# Patient Record
Sex: MALE | Race: WHITE | Employment: OTHER | URBAN - NONMETROPOLITAN AREA
[De-identification: names, ages, dates, MRNs, and addresses within clinical notes are randomized per-mention and may not be internally consistent; named-entity substitution may affect disease eponyms.]

---

## 2017-01-20 RX ORDER — PRAVASTATIN SODIUM 20 MG
TABLET ORAL
Qty: 90 TABLET | Refills: 3 | Status: SHIPPED | OUTPATIENT
Start: 2017-01-20 | End: 2017-12-02 | Stop reason: SDUPTHER

## 2017-01-20 RX ORDER — CLOPIDOGREL BISULFATE 75 MG/1
TABLET ORAL
Qty: 90 TABLET | Refills: 3 | Status: SHIPPED | OUTPATIENT
Start: 2017-01-20 | End: 2017-12-02 | Stop reason: SDUPTHER

## 2017-03-14 ENCOUNTER — OFFICE VISIT (OUTPATIENT)
Dept: PULMONOLOGY | Age: 72
End: 2017-03-14

## 2017-03-14 VITALS
HEART RATE: 72 BPM | HEIGHT: 64 IN | DIASTOLIC BLOOD PRESSURE: 73 MMHG | BODY MASS INDEX: 23.18 KG/M2 | OXYGEN SATURATION: 97 % | WEIGHT: 135.8 LBS | SYSTOLIC BLOOD PRESSURE: 106 MMHG | TEMPERATURE: 96.8 F

## 2017-03-14 DIAGNOSIS — J44.9 COPD, SEVERE (HCC): ICD-10-CM

## 2017-03-14 DIAGNOSIS — J44.1 COPD EXACERBATION (HCC): Primary | ICD-10-CM

## 2017-03-14 PROCEDURE — G8420 CALC BMI NORM PARAMETERS: HCPCS | Performed by: INTERNAL MEDICINE

## 2017-03-14 PROCEDURE — G8427 DOCREV CUR MEDS BY ELIG CLIN: HCPCS | Performed by: INTERNAL MEDICINE

## 2017-03-14 PROCEDURE — 3017F COLORECTAL CA SCREEN DOC REV: CPT | Performed by: INTERNAL MEDICINE

## 2017-03-14 PROCEDURE — 3023F SPIROM DOC REV: CPT | Performed by: INTERNAL MEDICINE

## 2017-03-14 PROCEDURE — G8598 ASA/ANTIPLAT THER USED: HCPCS | Performed by: INTERNAL MEDICINE

## 2017-03-14 PROCEDURE — G8926 SPIRO NO PERF OR DOC: HCPCS | Performed by: INTERNAL MEDICINE

## 2017-03-14 PROCEDURE — 4040F PNEUMOC VAC/ADMIN/RCVD: CPT | Performed by: INTERNAL MEDICINE

## 2017-03-14 PROCEDURE — 99214 OFFICE O/P EST MOD 30 MIN: CPT | Performed by: INTERNAL MEDICINE

## 2017-03-14 PROCEDURE — 1036F TOBACCO NON-USER: CPT | Performed by: INTERNAL MEDICINE

## 2017-03-14 PROCEDURE — G8484 FLU IMMUNIZE NO ADMIN: HCPCS | Performed by: INTERNAL MEDICINE

## 2017-03-14 PROCEDURE — 1123F ACP DISCUSS/DSCN MKR DOCD: CPT | Performed by: INTERNAL MEDICINE

## 2017-03-14 RX ORDER — DOXYCYCLINE HYCLATE 100 MG
100 TABLET ORAL 2 TIMES DAILY
COMMUNITY
End: 2017-04-04

## 2017-03-14 RX ORDER — PREDNISONE 10 MG/1
TABLET ORAL
Qty: 30 TABLET | Refills: 0 | Status: SHIPPED | OUTPATIENT
Start: 2017-03-14 | End: 2017-03-24

## 2017-04-04 PROBLEM — J44.1 COPD WITH ACUTE EXACERBATION (HCC): Status: ACTIVE | Noted: 2017-04-04

## 2017-04-19 ENCOUNTER — TELEPHONE (OUTPATIENT)
Dept: PULMONOLOGY | Age: 72
End: 2017-04-19

## 2017-04-26 ENCOUNTER — OFFICE VISIT (OUTPATIENT)
Dept: ENDOCRINOLOGY | Age: 72
End: 2017-04-26

## 2017-04-26 VITALS
TEMPERATURE: 98.1 F | BODY MASS INDEX: 23.56 KG/M2 | SYSTOLIC BLOOD PRESSURE: 132 MMHG | WEIGHT: 138 LBS | HEIGHT: 64 IN | RESPIRATION RATE: 30 BRPM | DIASTOLIC BLOOD PRESSURE: 74 MMHG | HEART RATE: 76 BPM | OXYGEN SATURATION: 96 %

## 2017-04-26 DIAGNOSIS — M81.0 OSTEOPOROSIS: Primary | ICD-10-CM

## 2017-04-26 PROCEDURE — 3017F COLORECTAL CA SCREEN DOC REV: CPT | Performed by: CLINICAL NURSE SPECIALIST

## 2017-04-26 PROCEDURE — G8420 CALC BMI NORM PARAMETERS: HCPCS | Performed by: CLINICAL NURSE SPECIALIST

## 2017-04-26 PROCEDURE — 1036F TOBACCO NON-USER: CPT | Performed by: CLINICAL NURSE SPECIALIST

## 2017-04-26 PROCEDURE — 4005F PHARM THX FOR OP RXD: CPT | Performed by: CLINICAL NURSE SPECIALIST

## 2017-04-26 PROCEDURE — G8427 DOCREV CUR MEDS BY ELIG CLIN: HCPCS | Performed by: CLINICAL NURSE SPECIALIST

## 2017-04-26 PROCEDURE — 1123F ACP DISCUSS/DSCN MKR DOCD: CPT | Performed by: CLINICAL NURSE SPECIALIST

## 2017-04-26 PROCEDURE — G8598 ASA/ANTIPLAT THER USED: HCPCS | Performed by: CLINICAL NURSE SPECIALIST

## 2017-04-26 PROCEDURE — 1111F DSCHRG MED/CURRENT MED MERGE: CPT | Performed by: CLINICAL NURSE SPECIALIST

## 2017-04-26 PROCEDURE — 99213 OFFICE O/P EST LOW 20 MIN: CPT | Performed by: CLINICAL NURSE SPECIALIST

## 2017-04-26 PROCEDURE — 4040F PNEUMOC VAC/ADMIN/RCVD: CPT | Performed by: CLINICAL NURSE SPECIALIST

## 2017-04-26 ASSESSMENT — ENCOUNTER SYMPTOMS
NAUSEA: 0
ABDOMINAL PAIN: 0
EYE REDNESS: 0
VOICE CHANGE: 0
CONSTIPATION: 0
WHEEZING: 0
CHEST TIGHTNESS: 0
COUGH: 0
SHORTNESS OF BREATH: 1
DIARRHEA: 0

## 2017-05-22 DIAGNOSIS — E83.52 HYPERCALCEMIA: ICD-10-CM

## 2017-05-22 DIAGNOSIS — M81.0 OSTEOPOROSIS: ICD-10-CM

## 2017-05-22 DIAGNOSIS — M81.0 SENILE OSTEOPOROSIS: ICD-10-CM

## 2017-05-22 DIAGNOSIS — M81.8 STEROID-INDUCED OSTEOPOROSIS: ICD-10-CM

## 2017-05-22 DIAGNOSIS — T38.0X5A STEROID-INDUCED OSTEOPOROSIS: ICD-10-CM

## 2017-05-22 DIAGNOSIS — J44.9 COPD, SEVERE (HCC): ICD-10-CM

## 2017-05-22 RX ORDER — ALENDRONATE SODIUM 70 MG/1
TABLET ORAL
Qty: 12 TABLET | Refills: 1 | Status: SHIPPED | OUTPATIENT
Start: 2017-05-22 | End: 2017-12-04 | Stop reason: SDUPTHER

## 2017-07-26 ENCOUNTER — HOSPITAL ENCOUNTER (OUTPATIENT)
Age: 72
Discharge: HOME | End: 2017-07-26
Payer: MEDICARE

## 2017-07-26 DIAGNOSIS — M81.0 OSTEOPOROSIS: ICD-10-CM

## 2017-07-26 LAB
ANION GAP SERPL CALCULATED.3IONS-SCNC: 11 MEQ/L (ref 8–16)
BUN BLDV-MCNC: 17 MG/DL (ref 7–22)
CALCIUM SERPL-MCNC: 10 MG/DL (ref 8.5–10.5)
CHLORIDE BLD-SCNC: 94 MEQ/L (ref 98–111)
CO2: 29 MEQ/L (ref 23–33)
CREAT SERPL-MCNC: 0.7 MG/DL (ref 0.4–1.2)
GFR SERPL CREATININE-BSD FRML MDRD: > 90 ML/MIN/1.73M2
GLUCOSE BLD-MCNC: 78 MG/DL (ref 70–108)
POTASSIUM SERPL-SCNC: 4.4 MEQ/L (ref 3.5–5.2)
SODIUM BLD-SCNC: 134 MEQ/L (ref 135–145)
VITAMIN D 25-HYDROXY: 49 NG/ML (ref 30–100)

## 2017-07-26 PROCEDURE — 82652 VIT D 1 25-DIHYDROXY: CPT

## 2017-07-26 PROCEDURE — 36415 COLL VENOUS BLD VENIPUNCTURE: CPT

## 2017-07-26 PROCEDURE — 82306 VITAMIN D 25 HYDROXY: CPT

## 2017-07-26 PROCEDURE — 80048 BASIC METABOLIC PNL TOTAL CA: CPT

## 2017-07-28 LAB — VITAMIN D 1,25-DIHYDROXY: 31.1 PG/ML (ref 19.9–79.3)

## 2017-08-31 ENCOUNTER — OFFICE VISIT (OUTPATIENT)
Dept: INTERNAL MEDICINE CLINIC | Age: 72
End: 2017-08-31
Payer: MEDICARE

## 2017-08-31 ENCOUNTER — HOSPITAL ENCOUNTER (OUTPATIENT)
Dept: PHYSICAL THERAPY | Age: 72
Setting detail: THERAPIES SERIES
Discharge: HOME | End: 2017-08-31
Payer: MEDICARE

## 2017-08-31 VITALS
HEART RATE: 71 BPM | HEIGHT: 64 IN | RESPIRATION RATE: 18 BRPM | BODY MASS INDEX: 23.99 KG/M2 | DIASTOLIC BLOOD PRESSURE: 66 MMHG | WEIGHT: 140.5 LBS | SYSTOLIC BLOOD PRESSURE: 130 MMHG

## 2017-08-31 DIAGNOSIS — M81.0 OSTEOPOROSIS WITHOUT CURRENT PATHOLOGICAL FRACTURE, UNSPECIFIED OSTEOPOROSIS TYPE: Primary | ICD-10-CM

## 2017-08-31 DIAGNOSIS — Z79.52 CURRENT CHRONIC USE OF SYSTEMIC STEROIDS: ICD-10-CM

## 2017-08-31 PROCEDURE — 4005F PHARM THX FOR OP RXD: CPT | Performed by: INTERNAL MEDICINE

## 2017-08-31 PROCEDURE — G8979 MOBILITY GOAL STATUS: HCPCS

## 2017-08-31 PROCEDURE — 1036F TOBACCO NON-USER: CPT | Performed by: INTERNAL MEDICINE

## 2017-08-31 PROCEDURE — 1123F ACP DISCUSS/DSCN MKR DOCD: CPT | Performed by: INTERNAL MEDICINE

## 2017-08-31 PROCEDURE — 3017F COLORECTAL CA SCREEN DOC REV: CPT | Performed by: INTERNAL MEDICINE

## 2017-08-31 PROCEDURE — G8427 DOCREV CUR MEDS BY ELIG CLIN: HCPCS | Performed by: INTERNAL MEDICINE

## 2017-08-31 PROCEDURE — 4040F PNEUMOC VAC/ADMIN/RCVD: CPT | Performed by: INTERNAL MEDICINE

## 2017-08-31 PROCEDURE — G8420 CALC BMI NORM PARAMETERS: HCPCS | Performed by: INTERNAL MEDICINE

## 2017-08-31 PROCEDURE — G8978 MOBILITY CURRENT STATUS: HCPCS

## 2017-08-31 PROCEDURE — 97161 PT EVAL LOW COMPLEX 20 MIN: CPT

## 2017-08-31 PROCEDURE — 99213 OFFICE O/P EST LOW 20 MIN: CPT | Performed by: INTERNAL MEDICINE

## 2017-08-31 PROCEDURE — G8598 ASA/ANTIPLAT THER USED: HCPCS | Performed by: INTERNAL MEDICINE

## 2017-08-31 PROCEDURE — 97110 THERAPEUTIC EXERCISES: CPT

## 2017-08-31 ASSESSMENT — PAIN DESCRIPTION - ORIENTATION: ORIENTATION: LEFT

## 2017-08-31 ASSESSMENT — PAIN DESCRIPTION - LOCATION: LOCATION: BACK;NECK;HIP

## 2017-08-31 ASSESSMENT — PAIN DESCRIPTION - PAIN TYPE: TYPE: CHRONIC PAIN

## 2017-08-31 ASSESSMENT — PAIN SCALES - GENERAL: PAINLEVEL_OUTOF10: 4

## 2017-08-31 NOTE — PROGRESS NOTES
[facility]  PHYSICIANS [facility] [place] [address]  [address]  [address]  Dept: [phone]  Dept Fax: [phone]  Loc: [phone]    Visit Date: 10/1/2017    Ryan Herrera is a 67 y.o. male who presents today for:  Chief Complaint   Patient presents with    Osteoporosis    Discuss Labs     7/26/17            Subjective:   59-year-old male being followed for osteoporosis that was diagnosed last year. He has a history of long-term steroid usage for COPD. Most recent DEXA was last here with the T score of -2.7 at the femoral neck. There is no history of low trauma fracture. Patient is maintained on 70 mg of Fosamax weekly which he is taking with no GI side effects. He reports no musculoskeletal complaints. Patient reports no dental issues. We worked him up for secondary causes of osteoporosis and found to have an upper normal urinary excretion of calcium. This was in relation to increased salt intake and he has been accordingly advised to limit salt intake. There is no chest pain or shortness of breath.   Past Medical History:   Diagnosis Date    CAD (coronary artery disease)     COPD (chronic obstructive pulmonary disease) ([address]     Hyperlipidemia     Hypertension     Pneumonia       Past Surgical History:   Procedure Laterality Date    CARDIAC CATHETERIZATION  2012    STENT    COLONOSCOPY      ENDOSCOPY, COLON, DIAGNOSTIC      ROTATOR CUFF REPAIR         Family History   Problem Relation Age of Onset    High Cholesterol Father     Cancer Sister     Cancer Brother     Heart Disease Brother      Social History   Substance Use Topics    Smoking status: Former Smoker     Packs/day: 1.00     Years: 30.00     Types: Cigarettes     Start date: 5/3/1953     Quit date: 6/16/1983    Smokeless tobacco: Never Used    Alcohol use No      Current Outpatient Prescriptions   Medication Sig Dispense Refill    alendronate (FOSAMAX) 70 MG tablet Take 1 tablet by mouth  every 7 days 12 tablet 1    predniSONE (DELTASONE) 5 MG tablet Take 5 mg by mouth daily      guaiFENesin (MUCINEX) 600 MG extended release tablet Take 600 mg by mouth Daily      albuterol (PROVENTIL) (2.5 MG/3ML) 0.083% nebulizer solution Take 3 mLs by nebulization every 6 hours as needed for Wheezing or Shortness of Breath 360 vial 3    clopidogrel (PLAVIX) 75 MG tablet Take 1 tablet by mouth  daily 90 tablet 3    metoprolol tartrate (LOPRESSOR) 25 MG tablet Take 1 tablet by mouth  twice a day 180 tablet 3    pravastatin (PRAVACHOL) 20 MG tablet Take 1 tablet by mouth  daily 90 tablet 3    montelukast (SINGULAIR) 10 MG tablet Take 1 tablet by mouth  nightly 90 tablet 3    PROAIR  (90 BASE) MCG/ACT inhaler INHALE 2 PUFFS INTO THE  LUNGS EVERY 6 HOURS AS  NEEDED FOR WHEEZING OR  SHORTNESS OF BREATH 3 Inhaler 3    ADVAIR DISKUS 250-50 MCG/DOSE AEPB Use 1 puff every 12 hours 3 Inhaler 3    Calcium Carb-Cholecalciferol (CALCIUM 600 + D PO) Take 600 mg by mouth daily      potassium iodide (SSKI) 1 GM/ML solution Take by mouth daily 5 drops into oral liquid as Dr suggested      nitroGLYCERIN (NITROSTAT) 0.4 MG SL tablet Place 1 tablet under the tongue every 5 minutes as needed. 25 tablet 3    Oxygen Concentrator 2 L by Nasal route as needed.  aspirin 81 MG tablet Take 81 mg by mouth daily. No current facility-administered medications for this visit.       Allergies   Allergen Reactions    Flonase [Fluticasone Propionate]      Pt shakes and feels anxious    Mometasone Furoate      Shakes,feels anxious    Spiriva Handihaler [Tiotropium Bromide Monohydrate]      Health Maintenance   Topic Date Due    AAA screen  1945    DTaP/Tdap/Td vaccine (1 - Tdap) 08/01/1963    Zostavax vaccine  08/01/2004    Pneumococcal low/med risk (1 of 2 - PCV13) 08/01/2009    Flu vaccine (1) 09/01/2017    Lipid screen  04/14/2019    Colon cancer screen colonoscopy  12/11/2022       Labs  No results found for: LABA1C  No results found for: EAG  Lab Results   Component Value Date    TSH 2.190 06/28/2016     Lab Results   Component Value Date    CHOL 124 04/14/2014    CHOL 101 07/09/2013    CHOL 137 03/20/2012     Lab Results   Component Value Date    TRIG 33 04/14/2014    TRIG 24 07/09/2013    TRIG 23 03/20/2012     Lab Results   Component Value Date    HDL 71 (A) 04/14/2014    HDL 60 07/09/2013    HDL 52 03/20/2012     Lab Results   Component Value Date    LDLCALC 46 04/14/2014    LDLCALC 36 07/09/2013    [address] 03/20/2012     Lab Results   Component Value Date    VLDL 7 04/14/2014     No results found for: CHOLHDLRATIO      Review of Systems  Constitutional: negative for chills and fevers  Eyes: negative for irritation, redness and visual disturbance  Respiratory: negative for cough, shortness of breath and wheezing  Cardiovascular: negative for chest pain, irregular heart beat and palpitations    The remainder of systems were reviewed and negative.      Objective:   /66  Pulse 71  Resp 18  Ht 5' 4\" (1.626 m)  Wt 140 lb 8 oz (63.7 kg)  BMI 24.12 kg/m2    General:  alert, appears stated age, cooperative and no distress   Oropharynx: normal findings: lips normal without lesions, buccal mucosa normal, gums healthy and tongue midline and normal    Eyes:  negative findings: lids and lashes normal, conjunctivae and sclerae normal, corneas clear and pupils equal, round, reactive to light and accomodation       Neck: no adenopathy, no carotid bruit, no JVD, supple, symmetrical, trachea midline and thyroid not enlarged, symmetric, no tenderness/mass/nodules   Thyroid:  no palpable nodule   Lung: clear to auscultation bilaterally   Heart:  regular rate and rhythm, S1, S2 normal, no murmur, click, rub or gallop and normal apical impulse   Abdomen: soft, non-tender; bowel sounds normal; no masses,  no organomegaly   Extremities: extremities normal, atraumatic, no cyanosis or edema and Homans sign is negative, no sign of DVT   Pulses: 2+ and symmetric   Skin: warm and dry, no hyperpigmentation, vitiligo, or suspicious lesions   Neuro: normal without focal findings, mental status, speech normal, alert and oriented x3, PANCHO, cranial nerves 2-12 intact, muscle tone and strength normal and symmetric. Assessment/Plan:     1. Osteoporosis without current pathological fracture, unspecified osteoporosis type: Recent is tolerating Fosamax well. We'll monitor vitamin D status. I reinforced the need to limit salt intake. He will benefit from PT referral for muscle strengthening and appropriate training in posture and gait. 2. Current chronic use of systemic steroids: Required for COPD. Management by pulmonary. Orders Placed This Encounter   Procedures    Vitamin D 25 Hydroxy     Standing Status:   Future     Standing Expiration Date:   8/31/2018    Basic Metabolic Panel     Standing Status:   Future     Standing Expiration Date:   8/31/2018   [facility] - [facility]     Referral Priority:   Routine     Referral Type:   Eval and Treat     Referral Reason:   Specialty Services Required     Requested Specialty:   Physical Therapy     Number of Visits Requested:   1       · The risks and benefits of my recommendations, as well as other treatment options were discussed with the patient today. Questions were answered. · Follow up: 6 months and as needed. Electronically signed by Jadiel Velazquez MD on 8/31/17 at 8:36 AM    **This report has been created using voice recognition software. It may   contain minor errors which are inherent in voice recognition technology. **

## 2017-08-31 NOTE — LETTER
Start date: 5/3/1953     Quit date: 6/16/1983    Smokeless tobacco: Never Used    Alcohol use No      Current Outpatient Prescriptions   Medication Sig Dispense Refill    alendronate (FOSAMAX) 70 MG tablet Take 1 tablet by mouth  every 7 days 12 tablet 1    predniSONE (DELTASONE) 5 MG tablet Take 5 mg by mouth daily      guaiFENesin (MUCINEX) 600 MG extended release tablet Take 600 mg by mouth Daily      albuterol (PROVENTIL) (2.5 MG/3ML) 0.083% nebulizer solution Take 3 mLs by nebulization every 6 hours as needed for Wheezing or Shortness of Breath 360 vial 3    clopidogrel (PLAVIX) 75 MG tablet Take 1 tablet by mouth  daily 90 tablet 3    metoprolol tartrate (LOPRESSOR) 25 MG tablet Take 1 tablet by mouth  twice a day 180 tablet 3    pravastatin (PRAVACHOL) 20 MG tablet Take 1 tablet by mouth  daily 90 tablet 3    montelukast (SINGULAIR) 10 MG tablet Take 1 tablet by mouth  nightly 90 tablet 3    PROAIR  (90 BASE) MCG/ACT inhaler INHALE 2 PUFFS INTO THE  LUNGS EVERY 6 HOURS AS  NEEDED FOR WHEEZING OR  SHORTNESS OF BREATH 3 Inhaler 3    ADVAIR DISKUS 250-50 MCG/DOSE AEPB Use 1 puff every 12 hours 3 Inhaler 3    Calcium Carb-Cholecalciferol (CALCIUM 600 + D PO) Take 600 mg by mouth daily      potassium iodide (SSKI) 1 GM/ML solution Take by mouth daily 5 drops into oral liquid as Dr suggested      nitroGLYCERIN (NITROSTAT) 0.4 MG SL tablet Place 1 tablet under the tongue every 5 minutes as needed. 25 tablet 3    Oxygen Concentrator 2 L by Nasal route as needed.  aspirin 81 MG tablet Take 81 mg by mouth daily. No current facility-administered medications for this visit.       Allergies   Allergen Reactions    Flonase [Fluticasone Propionate]      Pt shakes and feels anxious    Mometasone Furoate      Shakes,feels anxious    Spiriva Handihaler [Tiotropium Bromide Monohydrate]      Health Maintenance   Topic Date Due    AAA screen  1945

## 2017-08-31 NOTE — MR AVS SNAPSHOT
Osteoporosis without current pathological fracture, unspecified osteoporosis type   [[id number]]         Vital Signs     Blood Pressure Pulse Respirations Height Weight Body Mass Index    130/66 71 18 5' 4\" (1.626 m) 140 lb 8 oz (63.7 kg) 24.12 kg/m2    Smoking Status                   Former Smoker              Today's Medication Changes          These changes are accurate as of: 8/31/17  8:49 AM.  If you have any questions, ask your nurse or doctor. STOP taking these medications           AMOXICILLIN PO               Your Current Medications Are              alendronate (FOSAMAX) 70 MG tablet Take 1 tablet by mouth  every 7 days    predniSONE (DELTASONE) 5 MG tablet Take 5 mg by mouth daily    guaiFENesin (MUCINEX) 600 MG extended release tablet Take 600 mg by mouth Daily    albuterol (PROVENTIL) (2.5 MG/3ML) 0.083% nebulizer solution Take 3 mLs by nebulization every 6 hours as needed for Wheezing or Shortness of Breath    clopidogrel (PLAVIX) 75 MG tablet Take 1 tablet by mouth  daily    metoprolol tartrate (LOPRESSOR) 25 MG tablet Take 1 tablet by mouth  twice a day    pravastatin (PRAVACHOL) 20 MG tablet Take 1 tablet by mouth  daily    montelukast (SINGULAIR) 10 MG tablet Take 1 tablet by mouth  nightly    PROAIR  (90 BASE) MCG/ACT inhaler INHALE 2 PUFFS INTO THE  LUNGS EVERY 6 HOURS AS  NEEDED FOR WHEEZING OR  SHORTNESS OF BREATH    ADVAIR DISKUS 250-50 MCG/DOSE AEPB Use 1 puff every 12 hours    Calcium Carb-Cholecalciferol (CALCIUM 600 + D PO) Take 600 mg by mouth daily    potassium iodide (SSKI) 1 GM/ML solution Take by mouth daily 5 drops into oral liquid as Dr suggested    nitroGLYCERIN (NITROSTAT) 0.4 MG SL tablet Place 1 tablet under the tongue every 5 minutes as needed. Oxygen Concentrator 2 L by Nasal route as needed. aspirin 81 MG tablet Take 81 mg by mouth daily.       Allergies              Flonase [Fluticasone Propionate]     Pt shakes and feels anxious Mometasone Furoate     Shakes,feels anxious    Spiriva Handihaler [Tiotropium Bromide Monohydrate]       We Ordered/Performed the following           [facility] - [facility]     Scheduling Instructions:    [facility]  [address]  [phone]    Comments: The patient can be scheduled with any member of the group, including the provider with the first available appointments. Additional Information        Basic Information     Date Of Birth Sex Race Ethnicity Preferred Language Preferred Written Language    1945 Male White Non-/Non  English English      Problem List as of 8/31/2017  Date Reviewed: 8/31/2017                COPD with acute exacerbation (HCC)    Chronic obstructive pulmonary disease with acute exacerbation (HCC)    Hypercalcemia    Wheezing    Preop testing    Nasal congestion    Deviated nasal septum    Tinnitus    COPD (chronic obstructive pulmonary disease) (HCC)    CAD (coronary artery disease)      Immunizations as of 8/31/2017     Name Date    Influenza Vaccine, unspecified formulation 10/30/2016      Preventive Care        Date Due    One-time abdominal aortic aneurism (AAA) screening is recommended for all men between the age of 73-68 who have ever smoked 1945    Tetanus Combination Vaccine (1 - Tdap) 8/1/1963    Zoster Vaccine 8/1/2004    Pneumococcal Vaccines (two) for all adults aged 72 and over (1 of 2 - PCV13) 8/1/2009    Yearly Flu Vaccine (1) 8/1/2017    Cholesterol Screening 4/14/2019    Colonoscopy 12/11/2022            [facility] Signup           Our records indicate that you have an active [facility] account. You can view your After Visit Summary by going to [email or web address] and logging in with your [facility] username and password.       If you don't have a [facility] username and password but a parent or

## 2017-09-06 ENCOUNTER — HOSPITAL ENCOUNTER (OUTPATIENT)
Dept: PHYSICAL THERAPY | Age: 72
Setting detail: THERAPIES SERIES
Discharge: HOME | End: 2017-09-06
Payer: MEDICARE

## 2017-09-06 PROCEDURE — 97110 THERAPEUTIC EXERCISES: CPT

## 2017-09-07 ENCOUNTER — HOSPITAL ENCOUNTER (OUTPATIENT)
Dept: PHYSICAL THERAPY | Age: 72
Setting detail: THERAPIES SERIES
Discharge: HOME | End: 2017-09-07
Payer: MEDICARE

## 2017-09-07 PROCEDURE — 97110 THERAPEUTIC EXERCISES: CPT

## 2017-09-11 ENCOUNTER — HOSPITAL ENCOUNTER (OUTPATIENT)
Dept: PHYSICAL THERAPY | Age: 72
Setting detail: THERAPIES SERIES
Discharge: HOME | End: 2017-09-11
Payer: MEDICARE

## 2017-09-11 PROCEDURE — G8980 MOBILITY D/C STATUS: HCPCS

## 2017-09-11 PROCEDURE — 97110 THERAPEUTIC EXERCISES: CPT

## 2017-09-11 PROCEDURE — G8979 MOBILITY GOAL STATUS: HCPCS

## 2017-09-19 ENCOUNTER — OFFICE VISIT (OUTPATIENT)
Dept: PULMONOLOGY | Age: 72
End: 2017-09-19
Payer: MEDICARE

## 2017-09-19 VITALS
HEIGHT: 64 IN | BODY MASS INDEX: 24.38 KG/M2 | WEIGHT: 142.8 LBS | OXYGEN SATURATION: 97 % | HEART RATE: 73 BPM | DIASTOLIC BLOOD PRESSURE: 76 MMHG | SYSTOLIC BLOOD PRESSURE: 115 MMHG | TEMPERATURE: 97.2 F

## 2017-09-19 DIAGNOSIS — J44.9 STEROID-DEPENDENT COPD (HCC): Primary | ICD-10-CM

## 2017-09-19 PROCEDURE — 3017F COLORECTAL CA SCREEN DOC REV: CPT | Performed by: INTERNAL MEDICINE

## 2017-09-19 PROCEDURE — G8427 DOCREV CUR MEDS BY ELIG CLIN: HCPCS | Performed by: INTERNAL MEDICINE

## 2017-09-19 PROCEDURE — G8420 CALC BMI NORM PARAMETERS: HCPCS | Performed by: INTERNAL MEDICINE

## 2017-09-19 PROCEDURE — 4040F PNEUMOC VAC/ADMIN/RCVD: CPT | Performed by: INTERNAL MEDICINE

## 2017-09-19 PROCEDURE — G8926 SPIRO NO PERF OR DOC: HCPCS | Performed by: INTERNAL MEDICINE

## 2017-09-19 PROCEDURE — 1036F TOBACCO NON-USER: CPT | Performed by: INTERNAL MEDICINE

## 2017-09-19 PROCEDURE — 3023F SPIROM DOC REV: CPT | Performed by: INTERNAL MEDICINE

## 2017-09-19 PROCEDURE — G8598 ASA/ANTIPLAT THER USED: HCPCS | Performed by: INTERNAL MEDICINE

## 2017-09-19 PROCEDURE — 1123F ACP DISCUSS/DSCN MKR DOCD: CPT | Performed by: INTERNAL MEDICINE

## 2017-09-19 PROCEDURE — 99214 OFFICE O/P EST MOD 30 MIN: CPT | Performed by: INTERNAL MEDICINE

## 2017-12-04 DIAGNOSIS — J44.9 COPD, SEVERE (HCC): ICD-10-CM

## 2017-12-04 DIAGNOSIS — E83.52 HYPERCALCEMIA: ICD-10-CM

## 2017-12-04 DIAGNOSIS — M81.0 SENILE OSTEOPOROSIS: ICD-10-CM

## 2017-12-04 DIAGNOSIS — T38.0X5A STEROID-INDUCED OSTEOPOROSIS: ICD-10-CM

## 2017-12-04 DIAGNOSIS — M81.0 OSTEOPOROSIS: ICD-10-CM

## 2017-12-04 DIAGNOSIS — M81.8 STEROID-INDUCED OSTEOPOROSIS: ICD-10-CM

## 2017-12-04 RX ORDER — PRAVASTATIN SODIUM 20 MG
TABLET ORAL
Qty: 90 TABLET | Refills: 3 | Status: SHIPPED | OUTPATIENT
Start: 2017-12-04 | End: 2018-11-25 | Stop reason: SDUPTHER

## 2017-12-04 RX ORDER — CLOPIDOGREL BISULFATE 75 MG/1
TABLET ORAL
Qty: 90 TABLET | Refills: 3 | Status: SHIPPED | OUTPATIENT
Start: 2017-12-04 | End: 2018-11-25 | Stop reason: SDUPTHER

## 2017-12-04 RX ORDER — ALENDRONATE SODIUM 70 MG/1
TABLET ORAL
Qty: 12 TABLET | Refills: 1 | Status: SHIPPED | OUTPATIENT
Start: 2017-12-04 | End: 2018-07-14 | Stop reason: SDUPTHER

## 2017-12-12 ENCOUNTER — OFFICE VISIT (OUTPATIENT)
Dept: CARDIOLOGY CLINIC | Age: 72
End: 2017-12-12
Payer: MEDICARE

## 2017-12-12 VITALS
DIASTOLIC BLOOD PRESSURE: 64 MMHG | WEIGHT: 142.2 LBS | BODY MASS INDEX: 24.28 KG/M2 | SYSTOLIC BLOOD PRESSURE: 118 MMHG | HEIGHT: 64 IN | HEART RATE: 80 BPM

## 2017-12-12 DIAGNOSIS — J44.9 CHRONIC OBSTRUCTIVE PULMONARY DISEASE, UNSPECIFIED COPD TYPE (HCC): ICD-10-CM

## 2017-12-12 DIAGNOSIS — I10 ESSENTIAL HYPERTENSION: ICD-10-CM

## 2017-12-12 DIAGNOSIS — I25.10 CORONARY ARTERY DISEASE INVOLVING NATIVE CORONARY ARTERY OF NATIVE HEART WITHOUT ANGINA PECTORIS: Primary | ICD-10-CM

## 2017-12-12 PROCEDURE — G8484 FLU IMMUNIZE NO ADMIN: HCPCS | Performed by: NUCLEAR MEDICINE

## 2017-12-12 PROCEDURE — 4040F PNEUMOC VAC/ADMIN/RCVD: CPT | Performed by: NUCLEAR MEDICINE

## 2017-12-12 PROCEDURE — 1036F TOBACCO NON-USER: CPT | Performed by: NUCLEAR MEDICINE

## 2017-12-12 PROCEDURE — G8420 CALC BMI NORM PARAMETERS: HCPCS | Performed by: NUCLEAR MEDICINE

## 2017-12-12 PROCEDURE — G8926 SPIRO NO PERF OR DOC: HCPCS | Performed by: NUCLEAR MEDICINE

## 2017-12-12 PROCEDURE — 3023F SPIROM DOC REV: CPT | Performed by: NUCLEAR MEDICINE

## 2017-12-12 PROCEDURE — G8427 DOCREV CUR MEDS BY ELIG CLIN: HCPCS | Performed by: NUCLEAR MEDICINE

## 2017-12-12 PROCEDURE — 99213 OFFICE O/P EST LOW 20 MIN: CPT | Performed by: NUCLEAR MEDICINE

## 2017-12-12 PROCEDURE — 1123F ACP DISCUSS/DSCN MKR DOCD: CPT | Performed by: NUCLEAR MEDICINE

## 2017-12-12 PROCEDURE — 3017F COLORECTAL CA SCREEN DOC REV: CPT | Performed by: NUCLEAR MEDICINE

## 2017-12-12 PROCEDURE — G8598 ASA/ANTIPLAT THER USED: HCPCS | Performed by: NUCLEAR MEDICINE

## 2017-12-12 NOTE — PROGRESS NOTES
Pt here for 1 year fu     Denies chest pain, dizziness   Has some SOB but has COPD   NO MED LIST TODAY

## 2017-12-12 NOTE — PROGRESS NOTES
[facility]  [facility]  [address] [facility] [address]  Dept: [phone]  Dept Fax: [phone]  Loc: [phone]    Visit Date: 12/12/2017    Robe Castellanos is a 67 y.o. male who presents today for:  Chief Complaint   Patient presents with    Check-Up     CAD    Coronary Artery Disease    COPD    Hypertension    Shortness of Breath     Has severe COPD  Cardiac fair  No chest pain  No changes in breathing  LM stent  No new cardiac symptoms   On home O2    HPI:  HPI  Past Medical History:   Diagnosis Date    CAD (coronary artery disease)     COPD (chronic obstructive pulmonary disease) ([address]     Hyperlipidemia     Hypertension     Pneumonia       Past Surgical History:   Procedure Laterality Date    CARDIAC CATHETERIZATION  2012    STENT    COLONOSCOPY      ENDOSCOPY, COLON, DIAGNOSTIC      ROTATOR CUFF REPAIR       Family History   Problem Relation Age of Onset    High Cholesterol Father     Cancer Sister     Cancer Brother     Heart Disease Brother      Social History   Substance Use Topics    Smoking status: Former Smoker     Packs/day: 1.00     Years: 30.00     Types: Cigarettes     Start date: 5/3/1953     Quit date: 6/16/1983    Smokeless tobacco: Never Used    Alcohol use No      Current Outpatient Prescriptions   Medication Sig Dispense Refill    pravastatin (PRAVACHOL) 20 MG tablet TAKE 1 TABLET BY MOUTH  DAILY 90 tablet 3    metoprolol tartrate (LOPRESSOR) 25 MG tablet TAKE 1 TABLET BY MOUTH  TWICE A  tablet 3    clopidogrel (PLAVIX) 75 MG tablet TAKE 1 TABLET BY MOUTH  DAILY 90 tablet 3    alendronate (FOSAMAX) 70 MG tablet TAKE 1 TABLET BY MOUTH  EVERY 7 DAYS 12 tablet 1    predniSONE (DELTASONE) 5 MG tablet Take 5 mg by mouth daily      guaiFENesin (MUCINEX) 600 MG extended release tablet Take 600 mg by mouth Daily      albuterol (PROVENTIL) (2.5 MG/3ML) 0.083% nebulizer solution Take 3 mLs by nebulization every 6

## 2017-12-14 ENCOUNTER — HOSPITAL ENCOUNTER (OUTPATIENT)
Age: 72
Discharge: HOME | End: 2017-12-14
Payer: MEDICARE

## 2017-12-14 DIAGNOSIS — M81.0 OSTEOPOROSIS WITHOUT CURRENT PATHOLOGICAL FRACTURE, UNSPECIFIED OSTEOPOROSIS TYPE: ICD-10-CM

## 2017-12-14 LAB
ANION GAP SERPL CALCULATED.3IONS-SCNC: 12 MEQ/L (ref 8–16)
BUN BLDV-MCNC: 14 MG/DL (ref 7–22)
CALCIUM SERPL-MCNC: 9.7 MG/DL (ref 8.5–10.5)
CHLORIDE BLD-SCNC: 94 MEQ/L (ref 98–111)
CO2: 29 MEQ/L (ref 23–33)
CREAT SERPL-MCNC: 0.8 MG/DL (ref 0.4–1.2)
GFR SERPL CREATININE-BSD FRML MDRD: > 90 ML/MIN/1.73M2
GLUCOSE BLD-MCNC: 92 MG/DL (ref 70–108)
POTASSIUM SERPL-SCNC: 4.5 MEQ/L (ref 3.5–5.2)
SODIUM BLD-SCNC: 135 MEQ/L (ref 135–145)
VITAMIN D 25-HYDROXY: 49 NG/ML (ref 30–100)

## 2017-12-14 PROCEDURE — 36415 COLL VENOUS BLD VENIPUNCTURE: CPT

## 2017-12-14 PROCEDURE — 82306 VITAMIN D 25 HYDROXY: CPT

## 2017-12-14 PROCEDURE — 80048 BASIC METABOLIC PNL TOTAL CA: CPT

## 2018-03-08 ENCOUNTER — OFFICE VISIT (OUTPATIENT)
Dept: ENDOCRINOLOGY | Age: 73
End: 2018-03-08
Payer: MEDICARE

## 2018-03-08 VITALS
HEART RATE: 83 BPM | BODY MASS INDEX: 23.74 KG/M2 | WEIGHT: 142.5 LBS | RESPIRATION RATE: 14 BRPM | SYSTOLIC BLOOD PRESSURE: 118 MMHG | DIASTOLIC BLOOD PRESSURE: 67 MMHG | HEIGHT: 65 IN

## 2018-03-08 DIAGNOSIS — T38.0X5A STEROID-INDUCED OSTEOPOROSIS: Primary | ICD-10-CM

## 2018-03-08 DIAGNOSIS — M81.8 STEROID-INDUCED OSTEOPOROSIS: Primary | ICD-10-CM

## 2018-03-08 PROCEDURE — 1036F TOBACCO NON-USER: CPT | Performed by: NURSE PRACTITIONER

## 2018-03-08 PROCEDURE — G8484 FLU IMMUNIZE NO ADMIN: HCPCS | Performed by: NURSE PRACTITIONER

## 2018-03-08 PROCEDURE — 99213 OFFICE O/P EST LOW 20 MIN: CPT | Performed by: NURSE PRACTITIONER

## 2018-03-08 PROCEDURE — 4040F PNEUMOC VAC/ADMIN/RCVD: CPT | Performed by: NURSE PRACTITIONER

## 2018-03-08 PROCEDURE — G8427 DOCREV CUR MEDS BY ELIG CLIN: HCPCS | Performed by: NURSE PRACTITIONER

## 2018-03-08 PROCEDURE — G8598 ASA/ANTIPLAT THER USED: HCPCS | Performed by: NURSE PRACTITIONER

## 2018-03-08 PROCEDURE — G8420 CALC BMI NORM PARAMETERS: HCPCS | Performed by: NURSE PRACTITIONER

## 2018-03-08 PROCEDURE — 1123F ACP DISCUSS/DSCN MKR DOCD: CPT | Performed by: NURSE PRACTITIONER

## 2018-03-08 PROCEDURE — 3017F COLORECTAL CA SCREEN DOC REV: CPT | Performed by: NURSE PRACTITIONER

## 2018-03-08 ASSESSMENT — ENCOUNTER SYMPTOMS
NAUSEA: 0
TROUBLE SWALLOWING: 0
VOICE CHANGE: 0
ABDOMINAL PAIN: 0
VOMITING: 0
SHORTNESS OF BREATH: 1

## 2018-03-08 NOTE — PROGRESS NOTES
normal.   Rhonchi   Abdominal: Soft. Bowel sounds are normal.   Musculoskeletal: Normal range of motion. Neurological: He is alert and oriented to person, place, and time. Skin: Skin is warm and dry. Psychiatric: He has a normal mood and affect. Assessment / Plan:      1. Steroid induced osteoporosis. Maintained on and tolerating Fosamax. Repeat DXA due, will schedule. Repeat vit d and bmp in June. Fall precautions. Weight bearing activities. Continue supplements. RTC in 6 months.

## 2018-03-21 ENCOUNTER — HOSPITAL ENCOUNTER (OUTPATIENT)
Dept: WOMENS IMAGING | Age: 73
Discharge: HOME | End: 2018-03-21
Payer: MEDICARE

## 2018-03-21 DIAGNOSIS — T38.0X5A STEROID-INDUCED OSTEOPOROSIS: ICD-10-CM

## 2018-03-21 DIAGNOSIS — M81.8 STEROID-INDUCED OSTEOPOROSIS: ICD-10-CM

## 2018-03-21 PROCEDURE — 77080 DXA BONE DENSITY AXIAL: CPT

## 2018-06-07 ENCOUNTER — HOSPITAL ENCOUNTER (OUTPATIENT)
Age: 73
Discharge: HOME | End: 2018-06-07
Payer: MEDICARE

## 2018-06-07 DIAGNOSIS — M81.8 STEROID-INDUCED OSTEOPOROSIS: ICD-10-CM

## 2018-06-07 DIAGNOSIS — T38.0X5A STEROID-INDUCED OSTEOPOROSIS: ICD-10-CM

## 2018-06-07 LAB
ANION GAP SERPL CALCULATED.3IONS-SCNC: 13 MEQ/L (ref 8–16)
BUN BLDV-MCNC: 13 MG/DL (ref 7–22)
CALCIUM SERPL-MCNC: 9.3 MG/DL (ref 8.5–10.5)
CHLORIDE BLD-SCNC: 99 MEQ/L (ref 98–111)
CO2: 26 MEQ/L (ref 23–33)
CREAT SERPL-MCNC: 0.7 MG/DL (ref 0.4–1.2)
GFR SERPL CREATININE-BSD FRML MDRD: > 90 ML/MIN/1.73M2
GLUCOSE BLD-MCNC: 96 MG/DL (ref 70–108)
POTASSIUM SERPL-SCNC: 5.3 MEQ/L (ref 3.5–5.2)
SODIUM BLD-SCNC: 138 MEQ/L (ref 135–145)
VITAMIN D 25-HYDROXY: 49 NG/ML (ref 30–100)

## 2018-06-07 PROCEDURE — 80048 BASIC METABOLIC PNL TOTAL CA: CPT

## 2018-06-07 PROCEDURE — 82306 VITAMIN D 25 HYDROXY: CPT

## 2018-06-07 PROCEDURE — 36415 COLL VENOUS BLD VENIPUNCTURE: CPT

## 2018-06-13 ASSESSMENT — ENCOUNTER SYMPTOMS
SINUS PAIN: 0
DIARRHEA: 0
SPUTUM PRODUCTION: 0
BLURRED VISION: 0
SORE THROAT: 0
ORTHOPNEA: 0
HEMOPTYSIS: 0
VOMITING: 0
DOUBLE VISION: 0
COUGH: 0
NAUSEA: 0

## 2018-06-14 ENCOUNTER — OFFICE VISIT (OUTPATIENT)
Dept: PULMONOLOGY | Age: 73
End: 2018-06-14
Payer: MEDICARE

## 2018-06-14 VITALS
BODY MASS INDEX: 24.66 KG/M2 | HEIGHT: 65 IN | TEMPERATURE: 96.3 F | DIASTOLIC BLOOD PRESSURE: 70 MMHG | SYSTOLIC BLOOD PRESSURE: 110 MMHG | WEIGHT: 148 LBS | OXYGEN SATURATION: 96 % | HEART RATE: 74 BPM

## 2018-06-14 DIAGNOSIS — J44.9 CHRONIC OBSTRUCTIVE PULMONARY DISEASE, UNSPECIFIED COPD TYPE (HCC): Primary | ICD-10-CM

## 2018-06-14 PROCEDURE — 99214 OFFICE O/P EST MOD 30 MIN: CPT | Performed by: NURSE PRACTITIONER

## 2018-06-14 ASSESSMENT — ENCOUNTER SYMPTOMS
WHEEZING: 1
SHORTNESS OF BREATH: 1

## 2018-07-14 DIAGNOSIS — E83.52 HYPERCALCEMIA: ICD-10-CM

## 2018-07-14 DIAGNOSIS — T38.0X5A STEROID-INDUCED OSTEOPOROSIS: ICD-10-CM

## 2018-07-14 DIAGNOSIS — M81.8 STEROID-INDUCED OSTEOPOROSIS: ICD-10-CM

## 2018-07-14 DIAGNOSIS — M81.0 OSTEOPOROSIS: ICD-10-CM

## 2018-07-14 DIAGNOSIS — M81.0 SENILE OSTEOPOROSIS: ICD-10-CM

## 2018-07-14 DIAGNOSIS — J44.9 COPD, SEVERE (HCC): ICD-10-CM

## 2018-07-16 RX ORDER — ALENDRONATE SODIUM 70 MG/1
TABLET ORAL
Qty: 12 TABLET | Refills: 1 | Status: SHIPPED | OUTPATIENT
Start: 2018-07-16 | End: 2018-12-31 | Stop reason: SDUPTHER

## 2018-07-19 ENCOUNTER — TELEPHONE (OUTPATIENT)
Dept: INTERNAL MEDICINE CLINIC | Age: 73
End: 2018-07-19

## 2018-07-19 NOTE — TELEPHONE ENCOUNTER
Patient received [employer] message that Juan De La Cruz is leaving. He is requesting lab results from 6/7/18. He wishes to discuss referral with PCP. His appt with Ashish for 9/13/18 has been cancelled.

## 2018-07-24 RX ORDER — ALBUTEROL SULFATE 90 UG/1
2 AEROSOL, METERED RESPIRATORY (INHALATION) EVERY 6 HOURS PRN
Qty: 1 INHALER | Refills: 3 | Status: SHIPPED | OUTPATIENT
Start: 2018-07-24 | End: 2018-10-01 | Stop reason: SDUPTHER

## 2018-10-01 ENCOUNTER — TELEPHONE (OUTPATIENT)
Dept: PULMONOLOGY | Age: 73
End: 2018-10-01

## 2018-10-01 DIAGNOSIS — J44.9 STAGE 4 VERY SEVERE COPD BY GOLD CLASSIFICATION (HCC): Primary | ICD-10-CM

## 2018-10-01 RX ORDER — ALBUTEROL SULFATE 90 UG/1
2 AEROSOL, METERED RESPIRATORY (INHALATION) EVERY 6 HOURS PRN
Qty: 3 INHALER | Refills: 3 | Status: SHIPPED | OUTPATIENT
Start: 2018-10-01 | End: 2020-03-18

## 2018-10-01 NOTE — TELEPHONE ENCOUNTER
Pt stopped at the window for refill on ProAir. He needs a 90 day script sent to [facility]. Thought it was 4 inhalers with 3 refills. But he said whatever YOU did in past was right but that Francy Claude had not sent in right. Please advise.

## 2018-11-26 RX ORDER — PRAVASTATIN SODIUM 20 MG
TABLET ORAL
Qty: 90 TABLET | Refills: 0 | Status: SHIPPED | OUTPATIENT
Start: 2018-11-26 | End: 2019-02-10 | Stop reason: SDUPTHER

## 2018-11-26 RX ORDER — CLOPIDOGREL BISULFATE 75 MG/1
TABLET ORAL
Qty: 90 TABLET | Refills: 0 | Status: SHIPPED | OUTPATIENT
Start: 2018-11-26 | End: 2019-02-10 | Stop reason: SDUPTHER

## 2018-12-06 ENCOUNTER — OFFICE VISIT (OUTPATIENT)
Dept: PULMONOLOGY | Age: 73
End: 2018-12-06
Payer: MEDICARE

## 2018-12-06 VITALS
DIASTOLIC BLOOD PRESSURE: 80 MMHG | HEIGHT: 65 IN | HEART RATE: 79 BPM | TEMPERATURE: 97.7 F | BODY MASS INDEX: 25.62 KG/M2 | WEIGHT: 153.8 LBS | OXYGEN SATURATION: 95 % | SYSTOLIC BLOOD PRESSURE: 136 MMHG

## 2018-12-06 DIAGNOSIS — Z99.81 CHRONIC RESPIRATORY FAILURE WITH HYPOXIA, ON HOME O2 THERAPY (HCC): ICD-10-CM

## 2018-12-06 DIAGNOSIS — J96.11 CHRONIC RESPIRATORY FAILURE WITH HYPOXIA, ON HOME O2 THERAPY (HCC): ICD-10-CM

## 2018-12-06 DIAGNOSIS — J44.9 STEROID-DEPENDENT COPD (HCC): ICD-10-CM

## 2018-12-06 DIAGNOSIS — J44.9 COPD, SEVERE (HCC): Primary | ICD-10-CM

## 2018-12-06 PROCEDURE — G8926 SPIRO NO PERF OR DOC: HCPCS | Performed by: NURSE PRACTITIONER

## 2018-12-06 PROCEDURE — 4040F PNEUMOC VAC/ADMIN/RCVD: CPT | Performed by: NURSE PRACTITIONER

## 2018-12-06 PROCEDURE — 3023F SPIROM DOC REV: CPT | Performed by: NURSE PRACTITIONER

## 2018-12-06 PROCEDURE — G8598 ASA/ANTIPLAT THER USED: HCPCS | Performed by: NURSE PRACTITIONER

## 2018-12-06 PROCEDURE — 1123F ACP DISCUSS/DSCN MKR DOCD: CPT | Performed by: NURSE PRACTITIONER

## 2018-12-06 PROCEDURE — 3017F COLORECTAL CA SCREEN DOC REV: CPT | Performed by: NURSE PRACTITIONER

## 2018-12-06 PROCEDURE — G8484 FLU IMMUNIZE NO ADMIN: HCPCS | Performed by: NURSE PRACTITIONER

## 2018-12-06 PROCEDURE — 99214 OFFICE O/P EST MOD 30 MIN: CPT | Performed by: NURSE PRACTITIONER

## 2018-12-06 PROCEDURE — G8419 CALC BMI OUT NRM PARAM NOF/U: HCPCS | Performed by: NURSE PRACTITIONER

## 2018-12-06 PROCEDURE — 1036F TOBACCO NON-USER: CPT | Performed by: NURSE PRACTITIONER

## 2018-12-06 PROCEDURE — G8427 DOCREV CUR MEDS BY ELIG CLIN: HCPCS | Performed by: NURSE PRACTITIONER

## 2018-12-06 PROCEDURE — 1101F PT FALLS ASSESS-DOCD LE1/YR: CPT | Performed by: NURSE PRACTITIONER

## 2018-12-06 ASSESSMENT — ENCOUNTER SYMPTOMS
STRIDOR: 0
DIARRHEA: 0
COUGH: 0
CHEST TIGHTNESS: 0
HEMOPTYSIS: 0
NAUSEA: 0
SHORTNESS OF BREATH: 1
VOMITING: 0
SPUTUM PRODUCTION: 0
WHEEZING: 0

## 2018-12-06 ASSESSMENT — COPD QUESTIONNAIRES: COPD: 1

## 2018-12-07 PROBLEM — J44.1 COPD WITH ACUTE EXACERBATION (HCC): Status: RESOLVED | Noted: 2017-04-04 | Resolved: 2018-12-07

## 2018-12-07 PROBLEM — Z99.81 CHRONIC RESPIRATORY FAILURE WITH HYPOXIA, ON HOME O2 THERAPY (HCC): Status: ACTIVE | Noted: 2018-12-07

## 2018-12-07 PROBLEM — J96.11 CHRONIC RESPIRATORY FAILURE WITH HYPOXIA, ON HOME O2 THERAPY (HCC): Status: ACTIVE | Noted: 2018-12-07

## 2018-12-11 ENCOUNTER — OFFICE VISIT (OUTPATIENT)
Dept: CARDIOLOGY CLINIC | Age: 73
End: 2018-12-11
Payer: MEDICARE

## 2018-12-11 VITALS
DIASTOLIC BLOOD PRESSURE: 60 MMHG | WEIGHT: 149.8 LBS | SYSTOLIC BLOOD PRESSURE: 122 MMHG | BODY MASS INDEX: 25.57 KG/M2 | HEIGHT: 64 IN | HEART RATE: 80 BPM

## 2018-12-11 DIAGNOSIS — I10 ESSENTIAL HYPERTENSION: ICD-10-CM

## 2018-12-11 DIAGNOSIS — J44.9 CHRONIC OBSTRUCTIVE PULMONARY DISEASE, UNSPECIFIED COPD TYPE (HCC): ICD-10-CM

## 2018-12-11 DIAGNOSIS — I70.90 ATHEROSCLEROSIS: ICD-10-CM

## 2018-12-11 DIAGNOSIS — R07.2 PRECORDIAL PAIN: Primary | ICD-10-CM

## 2018-12-11 PROCEDURE — 4040F PNEUMOC VAC/ADMIN/RCVD: CPT | Performed by: NUCLEAR MEDICINE

## 2018-12-11 PROCEDURE — 99214 OFFICE O/P EST MOD 30 MIN: CPT | Performed by: NUCLEAR MEDICINE

## 2018-12-11 PROCEDURE — 3023F SPIROM DOC REV: CPT | Performed by: NUCLEAR MEDICINE

## 2018-12-11 PROCEDURE — G8926 SPIRO NO PERF OR DOC: HCPCS | Performed by: NUCLEAR MEDICINE

## 2018-12-11 PROCEDURE — G8427 DOCREV CUR MEDS BY ELIG CLIN: HCPCS | Performed by: NUCLEAR MEDICINE

## 2018-12-11 PROCEDURE — G8419 CALC BMI OUT NRM PARAM NOF/U: HCPCS | Performed by: NUCLEAR MEDICINE

## 2018-12-11 PROCEDURE — 1036F TOBACCO NON-USER: CPT | Performed by: NUCLEAR MEDICINE

## 2018-12-11 PROCEDURE — G8484 FLU IMMUNIZE NO ADMIN: HCPCS | Performed by: NUCLEAR MEDICINE

## 2018-12-11 PROCEDURE — 1123F ACP DISCUSS/DSCN MKR DOCD: CPT | Performed by: NUCLEAR MEDICINE

## 2018-12-11 PROCEDURE — 3017F COLORECTAL CA SCREEN DOC REV: CPT | Performed by: NUCLEAR MEDICINE

## 2018-12-11 PROCEDURE — G8598 ASA/ANTIPLAT THER USED: HCPCS | Performed by: NUCLEAR MEDICINE

## 2018-12-11 PROCEDURE — 1101F PT FALLS ASSESS-DOCD LE1/YR: CPT | Performed by: NUCLEAR MEDICINE

## 2018-12-11 NOTE — PROGRESS NOTES
Patient here for 1 year follow up. Patient states he has been SOB and chest tightness.
tablet 1    albuterol sulfate HFA (PROAIR HFA) 108 (90 Base) MCG/ACT inhaler Inhale 2 puffs into the lungs every 6 hours as needed for Wheezing 3 Inhaler 3    alendronate (FOSAMAX) 70 MG tablet TAKE 1 TABLET BY MOUTH  EVERY 7 DAYS 12 tablet 1    predniSONE (DELTASONE) 5 MG tablet Take 2.5 mg by mouth daily      guaiFENesin (MUCINEX) 600 MG extended release tablet Take 600 mg by mouth Daily      albuterol (PROVENTIL) (2.5 MG/3ML) 0.083% nebulizer solution Take 3 mLs by nebulization every 6 hours as needed for Wheezing or Shortness of Breath 360 vial 3    Calcium Carb-Cholecalciferol (CALCIUM 600 + D PO) Take 600 mg by mouth daily      potassium iodide (SSKI) 1 GM/ML solution Take by mouth daily 5 drops into oral liquid as Dr suggested      nitroGLYCERIN (NITROSTAT) 0.4 MG SL tablet Place 1 tablet under the tongue every 5 minutes as needed. 25 tablet 3    Oxygen Concentrator 2 L by Nasal route as needed.  aspirin 81 MG tablet Take 81 mg by mouth daily. No current facility-administered medications for this visit.       Allergies   Allergen Reactions    Flonase [Fluticasone Propionate]      Pt shakes and feels anxious    Mometasone Furoate      Shakes,feels anxious    Spiriva Handihaler [Tiotropium Bromide Monohydrate]      Health Maintenance   Topic Date Due    AAA screen  1945    DTaP/Tdap/Td vaccine (1 - Tdap) 08/01/1963    Shingles Vaccine (1 of 2 - 2 Dose Series) 08/01/1994    Pneumococcal low/med risk (1 of 2 - PCV13) 08/01/2009    Lipid screen  04/14/2019    Colon cancer screen colonoscopy  12/11/2022    Flu vaccine  Completed       Subjective:  Review of Systems  General:   No fever, no chills, No fatigue or weight loss  Pulmonary:    some more dyspnea, no wheezing  Cardiac:    Did have recent chest pain,   GI:     No nausea or vomiting, no abdominal pain  Neuro:    No dizziness or light headedness,   Musculoskeletal:  No recent active issues  Extremities:   No edema, good

## 2018-12-19 DIAGNOSIS — I10 ESSENTIAL HYPERTENSION: ICD-10-CM

## 2018-12-19 DIAGNOSIS — R07.2 PRECORDIAL PAIN: ICD-10-CM

## 2018-12-19 DIAGNOSIS — J44.9 CHRONIC OBSTRUCTIVE PULMONARY DISEASE, UNSPECIFIED COPD TYPE (HCC): ICD-10-CM

## 2018-12-31 DIAGNOSIS — T38.0X5A STEROID-INDUCED OSTEOPOROSIS: ICD-10-CM

## 2018-12-31 DIAGNOSIS — M81.0 SENILE OSTEOPOROSIS: ICD-10-CM

## 2018-12-31 DIAGNOSIS — J44.9 COPD, SEVERE (HCC): ICD-10-CM

## 2018-12-31 DIAGNOSIS — M81.8 STEROID-INDUCED OSTEOPOROSIS: ICD-10-CM

## 2018-12-31 DIAGNOSIS — E83.52 HYPERCALCEMIA: ICD-10-CM

## 2018-12-31 RX ORDER — ALENDRONATE SODIUM 70 MG/1
70 TABLET ORAL
Qty: 12 TABLET | Refills: 1 | Status: SHIPPED | OUTPATIENT
Start: 2018-12-31 | End: 2019-02-25 | Stop reason: SDUPTHER

## 2019-01-02 RX ORDER — PREDNISONE 1 MG/1
TABLET ORAL
Qty: 90 TABLET | Refills: 3 | Status: ON HOLD | OUTPATIENT
Start: 2019-01-02 | End: 2019-03-19 | Stop reason: HOSPADM

## 2019-02-11 RX ORDER — PRAVASTATIN SODIUM 20 MG
TABLET ORAL
Qty: 90 TABLET | Refills: 1 | Status: SHIPPED | OUTPATIENT
Start: 2019-02-11 | End: 2019-10-11 | Stop reason: SDUPTHER

## 2019-02-11 RX ORDER — CLOPIDOGREL BISULFATE 75 MG/1
TABLET ORAL
Qty: 90 TABLET | Refills: 1 | Status: SHIPPED | OUTPATIENT
Start: 2019-02-11 | End: 2019-10-11 | Stop reason: SDUPTHER

## 2019-02-25 DIAGNOSIS — T38.0X5A STEROID-INDUCED OSTEOPOROSIS: ICD-10-CM

## 2019-02-25 DIAGNOSIS — M81.8 STEROID-INDUCED OSTEOPOROSIS: ICD-10-CM

## 2019-02-25 DIAGNOSIS — J44.9 COPD, SEVERE (HCC): ICD-10-CM

## 2019-02-25 DIAGNOSIS — M81.0 SENILE OSTEOPOROSIS: ICD-10-CM

## 2019-02-25 DIAGNOSIS — M85.80 OSTEOPENIA, UNSPECIFIED LOCATION: ICD-10-CM

## 2019-02-25 DIAGNOSIS — J44.9 STEROID-DEPENDENT COPD (HCC): Primary | ICD-10-CM

## 2019-02-25 RX ORDER — ALENDRONATE SODIUM 70 MG/1
70 TABLET ORAL
Qty: 12 TABLET | Refills: 1 | Status: SHIPPED | OUTPATIENT
Start: 2019-02-25 | End: 2022-08-30

## 2019-02-26 ENCOUNTER — TELEPHONE (OUTPATIENT)
Dept: PULMONOLOGY | Age: 74
End: 2019-02-26

## 2019-03-08 ENCOUNTER — TELEPHONE (OUTPATIENT)
Dept: PULMONOLOGY | Age: 74
End: 2019-03-08

## 2019-03-17 ENCOUNTER — APPOINTMENT (OUTPATIENT)
Dept: GENERAL RADIOLOGY | Age: 74
DRG: 202 | End: 2019-03-17
Payer: MEDICARE

## 2019-03-17 ENCOUNTER — APPOINTMENT (OUTPATIENT)
Dept: CT IMAGING | Age: 74
DRG: 202 | End: 2019-03-17
Payer: MEDICARE

## 2019-03-17 ENCOUNTER — HOSPITAL ENCOUNTER (INPATIENT)
Age: 74
LOS: 2 days | Discharge: HOME | DRG: 202 | End: 2019-03-19
Attending: INTERNAL MEDICINE | Admitting: INTERNAL MEDICINE
Payer: MEDICARE

## 2019-03-17 DIAGNOSIS — K82.8 GALLBLADDER SLUDGE: ICD-10-CM

## 2019-03-17 DIAGNOSIS — R07.2 PRECORDIAL PAIN: Primary | ICD-10-CM

## 2019-03-17 DIAGNOSIS — J44.1 COPD EXACERBATION (HCC): ICD-10-CM

## 2019-03-17 PROBLEM — R07.9 CHEST PAIN: Status: ACTIVE | Noted: 2019-03-17

## 2019-03-17 LAB
ALBUMIN SERPL-MCNC: 3.7 G/DL (ref 3.5–5.1)
ALP BLD-CCNC: 50 U/L (ref 38–126)
ALT SERPL-CCNC: 19 U/L (ref 11–66)
ANION GAP SERPL CALCULATED.3IONS-SCNC: 10 MEQ/L (ref 8–16)
AST SERPL-CCNC: 17 U/L (ref 5–40)
BACTERIA: ABNORMAL
BASOPHILS # BLD: 0.4 %
BASOPHILS ABSOLUTE: 0 THOU/MM3 (ref 0–0.1)
BILIRUB SERPL-MCNC: 0.3 MG/DL (ref 0.3–1.2)
BILIRUBIN DIRECT: < 0.2 MG/DL (ref 0–0.3)
BILIRUBIN URINE: NEGATIVE
BLOOD, URINE: NEGATIVE
BUN BLDV-MCNC: 13 MG/DL (ref 7–22)
CALCIUM SERPL-MCNC: 9.6 MG/DL (ref 8.5–10.5)
CASTS: ABNORMAL /LPF
CASTS: ABNORMAL /LPF
CHARACTER, URINE: CLEAR
CHLORIDE BLD-SCNC: 98 MEQ/L (ref 98–111)
CO2: 30 MEQ/L (ref 23–33)
COLOR: YELLOW
CREAT SERPL-MCNC: 0.9 MG/DL (ref 0.4–1.2)
CRYSTALS: ABNORMAL
EKG ATRIAL RATE: 112 BPM
EKG P AXIS: 61 DEGREES
EKG P-R INTERVAL: 156 MS
EKG Q-T INTERVAL: 320 MS
EKG QRS DURATION: 76 MS
EKG QTC CALCULATION (BAZETT): 436 MS
EKG R AXIS: -41 DEGREES
EKG T AXIS: 66 DEGREES
EKG VENTRICULAR RATE: 112 BPM
EOSINOPHIL # BLD: 3.6 %
EOSINOPHILS ABSOLUTE: 0.3 THOU/MM3 (ref 0–0.4)
EPITHELIAL CELLS, UA: ABNORMAL /HPF
ERYTHROCYTE [DISTWIDTH] IN BLOOD BY AUTOMATED COUNT: 15.3 % (ref 11.5–14.5)
ERYTHROCYTE [DISTWIDTH] IN BLOOD BY AUTOMATED COUNT: 51.8 FL (ref 35–45)
FLU A ANTIGEN: NEGATIVE
FLU B ANTIGEN: NEGATIVE
GFR SERPL CREATININE-BSD FRML MDRD: 82 ML/MIN/1.73M2
GLUCOSE BLD-MCNC: 95 MG/DL (ref 70–108)
GLUCOSE, URINE: NEGATIVE MG/DL
HCT VFR BLD CALC: 42.6 % (ref 42–52)
HEMOGLOBIN: 13.7 GM/DL (ref 14–18)
IMMATURE GRANS (ABS): 0.05 THOU/MM3 (ref 0–0.07)
IMMATURE GRANULOCYTES: 0.7 %
KETONES, URINE: NEGATIVE
LEUKOCYTE ESTERASE, URINE: NEGATIVE
LIPASE: 9.2 U/L (ref 5.6–51.3)
LYMPHOCYTES # BLD: 22.3 %
LYMPHOCYTES ABSOLUTE: 1.6 THOU/MM3 (ref 1–4.8)
MCH RBC QN AUTO: 30 PG (ref 26–33)
MCHC RBC AUTO-ENTMCNC: 32.2 GM/DL (ref 32.2–35.5)
MCV RBC AUTO: 93.2 FL (ref 80–94)
MISCELLANEOUS LAB TEST RESULT: ABNORMAL
MONOCYTES # BLD: 12.7 %
MONOCYTES ABSOLUTE: 0.9 THOU/MM3 (ref 0.4–1.3)
NITRITE, URINE: NEGATIVE
NUCLEATED RED BLOOD CELLS: 0 /100 WBC
OSMOLALITY CALCULATION: 275.6 MOSMOL/KG (ref 275–300)
PH UA: 6.5 (ref 5–9)
PLATELET # BLD: 305 THOU/MM3 (ref 130–400)
PMV BLD AUTO: 9 FL (ref 9.4–12.4)
POTASSIUM SERPL-SCNC: 4.3 MEQ/L (ref 3.5–5.2)
PRO-BNP: 114.9 PG/ML (ref 0–900)
PROCALCITONIN: 0.04 NG/ML (ref 0.01–0.09)
PROTEIN UA: NEGATIVE MG/DL
RBC # BLD: 4.57 MILL/MM3 (ref 4.7–6.1)
RBC URINE: ABNORMAL /HPF
REASON FOR REJECTION: NORMAL
REJECTED TEST: NORMAL
RENAL EPITHELIAL, UA: ABNORMAL
SEG NEUTROPHILS: 60.3 %
SEGMENTED NEUTROPHILS ABSOLUTE COUNT: 4.3 THOU/MM3 (ref 1.8–7.7)
SODIUM BLD-SCNC: 138 MEQ/L (ref 135–145)
SPECIFIC GRAVITY UA: > 1.03 (ref 1–1.03)
TOTAL PROTEIN: 6.4 G/DL (ref 6.1–8)
TROPONIN T: < 0.01 NG/ML
TROPONIN T: < 0.01 NG/ML
UROBILINOGEN, URINE: 0.2 EU/DL (ref 0–1)
WBC # BLD: 7.2 THOU/MM3 (ref 4.8–10.8)
WBC UA: ABNORMAL /HPF
YEAST: ABNORMAL

## 2019-03-17 PROCEDURE — 36415 COLL VENOUS BLD VENIPUNCTURE: CPT

## 2019-03-17 PROCEDURE — 6360000002 HC RX W HCPCS: Performed by: STUDENT IN AN ORGANIZED HEALTH CARE EDUCATION/TRAINING PROGRAM

## 2019-03-17 PROCEDURE — 84145 PROCALCITONIN (PCT): CPT

## 2019-03-17 PROCEDURE — 83690 ASSAY OF LIPASE: CPT

## 2019-03-17 PROCEDURE — 80053 COMPREHEN METABOLIC PANEL: CPT

## 2019-03-17 PROCEDURE — 2060000000 HC ICU INTERMEDIATE R&B

## 2019-03-17 PROCEDURE — 81001 URINALYSIS AUTO W/SCOPE: CPT

## 2019-03-17 PROCEDURE — 85025 COMPLETE CBC W/AUTO DIFF WBC: CPT

## 2019-03-17 PROCEDURE — 94640 AIRWAY INHALATION TREATMENT: CPT

## 2019-03-17 PROCEDURE — 74177 CT ABD & PELVIS W/CONTRAST: CPT

## 2019-03-17 PROCEDURE — 93005 ELECTROCARDIOGRAM TRACING: CPT | Performed by: STUDENT IN AN ORGANIZED HEALTH CARE EDUCATION/TRAINING PROGRAM

## 2019-03-17 PROCEDURE — 6360000004 HC RX CONTRAST MEDICATION: Performed by: STUDENT IN AN ORGANIZED HEALTH CARE EDUCATION/TRAINING PROGRAM

## 2019-03-17 PROCEDURE — 82248 BILIRUBIN DIRECT: CPT

## 2019-03-17 PROCEDURE — 99223 1ST HOSP IP/OBS HIGH 75: CPT | Performed by: INTERNAL MEDICINE

## 2019-03-17 PROCEDURE — 87804 INFLUENZA ASSAY W/OPTIC: CPT

## 2019-03-17 PROCEDURE — 93010 ELECTROCARDIOGRAM REPORT: CPT | Performed by: INTERNAL MEDICINE

## 2019-03-17 PROCEDURE — 83880 ASSAY OF NATRIURETIC PEPTIDE: CPT

## 2019-03-17 PROCEDURE — 87086 URINE CULTURE/COLONY COUNT: CPT

## 2019-03-17 PROCEDURE — 99285 EMERGENCY DEPT VISIT HI MDM: CPT

## 2019-03-17 PROCEDURE — 71046 X-RAY EXAM CHEST 2 VIEWS: CPT

## 2019-03-17 PROCEDURE — 71275 CT ANGIOGRAPHY CHEST: CPT

## 2019-03-17 PROCEDURE — 96374 THER/PROPH/DIAG INJ IV PUSH: CPT

## 2019-03-17 PROCEDURE — 84484 ASSAY OF TROPONIN QUANT: CPT

## 2019-03-17 PROCEDURE — 2580000003 HC RX 258: Performed by: STUDENT IN AN ORGANIZED HEALTH CARE EDUCATION/TRAINING PROGRAM

## 2019-03-17 PROCEDURE — 6370000000 HC RX 637 (ALT 250 FOR IP): Performed by: STUDENT IN AN ORGANIZED HEALTH CARE EDUCATION/TRAINING PROGRAM

## 2019-03-17 RX ORDER — PRAVASTATIN SODIUM 20 MG
20 TABLET ORAL NIGHTLY
Status: DISCONTINUED | OUTPATIENT
Start: 2019-03-18 | End: 2019-03-19 | Stop reason: HOSPADM

## 2019-03-17 RX ORDER — SODIUM CHLORIDE 0.9 % (FLUSH) 0.9 %
10 SYRINGE (ML) INJECTION PRN
Status: DISCONTINUED | OUTPATIENT
Start: 2019-03-17 | End: 2019-03-19 | Stop reason: HOSPADM

## 2019-03-17 RX ORDER — ACETYLCYSTEINE 200 MG/ML
600 SOLUTION ORAL; RESPIRATORY (INHALATION) ONCE
Status: COMPLETED | OUTPATIENT
Start: 2019-03-17 | End: 2019-03-17

## 2019-03-17 RX ORDER — GENTAMICIN SULFATE 40 MG/ML
INJECTION, SOLUTION INTRAMUSCULAR; INTRAVENOUS
Status: ON HOLD | COMMUNITY
Start: 2019-03-12 | End: 2020-04-29

## 2019-03-17 RX ORDER — ONDANSETRON 2 MG/ML
4 INJECTION INTRAMUSCULAR; INTRAVENOUS EVERY 6 HOURS PRN
Status: DISCONTINUED | OUTPATIENT
Start: 2019-03-17 | End: 2019-03-19 | Stop reason: HOSPADM

## 2019-03-17 RX ORDER — MONTELUKAST SODIUM 10 MG/1
10 TABLET ORAL NIGHTLY
Status: DISCONTINUED | OUTPATIENT
Start: 2019-03-18 | End: 2019-03-19 | Stop reason: HOSPADM

## 2019-03-17 RX ORDER — GUAIFENESIN 600 MG/1
600 TABLET, EXTENDED RELEASE ORAL DAILY
Status: DISCONTINUED | OUTPATIENT
Start: 2019-03-18 | End: 2019-03-19 | Stop reason: HOSPADM

## 2019-03-17 RX ORDER — ALENDRONATE SODIUM 70 MG/1
70 TABLET ORAL
Status: DISCONTINUED | OUTPATIENT
Start: 2019-03-17 | End: 2019-03-18 | Stop reason: RX

## 2019-03-17 RX ORDER — 0.9 % SODIUM CHLORIDE 0.9 %
500 INTRAVENOUS SOLUTION INTRAVENOUS ONCE
Status: COMPLETED | OUTPATIENT
Start: 2019-03-17 | End: 2019-03-17

## 2019-03-17 RX ORDER — ALBUTEROL SULFATE 2.5 MG/3ML
2.5 SOLUTION RESPIRATORY (INHALATION) EVERY 6 HOURS PRN
Status: DISCONTINUED | OUTPATIENT
Start: 2019-03-17 | End: 2019-03-19 | Stop reason: HOSPADM

## 2019-03-17 RX ORDER — PREDNISONE 1 MG/1
5 TABLET ORAL DAILY
Status: DISCONTINUED | OUTPATIENT
Start: 2019-03-18 | End: 2019-03-19 | Stop reason: HOSPADM

## 2019-03-17 RX ORDER — ONDANSETRON 2 MG/ML
4 INJECTION INTRAMUSCULAR; INTRAVENOUS ONCE
Status: COMPLETED | OUTPATIENT
Start: 2019-03-17 | End: 2019-03-17

## 2019-03-17 RX ORDER — SODIUM CHLORIDE 0.9 % (FLUSH) 0.9 %
10 SYRINGE (ML) INJECTION EVERY 12 HOURS SCHEDULED
Status: DISCONTINUED | OUTPATIENT
Start: 2019-03-17 | End: 2019-03-19 | Stop reason: HOSPADM

## 2019-03-17 RX ORDER — ASPIRIN 81 MG/1
81 TABLET, CHEWABLE ORAL DAILY
Status: DISCONTINUED | OUTPATIENT
Start: 2019-03-18 | End: 2019-03-19 | Stop reason: HOSPADM

## 2019-03-17 RX ORDER — CLOPIDOGREL BISULFATE 75 MG/1
75 TABLET ORAL DAILY
Status: DISCONTINUED | OUTPATIENT
Start: 2019-03-18 | End: 2019-03-19 | Stop reason: HOSPADM

## 2019-03-17 RX ADMIN — ACETYLCYSTEINE 600 MG: 200 SOLUTION ORAL; RESPIRATORY (INHALATION) at 18:05

## 2019-03-17 RX ADMIN — ONDANSETRON 4 MG: 2 INJECTION INTRAMUSCULAR; INTRAVENOUS at 17:20

## 2019-03-17 RX ADMIN — SODIUM CHLORIDE 500 ML: 9 INJECTION, SOLUTION INTRAVENOUS at 17:20

## 2019-03-17 RX ADMIN — IOPAMIDOL 80 ML: 755 INJECTION, SOLUTION INTRAVENOUS at 20:12

## 2019-03-17 RX ADMIN — LIDOCAINE HYDROCHLORIDE: 20 SOLUTION ORAL; TOPICAL at 22:07

## 2019-03-17 RX ADMIN — ALBUTEROL SULFATE 2.5 MG: 2.5 SOLUTION RESPIRATORY (INHALATION) at 18:05

## 2019-03-17 ASSESSMENT — PAIN DESCRIPTION - PAIN TYPE
TYPE: ACUTE PAIN
TYPE: ACUTE PAIN

## 2019-03-17 ASSESSMENT — PAIN DESCRIPTION - DESCRIPTORS: DESCRIPTORS: PRESSURE

## 2019-03-17 ASSESSMENT — PAIN SCALES - GENERAL
PAINLEVEL_OUTOF10: 6
PAINLEVEL_OUTOF10: 3

## 2019-03-17 ASSESSMENT — PAIN DESCRIPTION - LOCATION
LOCATION: CHEST
LOCATION: CHEST

## 2019-03-17 ASSESSMENT — HEART SCORE: ECG: 0

## 2019-03-18 ENCOUNTER — APPOINTMENT (OUTPATIENT)
Dept: ULTRASOUND IMAGING | Age: 74
DRG: 202 | End: 2019-03-18
Payer: MEDICARE

## 2019-03-18 PROBLEM — R10.13 EPIGASTRIC PAIN: Status: ACTIVE | Noted: 2019-03-18

## 2019-03-18 PROBLEM — R91.8 OPACITIES OF BOTH LUNGS PRESENT ON CHEST X-RAY: Status: ACTIVE | Noted: 2019-03-18

## 2019-03-18 PROBLEM — E78.5 HLD (HYPERLIPIDEMIA): Status: ACTIVE | Noted: 2019-03-18

## 2019-03-18 PROBLEM — K59.00 CONSTIPATED: Status: ACTIVE | Noted: 2019-03-18

## 2019-03-18 PROBLEM — I25.119 CORONARY ARTERY DISEASE WITH ANGINA PECTORIS (HCC): Status: ACTIVE | Noted: 2019-03-18

## 2019-03-18 LAB
ALBUMIN SERPL-MCNC: 3.2 G/DL (ref 3.5–5.1)
ANION GAP SERPL CALCULATED.3IONS-SCNC: 7 MEQ/L (ref 8–16)
BUN BLDV-MCNC: 12 MG/DL (ref 7–22)
CALCIUM SERPL-MCNC: 9.4 MG/DL (ref 8.5–10.5)
CHLORIDE BLD-SCNC: 103 MEQ/L (ref 98–111)
CO2: 30 MEQ/L (ref 23–33)
CREAT SERPL-MCNC: 0.9 MG/DL (ref 0.4–1.2)
ERYTHROCYTE [DISTWIDTH] IN BLOOD BY AUTOMATED COUNT: 15.1 % (ref 11.5–14.5)
ERYTHROCYTE [DISTWIDTH] IN BLOOD BY AUTOMATED COUNT: 52.4 FL (ref 35–45)
GFR SERPL CREATININE-BSD FRML MDRD: 82 ML/MIN/1.73M2
GLUCOSE BLD-MCNC: 91 MG/DL (ref 70–108)
HCT VFR BLD CALC: 39.9 % (ref 42–52)
HEMOGLOBIN: 12.5 GM/DL (ref 14–18)
MCH RBC QN AUTO: 29.8 PG (ref 26–33)
MCHC RBC AUTO-ENTMCNC: 31.3 GM/DL (ref 32.2–35.5)
MCV RBC AUTO: 95.2 FL (ref 80–94)
PHOSPHORUS: 3 MG/DL (ref 2.4–4.7)
PLATELET # BLD: 271 THOU/MM3 (ref 130–400)
PMV BLD AUTO: 8.6 FL (ref 9.4–12.4)
POTASSIUM SERPL-SCNC: 4.6 MEQ/L (ref 3.5–5.2)
RBC # BLD: 4.19 MILL/MM3 (ref 4.7–6.1)
SODIUM BLD-SCNC: 140 MEQ/L (ref 135–145)
TROPONIN T: < 0.01 NG/ML
TROPONIN T: < 0.01 NG/ML
WBC # BLD: 5.2 THOU/MM3 (ref 4.8–10.8)

## 2019-03-18 PROCEDURE — 6370000000 HC RX 637 (ALT 250 FOR IP): Performed by: INTERNAL MEDICINE

## 2019-03-18 PROCEDURE — 94640 AIRWAY INHALATION TREATMENT: CPT

## 2019-03-18 PROCEDURE — 87040 BLOOD CULTURE FOR BACTERIA: CPT

## 2019-03-18 PROCEDURE — 6360000002 HC RX W HCPCS: Performed by: INTERNAL MEDICINE

## 2019-03-18 PROCEDURE — 99223 1ST HOSP IP/OBS HIGH 75: CPT | Performed by: INTERNAL MEDICINE

## 2019-03-18 PROCEDURE — 2700000000 HC OXYGEN THERAPY PER DAY

## 2019-03-18 PROCEDURE — 2709999900 HC NON-CHARGEABLE SUPPLY

## 2019-03-18 PROCEDURE — 76705 ECHO EXAM OF ABDOMEN: CPT

## 2019-03-18 PROCEDURE — 80069 RENAL FUNCTION PANEL: CPT

## 2019-03-18 PROCEDURE — 99232 SBSQ HOSP IP/OBS MODERATE 35: CPT | Performed by: INTERNAL MEDICINE

## 2019-03-18 PROCEDURE — 2580000003 HC RX 258: Performed by: INTERNAL MEDICINE

## 2019-03-18 PROCEDURE — 36415 COLL VENOUS BLD VENIPUNCTURE: CPT

## 2019-03-18 PROCEDURE — 2060000000 HC ICU INTERMEDIATE R&B

## 2019-03-18 PROCEDURE — 84484 ASSAY OF TROPONIN QUANT: CPT

## 2019-03-18 PROCEDURE — 85027 COMPLETE CBC AUTOMATED: CPT

## 2019-03-18 RX ORDER — POLYETHYLENE GLYCOL 3350 17 G/17G
17 POWDER, FOR SOLUTION ORAL DAILY
Status: DISCONTINUED | OUTPATIENT
Start: 2019-03-18 | End: 2019-03-19 | Stop reason: HOSPADM

## 2019-03-18 RX ORDER — OYSTER SHELL CALCIUM WITH VITAMIN D 500; 200 MG/1; [IU]/1
1 TABLET, FILM COATED ORAL DAILY
Status: DISCONTINUED | OUTPATIENT
Start: 2019-03-18 | End: 2019-03-19 | Stop reason: HOSPADM

## 2019-03-18 RX ORDER — ACETAMINOPHEN 325 MG/1
650 TABLET ORAL EVERY 4 HOURS PRN
Status: DISCONTINUED | OUTPATIENT
Start: 2019-03-18 | End: 2019-03-19 | Stop reason: HOSPADM

## 2019-03-18 RX ORDER — PANTOPRAZOLE SODIUM 40 MG/1
40 TABLET, DELAYED RELEASE ORAL
Status: DISCONTINUED | OUTPATIENT
Start: 2019-03-18 | End: 2019-03-19 | Stop reason: HOSPADM

## 2019-03-18 RX ORDER — LACTOBACILLUS RHAMNOSUS GG 10B CELL
1 CAPSULE ORAL
Status: DISCONTINUED | OUTPATIENT
Start: 2019-03-19 | End: 2019-03-19 | Stop reason: HOSPADM

## 2019-03-18 RX ORDER — LEVOFLOXACIN 5 MG/ML
500 INJECTION, SOLUTION INTRAVENOUS EVERY 24 HOURS
Status: DISCONTINUED | OUTPATIENT
Start: 2019-03-18 | End: 2019-03-19 | Stop reason: HOSPADM

## 2019-03-18 RX ORDER — SODIUM CHLORIDE 9 MG/ML
INJECTION, SOLUTION INTRAVENOUS CONTINUOUS
Status: DISCONTINUED | OUTPATIENT
Start: 2019-03-18 | End: 2019-03-19

## 2019-03-18 RX ORDER — PANTOPRAZOLE SODIUM 40 MG/1
40 TABLET, DELAYED RELEASE ORAL
Status: DISCONTINUED | OUTPATIENT
Start: 2019-03-18 | End: 2019-03-18

## 2019-03-18 RX ADMIN — MONTELUKAST SODIUM 10 MG: 10 TABLET, FILM COATED ORAL at 21:05

## 2019-03-18 RX ADMIN — GUAIFENESIN 600 MG: 600 TABLET, EXTENDED RELEASE ORAL at 09:57

## 2019-03-18 RX ADMIN — PANTOPRAZOLE SODIUM 40 MG: 40 TABLET, DELAYED RELEASE ORAL at 10:35

## 2019-03-18 RX ADMIN — METOPROLOL TARTRATE 25 MG: 25 TABLET ORAL at 21:05

## 2019-03-18 RX ADMIN — METOPROLOL TARTRATE 25 MG: 25 TABLET ORAL at 09:57

## 2019-03-18 RX ADMIN — PRAVASTATIN SODIUM 20 MG: 20 TABLET ORAL at 21:05

## 2019-03-18 RX ADMIN — ALBUTEROL SULFATE 2.5 MG: 2.5 SOLUTION RESPIRATORY (INHALATION) at 10:04

## 2019-03-18 RX ADMIN — Medication 10 ML: at 03:49

## 2019-03-18 RX ADMIN — SODIUM CHLORIDE: 9 INJECTION, SOLUTION INTRAVENOUS at 06:42

## 2019-03-18 RX ADMIN — PREDNISONE 5 MG: 5 TABLET ORAL at 09:57

## 2019-03-18 RX ADMIN — Medication 10 ML: at 10:38

## 2019-03-18 RX ADMIN — ENOXAPARIN SODIUM 40 MG: 40 INJECTION SUBCUTANEOUS at 09:57

## 2019-03-18 RX ADMIN — CLOPIDOGREL BISULFATE 75 MG: 75 TABLET, FILM COATED ORAL at 09:57

## 2019-03-18 RX ADMIN — ALBUTEROL SULFATE 2.5 MG: 2.5 SOLUTION RESPIRATORY (INHALATION) at 19:23

## 2019-03-18 RX ADMIN — LEVOFLOXACIN 500 MG: 5 INJECTION, SOLUTION INTRAVENOUS at 11:35

## 2019-03-18 RX ADMIN — CALCIUM CARBONATE-VITAMIN D TAB 500 MG-200 UNIT 1 TABLET: 500-200 TAB at 09:57

## 2019-03-18 RX ADMIN — Medication 2 PUFF: at 19:31

## 2019-03-18 RX ADMIN — ASPIRIN 81 MG 81 MG: 81 TABLET ORAL at 09:57

## 2019-03-18 RX ADMIN — CEFTRIAXONE SODIUM 1 G: 1 INJECTION, POWDER, FOR SOLUTION INTRAMUSCULAR; INTRAVENOUS at 10:21

## 2019-03-18 RX ADMIN — Medication 2 PUFF: at 10:04

## 2019-03-18 RX ADMIN — PANTOPRAZOLE SODIUM 40 MG: 40 TABLET, DELAYED RELEASE ORAL at 21:05

## 2019-03-18 RX ADMIN — POLYETHYLENE GLYCOL 3350 17 G: 17 POWDER, FOR SOLUTION ORAL at 10:35

## 2019-03-18 ASSESSMENT — ENCOUNTER SYMPTOMS
VOMITING: 1
DIARRHEA: 0
WHEEZING: 1
CHEST TIGHTNESS: 1
SINUS PAIN: 1
SHORTNESS OF BREATH: 1
NAUSEA: 1
SORE THROAT: 0
COUGH: 1
BLOOD IN STOOL: 0
CONSTIPATION: 0
ABDOMINAL PAIN: 1
RHINORRHEA: 0

## 2019-03-18 ASSESSMENT — PAIN SCALES - GENERAL: PAINLEVEL_OUTOF10: 0

## 2019-03-19 VITALS
HEIGHT: 64 IN | BODY MASS INDEX: 25.01 KG/M2 | DIASTOLIC BLOOD PRESSURE: 82 MMHG | TEMPERATURE: 97.7 F | HEART RATE: 93 BPM | WEIGHT: 146.5 LBS | RESPIRATION RATE: 14 BRPM | OXYGEN SATURATION: 93 % | SYSTOLIC BLOOD PRESSURE: 113 MMHG

## 2019-03-19 LAB — URINE CULTURE, ROUTINE: NORMAL

## 2019-03-19 PROCEDURE — 2709999900 HC NON-CHARGEABLE SUPPLY

## 2019-03-19 PROCEDURE — 6370000000 HC RX 637 (ALT 250 FOR IP): Performed by: INTERNAL MEDICINE

## 2019-03-19 PROCEDURE — 99239 HOSP IP/OBS DSCHRG MGMT >30: CPT | Performed by: INTERNAL MEDICINE

## 2019-03-19 PROCEDURE — 2580000003 HC RX 258: Performed by: INTERNAL MEDICINE

## 2019-03-19 PROCEDURE — 99223 1ST HOSP IP/OBS HIGH 75: CPT | Performed by: INTERNAL MEDICINE

## 2019-03-19 PROCEDURE — 6360000002 HC RX W HCPCS: Performed by: INTERNAL MEDICINE

## 2019-03-19 PROCEDURE — 94640 AIRWAY INHALATION TREATMENT: CPT

## 2019-03-19 PROCEDURE — 2700000000 HC OXYGEN THERAPY PER DAY

## 2019-03-19 RX ORDER — LACTOBACILLUS RHAMNOSUS GG 10B CELL
1 CAPSULE ORAL
Qty: 30 CAPSULE | Refills: 1 | Status: ON HOLD | OUTPATIENT
Start: 2019-03-20 | End: 2022-04-25

## 2019-03-19 RX ORDER — AMOXICILLIN 250 MG
2 CAPSULE ORAL DAILY PRN
Qty: 30 TABLET | Refills: 1 | Status: ON HOLD | OUTPATIENT
Start: 2019-03-19 | End: 2020-06-08

## 2019-03-19 RX ORDER — PREDNISONE 2.5 MG
2.5 TABLET ORAL DAILY
Qty: 30 TABLET | Refills: 11 | Status: SHIPPED | OUTPATIENT
Start: 2019-03-19 | End: 2020-03-24 | Stop reason: SDUPTHER

## 2019-03-19 RX ORDER — CEFDINIR 300 MG/1
300 CAPSULE ORAL 2 TIMES DAILY
Qty: 10 CAPSULE | Refills: 0 | Status: SHIPPED | OUTPATIENT
Start: 2019-03-19 | End: 2019-03-24

## 2019-03-19 RX ORDER — BUDESONIDE 0.5 MG/2ML
500 INHALANT ORAL 2 TIMES DAILY
Qty: 60 AMPULE | Refills: 11 | Status: SHIPPED | OUTPATIENT
Start: 2019-03-19 | End: 2020-03-02 | Stop reason: SDUPTHER

## 2019-03-19 RX ORDER — POLYETHYLENE GLYCOL 3350 17 G/17G
17 POWDER, FOR SOLUTION ORAL DAILY
Qty: 527 G | Refills: 1 | Status: SHIPPED | OUTPATIENT
Start: 2019-03-20 | End: 2019-04-19

## 2019-03-19 RX ORDER — FORMOTEROL FUMARATE 20 UG/2ML
20 SOLUTION RESPIRATORY (INHALATION) EVERY 12 HOURS
Qty: 120 ML | Refills: 11 | Status: SHIPPED | OUTPATIENT
Start: 2019-03-19 | End: 2020-03-02 | Stop reason: SDUPTHER

## 2019-03-19 RX ORDER — LACTULOSE 10 G/15ML
20 SOLUTION ORAL 2 TIMES DAILY
Qty: 120 ML | Refills: 0 | Status: SHIPPED | OUTPATIENT
Start: 2019-03-19 | End: 2019-03-21

## 2019-03-19 RX ORDER — PANTOPRAZOLE SODIUM 40 MG/1
40 TABLET, DELAYED RELEASE ORAL
Qty: 60 TABLET | Refills: 3 | Status: SHIPPED | OUTPATIENT
Start: 2019-03-19

## 2019-03-19 RX ADMIN — SODIUM CHLORIDE: 9 INJECTION, SOLUTION INTRAVENOUS at 00:31

## 2019-03-19 RX ADMIN — PANTOPRAZOLE SODIUM 40 MG: 40 TABLET, DELAYED RELEASE ORAL at 07:56

## 2019-03-19 RX ADMIN — LEVOFLOXACIN 500 MG: 5 INJECTION, SOLUTION INTRAVENOUS at 09:17

## 2019-03-19 RX ADMIN — CEFTRIAXONE SODIUM 1 G: 1 INJECTION, POWDER, FOR SOLUTION INTRAMUSCULAR; INTRAVENOUS at 07:56

## 2019-03-19 RX ADMIN — CALCIUM CARBONATE-VITAMIN D TAB 500 MG-200 UNIT 1 TABLET: 500-200 TAB at 09:12

## 2019-03-19 RX ADMIN — CLOPIDOGREL BISULFATE 75 MG: 75 TABLET, FILM COATED ORAL at 09:11

## 2019-03-19 RX ADMIN — ALBUTEROL SULFATE 2.5 MG: 2.5 SOLUTION RESPIRATORY (INHALATION) at 07:55

## 2019-03-19 RX ADMIN — ENOXAPARIN SODIUM 40 MG: 40 INJECTION SUBCUTANEOUS at 09:12

## 2019-03-19 RX ADMIN — Medication 2 PUFF: at 07:58

## 2019-03-19 RX ADMIN — METOPROLOL TARTRATE 25 MG: 25 TABLET ORAL at 09:12

## 2019-03-19 RX ADMIN — Medication 2 PUFF: at 19:21

## 2019-03-19 RX ADMIN — Medication 1 CAPSULE: at 09:12

## 2019-03-19 RX ADMIN — PANTOPRAZOLE SODIUM 40 MG: 40 TABLET, DELAYED RELEASE ORAL at 16:58

## 2019-03-19 RX ADMIN — PREDNISONE 5 MG: 5 TABLET ORAL at 09:11

## 2019-03-19 RX ADMIN — GUAIFENESIN 600 MG: 600 TABLET, EXTENDED RELEASE ORAL at 07:56

## 2019-03-19 RX ADMIN — ASPIRIN 81 MG 81 MG: 81 TABLET ORAL at 09:11

## 2019-03-19 ASSESSMENT — PAIN SCALES - GENERAL
PAINLEVEL_OUTOF10: 0
PAINLEVEL_OUTOF10: 0

## 2019-03-23 LAB — BLOOD CULTURE, ROUTINE: NORMAL

## 2019-04-04 ENCOUNTER — NURSE ONLY (OUTPATIENT)
Dept: LAB | Age: 74
End: 2019-04-04

## 2019-04-04 LAB
ANION GAP SERPL CALCULATED.3IONS-SCNC: 10 MEQ/L (ref 8–16)
BUN BLDV-MCNC: 14 MG/DL (ref 7–22)
CALCIUM SERPL-MCNC: 9.1 MG/DL (ref 8.5–10.5)
CHLORIDE BLD-SCNC: 103 MEQ/L (ref 98–111)
CO2: 28 MEQ/L (ref 23–33)
CREAT SERPL-MCNC: 0.8 MG/DL (ref 0.4–1.2)
GFR SERPL CREATININE-BSD FRML MDRD: > 90 ML/MIN/1.73M2
GLUCOSE BLD-MCNC: 100 MG/DL (ref 70–108)
PHOSPHORUS: 2.2 MG/DL (ref 2.4–4.7)
POTASSIUM SERPL-SCNC: 5 MEQ/L (ref 3.5–5.2)
PTH INTACT: 81.7 PG/ML (ref 15–65)
SODIUM BLD-SCNC: 141 MEQ/L (ref 135–145)

## 2019-04-05 LAB
REASON FOR REJECTION: NORMAL
REJECTED TEST: NORMAL

## 2019-04-08 ENCOUNTER — NURSE ONLY (OUTPATIENT)
Dept: LAB | Age: 74
End: 2019-04-08

## 2019-04-10 LAB — N-TELOPEPTIDE, CROSS-LINKED, URINE: NORMAL

## 2019-05-10 ENCOUNTER — APPOINTMENT (OUTPATIENT)
Dept: CT IMAGING | Age: 74
End: 2019-05-10
Payer: MEDICARE

## 2019-05-10 ENCOUNTER — HOSPITAL ENCOUNTER (EMERGENCY)
Age: 74
Discharge: HOME | End: 2019-05-10
Attending: EMERGENCY MEDICINE
Payer: MEDICARE

## 2019-05-10 VITALS
HEART RATE: 78 BPM | WEIGHT: 147 LBS | SYSTOLIC BLOOD PRESSURE: 136 MMHG | BODY MASS INDEX: 25.1 KG/M2 | DIASTOLIC BLOOD PRESSURE: 82 MMHG | RESPIRATION RATE: 16 BRPM | TEMPERATURE: 97.6 F | HEIGHT: 64 IN | OXYGEN SATURATION: 97 %

## 2019-05-10 DIAGNOSIS — R10.13 ABDOMINAL PAIN, EPIGASTRIC: Primary | ICD-10-CM

## 2019-05-10 LAB
ALBUMIN SERPL-MCNC: 3.8 G/DL (ref 3.5–5.1)
ALP BLD-CCNC: 34 U/L (ref 38–126)
ALT SERPL-CCNC: 14 U/L (ref 11–66)
ANION GAP SERPL CALCULATED.3IONS-SCNC: 10 MEQ/L (ref 8–16)
AST SERPL-CCNC: 18 U/L (ref 5–40)
BASOPHILS # BLD: 0.5 %
BASOPHILS ABSOLUTE: 0 THOU/MM3 (ref 0–0.1)
BILIRUB SERPL-MCNC: 0.2 MG/DL (ref 0.3–1.2)
BUN BLDV-MCNC: 10 MG/DL (ref 7–22)
CALCIUM SERPL-MCNC: 10.1 MG/DL (ref 8.5–10.5)
CHLORIDE BLD-SCNC: 99 MEQ/L (ref 98–111)
CO2: 27 MEQ/L (ref 23–33)
CREAT SERPL-MCNC: 0.7 MG/DL (ref 0.4–1.2)
EKG ATRIAL RATE: 78 BPM
EKG P AXIS: 49 DEGREES
EKG P-R INTERVAL: 174 MS
EKG Q-T INTERVAL: 378 MS
EKG QRS DURATION: 84 MS
EKG QTC CALCULATION (BAZETT): 430 MS
EKG R AXIS: 10 DEGREES
EKG T AXIS: 38 DEGREES
EKG VENTRICULAR RATE: 78 BPM
EOSINOPHIL # BLD: 1.7 %
EOSINOPHILS ABSOLUTE: 0.1 THOU/MM3 (ref 0–0.4)
ERYTHROCYTE [DISTWIDTH] IN BLOOD BY AUTOMATED COUNT: 14.6 % (ref 11.5–14.5)
ERYTHROCYTE [DISTWIDTH] IN BLOOD BY AUTOMATED COUNT: 50.4 FL (ref 35–45)
GFR SERPL CREATININE-BSD FRML MDRD: > 90 ML/MIN/1.73M2
GLUCOSE BLD-MCNC: 99 MG/DL (ref 70–108)
HCT VFR BLD CALC: 40 % (ref 42–52)
HEMOGLOBIN: 12.6 GM/DL (ref 14–18)
IMMATURE GRANS (ABS): 0.01 THOU/MM3 (ref 0–0.07)
IMMATURE GRANULOCYTES: 0.2 %
LACTIC ACID: 1.8 MMOL/L (ref 0.5–2.2)
LIPASE: 12 U/L (ref 5.6–51.3)
LYMPHOCYTES # BLD: 24.4 %
LYMPHOCYTES ABSOLUTE: 1.5 THOU/MM3 (ref 1–4.8)
MCH RBC QN AUTO: 29.8 PG (ref 26–33)
MCHC RBC AUTO-ENTMCNC: 31.5 GM/DL (ref 32.2–35.5)
MCV RBC AUTO: 94.6 FL (ref 80–94)
MONOCYTES # BLD: 11.3 %
MONOCYTES ABSOLUTE: 0.7 THOU/MM3 (ref 0.4–1.3)
NUCLEATED RED BLOOD CELLS: 0 /100 WBC
OSMOLALITY CALCULATION: 271 MOSMOL/KG (ref 275–300)
PLATELET # BLD: 274 THOU/MM3 (ref 130–400)
PMV BLD AUTO: 9 FL (ref 9.4–12.4)
POTASSIUM REFLEX MAGNESIUM: 4.6 MEQ/L (ref 3.5–5.2)
RBC # BLD: 4.23 MILL/MM3 (ref 4.7–6.1)
SEG NEUTROPHILS: 61.9 %
SEGMENTED NEUTROPHILS ABSOLUTE COUNT: 3.7 THOU/MM3 (ref 1.8–7.7)
SODIUM BLD-SCNC: 136 MEQ/L (ref 135–145)
TOTAL PROTEIN: 6.2 G/DL (ref 6.1–8)
TROPONIN T: < 0.01 NG/ML
WBC # BLD: 6 THOU/MM3 (ref 4.8–10.8)

## 2019-05-10 PROCEDURE — 6360000004 HC RX CONTRAST MEDICATION: Performed by: EMERGENCY MEDICINE

## 2019-05-10 PROCEDURE — 74177 CT ABD & PELVIS W/CONTRAST: CPT

## 2019-05-10 PROCEDURE — 93005 ELECTROCARDIOGRAM TRACING: CPT | Performed by: EMERGENCY MEDICINE

## 2019-05-10 PROCEDURE — 93010 ELECTROCARDIOGRAM REPORT: CPT | Performed by: NUCLEAR MEDICINE

## 2019-05-10 PROCEDURE — 80053 COMPREHEN METABOLIC PANEL: CPT

## 2019-05-10 PROCEDURE — 85025 COMPLETE CBC W/AUTO DIFF WBC: CPT

## 2019-05-10 PROCEDURE — 99284 EMERGENCY DEPT VISIT MOD MDM: CPT

## 2019-05-10 PROCEDURE — 36415 COLL VENOUS BLD VENIPUNCTURE: CPT

## 2019-05-10 PROCEDURE — 83690 ASSAY OF LIPASE: CPT

## 2019-05-10 PROCEDURE — 84484 ASSAY OF TROPONIN QUANT: CPT

## 2019-05-10 PROCEDURE — 83605 ASSAY OF LACTIC ACID: CPT

## 2019-05-10 RX ORDER — SUCRALFATE 1 G/1
1 TABLET ORAL 3 TIMES DAILY
Qty: 15 TABLET | Refills: 0 | Status: ON HOLD | OUTPATIENT
Start: 2019-05-10 | End: 2020-04-29

## 2019-05-10 RX ADMIN — IOPAMIDOL 80 ML: 755 INJECTION, SOLUTION INTRAVENOUS at 13:31

## 2019-05-10 ASSESSMENT — PAIN SCALES - GENERAL: PAINLEVEL_OUTOF10: 6

## 2019-05-10 ASSESSMENT — ENCOUNTER SYMPTOMS
VOMITING: 0
VOICE CHANGE: 0
BLOOD IN STOOL: 0
WHEEZING: 0
ABDOMINAL PAIN: 1
NAUSEA: 0
PHOTOPHOBIA: 0
BACK PAIN: 0
CHEST TIGHTNESS: 0
DIARRHEA: 0
FACIAL SWELLING: 0
TROUBLE SWALLOWING: 0
SORE THROAT: 0
SHORTNESS OF BREATH: 0
COUGH: 0

## 2019-05-10 ASSESSMENT — PAIN DESCRIPTION - PAIN TYPE: TYPE: ACUTE PAIN

## 2019-05-10 ASSESSMENT — PAIN DESCRIPTION - FREQUENCY: FREQUENCY: CONTINUOUS

## 2019-05-10 ASSESSMENT — PAIN DESCRIPTION - ORIENTATION: ORIENTATION: UPPER

## 2019-05-10 ASSESSMENT — PAIN DESCRIPTION - DESCRIPTORS: DESCRIPTORS: THROBBING

## 2019-05-10 ASSESSMENT — PAIN DESCRIPTION - LOCATION: LOCATION: ABDOMEN

## 2019-05-10 NOTE — ED PROVIDER NOTES
[facility] [address] [address] EMERGENCY DEPT  EMERGENCY DEPARTMENT     Pt Name: Ephraim Randle  MRN: [id number]  [place] 1945  Date of evaluation: 5/10/2019  Provider: Robson Chow DO, [address]       Chief Complaint   Patient presents with    Abdominal Pain     upper       HISTORY OF PRESENT ILLNESS    Ephraim Randle is a 75 y.o. male who presents to the emergency department from home with complaints of epigastric abdominal pain that started over course the past week and a half, is progressively worsened. It is mostly postprandial, approximately an hour after eating, although he is unsure of the types of food did elicit the pain. Occasional nausea, but no vomiting, no fever. Has had previous imaging and suggested some gallbladder pathology in the past according the patient. Denies any dark or bloody stools. Denies any other associated symptoms      Triage notes and Nursing notes were reviewed by myself. Any discrepancies are addressed above. PAST MEDICAL HISTORY     Past Medical History:   Diagnosis Date    CAD (coronary artery disease)     COPD (chronic obstructive pulmonary disease) ([facility] [address]     Hyperlipidemia     Hypertension     Pneumonia        SURGICAL HISTORY       Past Surgical History:   Procedure Laterality Date    CARDIAC CATHETERIZATION  2012    STENT    COLONOSCOPY      ENDOSCOPY, COLON, DIAGNOSTIC      FINGER SURGERY Right 12/2018    ROTATOR CUFF REPAIR         CURRENT MEDICATIONS       Previous Medications    ALBUTEROL (PROVENTIL) (2.5 MG/3ML) 0.083% NEBULIZER SOLUTION    Take 3 mLs by nebulization every 6 hours as needed for Wheezing or Shortness of Breath    ALBUTEROL SULFATE HFA (PROAIR HFA) 108 (90 BASE) MCG/ACT INHALER    Inhale 2 puffs into the lungs every 6 hours as needed for Wheezing    ALENDRONATE (FOSAMAX) 70 MG TABLET    Take 1 tablet by mouth every 7 days    ASPIRIN 81 MG TABLET    Take 81 mg by mouth daily.     BUDESONIDE (PULMICORT) 0.5 MG/2ML NEBULIZER normal.   No nystagmus   Skin: Skin is warm and dry. No rash noted. He is not diaphoretic. No erythema. No pallor. Psychiatric: He has a normal mood and affect. His behavior is normal. Thought content normal.   Nursing note and vitals reviewed. DIAGNOSTIC RESULTS     EKG:(none if blank)  All E[facility]'s are [facility] who either signs or Co-signs this chart in the absence of a cardiologist.        RADIOLOGY: (none if blank)   Interpretation per the Radiologistbelow, if available at the time of this note:    CT ABDOMEN PELVIS W IV CONTRAST Additional Contrast? None   Final Result      1. Moderate scattered stool in the colon with diverticulosis. No diverticulitis at this time. 2. small gallstones versus layering sludge. No pericholecystic infiltration or thickening. 3. Distended bladder and enlarged prostate gland. Correlation with symptoms is advised. **This report has been created using voice recognition software. It may contain minor errors which are inherent in voice recognition technology. **      Final report electronically signed by Dr. Deanna Youngblood on 5/10/2019 2:15 PM          LABS:  Labs Reviewed   CBC WITH AUTO DIFFERENTIAL - Abnormal; Notable for the following components:       Result Value    RBC 4.23 (*)     Hemoglobin 12.6 (*)     Hematocrit 40.0 (*)     MCV 94.6 (*)     MCHC 31.5 (*)     RDW-CV 14.6 (*)     RDW-SD 50.4 (*)     MPV 9.0 (*)     All other components within normal limits   COMPREHENSIVE METABOLIC PANEL W/ REFLEX TO MG FOR LOW K - Abnormal; Notable for the following components:    Alkaline Phosphatase 34 (*)     Total Bilirubin 0.2 (*)     All other components within normal limits   OSMOLALITY - Abnormal; Notable for the following components:    Osmolality Calc 271.0 (*)     All other components within normal limits   LACTIC ACID, PLASMA   LIPASE   TROPONIN   ANION GAP   GLOMERULAR FILTRATION RATE, ESTIMATED       All other labs were within normal range or not returned as of this dictation. Please note, any cultures that may have been sent were not resulted at the time of this patient visit. EMERGENCY DEPARTMENT COURSE andMedical Decision Making:     MDM/  ED Course as of May 10 1447   Fri May 10, 2019   1220 A limited bedside ultrasound is performed myself to evaluate right upper quadrant and aorta. Gallbladder is well-visualized, no wall thickening, no evidence of a stone.    [AB]      ED Course User Index  [AB] Darrell Knutson DO     The patient presents with abdominal pain. The patient is feeling better with a benign repeat examination. On reexamination at [address], he states he has absolutely no pain, feels markedly better. There is no evidence of an acute abdomen at this time. Based on history, physical exam, risk factors, and tests,  my suspicion for bowel obstruction, acute pancreatitis, abscess, perforated viscous, diverticulitis, cholecystis, appendicitis is very low and I feel the patient can be managed as an outpatient with follow up. I also see no evidence of aortic dissection, AAA, or Acute Coronary Syndrome. Instructions have been given for the patient to return to the ED for worsening of the pain, high fevers, intractable vomiting, or bleeding. Strict follow up and return precautions have been discussed. Patient does have a history of gastritis on a recent EGD for which she was prescribed a PPI. I will add Carafate to his regimen encouraged follow-up with a GI doctor. ED Medications administered this visit:    Medications   iopamidol (ISOVUE-370) 76 % injection 80 mL (80 mLs Intravenous Given 5/10/19 1331)         Procedures: (None if blank)       CLINICAL       1.  Abdominal pain, epigastric          DISPOSITION/PLAN   DISPOSITION Decision To Discharge 05/10/2019 02:45:10 PM      PATIENT REFERRED TO:  Julia Teixeira  [phone]    In 3 days      [facility] [address] Spence, [address]  [phone]    In 2 days        DISCHARGE MEDICATIONS:  New Prescriptions    SUCRALFATE (CARAFATE) 1 GM TABLET    Take 1 tablet by mouth 3 times daily              (Please note that portions of this note were completed with a voice recognition program.  Efforts were made to edit the dictations but occasionallywords are mis-transcribed.)      Florian Shaver DO,NANCY (electronically signed)  Attending Physician, Emergency [facility] DO Bimal  05/10/19 3200

## 2019-06-03 ENCOUNTER — OFFICE VISIT (OUTPATIENT)
Dept: PULMONOLOGY | Age: 74
End: 2019-06-03
Payer: MEDICARE

## 2019-06-03 VITALS
SYSTOLIC BLOOD PRESSURE: 124 MMHG | HEART RATE: 77 BPM | HEIGHT: 64 IN | WEIGHT: 149.6 LBS | BODY MASS INDEX: 25.54 KG/M2 | DIASTOLIC BLOOD PRESSURE: 76 MMHG | OXYGEN SATURATION: 96 % | TEMPERATURE: 97.7 F

## 2019-06-03 DIAGNOSIS — J44.9 COPD, SEVERE (HCC): ICD-10-CM

## 2019-06-03 DIAGNOSIS — Z99.81 CHRONIC RESPIRATORY FAILURE WITH HYPOXIA, ON HOME O2 THERAPY (HCC): ICD-10-CM

## 2019-06-03 DIAGNOSIS — T38.0X5A STEROID-INDUCED OSTEOPOROSIS: ICD-10-CM

## 2019-06-03 DIAGNOSIS — J96.11 CHRONIC RESPIRATORY FAILURE WITH HYPOXIA, ON HOME O2 THERAPY (HCC): ICD-10-CM

## 2019-06-03 DIAGNOSIS — M81.8 STEROID-INDUCED OSTEOPOROSIS: ICD-10-CM

## 2019-06-03 DIAGNOSIS — J44.9 STEROID-DEPENDENT COPD (HCC): Primary | ICD-10-CM

## 2019-06-03 PROCEDURE — G8427 DOCREV CUR MEDS BY ELIG CLIN: HCPCS | Performed by: NURSE PRACTITIONER

## 2019-06-03 PROCEDURE — G8598 ASA/ANTIPLAT THER USED: HCPCS | Performed by: NURSE PRACTITIONER

## 2019-06-03 PROCEDURE — 3023F SPIROM DOC REV: CPT | Performed by: NURSE PRACTITIONER

## 2019-06-03 PROCEDURE — G8419 CALC BMI OUT NRM PARAM NOF/U: HCPCS | Performed by: NURSE PRACTITIONER

## 2019-06-03 PROCEDURE — 3017F COLORECTAL CA SCREEN DOC REV: CPT | Performed by: NURSE PRACTITIONER

## 2019-06-03 PROCEDURE — G8926 SPIRO NO PERF OR DOC: HCPCS | Performed by: NURSE PRACTITIONER

## 2019-06-03 PROCEDURE — 99214 OFFICE O/P EST MOD 30 MIN: CPT | Performed by: NURSE PRACTITIONER

## 2019-06-03 PROCEDURE — 1123F ACP DISCUSS/DSCN MKR DOCD: CPT | Performed by: NURSE PRACTITIONER

## 2019-06-03 PROCEDURE — 4040F PNEUMOC VAC/ADMIN/RCVD: CPT | Performed by: NURSE PRACTITIONER

## 2019-06-03 PROCEDURE — 1036F TOBACCO NON-USER: CPT | Performed by: NURSE PRACTITIONER

## 2019-06-03 ASSESSMENT — ENCOUNTER SYMPTOMS
SHORTNESS OF BREATH: 1
WHEEZING: 1
STRIDOR: 0
CHEST TIGHTNESS: 0
HEMOPTYSIS: 0
SPUTUM PRODUCTION: 1
COUGH: 1

## 2019-06-03 ASSESSMENT — COPD QUESTIONNAIRES: COPD: 1

## 2019-06-03 NOTE — PROGRESS NOTES
prednisone burst.  3/12/19 Dr. Jose Deleon continued cough holcomb sputum sputum cultures (results not available in Epic), ceftin 500 BID 2 weeks, gentamycin aerosol 40 mg.     Established with Dr. Kanika Yoon Endocrinologist seen in April has seen  GI Dr. Joseph Lynn endoscopy . Progress History:   Pulmicort and perforomist nebs-Working well  Had previously trialed spiriva and had reaction of chest tighness not interested in Incruse. Unable to tolerate Daliresp in the past  Former smoker Quit       Since last visit any new medical issues? No  New ER or hospital visits? Yes see above  Any new or changes in medicines? Yes performosit and pulmicort  Using nebs/inhlaers? Yes reports good compliance uses albuterol 2-3 time per day  Are they helpful?  Yes     Past Medical hx   PMH:  Past Medical History:   Diagnosis Date    CAD (coronary artery disease)     COPD (chronic obstructive pulmonary disease) ([facility] [address]     Hyperlipidemia     Hypertension     Pneumonia      SURGICAL HISTORY:  Past Surgical History:   Procedure Laterality Date    CARDIAC CATHETERIZATION      STENT    COLONOSCOPY      ENDOSCOPY, COLON, DIAGNOSTIC      FINGER SURGERY Right 2018    ROTATOR CUFF REPAIR       SOCIAL HISTORY:   Social History     Tobacco Use    Smoking status: Former Smoker     Packs/day: 1.00     Years: 30.00     Pack years: 30.00     Types: Cigarettes     Start date: 5/3/1953     Last attempt to quit: 1983     Years since quittin.9    Smokeless tobacco: Never Used   Substance Use Topics    Alcohol use: No     Alcohol/week: 0.0 oz     Types: 1 - 2 Cans of beer per week    Drug use: No     ALLERGIES:  Allergies   Allergen Reactions    Flonase [Fluticasone Propionate]      Pt shakes and feels anxious    Mometasone Furoate      Shakes,feels anxious    Spiriva Handihaler [Tiotropium Bromide Monohydrate]      FAMILY HISTORY:  Family History   Problem Relation Age of Onset    High Cholesterol Father     Cancer Sister     Cancer Brother     Heart Disease Brother      CURRENT MEDICATIONS:  Current Outpatient Medications   Medication Sig Dispense Refill    sucralfate (CARAFATE) 1 GM tablet Take 1 tablet by mouth 3 times daily 15 tablet 0    pantoprazole (PROTONIX) 40 MG tablet Take 1 tablet by mouth 2 times daily (before meals) 60 tablet 3    budesonide (PULMICORT) 0.5 MG/2ML nebulizer suspension Take 2 mLs by nebulization 2 times daily 60 ampule 11    formoterol (PERFOROMIST) 20 MCG/2ML nebulizer solution Take 2 mLs by nebulization every 12 hours 120 mL 11    predniSONE (DELTASONE) 2.5 MG tablet Take 1 tablet by mouth daily 30 tablet 11    lactobacillus (CULTURELLE) capsule Take 1 capsule by mouth daily (with breakfast) 30 capsule 1    senna-docusate (PERICOLACE) 8.6-50 MG per tablet Take 2 tablets by mouth daily as needed for Constipation 30 tablet 1    gentamicin (GARAMYCIN) 40 MG/ML injection       alendronate (FOSAMAX) 70 MG tablet Take 1 tablet by mouth every 7 days 12 tablet 1    pravastatin (PRAVACHOL) 20 MG tablet TAKE 1 TABLET BY MOUTH  DAILY 90 tablet 1    metoprolol tartrate (LOPRESSOR) 25 MG tablet TAKE 1 TABLET BY MOUTH  TWICE A  tablet 1    clopidogrel (PLAVIX) 75 MG tablet TAKE 1 TABLET BY MOUTH  DAILY 90 tablet 1    montelukast (SINGULAIR) 10 MG tablet TAKE 1 TABLET BY MOUTH  NIGHTLY 90 tablet 1    albuterol sulfate HFA (PROAIR HFA) 108 (90 Base) MCG/ACT inhaler Inhale 2 puffs into the lungs every 6 hours as needed for Wheezing 3 Inhaler 3    guaiFENesin (MUCINEX) 600 MG extended release tablet Take 600 mg by mouth Daily      albuterol (PROVENTIL) (2.5 MG/3ML) 0.083% nebulizer solution Take 3 mLs by nebulization every 6 hours as needed for Wheezing or Shortness of Breath 360 vial 3    Calcium Carb-Cholecalciferol (CALCIUM 600 + D PO) Take 600 mg by mouth daily      potassium iodide (SSKI) 1 GM/ML solution Take by mouth daily 5 drops into oral liquid as  suggested      nitroGLYCERIN (NITROSTAT) 0.4 MG SL tablet Place 1 tablet under the tongue every 5 minutes as needed. 25 tablet 3    Oxygen Concentrator 2 L by Nasal route as needed.  aspirin 81 MG tablet Take 81 mg by mouth daily. No current facility-administered medications for this visit. Dee CALLAHAN   Review of Systems   Constitutional: Negative for chills and fever. HENT: Negative for postnasal drip. Respiratory: Positive for cough, sputum production, shortness of breath and wheezing. Negative for hemoptysis, chest tightness and stridor. Chronic wheezing per patient at baseline   Cardiovascular: Positive for dyspnea on exertion. Negative for chest pain and leg swelling. Physical exam   /76 (Site: Right Upper Arm, Position: Sitting, Cuff Size: Large Adult)   Pulse 77   Temp 97.7 °F (36.5 °C)   Ht 5' 4\" (1.626 m)   Wt 149 lb 9.6 oz (67.9 kg)   SpO2 96% Comment: on RA  BMI 25.68 kg/m²    Wt Readings from Last 3 Encounters:   06/03/19 149 lb 9.6 oz (67.9 kg)   05/10/19 147 lb (66.7 kg)   03/19/19 146 lb 8 oz (66.5 kg)       Physical Exam   Constitutional: He is oriented to person, place, and time. He appears well-developed and well-nourished. No distress. HENT:   Head: Normocephalic and atraumatic. Mouth/Throat: Oropharynx is clear and moist.   Neck: Neck supple. No tracheal deviation present. Cardiovascular: Normal rate, regular rhythm and normal heart sounds. No murmur heard. Pulmonary/Chest: Effort normal. No stridor. No respiratory distress. He has wheezes. He has no rales. He exhibits no tenderness. Intermittent expiratory wheeze   Abdominal: Soft. Bowel sounds are normal. He exhibits no distension. Musculoskeletal: He exhibits no edema. Neurological: He is alert and oriented to person, place, and time. Skin: Skin is warm and dry. Capillary refill takes less than 2 seconds. Psychiatric: He has a normal mood and affect.  His behavior is normal. Judgment and thought content normal.   Nursing note and vitals reviewed. Test results   Lung Nodule Screening     [] Qualifies    [x] Does not qualify   [] Declined    [] Completed   [place]   [facility] annual screening for lung cancer with low-dose computed tomography (LDCT) in adults aged 54 to [de-identified] years who have a 30 pack-year smoking history and currently smoke or have quit within the past 15 years. Screeningshould be discontinued once a person has not smoked for 15 years or develops a health problem that substantially limits life expectancy or the ability or willingness to have curative lung surgery. Assessment      Diagnosis Orders   1. Steroid-dependent COPD ([address]  6 Minute Walk Test   2. Steroid-induced osteoporosis     3. Senile osteoporosis     4. Chronic respiratory failure with hypoxia, on home O2 therapy ([facility])  6 Minute Walk Test      Plan   -Not interested in A1A testing or advanced COPD therapies ie transplant etc.  -Continue Perforomist and pulmicort   -Continue 2.5 mg Prednisone -Discussed with patient regarding questions about long term oral glucocorticoid Per GOLD guidelines long term use of prednisone is associated with numerous side effects without documented benefit in respiratory function-outlined possible side effects including thinning skin and ecchymosis, increased risk of cataracts and glaucoma, fluid retention, increased risk of A-fib, and HTN, increased risk of gastritis, ulcer formation, depletion of bone mineral density, uncontrolled blood glucose, and mood imbalance.  Patient verbalized understanding and agreed to take risks  -O2 previously 2 lpm at night and 4 lpm with exertion will have patient continue using   -Due to drug reactions and patient preference will not have repeat spirometry testing (Previously could not tolerate Daliresp, not interested in advanced treatments had side effects with spiriva) on max therapy not clinical benefit from additional spirometry at this time  -Advised to maintain pneumonia vaccine with PCP and to take flu vaccine this coming season.  -Advised patient to call office with any changes, questions, or concerns regarding respiratory status    Will see Kamran Ledbetter back in: 12 months no testing    Manoj Ackerman CNP  6/3/2019

## 2019-06-25 ENCOUNTER — OFFICE VISIT (OUTPATIENT)
Dept: CARDIOLOGY CLINIC | Age: 74
End: 2019-06-25
Payer: MEDICARE

## 2019-06-25 VITALS
WEIGHT: 149.47 LBS | BODY MASS INDEX: 25.52 KG/M2 | HEIGHT: 64 IN | SYSTOLIC BLOOD PRESSURE: 120 MMHG | HEART RATE: 68 BPM | DIASTOLIC BLOOD PRESSURE: 70 MMHG

## 2019-06-25 DIAGNOSIS — R07.2 PRECORDIAL PAIN: Primary | ICD-10-CM

## 2019-06-25 DIAGNOSIS — J44.9 CHRONIC OBSTRUCTIVE PULMONARY DISEASE, UNSPECIFIED COPD TYPE (HCC): ICD-10-CM

## 2019-06-25 DIAGNOSIS — I25.119 CORONARY ARTERY DISEASE INVOLVING NATIVE CORONARY ARTERY OF NATIVE HEART WITH ANGINA PECTORIS (HCC): ICD-10-CM

## 2019-06-25 PROCEDURE — 1123F ACP DISCUSS/DSCN MKR DOCD: CPT | Performed by: NUCLEAR MEDICINE

## 2019-06-25 PROCEDURE — 3023F SPIROM DOC REV: CPT | Performed by: NUCLEAR MEDICINE

## 2019-06-25 PROCEDURE — G8419 CALC BMI OUT NRM PARAM NOF/U: HCPCS | Performed by: NUCLEAR MEDICINE

## 2019-06-25 PROCEDURE — G8926 SPIRO NO PERF OR DOC: HCPCS | Performed by: NUCLEAR MEDICINE

## 2019-06-25 PROCEDURE — 3017F COLORECTAL CA SCREEN DOC REV: CPT | Performed by: NUCLEAR MEDICINE

## 2019-06-25 PROCEDURE — G8427 DOCREV CUR MEDS BY ELIG CLIN: HCPCS | Performed by: NUCLEAR MEDICINE

## 2019-06-25 PROCEDURE — 1036F TOBACCO NON-USER: CPT | Performed by: NUCLEAR MEDICINE

## 2019-06-25 PROCEDURE — 4040F PNEUMOC VAC/ADMIN/RCVD: CPT | Performed by: NUCLEAR MEDICINE

## 2019-06-25 PROCEDURE — G8598 ASA/ANTIPLAT THER USED: HCPCS | Performed by: NUCLEAR MEDICINE

## 2019-06-25 PROCEDURE — 99213 OFFICE O/P EST LOW 20 MIN: CPT | Performed by: NUCLEAR MEDICINE

## 2019-06-25 NOTE — PROGRESS NOTES
[address] [address]  Dept: [phone]  Dept Fax: [phone]  Loc: [phone]    Visit Date: 2019    Omayra Nelson is a 75 y.o. male who presents todayfor:  Chief Complaint   Patient presents with    6 Month Follow-Up    Hypertension    COPD    Shortness of Breath    Coronary Artery Disease   admitted in march for chest pain  Thought to be gastric related  No cath done  Known COPD and home O2  known LM disease  And LM stent   Intermittent dyspnea  Intermittent chest pain   Some nitro use at times        HPI:  HPI  Past Medical History:   Diagnosis Date    CAD (coronary artery disease)     COPD (chronic obstructive pulmonary disease) ([facility] [address]     Hyperlipidemia     Hypertension     Pneumonia       Past Surgical History:   Procedure Laterality Date    CARDIAC CATHETERIZATION  2012    STENT    COLONOSCOPY      ENDOSCOPY, COLON, DIAGNOSTIC      FINGER SURGERY Right 2018    ROTATOR CUFF REPAIR       Family History   Problem Relation Age of Onset    High Cholesterol Father     Cancer Sister     Cancer Brother     Heart Disease Brother      Social History     Tobacco Use    Smoking status: Former Smoker     Packs/day: 1.00     Years: 30.00     Pack years: 30.00     Types: Cigarettes     Start date: 5/3/1953     Last attempt to quit: 1983     Years since quittin.0    Smokeless tobacco: Never Used   Substance Use Topics    Alcohol use: No     Alcohol/week: 0.0 oz     Types: 1 - 2 Cans of beer per week      Current Outpatient Medications   Medication Sig Dispense Refill    sucralfate (CARAFATE) 1 GM tablet Take 1 tablet by mouth 3 times daily 15 tablet 0    pantoprazole (PROTONIX) 40 MG tablet Take 1 tablet by mouth 2 times daily (before meals) 60 tablet 3    budesonide (PULMICORT) 0.5 MG/2ML nebulizer suspension Take 2 mLs by nebulization 2 times daily 60 ampule 11    formoterol [Tiotropium Bromide Monohydrate]      Health Maintenance   Topic Date Due    DTaP/Tdap/Td vaccine (1 - Tdap) 08/01/1963    Shingles Vaccine (1 of 2) 08/01/1994    Annual Wellness Visit (AWV)  08/01/2007    Pneumococcal 65+ years Vaccine (2 of 2 - PPSV23) 08/01/2009    Lipid screen  04/14/2019    Colon cancer screen colonoscopy  12/11/2022    Flu vaccine  Completed    AAA screen  Completed       Subjective:  Review of Systems  General:   No fever, no chills,some fatigue or weight loss  Pulmonary:   Some chronic dyspnea, no wheezing  Cardiac:    Denies recent chest pain,   GI:     No nausea or vomiting, no abdominal pain  Neuro:     No dizziness or light headedness,   Musculoskeletal:  No recent active issues  Extremities:   No edema, good peripheral pulses      Objective:  Physical Exam  /70   Pulse 68   Ht 5' 4\" (1.626 m)   Wt 149 lb 7.6 oz (67.8 kg)   BMI 25.66 kg/m²   General:   Well developed, well nourished  Lungs:    Decreased air   Heart:    Normal S1 S2, Slight murmur. no rubs, no gallops  Abdomen:   Soft, non tender, no organomegalies, positive bowel sounds  Extremities:   No edema, no cyanosis, good peripheral pulses  Neurological:   Awake, alert, oriented. No obvious focal deficits  Musculoskelatal:  No obvious deformities    Assessment:      Diagnosis Orders   1. Precordial pain     2. Chronic obstructive pulmonary disease, unspecified COPD type ([facility] [address]     3. Coronary artery disease involving native coronary artery of native heart with angina pectoris [facility])     cardiac as above  Higher risk patient       Plan:  No follow-ups on file. Cath if clinically indicated  Continue risk factor modification and medical management  Thank you for allowing me to participate in the care of your patient. Please don't hesitate to contact me regarding any further issues related to the patient care      Orders Placed:  No orders of the defined types were placed in this encounter.       Medications

## 2019-07-16 ENCOUNTER — HOSPITAL ENCOUNTER (OUTPATIENT)
Dept: ULTRASOUND IMAGING | Age: 74
Discharge: HOME | End: 2019-07-16
Payer: MEDICARE

## 2019-07-16 DIAGNOSIS — K82.4 GALLBLADDER POLYP: ICD-10-CM

## 2019-07-16 PROCEDURE — 76705 ECHO EXAM OF ABDOMEN: CPT

## 2019-07-16 PROCEDURE — 93975 VASCULAR STUDY: CPT

## 2019-08-22 ENCOUNTER — HOSPITAL ENCOUNTER (OUTPATIENT)
Dept: MRI IMAGING | Age: 74
Discharge: HOME | End: 2019-08-22
Payer: MEDICARE

## 2019-08-22 DIAGNOSIS — K82.4 CHOLESTEROLOSIS OF GALLBLADDER: ICD-10-CM

## 2019-08-22 LAB — POC CREATININE WHOLE BLOOD: 1.1 MG/DL (ref 0.5–1.2)

## 2019-08-22 PROCEDURE — 74183 MRI ABD W/O CNTR FLWD CNTR: CPT

## 2019-08-22 PROCEDURE — 6360000004 HC RX CONTRAST MEDICATION: Performed by: INTERNAL MEDICINE

## 2019-08-22 PROCEDURE — 82565 ASSAY OF CREATININE: CPT

## 2019-08-22 PROCEDURE — A9579 GAD-BASE MR CONTRAST NOS,1ML: HCPCS | Performed by: INTERNAL MEDICINE

## 2019-08-22 RX ADMIN — GADOTERIDOL 15 ML: 279.3 INJECTION, SOLUTION INTRAVENOUS at 17:16

## 2019-10-14 RX ORDER — PRAVASTATIN SODIUM 20 MG
TABLET ORAL
Qty: 90 TABLET | Refills: 1 | Status: SHIPPED | OUTPATIENT
Start: 2019-10-14 | End: 2020-03-02

## 2019-10-14 RX ORDER — CLOPIDOGREL BISULFATE 75 MG/1
TABLET ORAL
Qty: 90 TABLET | Refills: 1 | Status: SHIPPED | OUTPATIENT
Start: 2019-10-14 | End: 2020-03-02

## 2019-11-01 ENCOUNTER — HOSPITAL ENCOUNTER (OUTPATIENT)
Dept: ULTRASOUND IMAGING | Age: 74
Discharge: HOME | End: 2019-11-01
Payer: MEDICARE

## 2019-11-01 DIAGNOSIS — K82.4 GALLBLADDER POLYP: ICD-10-CM

## 2019-11-01 PROCEDURE — 76705 ECHO EXAM OF ABDOMEN: CPT

## 2019-11-01 PROCEDURE — 93975 VASCULAR STUDY: CPT

## 2020-03-02 RX ORDER — CLOPIDOGREL BISULFATE 75 MG/1
TABLET ORAL
Qty: 90 TABLET | Refills: 0 | Status: SHIPPED | OUTPATIENT
Start: 2020-03-02 | End: 2020-05-18

## 2020-03-02 RX ORDER — PRAVASTATIN SODIUM 20 MG
TABLET ORAL
Qty: 90 TABLET | Refills: 0 | Status: SHIPPED | OUTPATIENT
Start: 2020-03-02 | End: 2020-05-18

## 2020-03-02 RX ORDER — FORMOTEROL FUMARATE 20 UG/2ML
20 SOLUTION RESPIRATORY (INHALATION) EVERY 12 HOURS
Qty: 120 ML | Refills: 11 | Status: SHIPPED | OUTPATIENT
Start: 2020-03-02 | End: 2021-03-17

## 2020-03-02 RX ORDER — BUDESONIDE 0.5 MG/2ML
500 INHALANT ORAL 2 TIMES DAILY
Qty: 60 AMPULE | Refills: 11 | Status: SHIPPED | OUTPATIENT
Start: 2020-03-02 | End: 2021-03-11

## 2020-03-17 RX ORDER — PREDNISONE 1 MG/1
TABLET ORAL
Qty: 90 TABLET | Refills: 3 | OUTPATIENT
Start: 2020-03-17

## 2020-03-24 RX ORDER — ALBUTEROL SULFATE 90 UG/1
2 POWDER, METERED RESPIRATORY (INHALATION) EVERY 6 HOURS PRN
Qty: 3 INHALER | Refills: 3 | Status: SHIPPED | OUTPATIENT
Start: 2020-03-24 | End: 2021-09-14

## 2020-03-24 RX ORDER — PREDNISONE 2.5 MG
2.5 TABLET ORAL DAILY
Qty: 90 TABLET | Refills: 3 | Status: SHIPPED | OUTPATIENT
Start: 2020-03-24 | End: 2021-03-17

## 2020-04-29 ENCOUNTER — APPOINTMENT (OUTPATIENT)
Dept: GENERAL RADIOLOGY | Age: 75
End: 2020-04-29
Payer: MEDICARE

## 2020-04-29 ENCOUNTER — HOSPITAL ENCOUNTER (OUTPATIENT)
Age: 75
Setting detail: OBSERVATION
Discharge: HOME | End: 2020-05-01
Attending: EMERGENCY MEDICINE | Admitting: INTERNAL MEDICINE
Payer: MEDICARE

## 2020-04-29 LAB
ALBUMIN SERPL-MCNC: 4.4 G/DL (ref 3.5–5.1)
ALP BLD-CCNC: 42 U/L (ref 38–126)
ALT SERPL-CCNC: 16 U/L (ref 11–66)
ANION GAP SERPL CALCULATED.3IONS-SCNC: 9 MEQ/L (ref 8–16)
AST SERPL-CCNC: 18 U/L (ref 5–40)
BASOPHILS # BLD: 0.6 %
BASOPHILS ABSOLUTE: 0 THOU/MM3 (ref 0–0.1)
BILIRUB SERPL-MCNC: 0.4 MG/DL (ref 0.3–1.2)
BILIRUBIN DIRECT: < 0.2 MG/DL (ref 0–0.3)
BUN BLDV-MCNC: 12 MG/DL (ref 7–22)
C-REACTIVE PROTEIN: 0.26 MG/DL (ref 0–1)
CALCIUM SERPL-MCNC: 9.5 MG/DL (ref 8.5–10.5)
CHLORIDE BLD-SCNC: 96 MEQ/L (ref 98–111)
CO2: 29 MEQ/L (ref 23–33)
CREAT SERPL-MCNC: 0.7 MG/DL (ref 0.4–1.2)
D-DIMER QUANTITATIVE: 369 NG/ML FEU (ref 0–500)
EKG ATRIAL RATE: 74 BPM
EKG P AXIS: 64 DEGREES
EKG P-R INTERVAL: 178 MS
EKG Q-T INTERVAL: 386 MS
EKG QRS DURATION: 84 MS
EKG QTC CALCULATION (BAZETT): 428 MS
EKG R AXIS: 20 DEGREES
EKG T AXIS: 55 DEGREES
EKG VENTRICULAR RATE: 74 BPM
EOSINOPHIL # BLD: 1.9 %
EOSINOPHILS ABSOLUTE: 0.1 THOU/MM3 (ref 0–0.4)
ERYTHROCYTE [DISTWIDTH] IN BLOOD BY AUTOMATED COUNT: 15 % (ref 11.5–14.5)
ERYTHROCYTE [DISTWIDTH] IN BLOOD BY AUTOMATED COUNT: 50 FL (ref 35–45)
GFR SERPL CREATININE-BSD FRML MDRD: > 90 ML/MIN/1.73M2
GLUCOSE BLD-MCNC: 98 MG/DL (ref 70–108)
HCT VFR BLD CALC: 39.9 % (ref 42–52)
HEMOGLOBIN: 12.3 GM/DL (ref 14–18)
IMMATURE GRANS (ABS): 0.03 THOU/MM3 (ref 0–0.07)
IMMATURE GRANULOCYTES: 0.4 %
LYMPHOCYTES # BLD: 13.8 %
LYMPHOCYTES ABSOLUTE: 0.9 THOU/MM3 (ref 1–4.8)
MAGNESIUM: 2 MG/DL (ref 1.6–2.4)
MCH RBC QN AUTO: 28 PG (ref 26–33)
MCHC RBC AUTO-ENTMCNC: 30.8 GM/DL (ref 32.2–35.5)
MCV RBC AUTO: 90.9 FL (ref 80–94)
MONOCYTES # BLD: 8 %
MONOCYTES ABSOLUTE: 0.5 THOU/MM3 (ref 0.4–1.3)
NUCLEATED RED BLOOD CELLS: 0 /100 WBC
OSMOLALITY CALCULATION: 268 MOSMOL/KG (ref 275–300)
PLATELET # BLD: 328 THOU/MM3 (ref 130–400)
PMV BLD AUTO: 8.8 FL (ref 9.4–12.4)
POTASSIUM REFLEX MAGNESIUM: 4.2 MEQ/L (ref 3.5–5.2)
PRO-BNP: 28.8 PG/ML (ref 0–1800)
RBC # BLD: 4.39 MILL/MM3 (ref 4.7–6.1)
SARS-COV-2, NAAT: NOT DETECTED
SEDIMENTATION RATE, ERYTHROCYTE: 11 MM/HR (ref 0–10)
SEG NEUTROPHILS: 75.3 %
SEGMENTED NEUTROPHILS ABSOLUTE COUNT: 5.1 THOU/MM3 (ref 1.8–7.7)
SODIUM BLD-SCNC: 134 MEQ/L (ref 135–145)
TOTAL PROTEIN: 7.1 G/DL (ref 6.1–8)
TROPONIN T: < 0.01 NG/ML
TSH SERPL DL<=0.05 MIU/L-ACNC: 0.84 UIU/ML (ref 0.4–4.2)
WBC # BLD: 6.8 THOU/MM3 (ref 4.8–10.8)

## 2020-04-29 PROCEDURE — U0002 COVID-19 LAB TEST NON-CDC: HCPCS

## 2020-04-29 PROCEDURE — 93010 ELECTROCARDIOGRAM REPORT: CPT | Performed by: INTERNAL MEDICINE

## 2020-04-29 PROCEDURE — 83735 ASSAY OF MAGNESIUM: CPT

## 2020-04-29 PROCEDURE — 6360000002 HC RX W HCPCS: Performed by: EMERGENCY MEDICINE

## 2020-04-29 PROCEDURE — 99285 EMERGENCY DEPT VISIT HI MDM: CPT

## 2020-04-29 PROCEDURE — 94640 AIRWAY INHALATION TREATMENT: CPT

## 2020-04-29 PROCEDURE — G0378 HOSPITAL OBSERVATION PER HR: HCPCS

## 2020-04-29 PROCEDURE — 96376 TX/PRO/DX INJ SAME DRUG ADON: CPT

## 2020-04-29 PROCEDURE — 80048 BASIC METABOLIC PNL TOTAL CA: CPT

## 2020-04-29 PROCEDURE — 86140 C-REACTIVE PROTEIN: CPT

## 2020-04-29 PROCEDURE — 85651 RBC SED RATE NONAUTOMATED: CPT

## 2020-04-29 PROCEDURE — 71045 X-RAY EXAM CHEST 1 VIEW: CPT

## 2020-04-29 PROCEDURE — 6370000000 HC RX 637 (ALT 250 FOR IP): Performed by: INTERNAL MEDICINE

## 2020-04-29 PROCEDURE — 96366 THER/PROPH/DIAG IV INF ADDON: CPT

## 2020-04-29 PROCEDURE — 6370000000 HC RX 637 (ALT 250 FOR IP): Performed by: EMERGENCY MEDICINE

## 2020-04-29 PROCEDURE — 85025 COMPLETE CBC W/AUTO DIFF WBC: CPT

## 2020-04-29 PROCEDURE — 84484 ASSAY OF TROPONIN QUANT: CPT

## 2020-04-29 PROCEDURE — 99220 PR INITIAL OBSERVATION CARE/DAY 70 MINUTES: CPT | Performed by: INTERNAL MEDICINE

## 2020-04-29 PROCEDURE — 36415 COLL VENOUS BLD VENIPUNCTURE: CPT

## 2020-04-29 PROCEDURE — 6360000002 HC RX W HCPCS: Performed by: INTERNAL MEDICINE

## 2020-04-29 PROCEDURE — 94760 N-INVAS EAR/PLS OXIMETRY 1: CPT

## 2020-04-29 PROCEDURE — 96372 THER/PROPH/DIAG INJ SC/IM: CPT

## 2020-04-29 PROCEDURE — 2580000003 HC RX 258: Performed by: EMERGENCY MEDICINE

## 2020-04-29 PROCEDURE — 2500000003 HC RX 250 WO HCPCS: Performed by: EMERGENCY MEDICINE

## 2020-04-29 PROCEDURE — 2709999900 HC NON-CHARGEABLE SUPPLY

## 2020-04-29 PROCEDURE — 96374 THER/PROPH/DIAG INJ IV PUSH: CPT

## 2020-04-29 PROCEDURE — 85379 FIBRIN DEGRADATION QUANT: CPT

## 2020-04-29 PROCEDURE — 96375 TX/PRO/DX INJ NEW DRUG ADDON: CPT

## 2020-04-29 PROCEDURE — 83880 ASSAY OF NATRIURETIC PEPTIDE: CPT

## 2020-04-29 PROCEDURE — 96365 THER/PROPH/DIAG IV INF INIT: CPT

## 2020-04-29 PROCEDURE — 80076 HEPATIC FUNCTION PANEL: CPT

## 2020-04-29 PROCEDURE — 84443 ASSAY THYROID STIM HORMONE: CPT

## 2020-04-29 PROCEDURE — 93005 ELECTROCARDIOGRAM TRACING: CPT | Performed by: EMERGENCY MEDICINE

## 2020-04-29 RX ORDER — NITROGLYCERIN 0.4 MG/1
0.4 TABLET SUBLINGUAL EVERY 5 MIN PRN
Status: DISCONTINUED | OUTPATIENT
Start: 2020-04-29 | End: 2020-05-01 | Stop reason: HOSPADM

## 2020-04-29 RX ORDER — ASPIRIN 81 MG/1
324 TABLET, CHEWABLE ORAL ONCE
Status: COMPLETED | OUTPATIENT
Start: 2020-04-29 | End: 2020-04-29

## 2020-04-29 RX ORDER — IPRATROPIUM BROMIDE AND ALBUTEROL SULFATE 2.5; .5 MG/3ML; MG/3ML
1 SOLUTION RESPIRATORY (INHALATION) ONCE
Status: COMPLETED | OUTPATIENT
Start: 2020-04-29 | End: 2020-04-29

## 2020-04-29 RX ORDER — GUAIFENESIN 600 MG/1
600 TABLET, EXTENDED RELEASE ORAL DAILY
Status: DISCONTINUED | OUTPATIENT
Start: 2020-04-30 | End: 2020-05-01 | Stop reason: HOSPADM

## 2020-04-29 RX ORDER — NITROGLYCERIN 0.4 MG/1
0.4 TABLET SUBLINGUAL EVERY 5 MIN PRN
Status: DISCONTINUED | OUTPATIENT
Start: 2020-04-29 | End: 2020-04-29

## 2020-04-29 RX ORDER — NITROGLYCERIN 0.4 MG/1
0.4 TABLET SUBLINGUAL EVERY 5 MIN PRN
Status: COMPLETED | OUTPATIENT
Start: 2020-04-29 | End: 2020-04-29

## 2020-04-29 RX ORDER — POLYETHYLENE GLYCOL 3350 17 G/17G
17 POWDER, FOR SOLUTION ORAL DAILY PRN
Status: DISCONTINUED | OUTPATIENT
Start: 2020-04-29 | End: 2020-05-01 | Stop reason: HOSPADM

## 2020-04-29 RX ORDER — ALBUTEROL SULFATE 90 UG/1
2 AEROSOL, METERED RESPIRATORY (INHALATION) EVERY 6 HOURS PRN
Status: DISCONTINUED | OUTPATIENT
Start: 2020-04-29 | End: 2020-05-01 | Stop reason: HOSPADM

## 2020-04-29 RX ORDER — ASPIRIN 81 MG/1
81 TABLET ORAL DAILY
Status: DISCONTINUED | OUTPATIENT
Start: 2020-04-30 | End: 2020-05-01 | Stop reason: HOSPADM

## 2020-04-29 RX ORDER — SODIUM CHLORIDE 9 MG/ML
INJECTION, SOLUTION INTRAVENOUS CONTINUOUS
Status: DISCONTINUED | OUTPATIENT
Start: 2020-04-29 | End: 2020-04-30

## 2020-04-29 RX ORDER — PROMETHAZINE HYDROCHLORIDE 25 MG/1
12.5 TABLET ORAL EVERY 6 HOURS PRN
Status: DISCONTINUED | OUTPATIENT
Start: 2020-04-29 | End: 2020-05-01 | Stop reason: HOSPADM

## 2020-04-29 RX ORDER — METHYLPREDNISOLONE SODIUM SUCCINATE 40 MG/ML
40 INJECTION, POWDER, LYOPHILIZED, FOR SOLUTION INTRAMUSCULAR; INTRAVENOUS ONCE
Status: COMPLETED | OUTPATIENT
Start: 2020-04-29 | End: 2020-04-29

## 2020-04-29 RX ORDER — SUCRALFATE 1 G/1
1 TABLET ORAL 3 TIMES DAILY
Status: DISCONTINUED | OUTPATIENT
Start: 2020-04-29 | End: 2020-05-01 | Stop reason: HOSPADM

## 2020-04-29 RX ORDER — ACETAMINOPHEN 650 MG/1
650 SUPPOSITORY RECTAL EVERY 6 HOURS PRN
Status: DISCONTINUED | OUTPATIENT
Start: 2020-04-29 | End: 2020-05-01 | Stop reason: HOSPADM

## 2020-04-29 RX ORDER — SODIUM CHLORIDE 0.9 % (FLUSH) 0.9 %
10 SYRINGE (ML) INJECTION PRN
Status: DISCONTINUED | OUTPATIENT
Start: 2020-04-29 | End: 2020-04-29

## 2020-04-29 RX ORDER — SODIUM CHLORIDE 0.9 % (FLUSH) 0.9 %
10 SYRINGE (ML) INJECTION PRN
Status: DISCONTINUED | OUTPATIENT
Start: 2020-04-29 | End: 2020-05-01 | Stop reason: HOSPADM

## 2020-04-29 RX ORDER — CLOPIDOGREL BISULFATE 75 MG/1
75 TABLET ORAL DAILY
Status: DISCONTINUED | OUTPATIENT
Start: 2020-04-30 | End: 2020-05-01 | Stop reason: HOSPADM

## 2020-04-29 RX ORDER — ONDANSETRON 2 MG/ML
4 INJECTION INTRAMUSCULAR; INTRAVENOUS EVERY 6 HOURS PRN
Status: DISCONTINUED | OUTPATIENT
Start: 2020-04-29 | End: 2020-05-01 | Stop reason: HOSPADM

## 2020-04-29 RX ORDER — ACETAMINOPHEN 325 MG/1
650 TABLET ORAL EVERY 6 HOURS PRN
Status: DISCONTINUED | OUTPATIENT
Start: 2020-04-29 | End: 2020-05-01 | Stop reason: HOSPADM

## 2020-04-29 RX ORDER — PANTOPRAZOLE SODIUM 40 MG/1
40 TABLET, DELAYED RELEASE ORAL
Status: DISCONTINUED | OUTPATIENT
Start: 2020-04-29 | End: 2020-05-01 | Stop reason: HOSPADM

## 2020-04-29 RX ORDER — METOPROLOL TARTRATE 5 MG/5ML
5 INJECTION INTRAVENOUS EVERY 5 MIN PRN
Status: DISCONTINUED | OUTPATIENT
Start: 2020-04-29 | End: 2020-04-29

## 2020-04-29 RX ORDER — ATROPINE SULFATE 0.1 MG/ML
0.5 INJECTION INTRAVENOUS EVERY 5 MIN PRN
Status: DISCONTINUED | OUTPATIENT
Start: 2020-04-29 | End: 2020-04-29

## 2020-04-29 RX ORDER — NITROGLYCERIN 20 MG/100ML
5 INJECTION INTRAVENOUS CONTINUOUS
Status: DISCONTINUED | OUTPATIENT
Start: 2020-04-29 | End: 2020-04-30

## 2020-04-29 RX ORDER — IPRATROPIUM BROMIDE AND ALBUTEROL SULFATE 2.5; .5 MG/3ML; MG/3ML
1 SOLUTION RESPIRATORY (INHALATION)
Status: DISCONTINUED | OUTPATIENT
Start: 2020-04-29 | End: 2020-05-01 | Stop reason: HOSPADM

## 2020-04-29 RX ORDER — PRAVASTATIN SODIUM 20 MG
20 TABLET ORAL NIGHTLY
Status: DISCONTINUED | OUTPATIENT
Start: 2020-04-30 | End: 2020-05-01 | Stop reason: HOSPADM

## 2020-04-29 RX ORDER — SODIUM CHLORIDE 9 MG/ML
500 INJECTION, SOLUTION INTRAVENOUS CONTINUOUS PRN
Status: DISCONTINUED | OUTPATIENT
Start: 2020-04-29 | End: 2020-04-29

## 2020-04-29 RX ORDER — SODIUM CHLORIDE 0.9 % (FLUSH) 0.9 %
10 SYRINGE (ML) INJECTION EVERY 12 HOURS SCHEDULED
Status: DISCONTINUED | OUTPATIENT
Start: 2020-04-29 | End: 2020-05-01 | Stop reason: HOSPADM

## 2020-04-29 RX ORDER — CLOPIDOGREL BISULFATE 75 MG/1
75 TABLET ORAL ONCE
Status: DISCONTINUED | OUTPATIENT
Start: 2020-04-29 | End: 2020-04-29 | Stop reason: CLARIF

## 2020-04-29 RX ORDER — AMINOPHYLLINE DIHYDRATE 25 MG/ML
50 INJECTION, SOLUTION INTRAVENOUS PRN
Status: DISCONTINUED | OUTPATIENT
Start: 2020-04-29 | End: 2020-04-29

## 2020-04-29 RX ORDER — METHYLPREDNISOLONE SODIUM SUCCINATE 125 MG/2ML
80 INJECTION, POWDER, LYOPHILIZED, FOR SOLUTION INTRAMUSCULAR; INTRAVENOUS ONCE
Status: COMPLETED | OUTPATIENT
Start: 2020-04-29 | End: 2020-04-29

## 2020-04-29 RX ORDER — MONTELUKAST SODIUM 10 MG/1
10 TABLET ORAL NIGHTLY
Status: DISCONTINUED | OUTPATIENT
Start: 2020-04-29 | End: 2020-05-01 | Stop reason: HOSPADM

## 2020-04-29 RX ORDER — BUDESONIDE 0.5 MG/2ML
500 INHALANT ORAL 2 TIMES DAILY
Status: DISCONTINUED | OUTPATIENT
Start: 2020-04-29 | End: 2020-05-01 | Stop reason: HOSPADM

## 2020-04-29 RX ORDER — ALBUTEROL SULFATE 90 UG/1
2 AEROSOL, METERED RESPIRATORY (INHALATION) PRN
Status: DISCONTINUED | OUTPATIENT
Start: 2020-04-29 | End: 2020-04-29

## 2020-04-29 RX ADMIN — ENOXAPARIN SODIUM 40 MG: 40 INJECTION SUBCUTANEOUS at 22:06

## 2020-04-29 RX ADMIN — SUCRALFATE 1 G: 1 TABLET ORAL at 22:07

## 2020-04-29 RX ADMIN — NITROGLYCERIN 0.4 MG: 0.4 TABLET, ORALLY DISINTEGRATING SUBLINGUAL at 15:38

## 2020-04-29 RX ADMIN — METOPROLOL TARTRATE 25 MG: 25 TABLET ORAL at 22:07

## 2020-04-29 RX ADMIN — IPRATROPIUM BROMIDE AND ALBUTEROL SULFATE 1 AMPULE: .5; 3 SOLUTION RESPIRATORY (INHALATION) at 16:01

## 2020-04-29 RX ADMIN — NITROGLYCERIN 0.4 MG: 0.4 TABLET, ORALLY DISINTEGRATING SUBLINGUAL at 15:29

## 2020-04-29 RX ADMIN — NITROGLYCERIN 5 MCG/MIN: 20 INJECTION INTRAVENOUS at 18:19

## 2020-04-29 RX ADMIN — ASPIRIN 81 MG 324 MG: 81 TABLET ORAL at 16:14

## 2020-04-29 RX ADMIN — SODIUM CHLORIDE: 9 INJECTION, SOLUTION INTRAVENOUS at 13:56

## 2020-04-29 RX ADMIN — MONTELUKAST SODIUM 10 MG: 10 TABLET ORAL at 22:07

## 2020-04-29 RX ADMIN — METHYLPREDNISOLONE SODIUM SUCCINATE 80 MG: 125 INJECTION, POWDER, FOR SOLUTION INTRAMUSCULAR; INTRAVENOUS at 15:31

## 2020-04-29 RX ADMIN — METHYLPREDNISOLONE SODIUM SUCCINATE 40 MG: 40 INJECTION, POWDER, FOR SOLUTION INTRAMUSCULAR; INTRAVENOUS at 18:19

## 2020-04-29 RX ADMIN — IPRATROPIUM BROMIDE AND ALBUTEROL SULFATE 1 AMPULE: .5; 3 SOLUTION RESPIRATORY (INHALATION) at 21:12

## 2020-04-29 RX ADMIN — BUDESONIDE 500 MCG: 0.5 INHALANT RESPIRATORY (INHALATION) at 21:12

## 2020-04-29 ASSESSMENT — ENCOUNTER SYMPTOMS
VOICE CHANGE: 0
WHEEZING: 0
TROUBLE SWALLOWING: 0
CONSTIPATION: 0
ABDOMINAL PAIN: 0
BACK PAIN: 0
VOMITING: 0
SORE THROAT: 0
SHORTNESS OF BREATH: 1
DIARRHEA: 0
RHINORRHEA: 0
SINUS PRESSURE: 0
COUGH: 0
NAUSEA: 0
CHEST TIGHTNESS: 0

## 2020-04-29 ASSESSMENT — PAIN DESCRIPTION - FREQUENCY: FREQUENCY: CONTINUOUS

## 2020-04-29 ASSESSMENT — PAIN SCALES - GENERAL
PAINLEVEL_OUTOF10: 5
PAINLEVEL_OUTOF10: 4
PAINLEVEL_OUTOF10: 2
PAINLEVEL_OUTOF10: 4
PAINLEVEL_OUTOF10: 0
PAINLEVEL_OUTOF10: 0

## 2020-04-29 ASSESSMENT — PAIN DESCRIPTION - DESCRIPTORS: DESCRIPTORS: PRESSURE

## 2020-04-29 ASSESSMENT — PAIN DESCRIPTION - LOCATION
LOCATION: CHEST
LOCATION: CHEST

## 2020-04-29 ASSESSMENT — PAIN DESCRIPTION - PAIN TYPE: TYPE: ACUTE PAIN

## 2020-04-29 NOTE — ED NOTES
ED to inpatient nurses report    Chief Complaint   Patient presents with    Shortness of Breath    Chest Pain      Present to ED from home  LOC: alert and orientated to name, place, date  Vital signs   Vitals:    04/29/20 1306 04/29/20 1353 04/29/20 1528   BP: 121/73 105/74 113/74   Pulse: 73 78 75   Resp: 14 14 12   Temp: 98.4 °F (36.9 °C)     SpO2: 98% 97% 94%   Weight: 146 lb (66.2 kg)        Oxygen Baseline  2L/NC at night only    Current needs required room air Bipap/Cpap No  LDAs:   Peripheral IV 04/29/20 Right Forearm (Active)   Site Assessment Clean;Dry; Intact 4/29/2020  1:13 PM   Line Status Brisk blood return;Flushed;Specimen collected;Normal saline locked 4/29/2020  1:13 PM   Dressing Status Clean;Dry; Intact 4/29/2020  1:13 PM   Dressing Intervention New 4/29/2020  1:13 PM     Mobility: Independent  Pending ED orders: none  Present condition: stable, pleasant demeanor. Spouse would like updates.      Electronically signed by Leroy Padilla RN on 4/29/2020 at 3:34 PM     Rae Arias RN  04/29/20 8617

## 2020-04-29 NOTE — ED PROVIDER NOTES
[address] [facility] COMPLAINT    Chief Complaint   Patient presents with    Shortness of Breath    Chest Pain       Nurses Notes reviewed and I agree except as noted in the HPI. HPI    Torrey Cyr is a 75 y.o. male who presents for evaluation of constant chest tightness for the last 4-5 days, rating her current pain 5/10 in severity. Patient states the tightness occurs across her entire chest. He also reports associated shortness of breath, constant headache to the whole head, and left sided jaw pain for the last 3 days. Patient states that he took Nitroglycerin 1 day ago with relief of chest tightness. Patient reports use of Budesonide, Albuterol Nebulizer, Proair inhalator, and Perforomist for shortness of breath with no relief. Patient reports a history of COPD. Patient also reports a history of CAD with cardiac stent placement in 2014 with Dr. Ailyn Childress (Cardiologist). There are no other complaints, symptoms, or concerns on initial encounter. HPI provided by the patient. REVIEW OF SYSTEMS    Review of Systems   Constitutional: Negative for appetite change, chills, diaphoresis, fatigue and fever. HENT: Negative for congestion, ear discharge, ear pain, postnasal drip, rhinorrhea, sinus pressure, sore throat, trouble swallowing and voice change. Left sided jaw pain    Respiratory: Positive for shortness of breath. Negative for cough, chest tightness and wheezing. Cardiovascular: Negative for chest pain, palpitations and leg swelling. Gastrointestinal: Negative for abdominal pain, constipation, diarrhea, nausea and vomiting. Musculoskeletal: Negative for arthralgias, back pain, joint swelling, myalgias, neck pain and neck stiffness. Skin: Negative for rash. Neurological: Positive for headaches. Negative for dizziness, syncope, weakness, light-headedness and numbness.        PAST MEDICAL HISTORY Vascular: No JVD. Cardiovascular:      Rate and Rhythm: Normal rate and regular rhythm. Heart sounds: No murmur. No friction rub. Pulmonary:      Effort: Pulmonary effort is normal.      Breath sounds: Decreased breath sounds and wheezing present. No rales. Chest:      Chest wall: No tenderness. Abdominal:      General: Bowel sounds are normal.      Palpations: Abdomen is soft. There is no mass. Tenderness: There is no abdominal tenderness. Lymphadenopathy:      Cervical: No cervical adenopathy. Skin:     Findings: No rash. Neurological:      Mental Status: He is alert and oriented to person, place, and time. Psychiatric:         Behavior: Behavior is cooperative. MEDICAL DECISION MAKING    DIFFERENTIAL DIAGNOSIS:  Including but not limited to MI, ACS, COPD exacerbation, COVID-19      DIAGNOSTIC RESULTS    EKG   Interpreted by Doyle Quiles MD      Rhythm: normal sinus   Rate: normal  Axis: normal  Ectopy: none  Conduction: normal  ST Segments: no acute change  T Waves: no acute change  Q Waves: none    Clinical Impression: Normal sinus rhythm with no acute changes/normal EKG      Doyle Quiles MD      RADIOLOGY:  I have reviewed radiologic plain film image(s). The plain films will be read or overread by the radiologist.All other non-plain film images(s) such as CT, Ultrasound and MRI have been read by the radiologist.  XR CHEST PORTABLE   Final Result   Emphysematous changes with no acute intrathoracic process. **This report has been created using voice recognition software. It may contain minor errors which are inherent in voice recognition technology. **      Final report electronically signed by Dr Michelle Pickens on 4/29/2020 1:34 PM          LABS:   Labs Reviewed   BASIC METABOLIC PANEL W/ REFLEX TO MG FOR LOW K - Abnormal; Notable for the following components:       Result Value    Sodium 134 (*)     Chloride 96 (*)     All other components within

## 2020-04-29 NOTE — ED TRIAGE NOTES
Pt presents to ED c/c chest pain and sob x 4-5 days. Pt states he came today because he was not getting any better and he could barely get up his steps with his sob. Pt has hx of copd.

## 2020-04-29 NOTE — H&P
Hospitalist - History & Physical      Patient: Jaden Gates    Unit/Bed:02/002A  YOB: 1945  MRN: [id number]   Acct: [de-identified]   PCP: [address]    Date of Service: Pt seen/examined on 04/29/20  and Admitted to Observation with expected LOS less than two midnights due to medical therapy. Chief Complaint:  Chest pain    Assessment and Plan:-  1. Typical anginal pain    - Substernal, worsened with exertion, improved with Nitro. Has underlying CAD with stents. EKG normal   - Would consult Cardiology. Considered stress test but patients symptoms are typical. Ischemic workup IP vs OP   - Check echo   - Start on Nitro gtt in ER, would attempt to wean off    2. COPD exacerbation   - Faint expiratory wheezing on exam. Give a dose of IV steroid   - Continue bronchodilators. Add breathing treatments    3. Essential Hypertension    - Borderline right now likely due to nitro   - Continue home medications with parameter    4. Hyperlipidemia   - Continue statin    History Of Present Illness:      71-year-old gentleman with past medical history of CAD, hypertension, hyperlipidemia came to ER due to substernal chest pain that started when patient was mowing his lawn 4 days ago. Patient states it started suddenly, was substernal, nonradiating, associated with some shortness of breath, 7 out of 10 pressure-like. Patient states it was intermittent. He said physical activity brought it on and he felt better resting. Patient states he took aspirin with no relief. He continued to hold off but when it continued this morning elected to come to ER for further care. Patient received nitro which did alleviate his pain. At this time he is symptoms free. Patient denies any cough, fevers, chills, nausea, vomiting. In ER, patient's vitals were stable. Labs were unremarkable. EKG NSR.  Patient was admitted for further care    Past Medical History:        Diagnosis Date    CAD (coronary artery disease)     COPD release tablet Take 600 mg by mouth Daily      albuterol (PROVENTIL) (2.5 MG/3ML) 0.083% nebulizer solution Take 3 mLs by nebulization every 6 hours as needed for Wheezing or Shortness of Breath 360 vial 3    Calcium Carb-Cholecalciferol (CALCIUM 600 + D PO) Take 600 mg by mouth daily      potassium iodide (SSKI) 1 GM/ML solution Take by mouth daily 5 drops into oral liquid as Dr suggested      nitroGLYCERIN (NITROSTAT) 0.4 MG SL tablet Place 1 tablet under the tongue every 5 minutes as needed. 25 tablet 3    Oxygen Concentrator 2 L by Nasal route as needed.  aspirin 81 MG tablet Take 81 mg by mouth daily. Allergies:    Flonase [fluticasone propionate]; Mometasone furoate; and Spiriva handihaler [tiotropium bromide monohydrate]    Social History:    reports that he quit smoking about 36 years ago. His smoking use included cigarettes. He started smoking about 67 years ago. He has a 30.00 pack-year smoking history. He has never used smokeless tobacco. He reports that he does not drink alcohol or use drugs. Family History:       Problem Relation Age of Onset    High Cholesterol Father     Cancer Sister     Cancer Brother     Heart Disease Brother        Diet:  DIET CARDIAC; No Caffeine  Diet NPO, After Midnight    Review of systems:   Pertinent positives as noted in the HPI. All other systems reviewed and negative. PHYSICAL EXAM:  /71   Pulse 79   Temp 98.4 °F (36.9 °C)   Resp 14   Wt 146 lb (66.2 kg)   SpO2 94%   BMI 25.06 kg/m²   General appearance: No apparent distress, appears stated age and cooperative. HEENT: Normal cephalic, atraumatic without obvious deformity. Pupils equal, round, and reactive to light. Extra ocular muscles intact. Conjunctivae/corneas clear. Neck: Supple, with full range of motion. No jugular venous distention. Trachea midline.   Respiratory:  B/l expiratory wheezing, no ronchi  Cardiovascular: Regular rate and rhythm with normal S1/S2 without

## 2020-04-30 ENCOUNTER — APPOINTMENT (OUTPATIENT)
Dept: NON INVASIVE DIAGNOSTICS | Age: 75
End: 2020-04-30
Payer: MEDICARE

## 2020-04-30 LAB
ANION GAP SERPL CALCULATED.3IONS-SCNC: 10 MEQ/L (ref 8–16)
BUN BLDV-MCNC: 15 MG/DL (ref 7–22)
CALCIUM SERPL-MCNC: 9 MG/DL (ref 8.5–10.5)
CHLORIDE BLD-SCNC: 101 MEQ/L (ref 98–111)
CO2: 21 MEQ/L (ref 23–33)
CREAT SERPL-MCNC: 0.6 MG/DL (ref 0.4–1.2)
EKG ATRIAL RATE: 84 BPM
EKG P AXIS: 67 DEGREES
EKG P-R INTERVAL: 184 MS
EKG Q-T INTERVAL: 392 MS
EKG QRS DURATION: 86 MS
EKG QTC CALCULATION (BAZETT): 463 MS
EKG R AXIS: 16 DEGREES
EKG T AXIS: 56 DEGREES
EKG VENTRICULAR RATE: 84 BPM
ERYTHROCYTE [DISTWIDTH] IN BLOOD BY AUTOMATED COUNT: 15 % (ref 11.5–14.5)
ERYTHROCYTE [DISTWIDTH] IN BLOOD BY AUTOMATED COUNT: 49.8 FL (ref 35–45)
GFR SERPL CREATININE-BSD FRML MDRD: > 90 ML/MIN/1.73M2
GLUCOSE BLD-MCNC: 128 MG/DL (ref 70–108)
HCT VFR BLD CALC: 35.8 % (ref 42–52)
HEMOGLOBIN: 11.3 GM/DL (ref 14–18)
LV EF: 63 %
LVEF MODALITY: NORMAL
MCH RBC QN AUTO: 28.8 PG (ref 26–33)
MCHC RBC AUTO-ENTMCNC: 31.6 GM/DL (ref 32.2–35.5)
MCV RBC AUTO: 91.1 FL (ref 80–94)
PLATELET # BLD: 287 THOU/MM3 (ref 130–400)
PMV BLD AUTO: 9.2 FL (ref 9.4–12.4)
POTASSIUM REFLEX MAGNESIUM: 4.4 MEQ/L (ref 3.5–5.2)
RBC # BLD: 3.93 MILL/MM3 (ref 4.7–6.1)
SODIUM BLD-SCNC: 132 MEQ/L (ref 135–145)
WBC # BLD: 5.6 THOU/MM3 (ref 4.8–10.8)

## 2020-04-30 PROCEDURE — 2700000000 HC OXYGEN THERAPY PER DAY

## 2020-04-30 PROCEDURE — 94640 AIRWAY INHALATION TREATMENT: CPT

## 2020-04-30 PROCEDURE — 94761 N-INVAS EAR/PLS OXIMETRY MLT: CPT

## 2020-04-30 PROCEDURE — 97161 PT EVAL LOW COMPLEX 20 MIN: CPT

## 2020-04-30 PROCEDURE — 99215 OFFICE O/P EST HI 40 MIN: CPT | Performed by: INTERNAL MEDICINE

## 2020-04-30 PROCEDURE — 93010 ELECTROCARDIOGRAM REPORT: CPT | Performed by: INTERNAL MEDICINE

## 2020-04-30 PROCEDURE — 85027 COMPLETE CBC AUTOMATED: CPT

## 2020-04-30 PROCEDURE — 99232 SBSQ HOSP IP/OBS MODERATE 35: CPT | Performed by: NURSE PRACTITIONER

## 2020-04-30 PROCEDURE — G0378 HOSPITAL OBSERVATION PER HR: HCPCS

## 2020-04-30 PROCEDURE — 6370000000 HC RX 637 (ALT 250 FOR IP): Performed by: INTERNAL MEDICINE

## 2020-04-30 PROCEDURE — A9500 TC99M SESTAMIBI: HCPCS | Performed by: NURSE PRACTITIONER

## 2020-04-30 PROCEDURE — 93017 CV STRESS TEST TRACING ONLY: CPT

## 2020-04-30 PROCEDURE — 36415 COLL VENOUS BLD VENIPUNCTURE: CPT

## 2020-04-30 PROCEDURE — 93306 TTE W/DOPPLER COMPLETE: CPT

## 2020-04-30 PROCEDURE — 3430000000 HC RX DIAGNOSTIC RADIOPHARMACEUTICAL: Performed by: NURSE PRACTITIONER

## 2020-04-30 PROCEDURE — 96366 THER/PROPH/DIAG IV INF ADDON: CPT

## 2020-04-30 PROCEDURE — 78452 HT MUSCLE IMAGE SPECT MULT: CPT

## 2020-04-30 PROCEDURE — 2580000003 HC RX 258: Performed by: INTERNAL MEDICINE

## 2020-04-30 PROCEDURE — 97530 THERAPEUTIC ACTIVITIES: CPT

## 2020-04-30 PROCEDURE — 94669 MECHANICAL CHEST WALL OSCILL: CPT

## 2020-04-30 PROCEDURE — 6360000002 HC RX W HCPCS: Performed by: INTERNAL MEDICINE

## 2020-04-30 PROCEDURE — 6360000002 HC RX W HCPCS

## 2020-04-30 PROCEDURE — 80048 BASIC METABOLIC PNL TOTAL CA: CPT

## 2020-04-30 PROCEDURE — 93005 ELECTROCARDIOGRAM TRACING: CPT | Performed by: INTERNAL MEDICINE

## 2020-04-30 RX ORDER — NITROGLYCERIN 0.4 MG/1
0.4 TABLET SUBLINGUAL EVERY 5 MIN PRN
Status: ACTIVE | OUTPATIENT
Start: 2020-04-30 | End: 2020-04-30

## 2020-04-30 RX ORDER — ALBUTEROL SULFATE 90 UG/1
2 AEROSOL, METERED RESPIRATORY (INHALATION) PRN
Status: ACTIVE | OUTPATIENT
Start: 2020-04-30 | End: 2020-04-30

## 2020-04-30 RX ORDER — PREDNISONE 20 MG/1
40 TABLET ORAL DAILY
Status: DISCONTINUED | OUTPATIENT
Start: 2020-04-30 | End: 2020-04-30

## 2020-04-30 RX ORDER — AMINOPHYLLINE DIHYDRATE 25 MG/ML
50 INJECTION, SOLUTION INTRAVENOUS PRN
Status: ACTIVE | OUTPATIENT
Start: 2020-04-30 | End: 2020-04-30

## 2020-04-30 RX ORDER — SODIUM CHLORIDE 0.9 % (FLUSH) 0.9 %
10 SYRINGE (ML) INJECTION PRN
Status: ACTIVE | OUTPATIENT
Start: 2020-04-30 | End: 2020-04-30

## 2020-04-30 RX ORDER — METOPROLOL TARTRATE 5 MG/5ML
5 INJECTION INTRAVENOUS EVERY 5 MIN PRN
Status: ACTIVE | OUTPATIENT
Start: 2020-04-30 | End: 2020-04-30

## 2020-04-30 RX ORDER — SODIUM CHLORIDE 9 MG/ML
500 INJECTION, SOLUTION INTRAVENOUS CONTINUOUS PRN
Status: ACTIVE | OUTPATIENT
Start: 2020-04-30 | End: 2020-04-30

## 2020-04-30 RX ORDER — ATROPINE SULFATE 0.1 MG/ML
0.5 INJECTION INTRAVENOUS EVERY 5 MIN PRN
Status: ACTIVE | OUTPATIENT
Start: 2020-04-30 | End: 2020-04-30

## 2020-04-30 RX ADMIN — IPRATROPIUM BROMIDE AND ALBUTEROL SULFATE 1 AMPULE: .5; 3 SOLUTION RESPIRATORY (INHALATION) at 21:58

## 2020-04-30 RX ADMIN — Medication 35 MILLICURIE: at 14:35

## 2020-04-30 RX ADMIN — PANTOPRAZOLE SODIUM 40 MG: 40 TABLET, DELAYED RELEASE ORAL at 06:22

## 2020-04-30 RX ADMIN — PANTOPRAZOLE SODIUM 40 MG: 40 TABLET, DELAYED RELEASE ORAL at 15:26

## 2020-04-30 RX ADMIN — Medication 10 ML: at 20:17

## 2020-04-30 RX ADMIN — ASPIRIN 81 MG: 81 TABLET ORAL at 09:31

## 2020-04-30 RX ADMIN — GUAIFENESIN 600 MG: 600 TABLET, EXTENDED RELEASE ORAL at 09:31

## 2020-04-30 RX ADMIN — METOPROLOL TARTRATE 25 MG: 25 TABLET ORAL at 20:15

## 2020-04-30 RX ADMIN — CLOPIDOGREL BISULFATE 75 MG: 75 TABLET ORAL at 09:31

## 2020-04-30 RX ADMIN — ENOXAPARIN SODIUM 40 MG: 40 INJECTION SUBCUTANEOUS at 20:16

## 2020-04-30 RX ADMIN — PRAVASTATIN SODIUM 20 MG: 20 TABLET ORAL at 20:17

## 2020-04-30 RX ADMIN — SUCRALFATE 1 G: 1 TABLET ORAL at 20:17

## 2020-04-30 RX ADMIN — IPRATROPIUM BROMIDE AND ALBUTEROL SULFATE 1 AMPULE: .5; 3 SOLUTION RESPIRATORY (INHALATION) at 04:16

## 2020-04-30 RX ADMIN — METOPROLOL TARTRATE 25 MG: 25 TABLET ORAL at 09:31

## 2020-04-30 RX ADMIN — IPRATROPIUM BROMIDE AND ALBUTEROL SULFATE 1 AMPULE: .5; 3 SOLUTION RESPIRATORY (INHALATION) at 08:29

## 2020-04-30 RX ADMIN — MONTELUKAST SODIUM 10 MG: 10 TABLET ORAL at 20:16

## 2020-04-30 RX ADMIN — SUCRALFATE 1 G: 1 TABLET ORAL at 15:26

## 2020-04-30 RX ADMIN — IPRATROPIUM BROMIDE AND ALBUTEROL SULFATE 1 AMPULE: .5; 3 SOLUTION RESPIRATORY (INHALATION) at 16:41

## 2020-04-30 RX ADMIN — BUDESONIDE 500 MCG: 0.5 INHALANT RESPIRATORY (INHALATION) at 21:58

## 2020-04-30 RX ADMIN — Medication 10.7 MILLICURIE: at 13:30

## 2020-04-30 RX ADMIN — BUDESONIDE 500 MCG: 0.5 INHALANT RESPIRATORY (INHALATION) at 08:29

## 2020-04-30 RX ADMIN — SUCRALFATE 1 G: 1 TABLET ORAL at 09:31

## 2020-04-30 RX ADMIN — IPRATROPIUM BROMIDE AND ALBUTEROL SULFATE 1 AMPULE: .5; 3 SOLUTION RESPIRATORY (INHALATION) at 12:23

## 2020-04-30 ASSESSMENT — PAIN SCALES - GENERAL
PAINLEVEL_OUTOF10: 0

## 2020-04-30 NOTE — CARE COORDINATION
DISCHARGE PLANNING EVALUATION: OP/OBSERVATION        4/30/20, 7:24 AM EDT    Yana Bullock       Admitted from: ER 4/29/2020/ 1252   Location: [address] [place] [address] Reason for admit: Chest pain [R07.9]   Admit order signed?: yes    Procedure: Echo planned for today. Pertinent Info/Orders/Treatment Plan:  Presented to ER for eval of CP for past 4-5 days. SOB and headache. Hx of CAD with stent in 2014 per Dr. Lolly García. Consult to Cardiology. PT/OT. Telemetry. IVF at 75/hr. Ntg gtt. PCP: Irvin Garcia    Patient Goals/Plan/Treatment Preferences: Met with pt today. He is from home with spouse and no services. He does have O2 that he uses at bedtime and prn during day at 2L/NC. He has a PCP, he drives and denies issues getting basic needs or medications. He denies home going needs at this time. Transportation/Food Security/Housekeeping Addressed:  No issues identified.

## 2020-05-01 ENCOUNTER — TELEPHONE (OUTPATIENT)
Dept: CARDIOLOGY CLINIC | Age: 75
End: 2020-05-01

## 2020-05-01 VITALS
DIASTOLIC BLOOD PRESSURE: 79 MMHG | HEIGHT: 64 IN | OXYGEN SATURATION: 97 % | SYSTOLIC BLOOD PRESSURE: 139 MMHG | WEIGHT: 144.4 LBS | HEART RATE: 89 BPM | RESPIRATION RATE: 16 BRPM | TEMPERATURE: 97.4 F | BODY MASS INDEX: 24.65 KG/M2

## 2020-05-01 PROCEDURE — 94669 MECHANICAL CHEST WALL OSCILL: CPT

## 2020-05-01 PROCEDURE — 6370000000 HC RX 637 (ALT 250 FOR IP): Performed by: INTERNAL MEDICINE

## 2020-05-01 PROCEDURE — 99214 OFFICE O/P EST MOD 30 MIN: CPT | Performed by: NURSE PRACTITIONER

## 2020-05-01 PROCEDURE — 6360000002 HC RX W HCPCS: Performed by: INTERNAL MEDICINE

## 2020-05-01 PROCEDURE — 94640 AIRWAY INHALATION TREATMENT: CPT

## 2020-05-01 PROCEDURE — G0378 HOSPITAL OBSERVATION PER HR: HCPCS

## 2020-05-01 PROCEDURE — 99217 PR OBSERVATION CARE DISCHARGE MANAGEMENT: CPT | Performed by: NURSE PRACTITIONER

## 2020-05-01 PROCEDURE — 94760 N-INVAS EAR/PLS OXIMETRY 1: CPT

## 2020-05-01 RX ORDER — SUCRALFATE 1 G/1
1 TABLET ORAL 3 TIMES DAILY
Qty: 15 TABLET | Refills: 0 | Status: ON HOLD
Start: 2020-05-01 | End: 2020-06-08

## 2020-05-01 RX ADMIN — GUAIFENESIN 600 MG: 600 TABLET, EXTENDED RELEASE ORAL at 05:51

## 2020-05-01 RX ADMIN — IPRATROPIUM BROMIDE AND ALBUTEROL SULFATE 1 AMPULE: .5; 3 SOLUTION RESPIRATORY (INHALATION) at 07:36

## 2020-05-01 RX ADMIN — ASPIRIN 81 MG: 81 TABLET ORAL at 09:17

## 2020-05-01 RX ADMIN — METOPROLOL TARTRATE 25 MG: 25 TABLET ORAL at 09:17

## 2020-05-01 RX ADMIN — SUCRALFATE 1 G: 1 TABLET ORAL at 09:17

## 2020-05-01 RX ADMIN — PANTOPRAZOLE SODIUM 40 MG: 40 TABLET, DELAYED RELEASE ORAL at 05:51

## 2020-05-01 RX ADMIN — CLOPIDOGREL BISULFATE 75 MG: 75 TABLET ORAL at 09:17

## 2020-05-01 RX ADMIN — BUDESONIDE 500 MCG: 0.5 INHALANT RESPIRATORY (INHALATION) at 07:42

## 2020-05-01 ASSESSMENT — PAIN SCALES - GENERAL: PAINLEVEL_OUTOF10: 0

## 2020-05-01 NOTE — DISCHARGE SUMMARY
reactive to light. Extra ocular muscles intact. Conjunctivae/corneas clear. Neck: Supple, with full range of motion. No jugular venous distention. Trachea midline. Respiratory:  Normal respiratory effort. Clear to auscultation, bilaterally without Rales/Wheezes/Rhonchi. Cardiovascular:  Regular rate and rhythm with normal S1/S2 without murmurs, rubs or gallops. Abdomen: Soft, non-tender, non-distended with normal bowel sounds. Musculoskeletal:  No clubbing, cyanosis or edema bilaterally. Full range of motion without deformity. Skin: Skin color, texture, turgor normal.  No rashes or lesions. Neurologic:  Neurovascularly intact without any focal sensory/motor deficits. Cranial nerves: II-XII intact, grossly non-focal.  Psychiatric:  Alert and oriented, thought content appropriate, normal insight  Capillary Refill: Brisk,< 3 seconds   Peripheral Pulses: +2 palpable, equal bilaterally       Labs: For convenience and continuity at follow-up the following most recent labs are provided:      CBC:    Lab Results   Component Value Date    WBC 5.6 04/30/2020    HGB 11.3 04/30/2020    HCT 35.8 04/30/2020     04/30/2020       Renal:    Lab Results   Component Value Date     04/30/2020    K 4.4 04/30/2020     04/30/2020    CO2 21 04/30/2020    BUN 15 04/30/2020    CREATININE 0.6 04/30/2020    CALCIUM 9.0 04/30/2020    PHOS 2.2 04/04/2019         Significant Diagnostic Studies    Radiology:   XR CHEST PORTABLE   Final Result   Emphysematous changes with no acute intrathoracic process. **This report has been created using voice recognition software. It may contain minor errors which are inherent in voice recognition technology. **      Final report electronically signed by Dr Elizabeth Solis on 4/29/2020 1:34 PM             Consults:     IP CONSULT TO CARDIOLOGY    Disposition: Home  Condition at Discharge: Stable    Code Status:  Full Code     Patient Instructions:     Activity: activity

## 2020-05-01 NOTE — PROGRESS NOTES
Writer reviewed with patient medications, followup appts and education. Called Dr. Kavita Santoyo office to make an appt sooner, office will call patient. Patient discharged home at this time.
EKG NSR. Patient was admitted for further care    Subjective (past 24 hours): Denies CP or SOB. States he feels better since admission. No headache. Walked with therapy. Medications:  Reviewed    Infusion Medications    sodium chloride 75 mL/hr at 04/29/20 1356    nitroGLYCERIN 5 mcg/min (04/29/20 1819)     Scheduled Medications    sodium chloride flush  10 mL Intravenous 2 times per day    enoxaparin  40 mg Subcutaneous Q24H    aspirin  81 mg Oral Daily    budesonide  500 mcg Nebulization BID    guaiFENesin  600 mg Oral Daily    metoprolol tartrate  25 mg Oral BID    montelukast  10 mg Oral Nightly    pantoprazole  40 mg Oral BID AC    pravastatin  20 mg Oral Nightly    sucralfate  1 g Oral TID    ipratropium-albuterol  1 ampule Inhalation Q4H WA    clopidogrel  75 mg Oral Daily     PRN Meds: sodium chloride flush, acetaminophen **OR** acetaminophen, polyethylene glycol, promethazine **OR** ondansetron, nitroGLYCERIN, albuterol sulfate HFA, perflutren lipid microspheres      Intake/Output Summary (Last 24 hours) at 4/30/2020 1139  Last data filed at 4/29/2020 2215  Gross per 24 hour   Intake 250 ml   Output --   Net 250 ml       Diet:  Diet NPO, After Midnight    Exam:  /61   Pulse 93   Temp 97.7 °F (36.5 °C) (Oral)   Resp 18   Ht 5' 4\" (1.626 m)   Wt 148 lb 12.8 oz (67.5 kg)   SpO2 97%   BMI 25.54 kg/m²     General appearance: No apparent distress, appears stated age and cooperative. HEENT: Pupils equal, round, and reactive to light. Conjunctivae/corneas clear. Neck: Supple, with full range of motion. No jugular venous distention. Trachea midline. Respiratory:  Normal respiratory effort. Clear to auscultation, bilaterally without Rales/Wheezes/Rhonchi. Cardiovascular: Regular rate and rhythm with normal S1/S2 without murmurs, rubs or gallops. Abdomen: Soft, non-tender, non-distended with normal bowel sounds. Musculoskeletal: passive and active ROM x 4 extremities.   Skin: Skin
clopidogrel  75 mg Oral Daily       regadenoson, 0.4 mg, ONCE PRN  sodium chloride flush, 10 mL, PRN  acetaminophen, 650 mg, Q6H PRN    Or  acetaminophen, 650 mg, Q6H PRN  polyethylene glycol, 17 g, Daily PRN  promethazine, 12.5 mg, Q6H PRN    Or  ondansetron, 4 mg, Q6H PRN  nitroGLYCERIN, 0.4 mg, Q5 Min PRN  albuterol sulfate HFA, 2 puff, Q6H PRN  perflutren lipid microspheres, 1.5 mL, ONCE PRN        Diagnostics:  Echo:   Electronically signed by Chuck Garrett MD (Interpreting   physician) on 04/30/2020 at 11:11 AM   ----------------------------------------------------------------      Findings      Mitral Valve   Structurally normal mitral valve. Trace mitral regurgitation is present. Aortic Valve   Aortic valve leaflets are somewhat thickened. Tricuspid Valve   Tricuspid valve is structurally normal.   Trace tricuspid regurgitation. Pulmonic Valve   The pulmonic valve leaflets exhibited normal thickness, no calcification,   and normal cuspal separation. DOPPLER: The transpulmonic velocity was   within the normal range with no evidence for regurgitation. Left Atrium   Left atrial size was normal.      Left Ventricle   Normal left ventricle size and systolic function. Ejection fraction was   estimated at 60-65%. There were no regional left ventricular wall motion   abnormalities and wall thickness was within normal limits. Right Atrium   Right atrial size was normal.      Right Ventricle   The right ventricular size was normal with normal systolic function and   wall thickness. Pericardial Effusion   The pericardium was normal in appearance with no evidence of a pericardial   effusion. Pleural Effusion   No evidence of pleural effusion. Aorta / Great Vessels   -Aortic root dimension within normal limits.   -The Pulmonary artery is within normal limits. -IVC size is within normal limits with normal respiratory phasic changes.          Stress:   Conclusions      Summary

## 2020-05-01 NOTE — PLAN OF CARE
Problem: Impaired respiratory status  Goal: Clear lung sounds  4/30/2020 2203 by Fina Batista RCP  Outcome: Met This Shift  Note:  Patient lung sounds are considered normal for their current lung condition. No signs of distress noted.  Current treatment regimen appropriate

## 2020-05-01 NOTE — PLAN OF CARE
Problem: Discharge Planning:  Goal: Discharged to appropriate level of care  Description: Discharged to appropriate level of care  4/30/2020 2222 by Alana Robles RN  Outcome: Ongoing  Note: Patient will be discharged when appropraite     Problem: Cardiac Output - Decreased:  Goal: Hemodynamic stability will improve  Description: Hemodynamic stability will improve  4/30/2020 2222 by Alana Robles RN  Outcome: Ongoing  Note: Patient will be continuously monitored by ECG monitoring     Problem: Pain:  Goal: Pain level will decrease  Description: Pain level will decrease  4/30/2020 2222 by Alana Robles RN  Outcome: Ongoing  Note: Pt complains of pain at a 0. Non pharmacological interventions completed which include relaxation. Pt states pain goal at a 0/10. Pain assessed every 4 hours, and as needed. PRN pain medications given as ordered. Problem: Tissue Perfusion - Cardiopulmonary, Altered:  Goal: Absence of angina  Description: Absence of angina  4/30/2020 2222 by Alana Robles RN  Outcome: Ongoing  Note: Patient will remain free of chest pain during stay     Problem: Tissue Perfusion - Cardiopulmonary, Altered:  Goal: Circulatory function within specified parameters  Description: Circulatory function within specified parameters  4/30/2020 2222 by Alana Robles RN  Outcome: Ongoing  Note: Cardiac monitor in place. Skin warm and dry. Pedal pulses 2+ bilaterally. Care plan reviewed with patient. Patient verbalize understanding of the plan of care and contribute to goal setting.

## 2020-05-02 ENCOUNTER — CARE COORDINATION (OUTPATIENT)
Dept: CARE COORDINATION | Age: 75
End: 2020-05-02

## 2020-05-18 RX ORDER — CLOPIDOGREL BISULFATE 75 MG/1
TABLET ORAL
Qty: 90 TABLET | Refills: 0 | Status: SHIPPED | OUTPATIENT
Start: 2020-05-18 | End: 2020-09-17

## 2020-05-18 RX ORDER — PRAVASTATIN SODIUM 20 MG
TABLET ORAL
Qty: 90 TABLET | Refills: 0 | Status: SHIPPED | OUTPATIENT
Start: 2020-05-18 | End: 2020-09-17

## 2020-05-19 ENCOUNTER — OFFICE VISIT (OUTPATIENT)
Dept: CARDIOLOGY CLINIC | Age: 75
End: 2020-05-19
Payer: MEDICARE

## 2020-05-19 VITALS
WEIGHT: 148.4 LBS | HEIGHT: 64 IN | SYSTOLIC BLOOD PRESSURE: 128 MMHG | DIASTOLIC BLOOD PRESSURE: 72 MMHG | BODY MASS INDEX: 25.33 KG/M2 | HEART RATE: 68 BPM

## 2020-05-19 PROCEDURE — 1036F TOBACCO NON-USER: CPT | Performed by: NUCLEAR MEDICINE

## 2020-05-19 PROCEDURE — 99214 OFFICE O/P EST MOD 30 MIN: CPT | Performed by: NUCLEAR MEDICINE

## 2020-05-19 PROCEDURE — G8427 DOCREV CUR MEDS BY ELIG CLIN: HCPCS | Performed by: NUCLEAR MEDICINE

## 2020-05-19 PROCEDURE — 1123F ACP DISCUSS/DSCN MKR DOCD: CPT | Performed by: NUCLEAR MEDICINE

## 2020-05-19 PROCEDURE — 3017F COLORECTAL CA SCREEN DOC REV: CPT | Performed by: NUCLEAR MEDICINE

## 2020-05-19 PROCEDURE — G8417 CALC BMI ABV UP PARAM F/U: HCPCS | Performed by: NUCLEAR MEDICINE

## 2020-05-19 PROCEDURE — 4040F PNEUMOC VAC/ADMIN/RCVD: CPT | Performed by: NUCLEAR MEDICINE

## 2020-05-19 RX ORDER — ISOSORBIDE MONONITRATE 30 MG/1
30 TABLET, EXTENDED RELEASE ORAL DAILY
Qty: 90 TABLET | Refills: 1 | Status: SHIPPED | OUTPATIENT
Start: 2020-05-19 | End: 2020-09-17

## 2020-05-19 RX ORDER — ISOSORBIDE MONONITRATE 30 MG/1
30 TABLET, EXTENDED RELEASE ORAL DAILY
COMMUNITY
End: 2020-05-19 | Stop reason: SDUPTHER

## 2020-05-19 RX ORDER — MONTELUKAST SODIUM 10 MG/1
TABLET ORAL
Qty: 90 TABLET | Refills: 2 | Status: SHIPPED | OUTPATIENT
Start: 2020-05-19 | End: 2021-03-17

## 2020-05-19 NOTE — PROGRESS NOTES
180 tablet 0    sucralfate (CARAFATE) 1 GM tablet Take 1 tablet by mouth 3 times daily 15 tablet 0    albuterol sulfate (PROAIR RESPICLICK) 188 (90 Base) MCG/ACT aerosol powder inhalation Inhale 2 puffs into the lungs every 6 hours as needed for Wheezing or Shortness of Breath 3 Inhaler 3    predniSONE (DELTASONE) 2.5 MG tablet Take 1 tablet by mouth daily 90 tablet 3    budesonide (PULMICORT) 0.5 MG/2ML nebulizer suspension Take 2 mLs by nebulization 2 times daily 60 ampule 11    formoterol (PERFOROMIST) 20 MCG/2ML nebulizer solution Take 2 mLs by nebulization every 12 hours 120 mL 11    montelukast (SINGULAIR) 10 MG tablet TAKE 1 TABLET BY MOUTH  NIGHTLY 90 tablet 2    pantoprazole (PROTONIX) 40 MG tablet Take 1 tablet by mouth 2 times daily (before meals) 60 tablet 3    lactobacillus (CULTURELLE) capsule Take 1 capsule by mouth daily (with breakfast) 30 capsule 1    senna-docusate (PERICOLACE) 8.6-50 MG per tablet Take 2 tablets by mouth daily as needed for Constipation 30 tablet 1    guaiFENesin (MUCINEX) 600 MG extended release tablet Take 600 mg by mouth Daily      albuterol (PROVENTIL) (2.5 MG/3ML) 0.083% nebulizer solution Take 3 mLs by nebulization every 6 hours as needed for Wheezing or Shortness of Breath 360 vial 3    Calcium Carb-Cholecalciferol (CALCIUM 600 + D PO) Take 600 mg by mouth daily      nitroGLYCERIN (NITROSTAT) 0.4 MG SL tablet Place 1 tablet under the tongue every 5 minutes as needed. 25 tablet 3    Oxygen Concentrator 2 L by Nasal route as needed.  aspirin 81 MG tablet Take 81 mg by mouth daily.  alendronate (FOSAMAX) 70 MG tablet Take 1 tablet by mouth every 7 days 12 tablet 1     No current facility-administered medications for this visit.       Allergies   Allergen Reactions    Cefpodoxime Diarrhea    Flonase [Fluticasone Propionate]      Pt shakes and feels anxious    Mometasone     Mometasone Furoate      Shakes,feels anxious    Spiriva Handihaler [Tiotropium Bromide Monohydrate]     Umeclidinium [place]      Health Maintenance   Topic Date Due    DTaP/Tdap/Td vaccine (1 - Tdap) 08/01/1963    Shingles Vaccine (1 of 2) 08/01/1994    Lipid screen  04/14/2015    Annual Wellness Visit (AWV)  06/23/2019    Colon cancer screen colonoscopy  12/11/2022    Flu vaccine  Completed    Pneumococcal 65+ years Vaccine  Completed    AAA screen  Completed    Hepatitis A vaccine  Aged Out    Hepatitis B vaccine  Aged Out    Hib vaccine  Aged Out    Meningococcal (ACWY) vaccine  Aged Out       Subjective:  Review of Systems  General:   No fever, no chills, some fatigue or weight loss  Pulmonary:    some more dyspnea, no wheezing  Cardiac:    Denies recent chest pain,   GI:     No nausea or vomiting, no abdominal pain  Neuro:     No dizziness or light headedness,   Musculoskeletal:  No recent active issues  Extremities:   No edema, no obvious claudication       Objective:  Physical Exam  /72   Pulse 68   Ht 5' 4\" (1.626 m)   Wt 148 lb 6.4 oz (67.3 kg)   BMI 25.47 kg/m²   General:   Well developed, well nourished  Lungs:   Clear to auscultation  Heart:    Normal S1 S2, Slight murmur. no rubs, no gallops  Abdomen:   Soft, non tender, no organomegalies, positive bowel sounds  Extremities:   No edema, no cyanosis, good peripheral pulses  Neurological:   Awake, alert, oriented. No obvious focal deficits  Musculoskelatal:  No obvious deformities    Assessment:      Diagnosis Orders   1. Coronary artery disease involving coronary bypass graft of native heart with angina pectoris ([facility] [address]     2. Essential hypertension     3. Familial hypercholesterolemia     higher risk patient  Concerning symptoms  Known Lm stent     Plan:  No follow-ups on file. I have spent 25 minutes face to face with the patient. More than 50% of this time was spent counseling and coordinating care.     Higher risk patient  Consider a cath   Vs wait until he sees pulmonary   Add

## 2020-06-05 ENCOUNTER — HOSPITAL ENCOUNTER (OUTPATIENT)
Age: 75
Discharge: HOME | End: 2020-06-05
Payer: MEDICARE

## 2020-06-05 ENCOUNTER — OFFICE VISIT (OUTPATIENT)
Dept: PULMONOLOGY | Age: 75
End: 2020-06-05
Payer: MEDICARE

## 2020-06-05 ENCOUNTER — PREP FOR PROCEDURE (OUTPATIENT)
Dept: CARDIOLOGY | Age: 75
End: 2020-06-05

## 2020-06-05 VITALS
OXYGEN SATURATION: 96 % | TEMPERATURE: 96.4 F | HEIGHT: 64 IN | WEIGHT: 149.2 LBS | HEART RATE: 74 BPM | SYSTOLIC BLOOD PRESSURE: 132 MMHG | DIASTOLIC BLOOD PRESSURE: 74 MMHG | BODY MASS INDEX: 25.47 KG/M2

## 2020-06-05 PROBLEM — R68.89 ACTIVITY INTOLERANCE: Status: ACTIVE | Noted: 2020-06-05

## 2020-06-05 PROCEDURE — 99214 OFFICE O/P EST MOD 30 MIN: CPT | Performed by: NURSE PRACTITIONER

## 2020-06-05 PROCEDURE — 3017F COLORECTAL CA SCREEN DOC REV: CPT | Performed by: NURSE PRACTITIONER

## 2020-06-05 PROCEDURE — 1036F TOBACCO NON-USER: CPT | Performed by: NURSE PRACTITIONER

## 2020-06-05 PROCEDURE — G8427 DOCREV CUR MEDS BY ELIG CLIN: HCPCS | Performed by: NURSE PRACTITIONER

## 2020-06-05 PROCEDURE — 3023F SPIROM DOC REV: CPT | Performed by: NURSE PRACTITIONER

## 2020-06-05 PROCEDURE — G8926 SPIRO NO PERF OR DOC: HCPCS | Performed by: NURSE PRACTITIONER

## 2020-06-05 PROCEDURE — G8417 CALC BMI ABV UP PARAM F/U: HCPCS | Performed by: NURSE PRACTITIONER

## 2020-06-05 PROCEDURE — U0002 COVID-19 LAB TEST NON-CDC: HCPCS

## 2020-06-05 PROCEDURE — 94618 PULMONARY STRESS TESTING: CPT | Performed by: NURSE PRACTITIONER

## 2020-06-05 PROCEDURE — 1123F ACP DISCUSS/DSCN MKR DOCD: CPT | Performed by: NURSE PRACTITIONER

## 2020-06-05 PROCEDURE — 4040F PNEUMOC VAC/ADMIN/RCVD: CPT | Performed by: NURSE PRACTITIONER

## 2020-06-05 RX ORDER — SODIUM CHLORIDE 0.9 % (FLUSH) 0.9 %
10 SYRINGE (ML) INJECTION PRN
Status: CANCELLED | OUTPATIENT
Start: 2020-06-05

## 2020-06-05 RX ORDER — SODIUM CHLORIDE 9 MG/ML
INJECTION, SOLUTION INTRAVENOUS CONTINUOUS
Status: CANCELLED | OUTPATIENT
Start: 2020-06-05

## 2020-06-05 RX ORDER — NITROGLYCERIN 0.4 MG/1
0.4 TABLET SUBLINGUAL EVERY 5 MIN PRN
Status: CANCELLED | OUTPATIENT
Start: 2020-06-05

## 2020-06-05 RX ORDER — SODIUM CHLORIDE 0.9 % (FLUSH) 0.9 %
10 SYRINGE (ML) INJECTION EVERY 12 HOURS SCHEDULED
Status: CANCELLED | OUTPATIENT
Start: 2020-06-05

## 2020-06-05 RX ORDER — ASPIRIN 325 MG
325 TABLET ORAL ONCE
Status: CANCELLED | OUTPATIENT
Start: 2020-06-05 | End: 2020-06-05

## 2020-06-05 ASSESSMENT — ENCOUNTER SYMPTOMS
EYES NEGATIVE: 1
ABDOMINAL PAIN: 0
VOMITING: 0
DIARRHEA: 0
CHEST TIGHTNESS: 0
SHORTNESS OF BREATH: 1
WHEEZING: 0
COUGH: 0
NAUSEA: 0

## 2020-06-05 NOTE — PROGRESS NOTES
[facility]     Patient: Saji Walker, 75 y.o.   : 1945    Pt of Dr. Neeru Thompson MD      Subjective     Chief Complaint   Patient presents with    Follow-up     COPD 1 year follow up with no testing        HPI  Margo Veloz is here for 1 year follow up for COPD. Former pt of [facility], [place]. Seeing me now due to location. Using Performist / Pulmicort BID with benefit. Using albuterol nebs 2-3 times per day. Seldomly uses his ProAir   Gets out of air quickly, feels that he can not do much. Is scheduled for heart cath Monday. Was having CP , started on isosorbide monotrate, CP resolved. Chronic left lower leg in evenings. on Prednisone 2.5 mg PO daily for COPD . On supplemental O2 at night only at 2LPM - he paid for this out of his own pocket   No recent oral steroids or antibiotics for respiratory infection.      Past Medical hx   PMH:  Past Medical History:   Diagnosis Date    CAD (coronary artery disease)     COPD (chronic obstructive pulmonary disease) ([facility] [address]     Hyperlipidemia     Hypertension     Pneumonia      SURGICAL HISTORY:  Past Surgical History:   Procedure Laterality Date    CARDIAC CATHETERIZATION  2012    STENT    COLONOSCOPY      ENDOSCOPY, COLON, DIAGNOSTIC      FINGER SURGERY Right 2018    ROTATOR CUFF REPAIR       SOCIAL HISTORY:  Social History     Tobacco Use    Smoking status: Former Smoker     Packs/day: 1.00     Years: 30.00     Pack years: 30.00     Types: Cigarettes     Start date: 5/3/1953     Last attempt to quit: 1983     Years since quittin.9    Smokeless tobacco: Never Used   Substance Use Topics    Alcohol use: No     Alcohol/week: 0.0 standard drinks     Types: 1 - 2 Cans of beer per week    Drug use: No     ALLERGIES:  Allergies   Allergen Reactions    Cefpodoxime Diarrhea    Flonase [Fluticasone Propionate]      Pt shakes and feels anxious    Mometasone     Mometasone Furoate Conjunctiva/sclera: Conjunctivae normal.   Neck:      Vascular: No JVD. Cardiovascular:      Rate and Rhythm: Normal rate and regular rhythm. Heart sounds: No murmur. No friction rub. Pulmonary:      Effort: Pulmonary effort is normal. No accessory muscle usage or respiratory distress. Breath sounds: Normal breath sounds. No wheezing, rhonchi or rales. Chest:      Chest wall: No tenderness. Musculoskeletal:      Right lower leg: No edema. Left lower leg: No edema. Skin:     General: Skin is warm and dry. Capillary Refill: Capillary refill takes less than 2 seconds. Nails: There is no clubbing. Neurological:      Mental Status: He is alert. Psychiatric:         Mood and Affect: Mood normal.         Behavior: Behavior normal.         Thought Content: Thought content normal.         Judgment: Judgment normal.          Test results   Lung Nodule Screening     [] Qualifies    [x]Does not qualify   [] Declined    [] Completed       Six Minute Walk Test  Negin Roque 1945    Six minute walk test done in my office today by my medical assistant. Yogesh's oxygen saturation at rest on room air was 96%. 1 min and 54 min into the walk pt could not physically ambulate any longer due to \"pounding Chest\" His SPO2 never dropped below 91% with ambulation. Once he sat down , SPO2 dropped to 89% for brief moment. The six min walk showed activity intolerance but no hypoxia to require O2 at this time during the day. Assessment      Diagnosis Orders   1. Steroid-dependent COPD ([facility] [address]  Spirometry With Bronchodilator   2. Chronic respiratory failure with hypoxia, on home O2 therapy ([facility])  Spirometry With Bronchodilator   3. Precordial pain     4.  Activity intolerance           Plan   -keep f/u with cardiology for heart cath without fail  -continue use of O2 at night   -continue Performist/ Pulmicort/Albuterol  -continue 2.5 mg daily prednisone    Will see Lisandro Hoover in: 6 months

## 2020-06-07 LAB
PERFORMING LAB: NORMAL
REPORT: NORMAL
SARS-COV-2: NOT DETECTED

## 2020-06-08 ENCOUNTER — HOSPITAL ENCOUNTER (OUTPATIENT)
Dept: INPATIENT UNIT | Age: 75
Discharge: HOME | End: 2020-06-08
Attending: NUCLEAR MEDICINE | Admitting: NUCLEAR MEDICINE
Payer: MEDICARE

## 2020-06-08 VITALS
SYSTOLIC BLOOD PRESSURE: 88 MMHG | WEIGHT: 144 LBS | RESPIRATION RATE: 12 BRPM | BODY MASS INDEX: 24.59 KG/M2 | OXYGEN SATURATION: 94 % | TEMPERATURE: 97.9 F | HEART RATE: 79 BPM | DIASTOLIC BLOOD PRESSURE: 54 MMHG | HEIGHT: 64 IN

## 2020-06-08 LAB
ABO: NORMAL
ANION GAP SERPL CALCULATED.3IONS-SCNC: 7 MEQ/L (ref 8–16)
ANTIBODY SCREEN: NORMAL
APTT: 29.5 SECONDS (ref 22–38)
BUN BLDV-MCNC: 16 MG/DL (ref 7–22)
CALCIUM SERPL-MCNC: 9.7 MG/DL (ref 8.5–10.5)
CHLORIDE BLD-SCNC: 103 MEQ/L (ref 98–111)
CO2: 29 MEQ/L (ref 23–33)
CREAT SERPL-MCNC: 0.9 MG/DL (ref 0.4–1.2)
ERYTHROCYTE [DISTWIDTH] IN BLOOD BY AUTOMATED COUNT: 15.2 % (ref 11.5–14.5)
ERYTHROCYTE [DISTWIDTH] IN BLOOD BY AUTOMATED COUNT: 51.8 FL (ref 35–45)
GFR SERPL CREATININE-BSD FRML MDRD: 82 ML/MIN/1.73M2
GLUCOSE BLD-MCNC: 90 MG/DL (ref 70–108)
HCT VFR BLD CALC: 38.4 % (ref 42–52)
HEMOGLOBIN: 12 GM/DL (ref 14–18)
INR BLD: 1 (ref 0.85–1.13)
MCH RBC QN AUTO: 28.9 PG (ref 26–33)
MCHC RBC AUTO-ENTMCNC: 31.3 GM/DL (ref 32.2–35.5)
MCV RBC AUTO: 92.5 FL (ref 80–94)
PLATELET # BLD: 307 THOU/MM3 (ref 130–400)
PMV BLD AUTO: 8.9 FL (ref 9.4–12.4)
POTASSIUM REFLEX MAGNESIUM: 4.2 MEQ/L (ref 3.5–5.2)
RBC # BLD: 4.15 MILL/MM3 (ref 4.7–6.1)
REASON FOR REJECTION: NORMAL
REJECTED TEST: NORMAL
RH FACTOR: NORMAL
SODIUM BLD-SCNC: 139 MEQ/L (ref 135–145)
WBC # BLD: 7 THOU/MM3 (ref 4.8–10.8)

## 2020-06-08 PROCEDURE — 93799 UNLISTED CV SVC/PROCEDURE: CPT | Performed by: INTERNAL MEDICINE

## 2020-06-08 PROCEDURE — 85027 COMPLETE CBC AUTOMATED: CPT

## 2020-06-08 PROCEDURE — 80048 BASIC METABOLIC PNL TOTAL CA: CPT

## 2020-06-08 PROCEDURE — 85730 THROMBOPLASTIN TIME PARTIAL: CPT

## 2020-06-08 PROCEDURE — 36415 COLL VENOUS BLD VENIPUNCTURE: CPT

## 2020-06-08 PROCEDURE — C1894 INTRO/SHEATH, NON-LASER: HCPCS

## 2020-06-08 PROCEDURE — 93571 IV DOP VEL&/PRESS C FLO 1ST: CPT | Performed by: NUCLEAR MEDICINE

## 2020-06-08 PROCEDURE — 85610 PROTHROMBIN TIME: CPT

## 2020-06-08 PROCEDURE — 93458 L HRT ARTERY/VENTRICLE ANGIO: CPT | Performed by: NUCLEAR MEDICINE

## 2020-06-08 PROCEDURE — 6360000004 HC RX CONTRAST MEDICATION: Performed by: NUCLEAR MEDICINE

## 2020-06-08 PROCEDURE — 92978 ENDOLUMINL IVUS OCT C 1ST: CPT | Performed by: NUCLEAR MEDICINE

## 2020-06-08 PROCEDURE — 93005 ELECTROCARDIOGRAM TRACING: CPT | Performed by: NUCLEAR MEDICINE

## 2020-06-08 PROCEDURE — 86850 RBC ANTIBODY SCREEN: CPT

## 2020-06-08 PROCEDURE — 6360000002 HC RX W HCPCS

## 2020-06-08 PROCEDURE — 2500000003 HC RX 250 WO HCPCS

## 2020-06-08 PROCEDURE — 86900 BLOOD TYPING SEROLOGIC ABO: CPT

## 2020-06-08 PROCEDURE — 86901 BLOOD TYPING SEROLOGIC RH(D): CPT

## 2020-06-08 PROCEDURE — C1769 GUIDE WIRE: HCPCS

## 2020-06-08 PROCEDURE — C1887 CATHETER, GUIDING: HCPCS

## 2020-06-08 PROCEDURE — 93005 ELECTROCARDIOGRAM TRACING: CPT | Performed by: PHYSICIAN ASSISTANT

## 2020-06-08 PROCEDURE — 6370000000 HC RX 637 (ALT 250 FOR IP): Performed by: NUCLEAR MEDICINE

## 2020-06-08 PROCEDURE — 2580000003 HC RX 258: Performed by: PHYSICIAN ASSISTANT

## 2020-06-08 PROCEDURE — 2709999900 HC NON-CHARGEABLE SUPPLY

## 2020-06-08 RX ORDER — ASPIRIN 325 MG
325 TABLET ORAL ONCE
Status: DISCONTINUED | OUTPATIENT
Start: 2020-06-08 | End: 2020-06-08 | Stop reason: HOSPADM

## 2020-06-08 RX ORDER — SODIUM CHLORIDE 0.9 % (FLUSH) 0.9 %
10 SYRINGE (ML) INJECTION EVERY 12 HOURS SCHEDULED
Status: DISCONTINUED | OUTPATIENT
Start: 2020-06-08 | End: 2020-06-08 | Stop reason: HOSPADM

## 2020-06-08 RX ORDER — ACETAMINOPHEN 325 MG/1
650 TABLET ORAL EVERY 4 HOURS PRN
Status: DISCONTINUED | OUTPATIENT
Start: 2020-06-08 | End: 2020-06-08 | Stop reason: HOSPADM

## 2020-06-08 RX ORDER — SODIUM CHLORIDE 9 MG/ML
INJECTION, SOLUTION INTRAVENOUS CONTINUOUS
Status: DISCONTINUED | OUTPATIENT
Start: 2020-06-08 | End: 2020-06-08 | Stop reason: HOSPADM

## 2020-06-08 RX ORDER — SODIUM CHLORIDE 0.9 % (FLUSH) 0.9 %
10 SYRINGE (ML) INJECTION PRN
Status: DISCONTINUED | OUTPATIENT
Start: 2020-06-08 | End: 2020-06-08 | Stop reason: HOSPADM

## 2020-06-08 RX ORDER — NITROGLYCERIN 0.4 MG/1
0.4 TABLET SUBLINGUAL EVERY 5 MIN PRN
Status: DISCONTINUED | OUTPATIENT
Start: 2020-06-08 | End: 2020-06-08 | Stop reason: HOSPADM

## 2020-06-08 RX ORDER — ATROPINE SULFATE 0.4 MG/ML
0.5 AMPUL (ML) INJECTION
Status: DISCONTINUED | OUTPATIENT
Start: 2020-06-08 | End: 2020-06-08 | Stop reason: HOSPADM

## 2020-06-08 RX ORDER — SODIUM CHLORIDE 9 MG/ML
INJECTION, SOLUTION INTRAVENOUS CONTINUOUS
Status: DISCONTINUED | OUTPATIENT
Start: 2020-06-08 | End: 2020-06-08 | Stop reason: SDUPTHER

## 2020-06-08 RX ORDER — SODIUM CHLORIDE 0.9 % (FLUSH) 0.9 %
10 SYRINGE (ML) INJECTION PRN
Status: DISCONTINUED | OUTPATIENT
Start: 2020-06-08 | End: 2020-06-08 | Stop reason: SDUPTHER

## 2020-06-08 RX ORDER — SODIUM CHLORIDE 0.9 % (FLUSH) 0.9 %
10 SYRINGE (ML) INJECTION EVERY 12 HOURS SCHEDULED
Status: DISCONTINUED | OUTPATIENT
Start: 2020-06-08 | End: 2020-06-08 | Stop reason: SDUPTHER

## 2020-06-08 RX ADMIN — IOPAMIDOL 150 ML: 755 INJECTION, SOLUTION INTRAVENOUS at 14:09

## 2020-06-08 RX ADMIN — SODIUM CHLORIDE: 9 INJECTION, SOLUTION INTRAVENOUS at 11:32

## 2020-06-08 RX ADMIN — NITROGLYCERIN 0.4 MG: 0.4 TABLET, ORALLY DISINTEGRATING SUBLINGUAL at 14:32

## 2020-06-08 NOTE — H&P
Estela  Sedation/Analgesia History & Physical    Pt Name: Pierre Kim  Account number: [de-identified]  MRN: [id number]  YOB: 1945  Provider Performing Procedure: Belvie Kawasaki MD [facility] [place]  Primary Care Physician: Titus Yates  Date: 6/8/2020    PRE-PROCEDURE    Code Status: FULL CODE  Brief History/Pre-Procedure Diagnosis:   Angina  LM stent      Consent: : I have discussed with the patient risks, benefits, and alternatives (and relevant risks, benefits, and side effects related to alternatives or not receiving care), and likelihood of the success. The patient and/or representative appear to understand and agree to proceed. The discussion encompasses risks, benefits, and side effects related to the alternatives and the risks related to not receiving the proposed care, treatment, and services. MEDICAL HISTORY  [x]ASHD/ANGINA/MI/CHF   [x]Hypertension  []Diabetes  []Hyperlipidemia  []Smoking  []Family Hx of ASHD  []Additional information:       has a past medical history of CAD (coronary artery disease), COPD (chronic obstructive pulmonary disease) ([facility] [address] Hyperlipidemia, Hypertension, and Pneumonia. SURGICAL HISTORY   has a past surgical history that includes Rotator cuff repair; Colonoscopy; Endoscopy, colon, diagnostic; Cardiac catheterization (2012); and Finger surgery (Right, 12/2018).   Additional information:       ALLERGIES   Allergies as of 06/08/2020 - Review Complete 06/08/2020   Allergen Reaction Noted    Cefpodoxime Diarrhea 02/02/2017    Flonase [fluticasone propionate]  01/21/2013    Mometasone  02/02/2017    Mometasone furoate  01/21/2013    Spiriva handihaler [tiotropium bromide monohydrate]  08/27/2015    Umeclidinium bromide  02/02/2017     Additional information:       MEDICATIONS   Aspirin  [x] 81 mg  [] 325 mg  [] None  Antiplatelet drug therapy use last 5 days  []No []Yes  Coumadin Use Last 5 Days []No []Yes  Other anticoagulant use last 5 pantoprazole (PROTONIX) 40 MG tablet Take 1 tablet by mouth 2 times daily (before meals) 3/19/19   SEBASTIAN Hurt - CNP   lactobacillus (CULTURELLE) capsule Take 1 capsule by mouth daily (with breakfast) 3/20/19   Carlos Fernandes MD   senna-docusate (PERICOLACE) 8.6-50 MG per tablet Take 2 tablets by mouth daily as needed for Constipation  Patient not taking: Reported on 6/5/2020 3/19/19   Carlos Fernandes MD   alendronate (FOSAMAX) 70 MG tablet Take 1 tablet by mouth every 7 days 2/25/19 4/29/20  Harry Anderson MD   guaiFENesin (MUCINEX) 600 MG extended release tablet Take 600 mg by mouth Daily    Historical Provider, MD   albuterol (PROVENTIL) (2.5 MG/3ML) 0.083% nebulizer solution Take 3 mLs by nebulization every 6 hours as needed for Wheezing or Shortness of Breath 2/21/17   Harry Anderson MD   Calcium Carb-Cholecalciferol (CALCIUM 600 + D PO) Take 600 mg by mouth daily    Historical Provider, MD   nitroGLYCERIN (NITROSTAT) 0.4 MG SL tablet Place 1 tablet under the tongue every 5 minutes as needed. 3/23/15   Watson Vogt MD   Oxygen Concentrator 2 L by Nasal route as needed. Historical Provider, MD   aspirin 81 MG tablet Take 81 mg by mouth daily. Historical Provider, MD     Additional information:       VITAL SIGNS   There were no vitals filed for this visit.     PHYSICAL:   General: No acute distress  HEENT:  Unremarkable for age  Neck: without increased JVD, carotid pulses 2+ bilaterally without bruits  Heart: RRR, S1 & S2 WNL, S4 gallop, without murmurs or rubs    Lungs: Clear to auscultation    Abdomen: BS present, without HSM, masses, or tenderness    Extremities: without C,C,E.  Pulses 2+ bilaterally  Mental Status: Alert & Oriented        PLANNED PROCEDURE   [x]Cath  []PCI                []Pacemaker/AICD  []MARÍA             []Cardioversion []Peripheral angiography/PTA  []Other:      SEDATION  Planned agent: [x]Midazolam []Meperidine [x]Sublimaze []Morphine  []Diazepam  []Other:     ASA Classification:  []1 [x]2 []3 []4 []5  Class 1: A normal healthy patient  Class 2: Pt with mild to moderate systemic disease  Class 3: Severe systemic disease or disturbance  Class 4: Severe systemic disorders that are already life threatening. Class 5: Moribund pt with little chances of survival, for more than 24 hours. Mallampati I Airway Classification:   []1 [x]2 []3 []4    [x]Pre-procedure diagnostic studies complete and results available. Comment:    [x]Previous sedation/anesthesia experiences assessed. Comment:    [x]The patient is an appropriate candidate to undergo the planned procedure sedation and anesthesia. (Refer to nursing sedation/analgesia documentation record)  [x]Formulation and discussion of sedation/procedure plan, risks, and expectations with patient and/or responsible adult completed. [x]Patient examined immediately prior to the procedure.  (Refer to nursing sedation/analgesia documentation record)    Adore Lynn MD [facility] [place]  Electronically signed 6/8/2020 at 11:40 AM

## 2020-06-08 NOTE — PROCEDURES
this point, case discussed with Dr. Ashlee Aldrich. Plan  was to proceed with fractional flow reserve of the LAD, which will be  dictated in a separate report and further we will follow accordingly.         Devante Monson M.D.    D: 06/08/2020 15:08:48       T: 06/08/2020 15:12:36     [id number]  Job#: [id number]     Doc#: [id number]    CC:

## 2020-06-09 LAB
EKG ATRIAL RATE: 71 BPM
EKG ATRIAL RATE: 79 BPM
EKG P AXIS: 54 DEGREES
EKG P AXIS: 72 DEGREES
EKG P-R INTERVAL: 174 MS
EKG P-R INTERVAL: 180 MS
EKG Q-T INTERVAL: 394 MS
EKG Q-T INTERVAL: 424 MS
EKG QRS DURATION: 82 MS
EKG QRS DURATION: 82 MS
EKG QTC CALCULATION (BAZETT): 451 MS
EKG QTC CALCULATION (BAZETT): 460 MS
EKG R AXIS: 14 DEGREES
EKG R AXIS: 29 DEGREES
EKG T AXIS: 50 DEGREES
EKG T AXIS: 51 DEGREES
EKG VENTRICULAR RATE: 71 BPM
EKG VENTRICULAR RATE: 79 BPM

## 2020-06-09 PROCEDURE — 93010 ELECTROCARDIOGRAM REPORT: CPT | Performed by: INTERNAL MEDICINE

## 2020-06-23 ENCOUNTER — OFFICE VISIT (OUTPATIENT)
Dept: CARDIOLOGY CLINIC | Age: 75
End: 2020-06-23
Payer: MEDICARE

## 2020-06-23 VITALS
HEIGHT: 64 IN | HEART RATE: 76 BPM | DIASTOLIC BLOOD PRESSURE: 64 MMHG | BODY MASS INDEX: 25.1 KG/M2 | WEIGHT: 147.05 LBS | SYSTOLIC BLOOD PRESSURE: 122 MMHG

## 2020-06-23 PROCEDURE — G8427 DOCREV CUR MEDS BY ELIG CLIN: HCPCS | Performed by: NUCLEAR MEDICINE

## 2020-06-23 PROCEDURE — 4040F PNEUMOC VAC/ADMIN/RCVD: CPT | Performed by: NUCLEAR MEDICINE

## 2020-06-23 PROCEDURE — G8417 CALC BMI ABV UP PARAM F/U: HCPCS | Performed by: NUCLEAR MEDICINE

## 2020-06-23 PROCEDURE — G8926 SPIRO NO PERF OR DOC: HCPCS | Performed by: NUCLEAR MEDICINE

## 2020-06-23 PROCEDURE — 1036F TOBACCO NON-USER: CPT | Performed by: NUCLEAR MEDICINE

## 2020-06-23 PROCEDURE — 1123F ACP DISCUSS/DSCN MKR DOCD: CPT | Performed by: NUCLEAR MEDICINE

## 2020-06-23 PROCEDURE — 3017F COLORECTAL CA SCREEN DOC REV: CPT | Performed by: NUCLEAR MEDICINE

## 2020-06-23 PROCEDURE — 99213 OFFICE O/P EST LOW 20 MIN: CPT | Performed by: NUCLEAR MEDICINE

## 2020-06-23 PROCEDURE — 3023F SPIROM DOC REV: CPT | Performed by: NUCLEAR MEDICINE

## 2020-06-23 NOTE — PROGRESS NOTES
MG tablet TAKE 1 TABLET BY MOUTH  TWICE A  tablet 0    albuterol sulfate (PROAIR RESPICLICK) 235 (90 Base) MCG/ACT aerosol powder inhalation Inhale 2 puffs into the lungs every 6 hours as needed for Wheezing or Shortness of Breath 3 Inhaler 3    predniSONE (DELTASONE) 2.5 MG tablet Take 1 tablet by mouth daily 90 tablet 3    budesonide (PULMICORT) 0.5 MG/2ML nebulizer suspension Take 2 mLs by nebulization 2 times daily 60 ampule 11    formoterol (PERFOROMIST) 20 MCG/2ML nebulizer solution Take 2 mLs by nebulization every 12 hours 120 mL 11    pantoprazole (PROTONIX) 40 MG tablet Take 1 tablet by mouth 2 times daily (before meals) 60 tablet 3    lactobacillus (CULTURELLE) capsule Take 1 capsule by mouth daily (with breakfast) 30 capsule 1    guaiFENesin (MUCINEX) 600 MG extended release tablet Take 600 mg by mouth Daily      albuterol (PROVENTIL) (2.5 MG/3ML) 0.083% nebulizer solution Take 3 mLs by nebulization every 6 hours as needed for Wheezing or Shortness of Breath 360 vial 3    Calcium Carb-Cholecalciferol (CALCIUM 600 + D PO) Take 600 mg by mouth daily      nitroGLYCERIN (NITROSTAT) 0.4 MG SL tablet Place 1 tablet under the tongue every 5 minutes as needed. 25 tablet 3    Oxygen Concentrator 2 L by Nasal route as needed.  aspirin 81 MG tablet Take 81 mg by mouth daily.  alendronate (FOSAMAX) 70 MG tablet Take 1 tablet by mouth every 7 days 12 tablet 1     No current facility-administered medications for this visit.       Allergies   Allergen Reactions    Cefpodoxime Diarrhea    Flonase [Fluticasone Propionate]      Pt shakes and feels anxious    Mometasone     Mometasone Furoate      Shakes,feels anxious    Spiriva Handihaler [Tiotropium Bromide Monohydrate]     Umeclidinium [place]      Health Maintenance   Topic Date Due    DTaP/Tdap/Td vaccine (1 - Tdap) 08/01/1963    Shingles Vaccine (1 of 2) 08/01/1994    Lipid screen  04/14/2015    Annual Wellness Visit (AWV)

## 2020-07-02 ENCOUNTER — OFFICE VISIT (OUTPATIENT)
Dept: PULMONOLOGY | Age: 75
End: 2020-07-02
Payer: MEDICARE

## 2020-07-02 VITALS
SYSTOLIC BLOOD PRESSURE: 132 MMHG | WEIGHT: 148 LBS | BODY MASS INDEX: 25.27 KG/M2 | DIASTOLIC BLOOD PRESSURE: 70 MMHG | HEART RATE: 85 BPM | HEIGHT: 64 IN | OXYGEN SATURATION: 95 % | TEMPERATURE: 98.2 F

## 2020-07-02 PROCEDURE — G8427 DOCREV CUR MEDS BY ELIG CLIN: HCPCS | Performed by: NURSE PRACTITIONER

## 2020-07-02 PROCEDURE — 4040F PNEUMOC VAC/ADMIN/RCVD: CPT | Performed by: NURSE PRACTITIONER

## 2020-07-02 PROCEDURE — 99214 OFFICE O/P EST MOD 30 MIN: CPT | Performed by: NURSE PRACTITIONER

## 2020-07-02 PROCEDURE — 3023F SPIROM DOC REV: CPT | Performed by: NURSE PRACTITIONER

## 2020-07-02 PROCEDURE — 3017F COLORECTAL CA SCREEN DOC REV: CPT | Performed by: NURSE PRACTITIONER

## 2020-07-02 PROCEDURE — 1123F ACP DISCUSS/DSCN MKR DOCD: CPT | Performed by: NURSE PRACTITIONER

## 2020-07-02 PROCEDURE — 1036F TOBACCO NON-USER: CPT | Performed by: NURSE PRACTITIONER

## 2020-07-02 PROCEDURE — G8926 SPIRO NO PERF OR DOC: HCPCS | Performed by: NURSE PRACTITIONER

## 2020-07-02 PROCEDURE — G8417 CALC BMI ABV UP PARAM F/U: HCPCS | Performed by: NURSE PRACTITIONER

## 2020-07-02 ASSESSMENT — ENCOUNTER SYMPTOMS
VOMITING: 0
NAUSEA: 0
ABDOMINAL PAIN: 0
COUGH: 0
DIARRHEA: 0
CHEST TIGHTNESS: 0
SHORTNESS OF BREATH: 1
EYES NEGATIVE: 1
WHEEZING: 0

## 2020-07-02 NOTE — PROGRESS NOTES
[facility]     Patient: Yoko Hale, 75 y.o.   : 1945    Pt of Dr. Gallo Bangura   Patient presents with    Follow-up     1mo f/u wants to discuss meds        HPI  Keke Peterson is here for 1 month follow up for COPD, pt requested this appt to discuss his medications. \" I can not even walk a few feet without getting winded\", wants to be more active but he can't   With any activity, his legs get to feeling like jello, gets feeling of weakness and fatigue along with his SOB. At rest he currently feels like he is breathing better than he ever has in the past.   \"I'm not ready to just give up and die\"   On daily prednisone at 2.5 mg daily, using Pulmicort/ Brovana nebs with compliance. Using PRN albuterol  Has oxygen for nighttime use, will sometimes put on during day if having trouble. 6 min walk attempted last visit, could not ambulate > 1 min, got tired and HR elevated. SPO2 did not drop <90%   Last PFT - FEV 1 43%, was scheduled to have one this coming Dec.     Past Medical hx   PMH:  Past Medical History:   Diagnosis Date    CAD (coronary artery disease)     COPD (chronic obstructive pulmonary disease) ([facility] [address]     Hyperlipidemia     Hypertension     Pneumonia      SURGICAL HISTORY:  Past Surgical History:   Procedure Laterality Date    CARDIAC CATHETERIZATION      STENT    COLONOSCOPY      ENDOSCOPY, COLON, DIAGNOSTIC      FINGER SURGERY Right 2018    ROTATOR CUFF REPAIR       SOCIAL HISTORY:  Social History     Tobacco Use    Smoking status: Former Smoker     Packs/day: 1.00     Years: 30.00     Pack years: 30.00     Types: Cigarettes     Start date: 5/3/1953     Last attempt to quit: 1983     Years since quittin.0    Smokeless tobacco: Never Used   Substance Use Topics    Alcohol use: No     Alcohol/week: 0.0 standard drinks     Types: 1 - 2 Cans of beer per week    Drug use:  No Calcium Carb-Cholecalciferol (CALCIUM 600 + D PO) Take 600 mg by mouth daily      nitroGLYCERIN (NITROSTAT) 0.4 MG SL tablet Place 1 tablet under the tongue every 5 minutes as needed. 25 tablet 3    Oxygen Concentrator 2 L by Nasal route as needed.  aspirin 81 MG tablet Take 81 mg by mouth daily.  alendronate (FOSAMAX) 70 MG tablet Take 1 tablet by mouth every 7 days 12 tablet 1     No current facility-administered medications for this visit. Collette Ruts ROS   Review of Systems   Constitutional: Positive for activity change and fatigue. Negative for chills, fever and unexpected weight change. HENT: Negative. Eyes: Negative. Respiratory: Positive for shortness of breath. Negative for cough, chest tightness and wheezing. Cardiovascular: Negative for chest pain, palpitations and leg swelling. Gastrointestinal: Negative for abdominal pain, diarrhea, nausea and vomiting. Genitourinary: Negative. Musculoskeletal: Negative. Skin: Negative. Neurological: Positive for weakness. Hematological: Does not bruise/bleed easily. Psychiatric/Behavioral: Negative for sleep disturbance and suicidal ideas. Physical exam   /70 (Site: Left Upper Arm, Position: Sitting, Cuff Size: Medium Adult)   Pulse 85   Temp 98.2 °F (36.8 °C)   Ht 5' 4\" (1.626 m)   Wt 148 lb (67.1 kg)   SpO2 95% Comment: on RA  BMI 25.40 kg/m²        Physical Exam  Vitals signs and nursing note reviewed. Constitutional:       General: He is not in acute distress. Appearance: Normal appearance. He is well-developed. Interventions: Face mask in place. Comments: Face mask on due to COVID 19    HENT:      Mouth/Throat:      Lips: Pink. Mouth: Mucous membranes are moist.      Pharynx: Oropharynx is clear. No oropharyngeal exudate or posterior oropharyngeal erythema. Eyes:      Conjunctiva/sclera: Conjunctivae normal.   Neck:      Vascular: No JVD.    Cardiovascular:      Rate and Rhythm: Normal rate and regular rhythm. Heart sounds: No murmur. No friction rub. Pulmonary:      Effort: Pulmonary effort is normal. No accessory muscle usage or respiratory distress. Breath sounds: Normal breath sounds. Decreased air movement (in all lung fields ) present. No wheezing, rhonchi or rales. Chest:      Chest wall: No tenderness. Musculoskeletal:      Right lower leg: No edema. Left lower leg: No edema. Skin:     General: Skin is warm and dry. Capillary Refill: Capillary refill takes less than 2 seconds. Nails: There is no clubbing. Neurological:      Mental Status: He is alert. Psychiatric:         Mood and Affect: Mood normal.         Behavior: Behavior normal.         Thought Content: Thought content normal.         Judgment: Judgment normal.          Test results   Lung Nodule Screening     [] Qualifies    [x]Does not qualify   [] Declined    [] Completed             Assessment      Diagnosis Orders   1. Stage 4 very severe COPD by GOLD classification ([address]  Blood Gas, Arterial    COVID-19 Ambulatory   2. Steroid-dependent COPD ([address]  Blood Gas, Arterial    COVID-19 Ambulatory   3. Activity intolerance           Plan   -add Spiriva Respimat, 2 samples given in office today, instructed on proper use  -move PFT up, add Abg to be done on Room air  -continue cardiac meds per cardiology recommendations    Will see Marietta Amaro in: 1 month to review PFT / Abg, consider home BiPAP and / or EBV.      Electronically signed by SEBASTIAN Mason CNP on 7/2/2020 at 10:27 AM

## 2020-07-27 ENCOUNTER — HOSPITAL ENCOUNTER (OUTPATIENT)
Age: 75
Discharge: HOME | End: 2020-07-27
Payer: MEDICARE

## 2020-07-28 ENCOUNTER — HOSPITAL ENCOUNTER (OUTPATIENT)
Age: 75
Discharge: HOME | End: 2020-07-28
Payer: MEDICARE

## 2020-07-28 PROCEDURE — U0003 INFECTIOUS AGENT DETECTION BY NUCLEIC ACID (DNA OR RNA); SEVERE ACUTE RESPIRATORY SYNDROME CORONAVIRUS 2 (SARS-COV-2) (CORONAVIRUS DISEASE [COVID-19]), AMPLIFIED PROBE TECHNIQUE, MAKING USE OF HIGH THROUGHPUT TECHNOLOGIES AS DESCRIBED BY CMS-2020-01-R: HCPCS

## 2020-07-29 LAB — SARS-COV-2: NOT DETECTED

## 2020-08-03 ENCOUNTER — HOSPITAL ENCOUNTER (OUTPATIENT)
Dept: PULMONOLOGY | Age: 75
Discharge: HOME | End: 2020-08-03
Payer: MEDICARE

## 2020-08-03 ENCOUNTER — HOSPITAL ENCOUNTER (OUTPATIENT)
Dept: RESPIRATORY THERAPY | Age: 75
Discharge: HOME | End: 2020-08-03
Payer: MEDICARE

## 2020-08-03 LAB
ALLEN TEST: POSITIVE
BASE EXCESS (CALCULATED): 2.1 MMOL/L (ref -2.5–2.5)
COLLECTED BY:: 4648
DEVICE: NORMAL
HCO3: 27 MMOL/L (ref 23–28)
O2 SATURATION: 95 %
PCO2: 42 MMHG (ref 35–45)
PH BLOOD GAS: 7.42 (ref 7.35–7.45)
PO2: 75 MMHG (ref 71–104)
SOURCE, BLOOD GAS: NORMAL

## 2020-08-03 PROCEDURE — 94060 EVALUATION OF WHEEZING: CPT

## 2020-08-03 PROCEDURE — 36600 WITHDRAWAL OF ARTERIAL BLOOD: CPT

## 2020-08-03 PROCEDURE — 82803 BLOOD GASES ANY COMBINATION: CPT

## 2020-08-06 ENCOUNTER — OFFICE VISIT (OUTPATIENT)
Dept: PULMONOLOGY | Age: 75
End: 2020-08-06
Payer: MEDICARE

## 2020-08-06 VITALS
OXYGEN SATURATION: 94 % | HEIGHT: 64 IN | BODY MASS INDEX: 25.13 KG/M2 | TEMPERATURE: 97.7 F | SYSTOLIC BLOOD PRESSURE: 102 MMHG | HEART RATE: 89 BPM | WEIGHT: 147.2 LBS | DIASTOLIC BLOOD PRESSURE: 66 MMHG

## 2020-08-06 PROCEDURE — G8417 CALC BMI ABV UP PARAM F/U: HCPCS | Performed by: NURSE PRACTITIONER

## 2020-08-06 PROCEDURE — 1123F ACP DISCUSS/DSCN MKR DOCD: CPT | Performed by: NURSE PRACTITIONER

## 2020-08-06 PROCEDURE — 99214 OFFICE O/P EST MOD 30 MIN: CPT | Performed by: NURSE PRACTITIONER

## 2020-08-06 PROCEDURE — G8427 DOCREV CUR MEDS BY ELIG CLIN: HCPCS | Performed by: NURSE PRACTITIONER

## 2020-08-06 PROCEDURE — 1036F TOBACCO NON-USER: CPT | Performed by: NURSE PRACTITIONER

## 2020-08-06 PROCEDURE — 3023F SPIROM DOC REV: CPT | Performed by: NURSE PRACTITIONER

## 2020-08-06 PROCEDURE — 4040F PNEUMOC VAC/ADMIN/RCVD: CPT | Performed by: NURSE PRACTITIONER

## 2020-08-06 PROCEDURE — G8926 SPIRO NO PERF OR DOC: HCPCS | Performed by: NURSE PRACTITIONER

## 2020-08-06 NOTE — PROGRESS NOTES
ALLERGIES:  Allergies   Allergen Reactions    Cefpodoxime Diarrhea    Flonase [Fluticasone Propionate]      Pt shakes and feels anxious    Mometasone     Mometasone Furoate      Shakes,feels anxious    Spiriva Handihaler [Tiotropium Bromide Monohydrate]     Umeclidinium [place]      FAMILY HISTORY:  Family History   Problem Relation Age of Onset    High Cholesterol Father     Cancer Sister     Cancer Brother     Heart Disease Brother      CURRENT MEDICATIONS:  Current Outpatient Medications   Medication Sig Dispense Refill    montelukast (SINGULAIR) 10 MG tablet TAKE 1 TABLET BY MOUTH  NIGHTLY 90 tablet 2    isosorbide mononitrate (IMDUR) 30 MG extended release tablet Take 1 tablet by mouth daily 90 tablet 1    clopidogrel (PLAVIX) 75 MG tablet TAKE 1 TABLET BY MOUTH  DAILY 90 tablet 0    pravastatin (PRAVACHOL) 20 MG tablet TAKE 1 TABLET BY MOUTH  DAILY 90 tablet 0    metoprolol tartrate (LOPRESSOR) 25 MG tablet TAKE 1 TABLET BY MOUTH  TWICE A  tablet 0    albuterol sulfate (PROAIR RESPICLICK) 417 (90 Base) MCG/ACT aerosol powder inhalation Inhale 2 puffs into the lungs every 6 hours as needed for Wheezing or Shortness of Breath 3 Inhaler 3    predniSONE (DELTASONE) 2.5 MG tablet Take 1 tablet by mouth daily 90 tablet 3    budesonide (PULMICORT) 0.5 MG/2ML nebulizer suspension Take 2 mLs by nebulization 2 times daily 60 ampule 11    formoterol (PERFOROMIST) 20 MCG/2ML nebulizer solution Take 2 mLs by nebulization every 12 hours 120 mL 11    pantoprazole (PROTONIX) 40 MG tablet Take 1 tablet by mouth 2 times daily (before meals) 60 tablet 3    lactobacillus (CULTURELLE) capsule Take 1 capsule by mouth daily (with breakfast) 30 capsule 1    alendronate (FOSAMAX) 70 MG tablet Take 1 tablet by mouth every 7 days 12 tablet 1    guaiFENesin (MUCINEX) 600 MG extended release tablet Take 600 mg by mouth Daily      albuterol (PROVENTIL) (2.5 MG/3ML) 0.083% nebulizer solution Take 3 mLs by nebulization every 6 hours as needed for Wheezing or Shortness of Breath 360 vial 3    Calcium Carb-Cholecalciferol (CALCIUM 600 + D PO) Take 600 mg by mouth daily      nitroGLYCERIN (NITROSTAT) 0.4 MG SL tablet Place 1 tablet under the tongue every 5 minutes as needed. 25 tablet 3    Oxygen Concentrator 2 L by Nasal route as needed.  aspirin 81 MG tablet Take 81 mg by mouth daily. No current facility-administered medications for this visit. ROS   Review of Systems   Review of Systems   General/Constitutional: No recent loss of weight or appetite changes. No fever or chills. HENT: Negative. Eyes: Negative. Upper respiratory tract: No nasal stuffiness or post nasal drip. Lower respiratory tract/ lungs: Positive for cough and SOB. No sputum production. No hemoptysis. Cardiovascular: No palpitations or chest pain. Gastrointestinal: No nausea or vomiting. Neurological: No focal neurologiacal weakness. Extremities: No edema. Musculoskeletal: No complaints. Genitourinary: No complaints. Hematological: Negative. Psychiatric/Behavioral: Negative. Skin: No itching. Physical exam   /66 (Site: Right Upper Arm, Position: Sitting, Cuff Size: Medium Adult)   Pulse 89   Temp 97.7 °F (36.5 °C)   Ht 5' 4\" (1.626 m)   Wt 147 lb 3.2 oz (66.8 kg)   SpO2 94% Comment: on room air  BMI 25.27 kg/m²    Wt Readings from Last 3 Encounters:   08/06/20 147 lb 3.2 oz (66.8 kg)   07/02/20 148 lb (67.1 kg)   06/23/20 147 lb 0.8 oz (66.7 kg)       Physical Exam  Constitutional:       General: He is not in acute distress. HENT:      Head: Normocephalic and atraumatic. Right Ear: External ear normal.      Left Ear: External ear normal.      Nose: Nose normal.   Eyes:      General: No scleral icterus. Right eye: No discharge. Left eye: No discharge. Neck:      Musculoskeletal: Normal range of motion. Cardiovascular:      Rate and Rhythm: Normal rate.       Heart sounds: Normal heart sounds. Pulmonary:      Effort: Pulmonary effort is normal. No respiratory distress. Breath sounds: Wheezing present. Comments: Generalized wheezing on expiration  Musculoskeletal: Normal range of motion. Neurological:      Mental Status: He is alert and oriented to person, place, and time. Psychiatric:         Behavior: Behavior normal.          results   Lung Nodule Screening     [] Qualifies    [x] Does not qualify   [] Declined    [] Completed  The USPSTF recommends annual screening for lung cancer with low-dose computed tomography (LDCT) in adults aged 54 to 80 years who have a 30 pack-year smoking history and currently smoke or have quit within the past 15 years. Screening should be discontinued once a person has not smoked for 15 years or develops a health problem that substantially limits life expectancy or the ability or willingness to have curative lung surgery. Assessment      Diagnosis Orders   1. Stage 4 very severe COPD by GOLD classification ([address]     2. Steroid-dependent COPD ([address]     3.  Activity intolerance           Plan   -Continue current inhaler and prednisone regimen  -Continue 2LPM O2 therapy at night  -Patient declines adjustments to medications and inhalers  -Patient declines further testing  -Advised to increase physical activity as tolerated  -Advised to maintain pneumonia vaccine with PCP and to take flu vaccine this coming season.  -Advised patient to call office with any changes, questions, or concerns regarding respiratory status    Will see Roman Harvey back in: 4 months    Heather Aguirre MD  Internal Medicine Resident PGY-1  8/6/2020

## 2020-08-12 ENCOUNTER — TELEPHONE (OUTPATIENT)
Dept: PULMONOLOGY | Age: 75
End: 2020-08-12

## 2020-08-28 ENCOUNTER — HOSPITAL ENCOUNTER (OUTPATIENT)
Age: 75
Discharge: HOME | End: 2020-08-28
Payer: MEDICARE

## 2020-08-28 PROCEDURE — U0003 INFECTIOUS AGENT DETECTION BY NUCLEIC ACID (DNA OR RNA); SEVERE ACUTE RESPIRATORY SYNDROME CORONAVIRUS 2 (SARS-COV-2) (CORONAVIRUS DISEASE [COVID-19]), AMPLIFIED PROBE TECHNIQUE, MAKING USE OF HIGH THROUGHPUT TECHNOLOGIES AS DESCRIBED BY CMS-2020-01-R: HCPCS

## 2020-08-29 LAB — SARS-COV-2: NOT DETECTED

## 2020-09-04 ENCOUNTER — HOSPITAL ENCOUNTER (OUTPATIENT)
Dept: PULMONOLOGY | Age: 75
Discharge: HOME | End: 2020-09-04
Payer: MEDICARE

## 2020-09-04 PROCEDURE — 94726 PLETHYSMOGRAPHY LUNG VOLUMES: CPT

## 2020-09-04 PROCEDURE — 94060 EVALUATION OF WHEEZING: CPT

## 2020-09-04 PROCEDURE — 94729 DIFFUSING CAPACITY: CPT

## 2020-09-16 ENCOUNTER — OFFICE VISIT (OUTPATIENT)
Dept: PULMONOLOGY | Age: 75
End: 2020-09-16
Payer: MEDICARE

## 2020-09-16 VITALS
SYSTOLIC BLOOD PRESSURE: 132 MMHG | HEIGHT: 64 IN | HEART RATE: 70 BPM | WEIGHT: 146 LBS | OXYGEN SATURATION: 97 % | BODY MASS INDEX: 24.92 KG/M2 | TEMPERATURE: 97.8 F | DIASTOLIC BLOOD PRESSURE: 74 MMHG

## 2020-09-16 PROCEDURE — G8417 CALC BMI ABV UP PARAM F/U: HCPCS | Performed by: NURSE PRACTITIONER

## 2020-09-16 PROCEDURE — 1123F ACP DISCUSS/DSCN MKR DOCD: CPT | Performed by: NURSE PRACTITIONER

## 2020-09-16 PROCEDURE — G8926 SPIRO NO PERF OR DOC: HCPCS | Performed by: NURSE PRACTITIONER

## 2020-09-16 PROCEDURE — 1036F TOBACCO NON-USER: CPT | Performed by: NURSE PRACTITIONER

## 2020-09-16 PROCEDURE — 99213 OFFICE O/P EST LOW 20 MIN: CPT | Performed by: NURSE PRACTITIONER

## 2020-09-16 PROCEDURE — 4040F PNEUMOC VAC/ADMIN/RCVD: CPT | Performed by: NURSE PRACTITIONER

## 2020-09-16 PROCEDURE — 94618 PULMONARY STRESS TESTING: CPT | Performed by: NURSE PRACTITIONER

## 2020-09-16 PROCEDURE — G8427 DOCREV CUR MEDS BY ELIG CLIN: HCPCS | Performed by: NURSE PRACTITIONER

## 2020-09-16 PROCEDURE — 3023F SPIROM DOC REV: CPT | Performed by: NURSE PRACTITIONER

## 2020-09-16 ASSESSMENT — ENCOUNTER SYMPTOMS
SHORTNESS OF BREATH: 1
NAUSEA: 0
EYES NEGATIVE: 1
VOMITING: 0
COUGH: 1
ABDOMINAL PAIN: 0
DIARRHEA: 0
WHEEZING: 0

## 2020-09-16 NOTE — PROGRESS NOTES
[facility]     Patient: Kristina Erazo, 67 y.o.   : 1945    Pt of Dr. Gaetano Brambila   Patient presents with    Follow-up     1mo f/u PFT-20        HPI  Sami Coker is here for follow up for his severe, steroid dependent COPD  We are in process of completing testing for Bonnyman valve. Has had ECHO EF 60-65% .  RSVP < 45  On 2.5 mg prednisone daily  Not currently on home Oxygen   Quit smoking in   No previous lung/ chest surgery     Past Medical hx   PMH:  Past Medical History:   Diagnosis Date    CAD (coronary artery disease)     COPD (chronic obstructive pulmonary disease) (HCC)     Hyperlipidemia     Hypertension     Pneumonia      SURGICAL HISTORY:  Past Surgical History:   Procedure Laterality Date    CARDIAC CATHETERIZATION      STENT    COLONOSCOPY      ENDOSCOPY, COLON, DIAGNOSTIC      FINGER SURGERY Right 2018    ROTATOR CUFF REPAIR       SOCIAL HISTORY:  Social History     Tobacco Use    Smoking status: Former Smoker     Packs/day: 1.00     Years: 30.00     Pack years: 30.00     Types: Cigarettes     Start date: 5/3/1953     Last attempt to quit: 1983     Years since quittin.2    Smokeless tobacco: Never Used   Substance Use Topics    Alcohol use: No     Alcohol/week: 0.0 standard drinks     Types: 1 - 2 Cans of beer per week    Drug use: No     ALLERGIES:  Allergies   Allergen Reactions    Cefpodoxime Diarrhea    Flonase [Fluticasone Propionate]      Pt shakes and feels anxious    Mometasone     Mometasone Furoate      Shakes,feels anxious    Spiriva Handihaler [Tiotropium Bromide Monohydrate]     Umeclidinium [place]      FAMILY HISTORY:  Family History   Problem Relation Age of Onset    High Cholesterol Father     Cancer Sister     Cancer Brother     Heart Disease Brother      CURRENT MEDICATIONS:  Current Outpatient Medications   Medication Sig Dispense Refill    montelukast (SINGULAIR) 10 MG tablet TAKE 1 TABLET BY MOUTH  NIGHTLY 90 tablet 2    isosorbide mononitrate (IMDUR) 30 MG extended release tablet Take 1 tablet by mouth daily 90 tablet 1    clopidogrel (PLAVIX) 75 MG tablet TAKE 1 TABLET BY MOUTH  DAILY 90 tablet 0    pravastatin (PRAVACHOL) 20 MG tablet TAKE 1 TABLET BY MOUTH  DAILY 90 tablet 0    metoprolol tartrate (LOPRESSOR) 25 MG tablet TAKE 1 TABLET BY MOUTH  TWICE A  tablet 0    albuterol sulfate (PROAIR RESPICLICK) 920 (90 Base) MCG/ACT aerosol powder inhalation Inhale 2 puffs into the lungs every 6 hours as needed for Wheezing or Shortness of Breath 3 Inhaler 3    predniSONE (DELTASONE) 2.5 MG tablet Take 1 tablet by mouth daily 90 tablet 3    budesonide (PULMICORT) 0.5 MG/2ML nebulizer suspension Take 2 mLs by nebulization 2 times daily 60 ampule 11    formoterol (PERFOROMIST) 20 MCG/2ML nebulizer solution Take 2 mLs by nebulization every 12 hours 120 mL 11    pantoprazole (PROTONIX) 40 MG tablet Take 1 tablet by mouth 2 times daily (before meals) 60 tablet 3    lactobacillus (CULTURELLE) capsule Take 1 capsule by mouth daily (with breakfast) 30 capsule 1    guaiFENesin (MUCINEX) 600 MG extended release tablet Take 600 mg by mouth Daily      albuterol (PROVENTIL) (2.5 MG/3ML) 0.083% nebulizer solution Take 3 mLs by nebulization every 6 hours as needed for Wheezing or Shortness of Breath 360 vial 3    Calcium Carb-Cholecalciferol (CALCIUM 600 + D PO) Take 600 mg by mouth daily      nitroGLYCERIN (NITROSTAT) 0.4 MG SL tablet Place 1 tablet under the tongue every 5 minutes as needed. 25 tablet 3    Oxygen Concentrator 2 L by Nasal route as needed.  aspirin 81 MG tablet Take 81 mg by mouth daily.  alendronate (FOSAMAX) 70 MG tablet Take 1 tablet by mouth every 7 days 12 tablet 1     No current facility-administered medications for this visit. [place] ROS   Review of Systems   Constitutional: Positive for fatigue.  Negative for activity change, appetite change, chills, fever and unexpected weight change. HENT: Negative. Eyes: Negative. Respiratory: Positive for cough and shortness of breath. Negative for wheezing. Cardiovascular: Negative for chest pain, palpitations and leg swelling. Gastrointestinal: Negative for abdominal pain, diarrhea, nausea and vomiting. Genitourinary: Negative. Musculoskeletal: Negative. Skin: Negative. Neurological: Negative. Hematological: Does not bruise/bleed easily. Psychiatric/Behavioral: Negative for sleep disturbance and suicidal ideas. Physical exam   /74 (Site: Left Upper Arm, Position: Sitting, Cuff Size: Medium Adult)   Pulse 70   Temp 97.8 °F (36.6 °C) (Temporal)   Ht 5' 4\" (1.626 m)   Wt 146 lb (66.2 kg)   SpO2 97% Comment: r/a  BMI 25.06 kg/m²      Wt Readings from Last 3 Encounters:   09/16/20 146 lb (66.2 kg)   08/06/20 147 lb 3.2 oz (66.8 kg)   07/02/20 148 lb (67.1 kg)     Physical Exam  Vitals signs and nursing note reviewed. Constitutional:       General: He is not in acute distress. Appearance: He is well-developed. HENT:      Mouth/Throat:      Lips: Pink. Mouth: Mucous membranes are moist.      Pharynx: Oropharynx is clear. No oropharyngeal exudate or posterior oropharyngeal erythema. Eyes:      Conjunctiva/sclera: Conjunctivae normal.   Neck:      Vascular: No JVD. Cardiovascular:      Rate and Rhythm: Normal rate and regular rhythm. Heart sounds: No murmur. No friction rub. Pulmonary:      Effort: Pulmonary effort is normal. No accessory muscle usage or respiratory distress. Breath sounds: Normal breath sounds. No wheezing, rhonchi or rales. Chest:      Chest wall: No tenderness. Musculoskeletal:      Right lower leg: No edema. Left lower leg: No edema. Skin:     General: Skin is warm and dry. Capillary Refill: Capillary refill takes less than 2 seconds. Nails: There is no clubbing. Neurological:      Mental Status: He is alert. Psychiatric:         Mood and Affect: Mood normal.         Behavior: Behavior normal.         Thought Content: Thought content normal.         Judgment: Judgment normal.          Test results   Lung Nodule Screening     [] Qualifies    [x]Does not qualify   [] Declined    [] Completed                 Ref. Range 8/3/2020 07:31   Source: Unknown R Radial   pH, Blood Gas Latest Ref Range: 7.35 - 7.45  7.42   PCO2 Latest Ref Range: 35 - 45 mmhg 42   pO2 Latest Ref Range: 71 - 104 mmhg 75   HCO3 Latest Ref Range: 23 - 28 mmol/l 27   Base Excess (Calculated) Latest Ref Range: -2.5 - 2.5 mmol/l 2.1   O2 Sat Latest Units: % 95   Harrison Test Unknown Positive   DEVICE Unknown Room Air   COLLECTED BY: Unknown [id number]      ECHO 4/30/2020   Conclusions      Summary   Normal left ventricle size and systolic function. Ejection fraction was   estimated at 60-65%. There were no regional left ventricular wall motion   abnormalities and wall thickness was within normal limits. Trace tricuspid regurgitation. Trace mitral regurgitation. Signature      ----------------------------------------------------------------   Electronically signed by Los Clark MD (Interpreting   physician) on 04/30/2020 at 11:11 AM   ----------------------------------------------------------------      Findings      Mitral Valve   Structurally normal mitral valve. Trace mitral regurgitation is present. Aortic Valve   Aortic valve leaflets are somewhat thickened. Tricuspid Valve   Tricuspid valve is structurally normal.   Trace tricuspid regurgitation. Pulmonic Valve   The pulmonic valve leaflets exhibited normal thickness, no calcification,   and normal cuspal separation. DOPPLER: The transpulmonic velocity was   within the normal range with no evidence for regurgitation.       Left Atrium   Left atrial size was normal.      Left Ventricle   Normal left ventricle size and systolic function. Ejection fraction was   estimated at 60-65%. There were no regional left ventricular wall motion   abnormalities and wall thickness was within normal limits. Right Atrium   Right atrial size was normal.      Right Ventricle   The right ventricular size was normal with normal systolic function and   wall thickness. Pericardial Effusion   The pericardium was normal in appearance with no evidence of a pericardial   effusion. Pleural Effusion   No evidence of pleural effusion. Aorta / Great Vessels   -Aortic root dimension within normal limits.   -The Pulmonary artery is within normal limits. -IVC size is within normal limits with normal respiratory phasic changes. M-Mode/2D Measurements & Calculations        Six Minute Walk Test  Rylan Carr 1945    Six minute walk test done in my office today by my medical assistant. Yogesh's oxygen saturation at rest on room air was 94%. His oxygen saturation dropped to 91% on room air with exertion. . Patient ambulated a total of  864   feet / 263 meters. He had a lot of dyspnea during the walk. Resting heart rate  was 66   and   98  upon completion of the walk. He did not require oxygen . Assessment      Diagnosis Orders   1. Stage 4 very severe COPD by GOLD classification ([facility])  6 Minute Walk Test    6 Minute Walk Test    CT CHEST WO CONTRAST   2. Steroid-dependent COPD ([facility] [address]  CT CHEST WO CONTRAST   3.  Coronary artery disease involving native coronary artery of native heart with angina pectoris ([facility])           Plan   -6 min walk completed, did not require oxygen   -continue current nebs  -continue 2.5 mg prednisone  -order CT chest with STRATX report per [place] protocol    F/u with Dr Neftaly Rdz MD to review CT and schedule EBV if candidate    Will see [facility] after testing     Electronically signed by SEBASTIAN Mack CNP on 9/16/2020 at 4:13 PM

## 2020-09-17 RX ORDER — ISOSORBIDE MONONITRATE 30 MG/1
30 TABLET, EXTENDED RELEASE ORAL DAILY
Qty: 90 TABLET | Refills: 1 | Status: SHIPPED | OUTPATIENT
Start: 2020-09-17 | End: 2021-03-15

## 2020-09-17 RX ORDER — CLOPIDOGREL BISULFATE 75 MG/1
TABLET ORAL
Qty: 90 TABLET | Refills: 1 | Status: SHIPPED | OUTPATIENT
Start: 2020-09-17 | End: 2021-03-15

## 2020-09-17 RX ORDER — PRAVASTATIN SODIUM 20 MG
TABLET ORAL
Qty: 90 TABLET | Refills: 1 | Status: SHIPPED | OUTPATIENT
Start: 2020-09-17 | End: 2021-03-15

## 2020-09-23 ENCOUNTER — HOSPITAL ENCOUNTER (OUTPATIENT)
Dept: CT IMAGING | Age: 75
Discharge: HOME | End: 2020-09-23
Payer: MEDICARE

## 2020-09-23 PROCEDURE — 71250 CT THORAX DX C-: CPT

## 2020-09-27 NOTE — PROGRESS NOTES
[facility]  Pulmonary medicine clinic follow up note:    Lung Nodule Screening   [ ] Qualifies [X] Does not qualify [ ] [facility] [ ] Completed    Chief complaint and [place]:  Myrna Villalobos is a 67 y. o.oldmale came for follow up regarding his sever COPD and EBV- Zypher valve placement. He denies any fever or chills. No recent hospitalizations or emergency room visits. He is using inhalers with good compliance. He rarely uses rescue inhaler. He denies any nocturnal symptoms. On today's questioning:  He admits to cough with no expectoration. He denies hemoptysis. He denies fever or chills. He denies recent hospitalizations or emergency room visits. He is using his prescribed inhalers with excellent compliance. He is using his rescue inhaler rarely. He denies recent loss of weight or appetite changes. He denies recent decline in functional status. He is currently on treatment with following inhalers/Nebs:  -Prednisone 2.5mg po daily.  -Pulmicort 0.5mg via neb bid. -Performist ( Formoterol) 20mcg/2ml via nebs bid.  -Albuterol HFA 90mcg/Spray MDI, 2puffs  Q6Hprn.   -Albuterol 2.5mg nebs Q6h prn (the nebulizer). -Singulair 10mg 1 tablet at bed time daily. He used to be on Advair Diskus 250/50mcg DPI, 1 inhalation BID in the past. He is tolerating prednisone well. He is currently on 2LPM of home O2 at night time and 4LPM with exercise. No home O2 at rest. No recent hospitalizations. He was found to have Osteoporosis on Dexa scan of multifactorial including Steroid induced. He is currently taking Calcium + Vitamin D 1 tab po bid and Fosamax 70mg Po once a week. He is following with Dr. Rashmi Fregoso.     Patient did not tolerated Daliresp after using it for ~1month. He lost weight and not able to sleep. He stopped taking it. He prefers to go on maintenance steroids. Spiriva was stopped.  He did not tolerated Spiriva and developed chest tightness with that. He felt much better after stopping Spiriva. He did not want to take incruse inhaler. He was evaluated by Dr. Nasra Blanco for his allergic rhinitis and Deviated nasal septum. He was advised to go for septoplasty. But patient decided to not to go for surgery due to not sure about results. He quit taking Nasonex nasal spray. He completed Pulmonary rehab for his COPD in the past. He was advised to continue at home by dept. He never diagnosed with Glucoma or urinary retention. Review of Systems   General/Constitutional: he lost 1lb of weight from the last visit with normal appetite. No fever or chills. HENT: Negative. Eyes: Negative. Upper respiratory tract: No nasal stuffiness or post nasal drip. Lower respiratory tract/ lungs: See HPI  Cardiovascular: No palpitations or chest pain. Gastrointestinal: No nausea or vomiting. Neurological: No focal neurologiacal weakness. Extremities: No edema. Musculoskeletal: No complaints. Genitourinary: No complaints. Hematological: Negative. Psychiatric/Behavioral: Negative. Skin: No itching.       Past Medical History:   Diagnosis Date    CAD (coronary artery disease)     COPD (chronic obstructive pulmonary disease) ([facility] [address]     Hyperlipidemia     Hypertension     Pneumonia        Past Surgical History:   Procedure Laterality Date    CARDIAC CATHETERIZATION  2012    STENT    COLONOSCOPY      ENDOSCOPY, COLON, DIAGNOSTIC      FINGER SURGERY Right 2018    ROTATOR CUFF REPAIR         Social History     Tobacco Use    Smoking status: Former Smoker     Packs/day: 1.00     Years: 30.00     Pack years: 30.00     Types: Cigarettes     Start date: 5/3/1953     Last attempt to quit: 1983     Years since quittin.3    Smokeless tobacco: Never Used   Substance Use Topics    Alcohol use: No     Alcohol/week: 0.0 standard drinks     Types: 1 - 2 Cans of beer per week    Drug use: No       Allergies   Allergen Reactions    Cefpodoxime Diarrhea    Flonase [Fluticasone Propionate]      Pt shakes and feels anxious    Mometasone     Mometasone Furoate      Shakes,feels anxious    Spiriva Handihaler [Tiotropium Bromide Monohydrate]     Umeclidinium Bromide        Current Outpatient Medications   Medication Sig Dispense Refill    pravastatin (PRAVACHOL) 20 MG tablet TAKE 1 TABLET BY MOUTH  DAILY 90 tablet 1    clopidogrel (PLAVIX) 75 MG tablet TAKE 1 TABLET BY MOUTH  DAILY 90 tablet 1    metoprolol tartrate (LOPRESSOR) 25 MG tablet TAKE 1 TABLET BY MOUTH  TWICE DAILY 180 tablet 1    isosorbide mononitrate (IMDUR) 30 MG extended release tablet TAKE 1 TABLET BY MOUTH  DAILY 90 tablet 1    montelukast (SINGULAIR) 10 MG tablet TAKE 1 TABLET BY MOUTH  NIGHTLY 90 tablet 2    albuterol sulfate (PROAIR RESPICLICK) 910 (90 Base) MCG/ACT aerosol powder inhalation Inhale 2 puffs into the lungs every 6 hours as needed for Wheezing or Shortness of Breath 3 Inhaler 3    predniSONE (DELTASONE) 2.5 MG tablet Take 1 tablet by mouth daily 90 tablet 3    budesonide (PULMICORT) 0.5 MG/2ML nebulizer suspension Take 2 mLs by nebulization 2 times daily 60 ampule 11    formoterol (PERFOROMIST) 20 MCG/2ML nebulizer solution Take 2 mLs by nebulization every 12 hours 120 mL 11    pantoprazole (PROTONIX) 40 MG tablet Take 1 tablet by mouth 2 times daily (before meals) 60 tablet 3    lactobacillus (CULTURELLE) capsule Take 1 capsule by mouth daily (with breakfast) 30 capsule 1    guaiFENesin (MUCINEX) 600 MG extended release tablet Take 600 mg by mouth Daily      albuterol (PROVENTIL) (2.5 MG/3ML) 0.083% nebulizer solution Take 3 mLs by nebulization every 6 hours as needed for Wheezing or Shortness of Breath 360 vial 3    Calcium Carb-Cholecalciferol (CALCIUM 600 + D PO) Take 600 mg by mouth daily      nitroGLYCERIN (NITROSTAT) 0.4 MG SL tablet Place 1 tablet under the tongue every 5 minutes as needed.  25 tablet 3    Oxygen Concentrator 2 L by Nasal route as needed.  aspirin 81 MG tablet Take 81 mg by mouth daily.  alendronate (FOSAMAX) 70 MG tablet Take 1 tablet by mouth every 7 days 12 tablet 1     No current facility-administered medications for this visit. Family History   Problem Relation Age of Onset    High Cholesterol Father     Cancer Sister     Cancer Brother     Heart Disease Brother           Vitals:   /72 (Site: Left Upper Arm, Position: Sitting, Cuff Size: Medium Adult)   Pulse 73   Temp 97.1 °F (36.2 °C)   Ht 5' 4\" (1.626 m)   Wt 145 lb (65.8 kg)   SpO2 94% Comment: on ra  BMI 24.89 kg/m²           Physical Exam   Nursing note and vitals reviewed. Constitutional: Patient appears moderately built and moderately nourished. No distress. Patient is oriented to person, place, and time. HENT:   Head: Normocephalic and atraumatic. Right Ear: External ear normal.   Left Ear: External ear normal.   Mouth/Throat: Oropharynx is clear and moist.  No oral thrush. Eyes: Conjunctivae are normal. Pupils are equal, round, and reactive to light. No scleral icterus. Neck: Neck supple. No JVD present. No tracheal deviation present. Cardiovascular: Normal rate, regular rhythm, normal heart sounds. No murmur heard. Pulmonary/Chest: Effort normal and breath sounds normal. No stridor. No respiratory distress. No wheezes. No rales. Patient exhibits no tenderness. Abdominal: Soft. Patient exhibits no distension. No tenderness. Musculoskeletal: Normal range of motion. Extremities: Patient exhibits no edema and no tenderness. Lymphadenopathy:  No cervical adenopathy. Neurological: Patient is alert and oriented to person, place, and time. Skin: Skin is warm and dry. Patient is not diaphoretic. Psychiatric: Patient  has a normal mood and affect.  Patient behavior is normal.       Neck Circumference - 13.5\"   Mallampati - 3    Diagnostic Data:    Nocturnal pulse ox study:      Six minute walk test:  -Six minute walk test done on 12/16/2014 at 9:07 AM in my office by medical assistant Miss. Molina:  The patient oxygen saturation at rest on room air was 94 %. The patient oxygen saturation dropped to 90% on room air with exertion after 6 minute 0 seconds. Spirometry 2015:6/3/15          Dexa scan:  Mar 15, 2016  [id number] - SUPRIYA DEXA BONE DENSITY SCAN # MALE BONE DENSITY EVALUATION: 03/15/2016    An additional bone density evaluation was performed 03/15/2016 on  the AP L1-L2 region of spine using [employer] X-Ray Absorptiometry. The BMD average for the exam is 1.033  g/cm2. The  T-score is -1.50 and the Z-score is -0.90. This matches the [address] [facility]'s criteria for osteopenia and places the patient at a medium risk for fracture. IMPRESSION: OSTEOPOROSIS Patient is at high risk for fracture. Recheck of BMD in 1-2 years and patient consult w/primary care provider are recommended. Luis Angel Dixon M.D., rd/janette:3/15/2016 10:09:34      Six Minute Walk Test done on 3/22/16 at 10:26 AM    Martin Wander 1945    Six minute walk test done in my office today by my medical assistant Miss. Henriquez: Sneha oxygen saturation at rest on room air was 94%. His oxygen saturation dropped to 87% on room air with exertion after 3 minute 3 seconds. The six minute walk test was repeated with oxygen supplementation. Oxygen supplimentation was started with 2LPM via nasal cannula and titrated to 4 LPM via nasal cannula. At the end of the test Yogesh Posey oxygen saturation remained at 91% on 4LPM LPM with exertion. He is mobile in the home and requires oxygen as outlined above. Martin Viramontes needs no home O2 at rest. He needs 4LPM of home O2 with exercise and 2LPM at night time. DME Medical Necessity Documentation    Patient was seen in my clinic on 3/22/16 for the diagnosis COPD. I am prescribing oxygen because the diagnosis and testing requires the patient to have oxygen in the home. Condition will improve or be benefited by oxygen use. The patient is  able to perform good mobility in a home setting and therefore does require the use of a portable oxygen system. Spirometry: 2016:          His FEV1 improved from previous spirometry done in the past.      Chest Xray:  Feb 16, 2017  PROCEDURE: XR CHEST STANDARD TWO VW  1. There is no acute cardiopulmonary process. 2. Emphysema. 3. Stable mild linear scar the left lung base. CT chest:  Apr 4, 2017  PROCEDURE: CTA CHEST WITH CONTRAST  1. No evidence of pulmonary embolism. No acute findings. 2. Extensive bullous emphysematous changes bilaterally. PFTs: 09/04/2020        CT chest with out contrast:  Sep 23, 2020   PROCEDURE: CT CHEST WO CONTRAST   Severe emphysematous changes are again seen with extensive scarring. Large bullous changes are seen at the lung apices. Appearance of the lungs is similar to the previous exam.       Initial Patient Screening Checklist for Oaks Endobronchial Valve System:    Medical History and Physical exam:  Patient is 25years of age or older : Yes. Patient diagnosis of emphysema confirmed by CT : Yes. Patient is not an active smoker ( for at least 4 months): Yes. Patient has a BMI <35 kg/m²: Yes. Patient is stable with ? 20mg prednisone ( or equivalent)daily:Yes. Patient target lobe with little or no collateral ventilation : Yes. Patient is able to tolerate a bronchoscopic procedure:Yes. Pulmonary Function Testing & Medical work up:  FEV1: > 15 and ?  45% Predicted: Yes. Residual Volume ( RV):  ? 175% Predicted ( ? 200% if homogeneous):Yes. Total lung capacity: ? 100% Predicted:Yes. 6MWT (Distance): >100-500 meters (150-500 meters if homogeneous):Yes- 263 meters on 09/16/2020.                     Pulmonary Exclusion Criteria for the Oaks Valve Based on Multiple RCTs:  Patient ever had prior lung transplant, lung volume reduction Center Lab    HCO3  27  23 - 28 mmol/l  Final  08/03/2020  7:31 AM  [facility]    Base Excess (Calculated)  2.1  -2.5 - 2.5 mmol/l  Final  08/03/2020  7:31 AM  [facility]    O2 Sat  95  %  Final  08/03/2020  7:31 AM   - [facility]    DEVICE  Room Air   Final  08/03/2020  7:31 AM   - [address] Test  Positive   Final  08/03/2020  7:31 AM   - [address] [address] [facility]    Source:  R Radial   Final  08/03/2020  7:31 AM   - [address] [address] [facility]    COLLECTED BY:  [id number]   Final  08/03/2020  7:31 AM  [facility]    Performed at [facility], [address]    Testing Performed By     [address]  Name  Director  Address  Valid Date Range    [facility]  Rossy Doyle MD         Radiographic Assessment:- StratX lung report dated 09/24/2020- reviewed  Fissure completeness scores of : > 80% : Yes. Voxel density less than -910HU ? 50:Yes. Assessment:   -Steroid dependent Severe COPD- under control with current meds. He is currently on Prednisone 2.5mg po daily. He did not tolerated Daliresp or Spiriva. He did not want to take Incruse Ellipta.   -He was noted to have bilateral apial big bullae on his CT chest dated Sep 23, 2020- He is not a candidate for Zyphr EBV valve placement procedure  -Allergic rhinitis- under control.   -Deviated nasal septum to Right side.  -Coronary artery disease s/p 2 stent. He follows with Dr. Davis Bustillo  -Bronchial asthma-under moderate control.  -Osteoporosis- Multifactorial including Steroid induced.  He is following with Dr. Mauro Javier       Plan:  -Continue prednisone i.e 2.5mg po daily.  -Continue Pulmicort 0.5mg via neb bid.  -Continue Performist ( Formoterol) 20mcg/2ml via nebs bid.  -Continue Albuterol HFA 90mcg/Spray MDI, 2puffs  Q6Hprn.   -He instructed to use Albuterol HFA  inhaler 2 puffs Q6h prn or Albuterol 2.5mg nebs Q6h prn (the nebulizer) at one time as rescue medication not both at the same time. He verbalizes understanding.   -Continue Calcium + Vitamin D supplementation as prescribed  -Continue Fosamax 70mg Po once a week. -Keep scheduled follow up with [facility] Endocrinology for further evaluation of Osteoporosis and management.  -Patient educated to update his pneumococcal vaccine with family physician and take influenza vaccine in coming season with out fail. Patient verbalizes understanding.  -Patient advised to continue 2LPM of home O2 at night time and 4LPM with exercise. No home O2 at rest.  -Refer patient to Dr. Jesse [facility]ustos MD, [facility] Cardiothoracic surgery department for further evaluation and management of his very severe COPD to consider for LVRS or Endobronchial valve placement for second opinion by interventional pulmonary service. -Whitney Avila was advised to keep scheduled follow up appointment on 12/18/2020 at [address], [address] for pulmonary disease Pulmonary clinic with Ms. Yuli Medley CNP for further evaluation and management of COPD . -Patient and his wife were educated about my impression and plan. They verbalizes understanding.        -He refused to go on treatment with Breo. He want to continue with Advair.  -He did not want to take Incruse Ellipta 62.5mcg/actuation DPI 1puff daily in am.  -He refused referral to lung transplant in the past. He verbalizes understanding.  -He refused to go for testing for serum for [id number]. He verbalizes understanding of not diagnosis      Total time spent interviewing the patient and/or family, evaluating lab data and X-ray data and processing orders was 45 Minutes. I personally spent more than 50% of the appointment time face to face with the patient providing counseling and coordinating the patient's care.

## 2020-10-05 ENCOUNTER — OFFICE VISIT (OUTPATIENT)
Dept: PULMONOLOGY | Age: 75
End: 2020-10-05
Payer: MEDICARE

## 2020-10-05 VITALS
DIASTOLIC BLOOD PRESSURE: 72 MMHG | WEIGHT: 145 LBS | OXYGEN SATURATION: 94 % | HEIGHT: 64 IN | TEMPERATURE: 97.1 F | HEART RATE: 73 BPM | SYSTOLIC BLOOD PRESSURE: 134 MMHG | BODY MASS INDEX: 24.75 KG/M2

## 2020-10-05 PROCEDURE — 99215 OFFICE O/P EST HI 40 MIN: CPT | Performed by: INTERNAL MEDICINE

## 2020-10-05 PROCEDURE — G8484 FLU IMMUNIZE NO ADMIN: HCPCS | Performed by: INTERNAL MEDICINE

## 2020-10-05 PROCEDURE — G8926 SPIRO NO PERF OR DOC: HCPCS | Performed by: INTERNAL MEDICINE

## 2020-10-05 PROCEDURE — G8420 CALC BMI NORM PARAMETERS: HCPCS | Performed by: INTERNAL MEDICINE

## 2020-10-05 PROCEDURE — 3023F SPIROM DOC REV: CPT | Performed by: INTERNAL MEDICINE

## 2020-10-05 PROCEDURE — 1123F ACP DISCUSS/DSCN MKR DOCD: CPT | Performed by: INTERNAL MEDICINE

## 2020-10-05 PROCEDURE — 1036F TOBACCO NON-USER: CPT | Performed by: INTERNAL MEDICINE

## 2020-10-05 PROCEDURE — G8427 DOCREV CUR MEDS BY ELIG CLIN: HCPCS | Performed by: INTERNAL MEDICINE

## 2020-10-05 PROCEDURE — 4040F PNEUMOC VAC/ADMIN/RCVD: CPT | Performed by: INTERNAL MEDICINE

## 2020-10-05 NOTE — PROGRESS NOTES
Neck Circumference -   13.5  Mallampati - 3  Lung Nodule Screening     [] Qualifies    [x] Does not qualify   [] Declined    [] Completed

## 2020-10-05 NOTE — PATIENT INSTRUCTIONS
Plan:  -Continue prednisone i.e 2.5mg po daily.  -Continue Pulmicort 0.5mg via neb bid.  -Continue Performist ( Formoterol) 20mcg/2ml via nebs bid.  -Continue Albuterol HFA 90mcg/Spray MDI, 2puffs  Q6Hprn.   -He instructed to use Albuterol HFA  inhaler 2 puffs Q6h prn or Albuterol 2.5mg nebs Q6h prn (the nebulizer) at one time as rescue medication not both at the same time. He verbalizes understanding.   -Continue Calcium + Vitamin D supplementation as prescribed  -Continue Fosamax 70mg Po once a week. -Keep scheduled follow up with [facility] Endocrinology for further evaluation of Osteoporosis and management.  -Patient educated to update his pneumococcal vaccine with family physician and take influenza vaccine in coming season with out fail. Patient verbalizes understanding.  -Patient advised to continue 2LPM of home O2 at night time and 4LPM with exercise. No home O2 at rest.  -Refer patient to Dr[facility] Nelson [facility]os [facility] Cardiothoracic surgery department for further evaluation and management of his very severe COPD to consider for LVRS or Endobronchial valve placement for second opinion by interventional pulmonary service. -Kristiepooja Melo was advised to keep scheduled follow up appointment on 12/18/2020 at [address], [address] for pulmonary disease Pulmonary clinic with Ms. Yuli Medley CNP for further evaluation and management of COPD . -Patient and his wife were educated about my impression and plan. They verbalizes understanding.

## 2020-10-14 ENCOUNTER — TELEPHONE (OUTPATIENT)
Dept: CARDIOLOGY CLINIC | Age: 75
End: 2020-10-14

## 2020-10-14 NOTE — TELEPHONE ENCOUNTER
Zachery Miller MD 21 minutes ago (1:22 PM)          Dr Darline Ballesteros . I am going to be evaluated  nov 3 for valve placement /or lung reduction . i need to know about stopping  plavix  for 7 days. thank you.   Yogesh shannon. [phone]. Dr. Rigoberto Escobar advise?

## 2020-12-18 ENCOUNTER — OFFICE VISIT (OUTPATIENT)
Dept: PULMONOLOGY | Age: 75
End: 2020-12-18
Payer: MEDICARE

## 2020-12-18 VITALS
WEIGHT: 147.4 LBS | DIASTOLIC BLOOD PRESSURE: 64 MMHG | BODY MASS INDEX: 25.16 KG/M2 | HEIGHT: 64 IN | HEART RATE: 75 BPM | OXYGEN SATURATION: 94 % | SYSTOLIC BLOOD PRESSURE: 120 MMHG | TEMPERATURE: 97.4 F

## 2020-12-18 PROBLEM — J43.9 BULLOUS EMPHYSEMA (HCC): Status: ACTIVE | Noted: 2020-12-18

## 2020-12-18 PROBLEM — Z87.891 PERSONAL HISTORY OF TOBACCO USE: Status: ACTIVE | Noted: 2020-12-18

## 2020-12-18 PROCEDURE — 1123F ACP DISCUSS/DSCN MKR DOCD: CPT | Performed by: NURSE PRACTITIONER

## 2020-12-18 PROCEDURE — G8926 SPIRO NO PERF OR DOC: HCPCS | Performed by: NURSE PRACTITIONER

## 2020-12-18 PROCEDURE — 3023F SPIROM DOC REV: CPT | Performed by: NURSE PRACTITIONER

## 2020-12-18 PROCEDURE — G8484 FLU IMMUNIZE NO ADMIN: HCPCS | Performed by: NURSE PRACTITIONER

## 2020-12-18 PROCEDURE — G8427 DOCREV CUR MEDS BY ELIG CLIN: HCPCS | Performed by: NURSE PRACTITIONER

## 2020-12-18 PROCEDURE — 1036F TOBACCO NON-USER: CPT | Performed by: NURSE PRACTITIONER

## 2020-12-18 PROCEDURE — G8417 CALC BMI ABV UP PARAM F/U: HCPCS | Performed by: NURSE PRACTITIONER

## 2020-12-18 PROCEDURE — 4040F PNEUMOC VAC/ADMIN/RCVD: CPT | Performed by: NURSE PRACTITIONER

## 2020-12-18 PROCEDURE — 99214 OFFICE O/P EST MOD 30 MIN: CPT | Performed by: NURSE PRACTITIONER

## 2020-12-18 ASSESSMENT — ENCOUNTER SYMPTOMS
SHORTNESS OF BREATH: 1
EYES NEGATIVE: 1
NAUSEA: 0
COUGH: 0
ABDOMINAL PAIN: 0
DIARRHEA: 0
VOMITING: 0
CHEST TIGHTNESS: 1
WHEEZING: 1

## 2020-12-18 NOTE — PROGRESS NOTES
[facility]     Patient: Vickie Whitehead, 67 y.o.   : 2020    Pt of Dr. Suleman Woodruff   Patient presents with    Follow-up     COPD 6 mo f/u, CT chest , cancelled PFT        HPI  Guille Cui is here for 6 month follow up for COPD   Since last visit. He saw Dr Eusebio Hinton for [facility] EBV evaluation. Due to patient's large Bullae, he was high risk for pneumothorax and complications with EBV, he was referred to Dr Jos Cespedes at [facility] .   has stress test scheduled  in [facility] with his cardiologist for surgical clearance for LVRS at [facility]    6 min walk 2020- no hypoxia . He is using oxygen at night and PRN during the day with exercise. Does try to exercise routinely on Treadmill      Current inhalers/ nebs:  Perforomist nebs BID, Pulmicort nebs BID, PRN albuterol nebs./ HFA    steroid dependent on 2.5 mg/day ( previously on 5 mg) , PRN Muccinex   Does have osteoporosis, on Fosamax   Previous inhalers:  Advair     -He refused referral to lung transplant in the past. He verbalizes understanding.  -He refused to go for testing for serum for Cejc-4-[id number].  He verbalizes understanding of not diagnosis    Past Medical hx   PMH:  Past Medical History:   Diagnosis Date    CAD (coronary artery disease)     COPD [facility]chronic obstructive pulmonary disease) [facility] [address]     Hyperlipidemia     Hypertension     Pneumonia      SURGICAL HISTORY:  Past Surgical History:   Procedure Laterality Date    CARDIAC CATHETERIZATION  2012    STENT    COLONOSCOPY      ENDOSCOPY, COLON, DIAGNOSTIC      FINGER SURGERY Right 2018    ROTATOR CUFF REPAIR       SOCIAL HISTORY:  Social History     Tobacco Use    Smoking status: Former Smoker     Packs/day: 1.00     Years: 30.00     Pack years: 30.00     Types: Cigarettes     Start date: 5/3/1953     Quit date: 1983     Years since quittin.5    Smokeless tobacco: Never Used   Substance Use Topics  Alcohol use: No     Alcohol/week: 0.0 standard drinks     Types: 1 - 2 Cans of beer per week    Drug use: No     ALLERGIES:  Allergies   Allergen Reactions    Cefpodoxime Diarrhea    Flonase [Fluticasone Propionate]      Pt shakes and feels anxious    Mometasone     Mometasone Furoate      Shakes,feels anxious    Spiriva Handihaler [Tiotropium Bromide Monohydrate]     Umeclidinium [place]      FAMILY HISTORY:  Family History   Problem Relation Age of Onset    High Cholesterol Father     Cancer Sister     Cancer Brother     Heart Disease Brother      CURRENT MEDICATIONS:  Current Outpatient Medications   Medication Sig Dispense Refill    pravastatin (PRAVACHOL) 20 MG tablet TAKE 1 TABLET BY MOUTH  DAILY 90 tablet 1    clopidogrel (PLAVIX) 75 MG tablet TAKE 1 TABLET BY MOUTH  DAILY 90 tablet 1    metoprolol tartrate (LOPRESSOR) 25 MG tablet TAKE 1 TABLET BY MOUTH  TWICE DAILY 180 tablet 1    isosorbide mononitrate (IMDUR) 30 MG extended release tablet TAKE 1 TABLET BY MOUTH  DAILY 90 tablet 1    montelukast (SINGULAIR) 10 MG tablet TAKE 1 TABLET BY MOUTH  NIGHTLY 90 tablet 2    albuterol sulfate (PROAIR RESPICLICK) 391 (90 Base) MCG/ACT aerosol powder inhalation Inhale 2 puffs into the lungs every 6 hours as needed for Wheezing or Shortness of Breath 3 Inhaler 3    predniSONE (DELTASONE) 2.5 MG tablet Take 1 tablet by mouth daily 90 tablet 3    budesonide (PULMICORT) 0.5 MG/2ML nebulizer suspension Take 2 mLs by nebulization 2 times daily 60 ampule 11    formoterol (PERFOROMIST) 20 MCG/2ML nebulizer solution Take 2 mLs by nebulization every 12 hours 120 mL 11    pantoprazole (PROTONIX) 40 MG tablet Take 1 tablet by mouth 2 times daily (before meals) 60 tablet 3    lactobacillus (CULTURELLE) capsule Take 1 capsule by mouth daily (with breakfast) 30 capsule 1    guaiFENesin (MUCINEX) 600 MG extended release tablet Take 600 mg by mouth Daily  albuterol (PROVENTIL) (2.5 MG/3ML) 0.083% nebulizer solution Take 3 mLs by nebulization every 6 hours as needed for Wheezing or Shortness of Breath 360 vial 3    Calcium Carb-Cholecalciferol (CALCIUM 600 + D PO) Take 600 mg by mouth daily      nitroGLYCERIN (NITROSTAT) 0.4 MG SL tablet Place 1 tablet under the tongue every 5 minutes as needed. 25 tablet 3    Oxygen Concentrator 2 L by Nasal route as needed.  aspirin 81 MG tablet Take 81 mg by mouth daily.  alendronate (FOSAMAX) 70 MG tablet Take 1 tablet by mouth every 7 days 12 tablet 1     No current facility-administered medications for this visit. [facility]   Review of Systems   Constitutional: Negative for activity change, appetite change, chills and fever. HENT: Negative. Eyes: Negative. Respiratory: Positive for chest tightness, shortness of breath and wheezing. Negative for cough. Cardiovascular: Negative for chest pain, palpitations and leg swelling. Gastrointestinal: Negative for abdominal pain, diarrhea, nausea and vomiting. Genitourinary: Negative. Musculoskeletal: Negative. Skin: Negative. Neurological: Negative. Hematological: Does not bruise/bleed easily. Psychiatric/Behavioral: Negative for sleep disturbance and suicidal ideas. Physical exam   /64 (Site: Left Upper Arm, Position: Sitting, Cuff Size: Medium Adult)   Pulse 75   Temp 97.4 °F (36.3 °C)   Ht 5' 4\" (1.626 m)   Wt 147 lb 6.4 oz (66.9 kg)   SpO2 94% Comment: on room air  BMI 25.30 kg/m²      Wt Readings from Last 3 Encounters:   12/18/20 147 lb 6.4 oz (66.9 kg)   10/05/20 145 lb (65.8 kg)   09/16/20 146 lb (66.2 kg)     Physical Exam  Vitals signs and nursing note reviewed. Constitutional:       General: He is not in acute distress. Appearance: He is well-developed and normal weight. Interventions: Face mask in place. Comments: Face mask due to COVID precautions    HENT:      Mouth/Throat:      Lips: Pink. Mouth: Mucous membranes are moist.      Pharynx: Oropharynx is clear. No oropharyngeal exudate or posterior oropharyngeal erythema. Eyes:      Conjunctiva/sclera: Conjunctivae normal.   Neck:      Vascular: No JVD. Cardiovascular:      Rate and Rhythm: Normal rate and regular rhythm. Heart sounds: No murmur. No friction rub. Pulmonary:      Effort: Pulmonary effort is normal. No accessory muscle usage or respiratory distress. Breath sounds: Normal breath sounds. No wheezing, rhonchi or rales. Chest:      Chest wall: No tenderness. Musculoskeletal:      Right lower leg: No edema. Left lower leg: No edema. Skin:     General: Skin is warm and dry. Capillary Refill: Capillary refill takes less than 2 seconds. Nails: There is no clubbing. Neurological:      Mental Status: He is alert. Psychiatric:         Mood and Affect: Mood normal.         Behavior: Behavior normal.         Thought Content: Thought content normal.         Judgment: Judgment normal.          Test results   Lung Nodule Screening     [x] Qualifies    []Does not qualify   [] Declined    [x] Completed     Assessment      Steroid-dependent COPD ([facility] [address]  Continue current therapy with nebs/ 2.5 mg Pred daily   Avoid ill contacts  Is UTD with vaccinations     Chronic respiratory failure with hypoxia, on home O2 therapy ([facility] [address]  Continue 2LPM at night     Bullous emphysema ([facility] [address]  Following with [facility] for planned LVRS   Keep scheduled cardiology clearance f/u     Personal history of tobacco use  Qualifies for annual LDCT, completed 9/2020.  Will not schedule f/u CT at this time as he is following with CT surgery at [facility] for LVRS       Plan   Will see Michelle Larsen in: 6 months, sooner if needed for any worsening symptoms or if requested by surgeon at [facility]     Electronically signed by SEBASTIAN Mcintosh CNP on 12/18/2020 at 9:38 AM

## 2020-12-18 NOTE — ASSESSMENT & PLAN NOTE
Continue current therapy with nebs/ 2.5 mg Pred daily   Avoid ill contacts  Is UTD with vaccinations

## 2020-12-18 NOTE — ASSESSMENT & PLAN NOTE
Qualifies for annual LDCT, completed 9/2020.  Will not schedule f/u CT at this time as he is following with CT surgery at [facility] for LVRS

## 2020-12-29 ENCOUNTER — OFFICE VISIT (OUTPATIENT)
Dept: CARDIOLOGY CLINIC | Age: 75
End: 2020-12-29
Payer: MEDICARE

## 2020-12-29 VITALS
HEIGHT: 64 IN | DIASTOLIC BLOOD PRESSURE: 70 MMHG | BODY MASS INDEX: 25.18 KG/M2 | SYSTOLIC BLOOD PRESSURE: 120 MMHG | WEIGHT: 147.49 LBS | HEART RATE: 68 BPM

## 2020-12-29 PROCEDURE — G8427 DOCREV CUR MEDS BY ELIG CLIN: HCPCS | Performed by: NUCLEAR MEDICINE

## 2020-12-29 PROCEDURE — G8926 SPIRO NO PERF OR DOC: HCPCS | Performed by: NUCLEAR MEDICINE

## 2020-12-29 PROCEDURE — 1036F TOBACCO NON-USER: CPT | Performed by: NUCLEAR MEDICINE

## 2020-12-29 PROCEDURE — 1123F ACP DISCUSS/DSCN MKR DOCD: CPT | Performed by: NUCLEAR MEDICINE

## 2020-12-29 PROCEDURE — G8484 FLU IMMUNIZE NO ADMIN: HCPCS | Performed by: NUCLEAR MEDICINE

## 2020-12-29 PROCEDURE — 3023F SPIROM DOC REV: CPT | Performed by: NUCLEAR MEDICINE

## 2020-12-29 PROCEDURE — 99213 OFFICE O/P EST LOW 20 MIN: CPT | Performed by: NUCLEAR MEDICINE

## 2020-12-29 PROCEDURE — 4040F PNEUMOC VAC/ADMIN/RCVD: CPT | Performed by: NUCLEAR MEDICINE

## 2020-12-29 PROCEDURE — G8417 CALC BMI ABV UP PARAM F/U: HCPCS | Performed by: NUCLEAR MEDICINE

## 2020-12-29 NOTE — PROGRESS NOTES
[id number] [place] [address]  Dept: [phone]  Dept Fax: [phone]  Loc: [phone]    Visit Date: 2020    Myrna Villalobos is a 67 y.o. male who presents todayfor:  Chief Complaint   Patient presents with    6 Month Follow-Up    Coronary Artery Disease    COPD    Hypertension   had a cath   FFR of the LAD was okay   No re stenting was needed  No chest pain  Does have dyspnea   Dyspnea on exertion   On home O2 at night   Going to [facility] for possible lung reduction       HPI:  HPI  Past Medical History:   Diagnosis Date    CAD (coronary artery disease)     COPD [facility]ch[facility] obstructive pulmonary disease) ([facility] [address]     Hyperlipidemia     Hypertension     Pneumonia       Past Surgical History:   Procedure Laterality Date    CARDIAC CATHETERIZATION      STENT    COLONOSCOPY      ENDOSCOPY, COLON, DIAGNOSTIC      FINGER SURGERY Right 2018    ROTATOR CUFF REPAIR       Family History   Problem Relation Age of Onset    High Cholesterol Father     Cancer Sister     Cancer Brother     Heart Disease Brother      Social History     Tobacco Use    Smoking status: Former Smoker     Packs/day: 1.00     Years: 30.00     Pack years: 30.00     Types: Cigarettes     Start date: 5/3/1953     Quit date: 1983     Years since quittin.5    Smokeless tobacco: Never Used   Substance Use Topics    Alcohol use: No     Alcohol/week: 0.0 standard drinks     Types: 1 - 2 Cans of beer per week      Current Outpatient Medications   Medication Sig Dispense Refill    pravastatin (PRAVACHOL) 20 MG tablet TAKE 1 TABLET BY MOUTH  DAILY 90 tablet 1    clopidogrel (PLAVIX) 75 MG tablet TAKE 1 TABLET BY MOUTH  DAILY 90 tablet 1    metoprolol tartrate (LOPRESSOR) 25 MG tablet TAKE 1 TABLET BY MOUTH  TWICE DAILY 180 tablet 1    isosorbide mononitrate (IMDUR) 30 MG extended release tablet TAKE 1 TABLET BY MOUTH  DAILY 90 tablet 1  montelukast (SINGULAIR) 10 MG tablet TAKE 1 TABLET BY MOUTH  NIGHTLY 90 tablet 2    albuterol sulfate (PROAIR RESPICLICK) 066 (90 Base) MCG/ACT aerosol powder inhalation Inhale 2 puffs into the lungs every 6 hours as needed for Wheezing or Shortness of Breath 3 Inhaler 3    predniSONE (DELTASONE) 2.5 MG tablet Take 1 tablet by mouth daily 90 tablet 3    budesonide (PULMICORT) 0.5 MG/2ML nebulizer suspension Take 2 mLs by nebulization 2 times daily 60 ampule 11    formoterol (PERFOROMIST) 20 MCG/2ML nebulizer solution Take 2 mLs by nebulization every 12 hours 120 mL 11    pantoprazole (PROTONIX) 40 MG tablet Take 1 tablet by mouth 2 times daily (before meals) 60 tablet 3    lactobacillus (CULTURELLE) capsule Take 1 capsule by mouth daily (with breakfast) 30 capsule 1    guaiFENesin (MUCINEX) 600 MG extended release tablet Take 600 mg by mouth Daily      albuterol (PROVENTIL) (2.5 MG/3ML) 0.083% nebulizer solution Take 3 mLs by nebulization every 6 hours as needed for Wheezing or Shortness of Breath 360 vial 3    Calcium Carb-Cholecalciferol (CALCIUM 600 + D PO) Take 600 mg by mouth daily      nitroGLYCERIN (NITROSTAT) 0.4 MG SL tablet Place 1 tablet under the tongue every 5 minutes as needed. 25 tablet 3    Oxygen Concentrator 2 L by Nasal route as needed.  aspirin 81 MG tablet Take 81 mg by mouth daily.  alendronate (FOSAMAX) 70 MG tablet Take 1 tablet by mouth every 7 days 12 tablet 1     No current facility-administered medications for this visit.       Allergies   Allergen Reactions    Cefpodoxime Diarrhea    Flonase [Fluticasone Propionate]      Pt shakes and feels anxious    Mometasone     Mometasone Furoate      Shakes,feels anxious    Spiriva Handihaler [Tiotropium Bromide Monohydrate]     Umeclidinium [place]      Health Maintenance   Topic Date Due    Hepatitis C screen  1945    DTaP/Tdap/Td vaccine (1 - Tdap) 08/01/1963    Shingles Vaccine (1 of 2) 08/01/1994 Discussed use, benefit, and side effects of prescribed medications. All patient questions answered. Pt voicedunderstanding. Instructed to continue current medications, diet and exercise. Continue risk factor modification and medical management. Patient agreed with treatment plan. Follow up as directed.     Electronically signedby Isidra Franco MD on 12/29/2020 at 11:33 AM

## 2021-03-15 RX ORDER — CLOPIDOGREL BISULFATE 75 MG/1
TABLET ORAL
Qty: 90 TABLET | Refills: 1 | Status: SHIPPED | OUTPATIENT
Start: 2021-03-15 | End: 2021-09-10

## 2021-03-15 RX ORDER — ISOSORBIDE MONONITRATE 30 MG/1
30 TABLET, EXTENDED RELEASE ORAL DAILY
Qty: 90 TABLET | Refills: 1 | Status: SHIPPED | OUTPATIENT
Start: 2021-03-15 | End: 2021-09-10

## 2021-03-15 RX ORDER — PRAVASTATIN SODIUM 20 MG
TABLET ORAL
Qty: 90 TABLET | Refills: 1 | Status: SHIPPED | OUTPATIENT
Start: 2021-03-15 | End: 2021-09-10

## 2021-03-17 DIAGNOSIS — J44.9 COPD, SEVERE (HCC): ICD-10-CM

## 2021-03-17 RX ORDER — FORMOTEROL FUMARATE DIHYDRATE 20 UG/2ML
SOLUTION RESPIRATORY (INHALATION)
Qty: 120 ML | Refills: 10 | Status: SHIPPED | OUTPATIENT
Start: 2021-03-17 | End: 2021-12-17 | Stop reason: SDUPTHER

## 2021-03-17 RX ORDER — MONTELUKAST SODIUM 10 MG/1
TABLET ORAL
Qty: 90 TABLET | Refills: 2 | Status: SHIPPED | OUTPATIENT
Start: 2021-03-17 | End: 2021-06-18 | Stop reason: HOSPADM

## 2021-03-17 RX ORDER — PREDNISONE 2.5 MG
2.5 TABLET ORAL DAILY
Qty: 90 TABLET | Refills: 3 | Status: SHIPPED | OUTPATIENT
Start: 2021-03-17 | End: 2022-02-28

## 2021-06-18 ENCOUNTER — OFFICE VISIT (OUTPATIENT)
Dept: PULMONOLOGY | Age: 76
End: 2021-06-18
Payer: MEDICARE

## 2021-06-18 VITALS
OXYGEN SATURATION: 96 % | SYSTOLIC BLOOD PRESSURE: 118 MMHG | WEIGHT: 148.6 LBS | DIASTOLIC BLOOD PRESSURE: 64 MMHG | HEIGHT: 64 IN | TEMPERATURE: 97.3 F | BODY MASS INDEX: 25.37 KG/M2 | HEART RATE: 74 BPM

## 2021-06-18 DIAGNOSIS — Z98.890 STATUS POST LUNG SURGERY: ICD-10-CM

## 2021-06-18 DIAGNOSIS — Z99.81 CHRONIC RESPIRATORY FAILURE WITH HYPOXIA, ON HOME O2 THERAPY (HCC): ICD-10-CM

## 2021-06-18 DIAGNOSIS — J44.1 COPD EXACERBATION (HCC): ICD-10-CM

## 2021-06-18 DIAGNOSIS — J44.9 COPD, SEVERE (HCC): Primary | ICD-10-CM

## 2021-06-18 DIAGNOSIS — J96.11 CHRONIC RESPIRATORY FAILURE WITH HYPOXIA, ON HOME O2 THERAPY (HCC): ICD-10-CM

## 2021-06-18 PROCEDURE — 1123F ACP DISCUSS/DSCN MKR DOCD: CPT | Performed by: NURSE PRACTITIONER

## 2021-06-18 PROCEDURE — 99214 OFFICE O/P EST MOD 30 MIN: CPT | Performed by: NURSE PRACTITIONER

## 2021-06-18 PROCEDURE — 1036F TOBACCO NON-USER: CPT | Performed by: NURSE PRACTITIONER

## 2021-06-18 PROCEDURE — G8926 SPIRO NO PERF OR DOC: HCPCS | Performed by: NURSE PRACTITIONER

## 2021-06-18 PROCEDURE — G8417 CALC BMI ABV UP PARAM F/U: HCPCS | Performed by: NURSE PRACTITIONER

## 2021-06-18 PROCEDURE — 4040F PNEUMOC VAC/ADMIN/RCVD: CPT | Performed by: NURSE PRACTITIONER

## 2021-06-18 PROCEDURE — G8427 DOCREV CUR MEDS BY ELIG CLIN: HCPCS | Performed by: NURSE PRACTITIONER

## 2021-06-18 PROCEDURE — 3023F SPIROM DOC REV: CPT | Performed by: NURSE PRACTITIONER

## 2021-06-18 RX ORDER — PREDNISONE 20 MG/1
40 TABLET ORAL DAILY
Qty: 20 TABLET | Refills: 0 | Status: SHIPPED | OUTPATIENT
Start: 2021-06-18 | End: 2021-06-28

## 2021-06-18 ASSESSMENT — ENCOUNTER SYMPTOMS
NAUSEA: 0
VOMITING: 0
ABDOMINAL PAIN: 0
EYES NEGATIVE: 1
DIARRHEA: 0
WHEEZING: 0
COUGH: 1
SHORTNESS OF BREATH: 1

## 2021-06-18 NOTE — PROGRESS NOTES
[facility]     Patient: Steve Lara, 67 y.o.   : 1945    Pt of Dr. Eliazar River   Patient presents with    Follow-up     Bullous emphysema 6 month pulmonary follow up with no test         HPI  Mono Meehan is here for 6 month  follow up for COPD  Had LVRS at [facility], 2021  [facility] on treadmill for 45 min about 3x per week and also walks outside. Happy with results of his surgery, is able to walk longer distances.   Only has to use oxygen with exercise, not normal  Walking   \"Took 30% of each lung out \"    Increased cough and phlegm last week, finished atb 2 days ago prescribed by PCP   Helped some, still more cough and phlegm in chest than baseline  On 2.5 mg PO daily , no recent bursts or tapers   Using perforomist/ budesonide nebs BID , albuterol nebs BID   Taking Mucinex daily  On Singulair nightly, denies problems with allergies ,only on occasion with mowing grass         SOCIAL HISTORY:  Social History     Tobacco Use    Smoking status: Former Smoker     Packs/day: 1.00     Years: 30.00     Pack years: 30.00     Types: Cigarettes     Start date: 5/3/1953     Quit date: 1983     Years since quittin.0    Smokeless tobacco: Never Used   Vaping Use    Vaping Use: Never used   Substance Use Topics    Alcohol use: No     Alcohol/week: 0.0 standard drinks     Types: 1 - 2 Cans of beer per week    Drug use: No         CURRENT MEDICATIONS:  Current Outpatient Medications   Medication Sig Dispense Refill    predniSONE (DELTASONE) 20 MG tablet Take 2 tablets by mouth daily for 10 days 20 tablet 0    predniSONE (DELTASONE) 2.5 MG tablet TAKE 1 TABLET BY MOUTH  DAILY 90 tablet 3    PERFOROMIST 20 MCG/2ML nebulizer solution INHALE ONE VIAL ( 2 MLS ) BY MOUTH VIA NEBULIZER EVERY 12 HOURS 120 mL 10    metoprolol tartrate (LOPRESSOR) 25 MG tablet TAKE 1 TABLET BY MOUTH  TWICE DAILY 180 tablet 1    pravastatin (PRAVACHOL) 20 MG tablet TAKE 1 TABLET BY MOUTH  DAILY 90 tablet 1    clopidogrel (PLAVIX) 75 MG tablet TAKE 1 TABLET BY MOUTH  DAILY 90 tablet 1    isosorbide mononitrate (IMDUR) 30 MG extended release tablet TAKE 1 TABLET BY MOUTH  DAILY 90 tablet 1    budesonide (PULMICORT) 0.5 MG/2ML nebulizer suspension INHALE ONE VIAL ( 2 MLS ) VIA NEBULIZER BY MOUTH TWO TIMES A DAY 60 ampule 10    albuterol sulfate (PROAIR RESPICLICK) 125 (90 Base) MCG/ACT aerosol powder inhalation Inhale 2 puffs into the lungs every 6 hours as needed for Wheezing or Shortness of Breath 3 Inhaler 3    pantoprazole (PROTONIX) 40 MG tablet Take 1 tablet by mouth 2 times daily (before meals) 60 tablet 3    lactobacillus (CULTURELLE) capsule Take 1 capsule by mouth daily (with breakfast) 30 capsule 1    alendronate (FOSAMAX) 70 MG tablet Take 1 tablet by mouth every 7 days 12 tablet 1    guaiFENesin (MUCINEX) 600 MG extended release tablet Take 600 mg by mouth Daily      albuterol (PROVENTIL) (2.5 MG/3ML) 0.083% nebulizer solution Take 3 mLs by nebulization every 6 hours as needed for Wheezing or Shortness of Breath 360 vial 3    Calcium Carb-Cholecalciferol (CALCIUM 600 + D PO) Take 600 mg by mouth daily      nitroGLYCERIN (NITROSTAT) 0.4 MG SL tablet Place 1 tablet under the tongue every 5 minutes as needed. 25 tablet 3    Oxygen Concentrator 2 L by Nasal route as needed.  aspirin 81 MG tablet Take 81 mg by mouth daily. No current facility-administered medications for this visit. Tim CALLAHAN   Review of Systems   Constitutional: Negative for activity change, chills and fever. HENT: Negative. Eyes: Negative. Respiratory: Positive for cough and shortness of breath. Negative for wheezing. Cardiovascular: Negative for chest pain, palpitations and leg swelling. Gastrointestinal: Negative for abdominal pain, diarrhea, nausea and vomiting. Genitourinary: Negative. Musculoskeletal: Negative. Skin: Negative. is appreciated. .     Electronically Signed By: Tadeo Webb MD on 4/22/2021 2:44 PM     Date: 04/22/21   Received From: [address] [facility]       Assessment      Diagnosis Orders   1. COPD, severe ([facility] [address]  Full PFT Study With Bronchodilator   2. COPD exacerbation ([facility] [address]     3. Chronic respiratory failure with hypoxia, on home O2 therapy (HCC)     4. Status post lung surgery      LVRS 3/2021     Plan    -stop Singulair  -continue current nebs    -treat acute exac with prednisone burst, continue daily 2.5 mg PO daily , just completed atb per PCP   -encouraged to stay active  -continue oxygen at 2lPM during the day with exercise as needed, do spot checks, continue oxygen at 2lPM continuous at night.   -scheduled Full PFT in 6 months  -keep f/u's at [facility] as recommended. -doing very well post op with positive results of less dyspnea.   Start by descaling Singulair, with no real allergy complaints, needs to stay on Bronchodilator and oxygen     Total time spent on encounter was 30 minutes    Will see Raimundo Hunt in: 6 months    Electronically signed by SEBASTIAN Bolden CNP on 6/18/2021 at 10:36 AM

## 2021-09-10 RX ORDER — PRAVASTATIN SODIUM 20 MG
TABLET ORAL
Qty: 90 TABLET | Refills: 1 | Status: SHIPPED | OUTPATIENT
Start: 2021-09-10 | End: 2022-02-28

## 2021-09-10 RX ORDER — CLOPIDOGREL BISULFATE 75 MG/1
TABLET ORAL
Qty: 90 TABLET | Refills: 1 | Status: SHIPPED | OUTPATIENT
Start: 2021-09-10 | End: 2022-02-28

## 2021-09-10 RX ORDER — ISOSORBIDE MONONITRATE 30 MG/1
30 TABLET, EXTENDED RELEASE ORAL DAILY
Qty: 90 TABLET | Refills: 1 | Status: SHIPPED | OUTPATIENT
Start: 2021-09-10 | End: 2022-02-28

## 2021-09-14 RX ORDER — ALBUTEROL SULFATE 90 UG/1
2 POWDER, METERED RESPIRATORY (INHALATION) EVERY 6 HOURS PRN
Qty: 4 EACH | Refills: 3 | Status: SHIPPED | OUTPATIENT
Start: 2021-09-14 | End: 2022-09-14

## 2021-12-10 ENCOUNTER — HOSPITAL ENCOUNTER (OUTPATIENT)
Dept: PULMONOLOGY | Age: 76
Discharge: HOME | End: 2021-12-10
Payer: MEDICARE

## 2021-12-10 DIAGNOSIS — J44.9 COPD, SEVERE (HCC): ICD-10-CM

## 2021-12-10 PROCEDURE — 94729 DIFFUSING CAPACITY: CPT

## 2021-12-10 PROCEDURE — 94726 PLETHYSMOGRAPHY LUNG VOLUMES: CPT

## 2021-12-10 PROCEDURE — 94060 EVALUATION OF WHEEZING: CPT

## 2021-12-14 ENCOUNTER — OFFICE VISIT (OUTPATIENT)
Dept: CARDIOLOGY CLINIC | Age: 76
End: 2021-12-14
Payer: MEDICARE

## 2021-12-14 VITALS
DIASTOLIC BLOOD PRESSURE: 62 MMHG | BODY MASS INDEX: 25.81 KG/M2 | HEIGHT: 64 IN | SYSTOLIC BLOOD PRESSURE: 118 MMHG | HEART RATE: 70 BPM | WEIGHT: 151.2 LBS

## 2021-12-14 DIAGNOSIS — J44.9 CHRONIC OBSTRUCTIVE PULMONARY DISEASE, UNSPECIFIED COPD TYPE (HCC): ICD-10-CM

## 2021-12-14 DIAGNOSIS — I25.10 CORONARY ARTERY DISEASE INVOLVING NATIVE CORONARY ARTERY OF NATIVE HEART WITHOUT ANGINA PECTORIS: Primary | ICD-10-CM

## 2021-12-14 DIAGNOSIS — I10 PRIMARY HYPERTENSION: ICD-10-CM

## 2021-12-14 PROCEDURE — G8926 SPIRO NO PERF OR DOC: HCPCS | Performed by: NUCLEAR MEDICINE

## 2021-12-14 PROCEDURE — 1123F ACP DISCUSS/DSCN MKR DOCD: CPT | Performed by: NUCLEAR MEDICINE

## 2021-12-14 PROCEDURE — 4040F PNEUMOC VAC/ADMIN/RCVD: CPT | Performed by: NUCLEAR MEDICINE

## 2021-12-14 PROCEDURE — 99213 OFFICE O/P EST LOW 20 MIN: CPT | Performed by: NUCLEAR MEDICINE

## 2021-12-14 PROCEDURE — 3023F SPIROM DOC REV: CPT | Performed by: NUCLEAR MEDICINE

## 2021-12-14 PROCEDURE — G8417 CALC BMI ABV UP PARAM F/U: HCPCS | Performed by: NUCLEAR MEDICINE

## 2021-12-14 PROCEDURE — G8484 FLU IMMUNIZE NO ADMIN: HCPCS | Performed by: NUCLEAR MEDICINE

## 2021-12-14 PROCEDURE — G8427 DOCREV CUR MEDS BY ELIG CLIN: HCPCS | Performed by: NUCLEAR MEDICINE

## 2021-12-14 PROCEDURE — 1036F TOBACCO NON-USER: CPT | Performed by: NUCLEAR MEDICINE

## 2021-12-14 NOTE — PROGRESS NOTES
[address] [address] [address]  [address] [facility],[address] [address] [facility]  Dept: [phone]  Dept Fax: [phone]  Loc: [phone]    Visit Date: 2021    Elo Garcia is a 67 y.o. male who presents todayfor:  Chief Complaint   Patient presents with    Check-Up    Coronary Artery Disease    COPD    Hypertension   had lung reduction surgery   Feels much better  Know LM stent   No chest pain   Dyspnea is much better  BP is stable   No dizziness  No syncope        HPI:  HPI  Past Medical History:   Diagnosis Date    CAD (coronary artery disease)     COPD (chronic obstructive pulmonary disease) ([facility] [address]     Hyperlipidemia     Hypertension     Pneumonia       Past Surgical History:   Procedure Laterality Date    CARDIAC CATHETERIZATION  2012    STENT    COLONOSCOPY      ENDOSCOPY, COLON, DIAGNOSTIC      FINGER SURGERY Right 2018    ROTATOR CUFF REPAIR       Family History   Problem Relation Age of Onset    High Cholesterol Father     Cancer Sister     Cancer Brother     Heart Disease Brother      Social History     Tobacco Use    Smoking status: Former Smoker     Packs/day: 1.00     Years: 30.00     Pack years: 30.00     Types: Cigarettes     Start date: 5/3/1953     Quit date: 1983     Years since quittin.5    Smokeless tobacco: Never Used   Substance Use Topics    Alcohol use: No     Alcohol/week: 0.0 standard drinks     Types: 1 - 2 Cans of beer per week      Current Outpatient Medications   Medication Sig Dispense Refill    PROAIR RESPICLICK 986 (90 Base) MCG/ACT aerosol powder inhalation INHALE 2 PUFFS INTO THE  LUNGS EVERY 6 HOURS AS  NEEDED FOR WHEEZING OR  SHORTNESS OF BREATH 4 each 3    pravastatin (PRAVACHOL) 20 MG tablet TAKE 1 TABLET BY MOUTH  DAILY 90 tablet 1    clopidogrel (PLAVIX) 75 MG tablet TAKE 1 TABLET BY MOUTH  DAILY 90 tablet 1    metoprolol tartrate (LOPRESSOR) 25 MG tablet TAKE 1 TABLET BY MOUTH  TWICE DAILY 180 tablet 1    isosorbide mononitrate (IMDUR) 30 MG extended release tablet TAKE 1 TABLET BY MOUTH  DAILY 90 tablet 1    predniSONE (DELTASONE) 2.5 MG tablet TAKE 1 TABLET BY MOUTH  DAILY 90 tablet 3    PERFOROMIST 20 MCG/2ML nebulizer solution INHALE ONE VIAL ( 2 MLS ) BY MOUTH VIA NEBULIZER EVERY 12 HOURS 120 mL 10    budesonide (PULMICORT) 0.5 MG/2ML nebulizer suspension INHALE ONE VIAL ( 2 MLS ) VIA NEBULIZER BY MOUTH TWO TIMES A DAY 60 ampule 10    pantoprazole (PROTONIX) 40 MG tablet Take 1 tablet by mouth 2 times daily (before meals) 60 tablet 3    lactobacillus (CULTURELLE) capsule Take 1 capsule by mouth daily (with breakfast) 30 capsule 1    guaiFENesin (MUCINEX) 600 MG extended release tablet Take 600 mg by mouth Daily      albuterol (PROVENTIL) (2.5 MG/3ML) 0.083% nebulizer solution Take 3 mLs by nebulization every 6 hours as needed for Wheezing or Shortness of Breath 360 vial 3    Calcium Carb-Cholecalciferol (CALCIUM 600 + D PO) Take 600 mg by mouth daily      nitroGLYCERIN (NITROSTAT) 0.4 MG SL tablet Place 1 tablet under the tongue every 5 minutes as needed. 25 tablet 3    Oxygen Concentrator 2 L by Nasal route as needed.  aspirin 81 MG tablet Take 81 mg by mouth daily.  alendronate (FOSAMAX) 70 MG tablet Take 1 tablet by mouth every 7 days 12 tablet 1     No current facility-administered medications for this visit.      Allergies   Allergen Reactions    Cefpodoxime Diarrhea    Flonase [Fluticasone Propionate]      Pt shakes and feels anxious    Mometasone     Mometasone Furoate      Shakes,feels anxious    Spiriva Handihaler [Tiotropium Bromide Monohydrate]     Umeclidinium [place]      Health Maintenance   Topic Date Due    Hepatitis C screen  Never done    DTaP/Tdap/Td vaccine (1 - Tdap) Never done    Shingles Vaccine (1 of 2) Never done    Lipid screen  04/14/2015    Annual Wellness Visit (AWV)  Never done    Flu vaccine  Completed    Pneumococcal 65+ years Vaccine  Completed    COVID-19 Vaccine  Completed    Hepatitis A vaccine  Aged Out    Hepatitis B vaccine  Aged Out    Hib vaccine  Aged Out    Meningococcal (ACWY) vaccine  Aged Out       Subjective:  Review of Systems  General:   No fever, no chills, No fatigue or weight loss  Pulmonary:    No dyspnea, no wheezing  Cardiac:    Denies recent chest pain,   GI:     No nausea or vomiting, no abdominal pain  Neuro:    No dizziness or light headedness,   Musculoskeletal:  No recent active issues  Extremities:   No edema, no obvious claudication       Objective:  Physical Exam  /62   Pulse 70   Ht 5' 4\" (1.626 m)   Wt 151 lb 3.2 oz (68.6 kg)   BMI 25.95 kg/m²   General:   Well developed, well nourished  Lungs:   Clear to auscultation  Heart:    Normal S1 S2, Slight murmur. no rubs, no gallops  Abdomen:   Soft, non tender, no organomegalies, positive bowel sounds  Extremities:   No edema, no cyanosis, good peripheral pulses  Neurological:   Awake, alert, oriented. No obvious focal deficits  Musculoskelatal:  No obvious deformities    Assessment:      Diagnosis Orders   1. Coronary artery disease involving native coronary artery of native heart without angina pectoris     2. Chronic obstructive pulmonary disease, unspecified COPD type ([facility] [address]     3. Primary hypertension     as above  Cardiac fair for now     Plan:  No follow-ups on file. As above  Continue risk factor modification and medical management    Thank you for allowing me to participate in the care of your patient. Please don't hesitate to contact me regarding any further issues related to the patient care    Orders Placed:  No orders of the defined types were placed in this encounter. Medications Prescribed:  No orders of the defined types were placed in this encounter. Discussed use, benefit, and side effects of prescribed medications. All patient questions answered. Pt voicedunderstanding.  Instructed to continue current medications, diet and exercise. Continue risk factor modification and medical management. Patient agreed with treatment plan. Follow up as directed.     Electronically signedby Tiesha Martinez MD on 12/14/2021 at 11:28 AM

## 2021-12-17 ENCOUNTER — OFFICE VISIT (OUTPATIENT)
Dept: PULMONOLOGY | Age: 76
End: 2021-12-17
Payer: MEDICARE

## 2021-12-17 VITALS
HEIGHT: 64 IN | OXYGEN SATURATION: 98 % | WEIGHT: 152.8 LBS | BODY MASS INDEX: 26.09 KG/M2 | HEART RATE: 67 BPM | SYSTOLIC BLOOD PRESSURE: 116 MMHG | TEMPERATURE: 96.8 F | DIASTOLIC BLOOD PRESSURE: 72 MMHG

## 2021-12-17 DIAGNOSIS — Z98.890 STATUS POST LUNG SURGERY: ICD-10-CM

## 2021-12-17 DIAGNOSIS — J44.9 STEROID-DEPENDENT COPD (HCC): Primary | ICD-10-CM

## 2021-12-17 DIAGNOSIS — J96.11 CHRONIC RESPIRATORY FAILURE WITH HYPOXIA, ON HOME O2 THERAPY (HCC): ICD-10-CM

## 2021-12-17 DIAGNOSIS — Z99.81 CHRONIC RESPIRATORY FAILURE WITH HYPOXIA, ON HOME O2 THERAPY (HCC): ICD-10-CM

## 2021-12-17 DIAGNOSIS — Z87.891 PERSONAL HISTORY OF TOBACCO USE: ICD-10-CM

## 2021-12-17 PROCEDURE — 3023F SPIROM DOC REV: CPT | Performed by: NURSE PRACTITIONER

## 2021-12-17 PROCEDURE — G8417 CALC BMI ABV UP PARAM F/U: HCPCS | Performed by: NURSE PRACTITIONER

## 2021-12-17 PROCEDURE — 1123F ACP DISCUSS/DSCN MKR DOCD: CPT | Performed by: NURSE PRACTITIONER

## 2021-12-17 PROCEDURE — 4040F PNEUMOC VAC/ADMIN/RCVD: CPT | Performed by: NURSE PRACTITIONER

## 2021-12-17 PROCEDURE — 99214 OFFICE O/P EST MOD 30 MIN: CPT | Performed by: NURSE PRACTITIONER

## 2021-12-17 PROCEDURE — G8484 FLU IMMUNIZE NO ADMIN: HCPCS | Performed by: NURSE PRACTITIONER

## 2021-12-17 PROCEDURE — G8427 DOCREV CUR MEDS BY ELIG CLIN: HCPCS | Performed by: NURSE PRACTITIONER

## 2021-12-17 PROCEDURE — G8926 SPIRO NO PERF OR DOC: HCPCS | Performed by: NURSE PRACTITIONER

## 2021-12-17 PROCEDURE — 1036F TOBACCO NON-USER: CPT | Performed by: NURSE PRACTITIONER

## 2021-12-17 RX ORDER — FORMOTEROL FUMARATE 20 UG/2ML
SOLUTION RESPIRATORY (INHALATION)
Qty: 120 ML | Refills: 11 | Status: ON HOLD | OUTPATIENT
Start: 2021-12-17 | End: 2022-04-25

## 2021-12-17 RX ORDER — ALBUTEROL SULFATE 2.5 MG/3ML
2.5 SOLUTION RESPIRATORY (INHALATION) EVERY 6 HOURS PRN
Qty: 360 EACH | Refills: 5 | Status: SHIPPED | OUTPATIENT
Start: 2021-12-17

## 2021-12-17 RX ORDER — BUDESONIDE 0.5 MG/2ML
INHALANT ORAL
Qty: 120 ML | Refills: 11 | Status: SHIPPED | OUTPATIENT
Start: 2021-12-17

## 2021-12-17 ASSESSMENT — ENCOUNTER SYMPTOMS
SHORTNESS OF BREATH: 1
COUGH: 0
NAUSEA: 0
DIARRHEA: 0
CHEST TIGHTNESS: 1
EYES NEGATIVE: 1
VOMITING: 0
WHEEZING: 0
ABDOMINAL PAIN: 0

## 2021-12-17 NOTE — PROGRESS NOTES
[facility]     Patient: Trevon Banegas, 67 y.o.   : 2021    Pt of Dr. Judith Luz   Patient presents with    Follow-up     COPD  6 month pulmonary follow up with PFT 12/10/21        HPI  Mary Abdi is here for 6 month  follow up for COPD with PFT   Occ. phlegm in chest, \" not like I used to \"   Feels great since his surgery . S/p bilateral LVRS at [address] [place] [address] 3/29/2021  Seen Dr Martha Adamson 2021final pathology revealed fibrous pleuritis and severe emphysema. 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 ( notes reviewed in Care Everywhere) - scheduled to see him back in 1 year with planned CXR/ 6 min walk /PFT     Walks daily, has been able to walk 1.5 miles with no to little SOB     Current Inhalers:  BUDESONIDE /PERFOROMIST NEBS BID , albuterol nebs 2x daily, has not been needing his albuterol HFA , Mucinex PO , prednisone 2.5 mg PO daily     Are the above symptoms at your baseline Yes- better than 1 year ago ! Any recent exacerbations that have required oral atb or steroids No  Any new medical issues since last visit : No    Monitors SPO2 at home with home pulse ox.   Has not required oxygen lately , will use 2lPM PRN       SOCIAL HISTORY:  Social History     Tobacco Use    Smoking status: Former Smoker     Packs/day: 1.00     Years: 30.00     Pack years: 30.00     Types: Cigarettes     Start date: 5/3/1953     Quit date: 1983     Years since quittin.5    Smokeless tobacco: Never Used   Vaping Use    Vaping Use: Never used   Substance Use Topics    Alcohol use: No     Alcohol/week: 0.0 standard drinks     Types: 1 - 2 Cans of beer per week    Drug use: No         CURRENT MEDICATIONS:  Current Outpatient Medications   Medication Sig Dispense Refill    formoterol (PERFOROMIST) 20 MCG/2ML nebulizer solution INHALE ONE VIAL ( 2 MLS ) BY MOUTH VIA NEBULIZER EVERY 12 HOURS 120 mL 11    budesonide (PULMICORT) 0.5 MG/2ML nebulizer suspension INHALE ONE VIAL ( 2 MLS ) VIA NEBULIZER BY MOUTH TWO TIMES A  mL 11    albuterol (PROVENTIL) (2.5 MG/3ML) 0.083% nebulizer solution Take 3 mLs by nebulization every 6 hours as needed for Wheezing or Shortness of Breath 360 each 5    PROAIR RESPICLICK 869 (90 Base) MCG/ACT aerosol powder inhalation INHALE 2 PUFFS INTO THE  LUNGS EVERY 6 HOURS AS  NEEDED FOR WHEEZING OR  SHORTNESS OF BREATH 4 each 3    pravastatin (PRAVACHOL) 20 MG tablet TAKE 1 TABLET BY MOUTH  DAILY 90 tablet 1    clopidogrel (PLAVIX) 75 MG tablet TAKE 1 TABLET BY MOUTH  DAILY 90 tablet 1    metoprolol tartrate (LOPRESSOR) 25 MG tablet TAKE 1 TABLET BY MOUTH  TWICE DAILY 180 tablet 1    isosorbide mononitrate (IMDUR) 30 MG extended release tablet TAKE 1 TABLET BY MOUTH  DAILY 90 tablet 1    predniSONE (DELTASONE) 2.5 MG tablet TAKE 1 TABLET BY MOUTH  DAILY 90 tablet 3    pantoprazole (PROTONIX) 40 MG tablet Take 1 tablet by mouth 2 times daily (before meals) 60 tablet 3    lactobacillus (CULTURELLE) capsule Take 1 capsule by mouth daily (with breakfast) 30 capsule 1    alendronate (FOSAMAX) 70 MG tablet Take 1 tablet by mouth every 7 days 12 tablet 1    guaiFENesin (MUCINEX) 600 MG extended release tablet Take 600 mg by mouth Daily      Calcium Carb-Cholecalciferol (CALCIUM 600 + D PO) Take 600 mg by mouth daily      nitroGLYCERIN (NITROSTAT) 0.4 MG SL tablet Place 1 tablet under the tongue every 5 minutes as needed. 25 tablet 3    Oxygen Concentrator 2 L by Nasal route as needed.  aspirin 81 MG tablet Take 81 mg by mouth daily. No current facility-administered medications for this visit. Starla CALLAHAN   Review of Systems   Constitutional: Negative for activity change, chills, fatigue and fever. HENT: Negative. Eyes: Negative. Respiratory: Positive for chest tightness (occasional mucous in chest ) and shortness of breath (exertional ). Negative for cough and wheezing.     Cardiovascular: Negative for chest pain, palpitations and leg swelling. Gastrointestinal: Negative for abdominal pain, diarrhea, nausea and vomiting. Genitourinary: Negative. Musculoskeletal: Negative. Skin: Negative. Neurological: Negative. Hematological: Negative. Psychiatric/Behavioral: Negative for sleep disturbance and suicidal ideas. Physical exam   /72 (Site: Left Upper Arm, Position: Sitting)   Pulse 67   Temp 96.8 °F (36 °C)   Ht 5' 4\" (1.626 m)   Wt 152 lb 12.8 oz (69.3 kg)   SpO2 98% Comment: ON RA  BMI 26.23 kg/m²      Wt Readings from Last 3 Encounters:   12/17/21 152 lb 12.8 oz (69.3 kg)   12/14/21 151 lb 3.2 oz (68.6 kg)   06/18/21 148 lb 9.6 oz (67.4 kg)     Physical Exam  Vitals and nursing note reviewed. Constitutional:       General: He is not in acute distress. Appearance: Normal appearance. He is well-developed and normal weight. HENT:      Mouth/Throat:      Lips: Pink. Mouth: Mucous membranes are moist.      Pharynx: Oropharynx is clear. No oropharyngeal exudate or posterior oropharyngeal erythema. Eyes:      Conjunctiva/sclera: Conjunctivae normal.   Neck:      Vascular: No JVD. Cardiovascular:      Rate and Rhythm: Normal rate and regular rhythm. Heart sounds: No murmur heard. No friction rub. Pulmonary:      Effort: Pulmonary effort is normal. No accessory muscle usage or respiratory distress. Breath sounds: Normal breath sounds. No decreased air movement. No wheezing, rhonchi or rales. Comments: Good aeration in lungs   Chest:      Chest wall: No tenderness. Musculoskeletal:      Right lower leg: No edema. Left lower leg: No edema. Skin:     General: Skin is warm and dry. Capillary Refill: Capillary refill takes less than 2 seconds. Nails: There is no clubbing. Neurological:      Mental Status: He is alert.    Psychiatric:         Mood and Affect: Mood normal.         Behavior: Behavior normal.         Thought Content: Thought content normal.         Judgment: Judgment normal.          Test results   Lung Nodule Screening     [] Qualifies    [x]Does not qualify   [] Declined    [] Completed     CXR 9/2/2021    IMPRESSION   IMPRESSION:   No acute cardiopulmonary disease         Assessment      Diagnosis Orders   1. Steroid-dependent COPD ([facility]     2. Chronic respiratory failure with hypoxia, on home O2 therapy (HCC)     3. Status post lung surgery     4. Personal history of tobacco use       Plan    -continue current therapies with Brovana/ Pulmicort nebs BID  -discussed trying to cut back on albuterol nebs to PRN only not routine 2x per day   -continues to feel well on low dose prednisone, not wanting to try to wean off at this time.   Continue 2.5mg PO daily   -PFT reviewed, improved FEV 1 from 37% last year , now 47%   -clinically doing well : continue to exercise , avoid ill contacts, keep UTD with vaccinations   -still following annually with CT surgery at [facility]   -utilize oxygen PRN at 2LPM [place]     Will see Sherice Powers in: 1 year- no testing ordered, since planned testing at [facility] in 1 year     Electronically signed by SEBASTIAN Alamo - CNP on 12/17/2021 at 10:11 AM

## 2022-02-28 RX ORDER — PRAVASTATIN SODIUM 20 MG
TABLET ORAL
Qty: 90 TABLET | Refills: 2 | Status: SHIPPED | OUTPATIENT
Start: 2022-02-28

## 2022-02-28 RX ORDER — ISOSORBIDE MONONITRATE 30 MG/1
30 TABLET, EXTENDED RELEASE ORAL DAILY
Qty: 90 TABLET | Refills: 2 | Status: SHIPPED | OUTPATIENT
Start: 2022-02-28

## 2022-02-28 RX ORDER — CLOPIDOGREL BISULFATE 75 MG/1
TABLET ORAL
Qty: 90 TABLET | Refills: 2 | Status: SHIPPED | OUTPATIENT
Start: 2022-02-28

## 2022-02-28 RX ORDER — PREDNISONE 2.5 MG
2.5 TABLET ORAL DAILY
Qty: 90 TABLET | Refills: 3 | Status: SHIPPED | OUTPATIENT
Start: 2022-02-28 | End: 2023-02-28

## 2022-04-01 ENCOUNTER — HOSPITAL ENCOUNTER (OUTPATIENT)
Dept: MRI IMAGING | Age: 77
Discharge: HOME | End: 2022-04-01
Payer: MEDICARE

## 2022-04-01 DIAGNOSIS — R93.3 ABNORMAL VIRTUAL COLONOSCOPE: ICD-10-CM

## 2022-04-01 DIAGNOSIS — R10.13 EPIGASTRIC ABDOMINAL PAIN: ICD-10-CM

## 2022-04-01 LAB — POC CREATININE WHOLE BLOOD: 0.8 MG/DL (ref 0.5–1.2)

## 2022-04-01 PROCEDURE — 6360000004 HC RX CONTRAST MEDICATION: Performed by: INTERNAL MEDICINE

## 2022-04-01 PROCEDURE — A9579 GAD-BASE MR CONTRAST NOS,1ML: HCPCS | Performed by: INTERNAL MEDICINE

## 2022-04-01 PROCEDURE — 74183 MRI ABD W/O CNTR FLWD CNTR: CPT

## 2022-04-01 PROCEDURE — 82565 ASSAY OF CREATININE: CPT

## 2022-04-01 RX ADMIN — GADOTERIDOL 15 ML: 279.3 INJECTION, SOLUTION INTRAVENOUS at 10:46

## 2022-04-23 NOTE — H&P
[address] [address] Endoscopy    Pre-Endoscopy H&PE      Patient: Keke Alfaro    : 1945    Acct#: [de-identified]  Primary Care Physician: Babita Esqueda   Date of evaluation: 2022    Planned Procedure:    []EGD    []Enteroscopy    []PEG    []PEG change    []PEG removal  []Variceal banding    []Biopsy   []Dilation      []Control of bleeding  []Destruction of lesion by [facility]    []Stent Placement  []Foreign Body Removal    []Snare Polypectomy  []Other:       []Colonoscopy  []Flex Sigmoid [x]EUS, rectal      []Biopsy   []Dilation      []Control of bleeding  []Destruction of lesion by [facility]    []Stent Placement  []Foreign Body Removal    []Snare Polypectomy  []Other:      Planned Sedation  []Conscious Sedation [x]MAC/Propofol []Anesthesia    []None    Airway:  Adequate for planned sedation    Indication:   Staging of rectal adenocarcinoma    History:  The patient is a 67 y.o. male diagnosed with rectal adenocarcinoma by colonoscopy 22 by Dr Nae Davalos. Physical Exam:  VITALS: /63   Pulse 84   Temp 96.9 °F (36.1 °C) (Temporal)   Resp 18   Ht 5' 4\" (1.626 m)   Wt 150 lb 6.4 oz (68.2 kg)   SpO2 97%   BMI 25.82 kg/m²   The patient is a 67 y.o. male in no acute distress. HEAD: Normal cephalic/atraumatic. Extra-occular motions intact bilaterally. NECK: No lymphadenopathy or bruits. CHEST: Rises equally on inspiration. Clear to auscultation bilaterally. CARDIOVASCULAR: Regular rate and rhythm without murmurs, rubs or gallops. ABDOMEN: Soft, nontender, and nondistended with normal bowel sounds. No Hepatosplemomegaly. UPPER EXTREMITIES: no cyanosis, clubbing, or edema. DERM: no rash or jaundice. LOWER EXTREMITIES: no cyanosis, clubbing, or edema. NEURO: Alert and oriented times four. Patient moves all extremities and has gross sensation in all extremities.     ASA: ASA 3 - Patient with moderate systemic disease with functional limitations    Past Medical History:        Diagnosis Date    CAD (coronary artery disease)     [facility]ch[facility] obstructive pulmonary disease) [facility]     Hyperlipidemia     Hypertension     Pneumonia      Past Surgical History:        Procedure Laterality Date    CARDIAC CATHETERIZATION  2012    STENT    COLONOSCOPY      ENDOSCOPY, COLON, DIAGNOSTIC      FINGER SURGERY Right 12/2018    ROTATOR CUFF REPAIR       Allergies:  Cefpodoxime, Flonase [fluticasone propionate], Mometasone, Mometasone furoate, Spiriva handihaler [tiotropium bromide monohydrate], and Umeclidinium bromide  Home Meds:  Prior to Admission medications    Medication Sig Start Date End Date Taking?  Authorizing Provider   isosorbide mononitrate (IMDUR) 30 MG extended release tablet TAKE 1 TABLET BY MOUTH  DAILY 2/28/22   Rajendra Du MD   predniSONE (DELTASONE) 2.5 MG tablet TAKE 1 TABLET BY MOUTH  DAILY 2/28/22 2/28/23  SEBASTIAN Galdamez CNP   metoprolol tartrate (LOPRESSOR) 25 MG tablet TAKE 1 TABLET BY MOUTH  TWICE DAILY 2/28/22   Rajendra Morse MD   clopidogrel (PLAVIX) 75 MG tablet TAKE 1 TABLET BY MOUTH  DAILY 2/28/22   Rajendra Du MD   pravastatin (PRAVACHOL) 20 MG tablet TAKE 1 TABLET BY MOUTH  DAILY 2/28/22   Rajendra Du MD   budesonide (PULMICORT) 0.5 MG/2ML nebulizer suspension INHALE ONE VIAL ( 2 MLS ) VIA NEBULIZER BY MOUTH TWO TIMES A DAY 12/17/21   SEBASTIAN Cheney CNP   albuterol (PROVENTIL) (2.5 MG/3ML) 0.083% nebulizer solution Take 3 mLs by nebulization every 6 hours as needed for Wheezing or Shortness of Breath 12/17/21   SEBASTIAN Cheney CNP   PROAIR RESPICLICK 250 (90 Base) MCG/ACT aerosol powder inhalation INHALE 2 PUFFS INTO THE  LUNGS EVERY 6 HOURS AS  NEEDED FOR WHEEZING OR  SHORTNESS OF BREATH 9/14/21 9/14/22  SEBASTIAN Cheney CNP   pantoprazole (PROTONIX) 40 MG tablet Take 1 tablet by mouth 2 times daily (before meals) 3/19/19   SEBASTIAN Lee CNP   alendronate (FOSAMAX) 70 MG tablet Take 1 tablet by mouth every 7 Gatherings with Friends and Family: Not on file    Attends Rastafari Services: Not on file    Active Member of Clubs or Organizations: Not on file    Attends Club or Organization Meetings: Not on file    Marital Status: Not on file   Intimate Partner Violence:     Fear of Current or Ex-Partner: Not on file    Emotionally Abused: Not on file    Physically Abused: Not on file    Sexually Abused: Not on file   Housing Stability:     Unable to Pay for Housing in the Last Year: Not on file    Number of [place] in the Last Year: Not on file    Unstable Housing in the Last Year: Not on file     Family History:   No GI malignancies     ROS:  GENERAL: No fever or chills. NEURO: No headache or seizure. EYES: No diplopia or vision loss. CARDIOVASCULAR: No chest pain or palpitations. RESPIRATORY: No dyspnea or cough. GI: NO melena. NO hematochezia   : No dysuria or hematuria. GYN:  Not appropriate. MUSCULOSKELETAL: No new arthralgias or myalgias  DERM: No rash or jaundice. ENDOCRINE: No polyuria or polydipsia. PSYCH: No anxiety or depression. Informed Consent:  The risks and benefits of the procedure have been discussed with either the patient or if they cannot consent, their representative. Assessment:  Patient examined and appropriate for planned sedation and procedure. Plan:  Proceed with planned sedation and procedure.     Denise Vivas MD  12:34 PM 4/25/2022

## 2022-04-25 ENCOUNTER — ANESTHESIA EVENT (OUTPATIENT)
Dept: ENDOSCOPY | Age: 77
End: 2022-04-25
Payer: MEDICARE

## 2022-04-25 ENCOUNTER — ANESTHESIA (OUTPATIENT)
Dept: ENDOSCOPY | Age: 77
End: 2022-04-25
Payer: MEDICARE

## 2022-04-25 ENCOUNTER — HOSPITAL ENCOUNTER (OUTPATIENT)
Age: 77
Setting detail: OUTPATIENT SURGERY
Discharge: HOME | End: 2022-04-25
Attending: INTERNAL MEDICINE | Admitting: INTERNAL MEDICINE
Payer: MEDICARE

## 2022-04-25 VITALS
DIASTOLIC BLOOD PRESSURE: 37 MMHG | SYSTOLIC BLOOD PRESSURE: 67 MMHG | OXYGEN SATURATION: 96 % | RESPIRATION RATE: 17 BRPM

## 2022-04-25 VITALS
BODY MASS INDEX: 25.68 KG/M2 | HEIGHT: 64 IN | HEART RATE: 76 BPM | WEIGHT: 150.4 LBS | TEMPERATURE: 96.9 F | DIASTOLIC BLOOD PRESSURE: 71 MMHG | RESPIRATION RATE: 18 BRPM | SYSTOLIC BLOOD PRESSURE: 117 MMHG | OXYGEN SATURATION: 98 %

## 2022-04-25 PROCEDURE — 2720000010 HC SURG SUPPLY STERILE: Performed by: INTERNAL MEDICINE

## 2022-04-25 PROCEDURE — 3700000000 HC ANESTHESIA ATTENDED CARE: Performed by: INTERNAL MEDICINE

## 2022-04-25 PROCEDURE — 3609018500 HC EGD US SCOPE W/ADJACENT STRUCTURES: Performed by: INTERNAL MEDICINE

## 2022-04-25 PROCEDURE — 7100000011 HC PHASE II RECOVERY - ADDTL 15 MIN: Performed by: INTERNAL MEDICINE

## 2022-04-25 PROCEDURE — 7100000010 HC PHASE II RECOVERY - FIRST 15 MIN: Performed by: INTERNAL MEDICINE

## 2022-04-25 PROCEDURE — 3700000001 HC ADD 15 MINUTES (ANESTHESIA): Performed by: INTERNAL MEDICINE

## 2022-04-25 PROCEDURE — 6360000002 HC RX W HCPCS: Performed by: NURSE ANESTHETIST, CERTIFIED REGISTERED

## 2022-04-25 PROCEDURE — 2580000003 HC RX 258: Performed by: INTERNAL MEDICINE

## 2022-04-25 RX ORDER — SODIUM CHLORIDE 0.9 % (FLUSH) 0.9 %
5-40 SYRINGE (ML) INJECTION PRN
Status: DISCONTINUED | OUTPATIENT
Start: 2022-04-25 | End: 2022-04-25 | Stop reason: HOSPADM

## 2022-04-25 RX ORDER — SODIUM CHLORIDE 450 MG/100ML
INJECTION, SOLUTION INTRAVENOUS CONTINUOUS
Status: DISCONTINUED | OUTPATIENT
Start: 2022-04-25 | End: 2022-04-25 | Stop reason: HOSPADM

## 2022-04-25 RX ORDER — PROPOFOL 10 MG/ML
INJECTION, EMULSION INTRAVENOUS PRN
Status: DISCONTINUED | OUTPATIENT
Start: 2022-04-25 | End: 2022-04-25 | Stop reason: SDUPTHER

## 2022-04-25 RX ORDER — SODIUM CHLORIDE 9 MG/ML
25 INJECTION, SOLUTION INTRAVENOUS PRN
Status: DISCONTINUED | OUTPATIENT
Start: 2022-04-25 | End: 2022-04-25 | Stop reason: HOSPADM

## 2022-04-25 RX ORDER — SODIUM CHLORIDE 0.9 % (FLUSH) 0.9 %
5-40 SYRINGE (ML) INJECTION EVERY 12 HOURS SCHEDULED
Status: DISCONTINUED | OUTPATIENT
Start: 2022-04-25 | End: 2022-04-25 | Stop reason: HOSPADM

## 2022-04-25 RX ADMIN — PROPOFOL 50 MG: 10 INJECTION, EMULSION INTRAVENOUS at 12:48

## 2022-04-25 RX ADMIN — SODIUM CHLORIDE: 4.5 INJECTION, SOLUTION INTRAVENOUS at 12:41

## 2022-04-25 RX ADMIN — PHENYLEPHRINE HYDROCHLORIDE 200 MCG: 10 INJECTION INTRAVENOUS at 12:56

## 2022-04-25 RX ADMIN — PROPOFOL 50 MG: 10 INJECTION, EMULSION INTRAVENOUS at 12:56

## 2022-04-25 ASSESSMENT — PULMONARY FUNCTION TESTS
PIF_VALUE: 0
PIF_VALUE: 3
PIF_VALUE: 1
PIF_VALUE: 0
PIF_VALUE: 0
PIF_VALUE: 3
PIF_VALUE: 0
PIF_VALUE: 0
PIF_VALUE: 1
PIF_VALUE: 0

## 2022-04-25 ASSESSMENT — COPD QUESTIONNAIRES: CAT_SEVERITY: NO INTERVAL CHANGE

## 2022-04-25 ASSESSMENT — PAIN - FUNCTIONAL ASSESSMENT: PAIN_FUNCTIONAL_ASSESSMENT: NONE - DENIES PAIN

## 2022-04-25 NOTE — OP NOTE
[facility] Endoscopy    Patient: Marlene Juárez  : 1945  Acct#: [de-identified]  Date of evaluation: 2022  Primary Care Physician: Gabriel Reed     Procedure:    []EGD    []Enteroscopy    []Biopsy   []Dilation      []PEG    []PEG change    []PEG removal  []Variceal banding    []Control of bleeding  []Destruction of lesion by [facility]    []Stent Placement  []Foreign Body Removal    []Snare Polypectomy  []Other:     []Aborted:        []Colonoscopy  []Flex Sigmoid [x]EUS, rectal     []Biopsy   []Snare Polypectomy    []Control of bleeding  []Destruction of lesion by [facility]    []Stent Placement  []Foreign Body Removal    []Dilation    []Other:    []Aborted:   []Tattoo    Indication:   Staging of rectal adenocarcinoma    History:  The patient is a 67 y.o. male diagnosed with rectal adenocarcinoma by colonoscopy 22 by Dr Heidy Galvez. Physical Exam:  VITALS: /63   Pulse 84   Temp 96.9 °F (36.1 °C) (Temporal)   Resp 18   Ht 5' 4\" (1.626 m)   Wt 150 lb 6.4 oz (68.2 kg)   SpO2 97%   BMI 25.82 kg/m²   The patient is a 67 y.o. male in no acute distress. HEAD: Normal cephalic/atraumatic. Extra-occular motions intact bilaterally. NECK: No lymphadenopathy or bruits. CHEST: Rises equally on inspiration. Clear to auscultation bilaterally. CARDIOVASCULAR: Regular rate and rhythm without murmurs, rubs or gallops. ABDOMEN: Soft, nontender, and nondistended with normal bowel sounds. No Hepatosplemomegaly. UPPER EXTREMITIES: no cyanosis, clubbing, or edema. DERM: no rash or jaundice. LOWER EXTREMITIES: no cyanosis, clubbing, or edema. NEURO: Alert and oriented times four. Patient moves all extremities and has gross sensation in all extremities. ASA: ASA 3 - Patient with moderate systemic disease with functional limitations    MEDICATION:    MAC/Propofol/Anesthesia:  Yes     Photo:  Yes  Biopsy:  No      Description of Procedure:   The risks and benefits of the procedure were described to the patient or their representative if unable to give consent including but not limited to bleeding, infection, poking a hole someplace requiring surgery to fix it, having a reaction to medication, and death. The patient or their representative if unable to give consent understood these risks and provided informed consent. Airway was assessed and is adequate for the planned sedation. Anesthesia: MAC  Estimated Blood Loss: less than 50   Complications: None  Specimens: Was Not Obtained    Sedation was administered by anesthesia who monitored the patient during the procedure. The patient was placed in the left lateral decubitus position. The perianal area was inspected, and a digital rectal exam was performed. A forward-viewing Olympus adult colonoscope was lubricated and inserted through the anus into the rectum with a rigid sheath to measure tumor distance from anal verge. The sheath and scope were removed. An Olympus radial ultrasound endoscope was inserted and was advanced to the sigmoid colon. The balloon was insufflated and the scope was then slowly withdrawn. Findings and maneuvers are listed in impression below. The patient tolerated the procedure well. The scope was removed. The patient was moved to the recovery area. There were no immediate complications. IMPRESSION:  1. [id number]  2.  2 cm rectal lesion at 6 cm from anal verge     RECOMMENDATIONS:    1. Per referring provider.     Kirill Salas MD  1:05 PM 4/25/2022

## 2022-04-25 NOTE — ANESTHESIA POSTPROCEDURE EVALUATION
Department of Anesthesiology  Postprocedure Note    Patient: Marly Dodd  MRN: [id number]  YOB: 1945  Date of evaluation: 4/25/2022  Time:  1:04 PM     Procedure Summary     Date: 04/25/22 Room / Location: [address] [place] [address] / [address] [place] [address]    Anesthesia Start: 4390 Anesthesia Stop: 5627    Procedure: RECTAL EUS (N/A ) Diagnosis: (RECTAL MASS)    Surgeons: Nahum Castaneda MD Responsible Provider: Iram Jiang MD    Anesthesia Type: MAC ASA Status: 3          Anesthesia Type: MAC    Tiffanie Phase I: Tiffanie Score: 10    Tiffanie Phase II:      Last vitals: Reviewed and per EMR flowsheets.        Anesthesia Post Evaluation    Patient location during evaluation: bedside  Patient participation: complete - patient participated  Level of consciousness: awake and alert  Pain score: 0  Airway patency: patent  Nausea & Vomiting: no nausea and no vomiting  Complications: no  Cardiovascular status: hemodynamically stable  Respiratory status: acceptable  Hydration status: euvolemic

## 2022-04-25 NOTE — ANESTHESIA PRE PROCEDURE
Department of Anesthesiology  Preprocedure Note       Name:  Laury Harris   Age:  67 y.o.  :  1945                                          MRN:  [id number]         Date:  2022      Surgeon: Maryjo Hill):  Navya Murcia MD    Procedure: Procedure(s):  RECTAL EUS    Medications prior to admission:   Prior to Admission medications    Medication Sig Start Date End Date Taking?  Authorizing Provider   isosorbide mononitrate (IMDUR) 30 MG extended release tablet TAKE 1 TABLET BY MOUTH  DAILY 22   Rajendra Rubi MD   predniSONE (DELTASONE) 2.5 MG tablet TAKE 1 TABLET BY MOUTH  DAILY 22  SEBASTIAN Dawson CNP   metoprolol tartrate (LOPRESSOR) 25 MG tablet TAKE 1 TABLET BY MOUTH  TWICE DAILY 22   Rajendra Fairchild MD   clopidogrel (PLAVIX) 75 MG tablet TAKE 1 TABLET BY MOUTH  DAILY 22   Rajendra Fairchild MD   pravastatin (PRAVACHOL) 20 MG tablet TAKE 1 TABLET BY MOUTH  DAILY 22   Rajendra Rubi MD   formoterol (PERFOROMIST) 20 MCG/2ML nebulizer solution INHALE ONE VIAL ( 2 MLS ) BY MOUTH VIA NEBULIZER EVERY 12 HOURS 21   SEBASTIAN Cheney CNP   budesonide (PULMICORT) 0.5 MG/2ML nebulizer suspension INHALE ONE VIAL ( 2 MLS ) VIA NEBULIZER BY MOUTH TWO TIMES A DAY 21   SEBASTIAN Cheney CNP   albuterol (PROVENTIL) (2.5 MG/3ML) 0.083% nebulizer solution Take 3 mLs by nebulization every 6 hours as needed for Wheezing or Shortness of Breath 21   SEBASTIAN Cheney CNP   PROAIR RESPICLICK 666 (90 Base) MCG/ACT aerosol powder inhalation INHALE 2 PUFFS INTO THE  LUNGS EVERY 6 HOURS AS  NEEDED FOR WHEEZING OR  SHORTNESS OF BREATH 21  SEBASTIAN Cheney CNP   pantoprazole (PROTONIX) 40 MG tablet Take 1 tablet by mouth 2 times daily (before meals) 3/19/19   SEBASTIAN Dotson CNP   lactobacillus (CULTURELLE) capsule Take 1 capsule by mouth daily (with breakfast) 3/20/19   Rita Duggan MD   alendronate (FOSAMAX) 70 MG tablet Take 1 tablet by mouth every 7 days 2/25/19 12/17/21  Allen Jorge MD   guaiFENesin (MUCINEX) 600 MG extended release tablet Take 600 mg by mouth Daily    Historical Provider, MD   Calcium Carb-Cholecalciferol (CALCIUM 600 + D PO) Take 600 mg by mouth daily    Historical Provider, MD   nitroGLYCERIN (NITROSTAT) 0.4 MG SL tablet Place 1 tablet under the tongue every 5 minutes as needed. 3/23/15   Watson Walter MD   Oxygen Concentrator 2 L by Nasal route as needed. Historical Provider, MD   aspirin 81 MG tablet Take 81 mg by mouth daily. Historical Provider, MD       Current medications:    No current facility-administered medications for this encounter. Allergies:     Allergies   Allergen Reactions    Cefpodoxime Diarrhea    Flonase [Fluticasone Propionate]      Pt shakes and feels anxious    Mometasone     Mometasone Furoate      Shakes,feels anxious    Spiriva Handihaler [Tiotropium Bromide Monohydrate]     Umeclidinium Bromide        Problem List:    Patient Active Problem List   Diagnosis Code    Steroid-dependent COPD ([facility] J44.9    Atherosclerosis I70.90    Wheezing R06.2    Preop testing Z01.818    Nasal congestion R09.81    Deviated nasal septum J34.2    Tinnitus H93.19    Hypercalcemia E83.52    Chronic respiratory failure with hypoxia, on home O2 therapy ([facility]) J96.11, Z99.81    HTN (hypertension) I10    Precordial pain R07.2    Chest pain R07.9    Epigastric pain R10.13    Coronary artery disease with angina pectoris ([facility]) I25.119    HLD (hyperlipidemia) E78.5    Opacities of both lungs present on chest x-ray R91.8    Constipated K59.00    Activity intolerance R68.89    Angina, class III ([facility]) I20.9    Angina of effort ([facility]) I20.8    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 Coags:   Lab Results   Component Value Date    INR 1.00 06/08/2020    APTT 29.5 06/08/2020       HCG (If Applicable): No results found for: PREGTESTUR, PREGSERUM, HCG, HCGQUANT     ABGs: No results found for: PHART, PO2ART, RNV0IGZ, APJ4VXZ, BEART, [id number]     Type & Screen (If Applicable):  Lab Results   Component Value Date    LABRH POS 06/08/2020       Drug/Infectious Status (If Applicable):  No results found for: HIV, HEPCAB    COVID-19 Screening (If Applicable):   Lab Results   Component Value Date    COVID19 Not Detected 08/28/2020           Anesthesia Evaluation  Patient summary reviewed  Airway: Mallampati: III        Dental:          Pulmonary: breath sounds clear to auscultation  (+) pneumonia: resolved,  COPD: no interval change,                             Cardiovascular:    (+) hypertension: no interval change, angina:, CAD: no interval change,         Rhythm: regular                      Neuro/Psych:               GI/Hepatic/Renal:             Endo/Other:                     Abdominal:             Vascular: Other Findings:           Anesthesia Plan      MAC     ASA 3       Induction: intravenous. Anesthetic plan and risks discussed with patient. Plan discussed with attending.                   SEBASTIAN Hart - CRNA   4/25/2022

## 2022-05-04 ENCOUNTER — HOSPITAL ENCOUNTER (OUTPATIENT)
Dept: CT IMAGING | Age: 77
Discharge: HOME | End: 2022-05-04
Payer: MEDICARE

## 2022-05-04 ENCOUNTER — OFFICE VISIT (OUTPATIENT)
Dept: SURGERY | Age: 77
End: 2022-05-04
Payer: MEDICARE

## 2022-05-04 VITALS
DIASTOLIC BLOOD PRESSURE: 62 MMHG | RESPIRATION RATE: 20 BRPM | WEIGHT: 150.8 LBS | HEART RATE: 81 BPM | SYSTOLIC BLOOD PRESSURE: 120 MMHG | TEMPERATURE: 97.2 F | OXYGEN SATURATION: 92 % | BODY MASS INDEX: 25.74 KG/M2 | HEIGHT: 64 IN

## 2022-05-04 DIAGNOSIS — K62.89 RECTAL MASS: Primary | ICD-10-CM

## 2022-05-04 DIAGNOSIS — C18.9 MALIGNANT NEOPLASM OF COLON, UNSPECIFIED PART OF COLON (HCC): ICD-10-CM

## 2022-05-04 DIAGNOSIS — Z13.89 ENCOUNTER FOR ROUTINE SCREENING FOR MALFORMATION USING ULTRASONICS: ICD-10-CM

## 2022-05-04 PROCEDURE — 1123F ACP DISCUSS/DSCN MKR DOCD: CPT | Performed by: SURGERY

## 2022-05-04 PROCEDURE — G8427 DOCREV CUR MEDS BY ELIG CLIN: HCPCS | Performed by: SURGERY

## 2022-05-04 PROCEDURE — 99204 OFFICE O/P NEW MOD 45 MIN: CPT | Performed by: SURGERY

## 2022-05-04 PROCEDURE — G8417 CALC BMI ABV UP PARAM F/U: HCPCS | Performed by: SURGERY

## 2022-05-04 PROCEDURE — 1036F TOBACCO NON-USER: CPT | Performed by: SURGERY

## 2022-05-04 PROCEDURE — 74177 CT ABD & PELVIS W/CONTRAST: CPT

## 2022-05-04 PROCEDURE — 4040F PNEUMOC VAC/ADMIN/RCVD: CPT | Performed by: SURGERY

## 2022-05-04 PROCEDURE — 71260 CT THORAX DX C+: CPT

## 2022-05-04 PROCEDURE — 6360000004 HC RX CONTRAST MEDICATION: Performed by: INTERNAL MEDICINE

## 2022-05-04 RX ADMIN — IOHEXOL 50 ML: 240 INJECTION, SOLUTION INTRATHECAL; INTRAVASCULAR; INTRAVENOUS; ORAL at 13:37

## 2022-05-04 RX ADMIN — IOPAMIDOL 80 ML: 755 INJECTION, SOLUTION INTRAVENOUS at 13:37

## 2022-05-06 ASSESSMENT — ENCOUNTER SYMPTOMS
STRIDOR: 0
ANAL BLEEDING: 0
EYE REDNESS: 0
BACK PAIN: 0
RHINORRHEA: 0
VOICE CHANGE: 0
SORE THROAT: 0
BLOOD IN STOOL: 0
VOMITING: 0
CONSTIPATION: 0
NAUSEA: 0
ABDOMINAL DISTENTION: 0
CHOKING: 0
CHEST TIGHTNESS: 0
EYE ITCHING: 0
ABDOMINAL PAIN: 1
PHOTOPHOBIA: 0
DIARRHEA: 0
TROUBLE SWALLOWING: 0
COUGH: 0
SHORTNESS OF BREATH: 0
ALLERGIC/IMMUNOLOGIC NEGATIVE: 1
WHEEZING: 0
COLOR CHANGE: 0
EYE PAIN: 0
RECTAL PAIN: 0
APNEA: 0
SINUS PRESSURE: 0
EYE DISCHARGE: 0
FACIAL SWELLING: 0

## 2022-05-06 NOTE — PROGRESS NOTES
Luis Fernando Mccullough (:  1945)     ASSESSMENT:  1. Rectal mass tubular adenoma with high-grade dysplasia  2. Gallbladder sludge/cholelithiasis    PLAN:  1.  CT chest, abdomen and pelvis  2. MRI pelvis  3. Reviewed endorectal ultrasound. Mass at 6 cm.  2 cm lesion. [id number]  4. Planning follow-up with oncology Monday  5. Discussed the pros and cons of surgical resection. Robotic, laparoscopic and open techniques discussed. Loop ileostomy and primary reanastomosis discussed. Possible need for permanent diverting colostomy. Possible APR discussed. Chemotherapy and radiation options also discussed pending recommendation from oncology Monday. Discussed possible need for port insertion. All questions answered. 6.  Await further imaging and testing results as well as recommendations from oncology  7. Follow-up in 1 week  8. Signs and symptoms reviewed with patient that would be concerning and need him to return to office for re-evaluation. Patient states He will call if He has questions or concerns. 9.  Discussed cholecystectomy with patient as well. He appears to have chronic gallbladder disease. If/when surgical intervention undertaken then most likely cholecystectomy at that time. SUBJECTIVE/OBJECTIVE:    Chief Complaint   Patient presents with    Surgical Consult     New patient-referred by Dr Morel Her adenoma, high grade dysplasia     HPI  Guille Cui is a 79-year-old male who presents for initial evaluation due to a low rectal mass. Recent colonoscopy demonstrated 2 cm mass 5-7 cm from anal verge. Biopsies demonstrated tubular adenoma with at least high-grade dysplasia. Not able to rule out carcinoma. Underwent endorectal ultrasound demonstrating lesion to be 2 cm in diameter at 6 cm. Lesion was felt to be [place] [address] No [place] ink left at distal margin per reports. Patient on Plavix. Has known chronic gallbladder disease. Pain after eating.   Upper epigastric/right upper quadrant discomfort. Occasional nausea. Worse after meals. Already underwent a MRCP demonstrating biliary sludge with small gallstones. Planning CT chest abdomen and pelvis this week. Planning to see oncology next Monday. History H. pylori with some intestinal metaplasia on the last upper endoscopy in November 2021. Remote history of hemorrhoid surgery. He states since then he has had some issues with incontinence. No other significant major history of abdominal surgeries. Review of Systems   Constitutional: Positive for fatigue. Negative for activity change, appetite change, chills, diaphoresis, fever and unexpected weight change. HENT: Negative for congestion, dental problem, drooling, ear discharge, ear pain, facial swelling, hearing loss, mouth sores, nosebleeds, postnasal drip, rhinorrhea, sinus pressure, sneezing, sore throat, tinnitus, trouble swallowing and voice change. Eyes: Negative for photophobia, pain, discharge, redness, itching and visual disturbance. Respiratory: Negative for apnea, cough, choking, chest tightness, shortness of breath, wheezing and stridor. Cardiovascular: Negative for chest pain, palpitations and leg swelling. Gastrointestinal: Positive for abdominal pain. Negative for abdominal distention, anal bleeding, blood in stool, constipation, diarrhea, nausea, rectal pain and vomiting. Endocrine: Negative. Genitourinary: Negative for decreased urine volume, difficulty urinating, dysuria, enuresis, flank pain, frequency, genital sores, hematuria, penile discharge, penile pain, penile swelling, scrotal swelling, testicular pain and urgency. Musculoskeletal: Negative for arthralgias, back pain, gait problem, joint swelling, myalgias, neck pain and neck stiffness. Skin: Negative for color change, pallor, rash and wound. Allergic/Immunologic: Negative.     Neurological: Negative for dizziness, tremors, seizures, syncope, facial asymmetry, speech difficulty, weakness, light-headedness, numbness and headaches. Hematological: Negative for adenopathy. Does not bruise/bleed easily. Psychiatric/Behavioral: Negative for agitation, behavioral problems, confusion, decreased concentration, dysphoric mood, hallucinations, self-injury, sleep disturbance and suicidal ideas. The patient is not nervous/anxious and is not hyperactive.         Past Medical History:   Diagnosis Date    CAD (coronary artery disease)     dr Simin Prado COPD (chronic obstructive pulmonary disease) ([facility] [address]     E jossie    Hyperlipidemia     Hypertension     Pneumonia        Past Surgical History:   Procedure Laterality Date    CARDIAC CATHETERIZATION  2012    STENT    COLONOSCOPY  04/18/2022    ENDOSCOPY, COLON, DIAGNOSTIC  11/10/2021    FINGER SURGERY Right 12/2018    LUNG REMOVAL, PARTIAL  2021    30% resection done on dale side of lungs-[facility]    ROTATOR CUFF REPAIR Right     UPPER GASTROINTESTINAL ENDOSCOPY N/A 04/25/2022    RECTAL EUS performed by Roselia Owens MD at [facility]       Current Outpatient Medications   Medication Sig Dispense Refill    isosorbide mononitrate (IMDUR) 30 MG extended release tablet TAKE 1 TABLET BY MOUTH  DAILY 90 tablet 2    predniSONE (DELTASONE) 2.5 MG tablet TAKE 1 TABLET BY MOUTH  DAILY 90 tablet 3    metoprolol tartrate (LOPRESSOR) 25 MG tablet TAKE 1 TABLET BY MOUTH  TWICE DAILY 180 tablet 2    clopidogrel (PLAVIX) 75 MG tablet TAKE 1 TABLET BY MOUTH  DAILY 90 tablet 2    pravastatin (PRAVACHOL) 20 MG tablet TAKE 1 TABLET BY MOUTH  DAILY 90 tablet 2    budesonide (PULMICORT) 0.5 MG/2ML nebulizer suspension INHALE ONE VIAL ( 2 MLS ) VIA NEBULIZER BY MOUTH TWO TIMES A  mL 11    albuterol (PROVENTIL) (2.5 MG/3ML) 0.083% nebulizer solution Take 3 mLs by nebulization every 6 hours as needed for Wheezing or Shortness of Breath 360 each 5    PROAIR RESPICLICK 506 (90 Base) MCG/ACT aerosol powder inhalation INHALE 2 PUFFS INTO THE  LUNGS EVERY 6 HOURS AS NEEDED FOR WHEEZING OR  SHORTNESS OF BREATH 4 each 3    pantoprazole (PROTONIX) 40 MG tablet Take 1 tablet by mouth 2 times daily (before meals) 60 tablet 3    guaiFENesin (MUCINEX) 600 MG extended release tablet Take 600 mg by mouth Daily      Calcium Carb-Cholecalciferol (CALCIUM 600 + D PO) Take 600 mg by mouth daily      nitroGLYCERIN (NITROSTAT) 0.4 MG SL tablet Place 1 tablet under the tongue every 5 minutes as needed. 25 tablet 3    Oxygen Concentrator 2 L by Nasal route as needed.  alendronate (FOSAMAX) 70 MG tablet Take 1 tablet by mouth every 7 days 12 tablet 1     No current facility-administered medications for this visit.        Allergies   Allergen Reactions    Cefpodoxime Diarrhea    Flonase [Fluticasone Propionate]      Pt shakes and feels anxious    Mometasone     Mometasone Furoate      Shakes,feels anxious    Spiriva Handihaler [Tiotropium Bromide Monohydrate]     Umeclidinium Bromide        Family History   Problem Relation Age of Onset    High Cholesterol Father     Cancer Sister         lung    Cancer Brother         prostate    Heart Disease Brother        Social History     Socioeconomic History    Marital status:      Spouse name: Best Lynn Number of children: 2    Years of education: Not on file    Highest education level: Not on file   Occupational History    Not on file   Tobacco Use    Smoking status: Former Smoker     Packs/day: 1.00     Years: 30.00     Pack years: 30.00     Types: Cigarettes     Start date: 5/3/1953     Quit date: 1983     Years since quittin.9    Smokeless tobacco: Never Used   Vaping Use    Vaping Use: Never used   Substance and Sexual Activity    Alcohol use: No     Alcohol/week: 0.0 standard drinks     Types: 1 - 2 Cans of beer per week    Drug use: No    Sexual activity: Not on file   Other Topics Concern    Not on file   Social History Narrative    Not on file     Social Determinants of Health     Financial Resource Strain:     Difficulty of Paying Living Expenses: Not on file   Food Insecurity:     Worried About Running Out of Food in the Last Year: Not on file    [place] of Food in the Last Year: Not on file   Transportation Needs:     Lack of Transportation (Medical): Not on file    Lack of Transportation (Non-Medical): Not on file   Physical Activity:     Days of Exercise per Week: Not on file    Minutes of Exercise per Session: Not on file   Stress:     Feeling of Stress : Not on file   Social Connections:     Frequency of Communication with Friends and Family: Not on file    Frequency of Social Gatherings with Friends and Family: Not on file    Attends Christianity Services: Not on file    Active Member of Clubs or Organizations: Not on file    Attends Club or Organization Meetings: Not on file    Marital Status: Not on file   Intimate Partner Violence:     Fear of Current or Ex-Partner: Not on file    Emotionally Abused: Not on file    Physically Abused: Not on file    Sexually Abused: Not on file   Housing Stability:     Unable to Pay for Housing in the Last Year: Not on file    Number of [place] in the Last Year: Not on file    Unstable Housing in the Last Year: Not on file     Vitals:    05/04/22 0934   BP: 120/62   Site: Right Upper Arm   Position: Sitting   Cuff Size: Medium Adult   Pulse: 81   Resp: 20   Temp: 97.2 °F (36.2 °C)   TempSrc: Temporal   SpO2: 92%   Weight: 150 lb 12.8 oz (68.4 kg)   Height: 5' 4\" (1.626 m)     Body mass index is 25.88 kg/m². Wt Readings from Last 3 Encounters:   05/04/22 150 lb 12.8 oz (68.4 kg)   04/25/22 150 lb 6.4 oz (68.2 kg)   12/17/21 152 lb 12.8 oz (69.3 kg)     Physical Exam  Vitals reviewed. Constitutional:       General: He is not in acute distress. Appearance: He is well-developed. He is not diaphoretic. HENT:      Head: Normocephalic and atraumatic.       Right Ear: External ear normal.      Left Ear: External ear normal.      Nose: Nose normal.   Eyes:      General: No scleral icterus. Right eye: No discharge. Left eye: No discharge. Conjunctiva/sclera: Conjunctivae normal.   Cardiovascular:      Rate and Rhythm: Normal rate and regular rhythm. Heart sounds: Normal heart sounds. Pulmonary:      Effort: Pulmonary effort is normal. No respiratory distress. Breath sounds: Normal breath sounds. No wheezing or rales. Chest:      Chest wall: No tenderness. Abdominal:      General: Bowel sounds are normal. There is no distension. Palpations: Abdomen is soft. There is no mass. Tenderness: There is no abdominal tenderness. There is no guarding or rebound. Genitourinary:     Rectum: Mass present. Musculoskeletal:         General: No tenderness. Normal range of motion. Cervical back: Normal range of motion and neck supple. Skin:     General: Skin is warm and dry. Coloration: Skin is not pale. Findings: No erythema or rash. Neurological:      Mental Status: He is alert and oriented to person, place, and time. Cranial Nerves: No cranial nerve deficit. Psychiatric:         Behavior: Behavior normal.         Thought Content:  Thought content normal.         Judgment: Judgment normal.       Lab Results   Component Value Date    WBC 7.0 06/08/2020    HGB 12.0 (L) 06/08/2020    HCT 38.4 (L) 06/08/2020    MCV 92.5 06/08/2020     06/08/2020     Lab Results   Component Value Date     06/08/2020    K 4.2 06/08/2020     06/08/2020    CO2 29 06/08/2020     Lab Results   Component Value Date    CREATININE 0.9 06/08/2020     Lab Results   Component Value Date    ALT 16 04/29/2020    AST 18 04/29/2020    ALKPHOS 42 04/29/2020    BILITOT 0.4 04/29/2020     Lab Results   Component Value Date    LIPASE 12.0 05/10/2019       Patient Active Problem List   Diagnosis    Steroid-dependent COPD ([facility] [address]    Atherosclerosis    Wheezing    Preop testing    Nasal congestion    Deviated nasal septum    Tinnitus    Hypercalcemia    Chronic respiratory failure with hypoxia, on home O2 therapy (HCC)    HTN (hypertension)    Precordial pain    Chest pain    Epigastric pain    Coronary artery disease with angina pectoris (HCC)    HLD (hyperlipidemia)    Opacities of both lungs present on chest x-ray    Constipated    Activity intolerance    Angina, class III (HCC)    Angina of effort (HCC)    Bullous emphysema ([address]    Personal history of tobacco use     Narrative   PROCEDURE: MRI ABDOMEN W WO CONTRAST MRCP       CLINICAL INFORMATION: Epigastric abdominal. Abnormal imaging of the gallbladder.       COMPARISON: MRI abdomen 8/22/2019. Gallbladder ultrasound 11/1/2019.       TECHNIQUE: Routine MRI abdomen without and with IV contrast.  Routine MRCP was also performed. The MRCP examination includes 3D reconstructions of the biliary tree and the pancreatic duct.       CONTRAST: 15 cc ProHance contrast material.           FINDINGS:        Lung bases: Unremarkable.       Liver/gallbladder/bilary tree: Scattered nonenhancing subcentimeter simple hepatic cysts are stable (series 3, images 8, 9, and 19). No liver lesions or abnormal enhancement is observed. Low signal intensity debris layers within the dependent portion of    the gallbladder suggesting small gallstones. No biliary ductal dilatation is identified. The common bile duct is not dilated measuring 6 mm. No ductal calculus or luminal irregularity is observed. The pancreatic duct is not dilated measuring 1 to 2 mm.       Pancreas: Normal.   Spleen : Normal.   Adrenal glands: Normal.       Kidneys/ ureters/ bladder: Normal.       Gastrointestinal:  No dilated small or large bowel loops are observed. A small sliding-type hiatal hernia is unchanged. No free fluid or fluid collections are observed. The appendix is normal.       Retroperitoneum / lymph nodes: The aorta is not dilated.  No lymphadenopathy is present.       Musculoskeletal: Curvature of the lumbar spine with left convexity is stable. Multilevel degenerative disc disease is observed. The visualized skeletal structures appear intact.               Impression   1. Sludge versus small stones layer within the dependent portion of the gallbladder. No abnormal enhancement, pericholecystic inflammation, biliary ductal dilatation or choledocholithiasis observed.       2. Stable benign subcentimeter nonenhancing simple hepatic cysts. No liver lesions. No acute findings.           **This report has been created using voice recognition software.  It may contain minor errors which are inherent in voice recognition technology. **       Final report electronically signed by Dr Meera Gruber on 2022 11:50 AM                                                    [id number]     Patient: Torito Banks             : 1945                        Acct#: [de-identified]  Date of evaluation: 2022  Primary Care Physician: Stephanie Alvarenga      Procedure:    []?EGD                                     []?Enteroscopy                          []?Biopsy                                 []?Dilation                                  []?PEG                                     []?PEG change                          []?PEG removal                       []?Variceal banding                          []?Control of bleeding              []?Destruction of lesion by APC                          []?Stent Placement                  []?Foreign Body Removal                          []?Snare Polypectomy             []?Other:                           []?Aborted:                                    []? Colonoscopy                       []?Flex Sigmoid           [x]? EUS, rectal                           []?Biopsy                                 []?Snare Polypectomy                          []?Control of bleeding              []?Destruction of lesion by [facility]                          []?Stent Placement []?Foreign Body Removal                          []?Dilation                                []?Other:                          []?Aborted:                               []?Tattoo     Indication:   Staging of rectal adenocarcinoma     History:  The patient is a 67 y.o. male diagnosed with rectal adenocarcinoma by colonoscopy 4/18/22 by Dr Matt Santos.     Physical Exam:  VITALS: /63   Pulse 84   Temp 96.9 °F (36.1 °C) (Temporal)   Resp 18   Ht 5' 4\" (1.626 m)   Wt 150 lb 6.4 oz (68.2 kg)   SpO2 97%   BMI 25.82 kg/m²   The patient is a 67 y.o. male in no acute distress. HEAD: Normal cephalic/atraumatic. Extra-occular motions intact bilaterally. NECK: No lymphadenopathy or bruits. CHEST: Rises equally on inspiration. Clear to auscultation bilaterally. CARDIOVASCULAR: Regular rate and rhythm without murmurs, rubs or gallops. ABDOMEN: Soft, nontender, and nondistended with normal bowel sounds. No Hepatosplemomegaly. UPPER EXTREMITIES: no cyanosis, clubbing, or edema. DERM: no rash or jaundice. LOWER EXTREMITIES: no cyanosis, clubbing, or edema. NEURO: Alert and oriented times four. Patient moves all extremities and has gross sensation in all extremities.     ASA: ASA 3 - Patient with moderate systemic disease with functional limitations     MEDICATION:            MAC/Propofol/Anesthesia:  Yes     Photo:  Yes  Biopsy:  No      Description of Procedure: The risks and benefits of the procedure were described to the patient or their representative if unable to give consent including but not limited to bleeding, infection, poking a hole someplace requiring surgery to fix it, having a reaction to medication, and death. The patient or their representative if unable to give consent understood these risks and provided informed consent.  Airway was assessed and is adequate for the planned sedation.      Anesthesia: MAC  Estimated Blood Loss: less than 50   Complications: None  Specimens: Was Not Obtained     Sedation was administered by anesthesia who monitored the patient during the procedure. The patient was placed in the left lateral decubitus position. The perianal area was inspected, and a digital rectal exam was performed. A forward-viewing Olympus adult colonoscope was lubricated and inserted through the anus into the rectum with a rigid sheath to measure tumor distance from anal verge. The sheath and scope were removed. An Olympus radial ultrasound endoscope was inserted and was advanced to the sigmoid colon. The balloon was insufflated and the scope was then slowly withdrawn. Findings and maneuvers are listed in impression below. The patient tolerated the procedure well. The scope was removed. The patient was moved to the recovery area. There were no immediate complications.     IMPRESSION:  1. [id number]  2.  2 cm rectal lesion at 6 cm from anal verge     RECOMMENDATIONS:    1. Per referring provider.  Erna Hutchins MD  1:05 PM 4/25/2022      An electronic signature was used to authenticate this note.     --Lupis Ko MD

## 2022-05-10 ENCOUNTER — CLINICAL DOCUMENTATION (OUTPATIENT)
Dept: CASE MANAGEMENT | Age: 77
End: 2022-05-10

## 2022-05-10 ENCOUNTER — OFFICE VISIT (OUTPATIENT)
Dept: ONCOLOGY | Age: 77
End: 2022-05-10
Payer: MEDICARE

## 2022-05-10 ENCOUNTER — HOSPITAL ENCOUNTER (OUTPATIENT)
Dept: INFUSION THERAPY | Age: 77
Discharge: HOME | End: 2022-05-10
Payer: MEDICARE

## 2022-05-10 VITALS
RESPIRATION RATE: 20 BRPM | BODY MASS INDEX: 26.29 KG/M2 | HEIGHT: 64 IN | DIASTOLIC BLOOD PRESSURE: 73 MMHG | TEMPERATURE: 98.1 F | WEIGHT: 154 LBS | SYSTOLIC BLOOD PRESSURE: 124 MMHG | HEART RATE: 91 BPM | OXYGEN SATURATION: 93 %

## 2022-05-10 VITALS
SYSTOLIC BLOOD PRESSURE: 124 MMHG | HEART RATE: 91 BPM | RESPIRATION RATE: 20 BRPM | TEMPERATURE: 98.1 F | DIASTOLIC BLOOD PRESSURE: 73 MMHG

## 2022-05-10 DIAGNOSIS — C20 RECTAL CANCER (HCC): ICD-10-CM

## 2022-05-10 DIAGNOSIS — Z12.5 SCREENING PSA (PROSTATE SPECIFIC ANTIGEN): ICD-10-CM

## 2022-05-10 DIAGNOSIS — C20 RECTAL CANCER (HCC): Primary | ICD-10-CM

## 2022-05-10 LAB
ABSOLUTE IMMATURE GRANULOCYTE: 0 THOU/MM3 (ref 0–0.07)
ALBUMIN SERPL-MCNC: 4.4 G/DL (ref 3.5–5.1)
ALP BLD-CCNC: 52 U/L (ref 38–126)
ALT SERPL-CCNC: 20 U/L (ref 11–66)
AST SERPL-CCNC: 23 U/L (ref 5–40)
BASINOPHIL, AUTOMATED: 1 % (ref 0–3)
BASOPHILS ABSOLUTE: 0 THOU/MM3 (ref 0–0.1)
BILIRUB SERPL-MCNC: 0.4 MG/DL (ref 0.3–1.2)
BILIRUBIN DIRECT: < 0.2 MG/DL (ref 0–0.3)
BUN, WHOLE BLOOD: 14 MG/DL (ref 8–26)
CEA: 2.8 NG/ML (ref 0–5)
CHLORIDE, WHOLE BLOOD: 103 MEQ/L (ref 98–109)
CREATININE, WHOLE BLOOD: 0.9 MG/DL (ref 0.5–1.2)
EOSINOPHILS ABSOLUTE: 0.1 THOU/MM3 (ref 0–0.4)
EOSINOPHILS RELATIVE PERCENT: 2 % (ref 0–4)
GFR, ESTIMATED: 87 ML/MIN/1.73M2
GLUCOSE, WHOLE BLOOD: 98 MG/DL (ref 70–108)
HCT VFR BLD CALC: 41.6 % (ref 42–52)
HEMOGLOBIN: 13.3 GM/DL (ref 14–18)
IMMATURE GRANULOCYTES: 0 %
IONIZED CALCIUM, WHOLE BLOOD: 1.28 MMOL/L (ref 1.12–1.32)
LYMPHOCYTES # BLD: 19 % (ref 15–47)
LYMPHOCYTES ABSOLUTE: 1.1 THOU/MM3 (ref 1–4.8)
MCH RBC QN AUTO: 30.2 PG (ref 26–33)
MCHC RBC AUTO-ENTMCNC: 32 GM/DL (ref 32.2–35.5)
MCV RBC AUTO: 95 FL (ref 80–94)
MONOCYTES ABSOLUTE: 0.5 THOU/MM3 (ref 0.4–1.3)
MONOCYTES: 8 % (ref 0–12)
PDW BLD-RTO: 14.3 % (ref 11.5–14.5)
PLATELET # BLD: 271 THOU/MM3 (ref 130–400)
PMV BLD AUTO: 9 FL (ref 9.4–12.4)
POTASSIUM, WHOLE BLOOD: 4.7 MEQ/L (ref 3.5–4.9)
PROSTATE SPECIFIC ANTIGEN: 3.18 NG/ML (ref 0–1)
RBC # BLD: 4.4 MILL/MM3 (ref 4.7–6.1)
SEG NEUTROPHILS: 71 % (ref 43–75)
SEGMENTED NEUTROPHILS ABSOLUTE COUNT: 4.3 THOU/MM3 (ref 1.8–7.7)
SODIUM, WHOLE BLOOD: 139 MEQ/L (ref 138–146)
TOTAL CO2, WHOLE BLOOD: 28 MEQ/L (ref 23–33)
TOTAL PROTEIN: 6.6 G/DL (ref 6.1–8)
WBC # BLD: 6.1 THOU/MM3 (ref 4.8–10.8)

## 2022-05-10 PROCEDURE — G8427 DOCREV CUR MEDS BY ELIG CLIN: HCPCS | Performed by: INTERNAL MEDICINE

## 2022-05-10 PROCEDURE — 36415 COLL VENOUS BLD VENIPUNCTURE: CPT

## 2022-05-10 PROCEDURE — 1036F TOBACCO NON-USER: CPT | Performed by: INTERNAL MEDICINE

## 2022-05-10 PROCEDURE — 85025 COMPLETE CBC W/AUTO DIFF WBC: CPT

## 2022-05-10 PROCEDURE — G8417 CALC BMI ABV UP PARAM F/U: HCPCS | Performed by: INTERNAL MEDICINE

## 2022-05-10 PROCEDURE — 80047 BASIC METABLC PNL IONIZED CA: CPT

## 2022-05-10 PROCEDURE — 1123F ACP DISCUSS/DSCN MKR DOCD: CPT | Performed by: INTERNAL MEDICINE

## 2022-05-10 PROCEDURE — 99205 OFFICE O/P NEW HI 60 MIN: CPT | Performed by: INTERNAL MEDICINE

## 2022-05-10 PROCEDURE — 82378 CARCINOEMBRYONIC ANTIGEN: CPT

## 2022-05-10 PROCEDURE — 80076 HEPATIC FUNCTION PANEL: CPT

## 2022-05-10 PROCEDURE — 99211 OFF/OP EST MAY X REQ PHY/QHP: CPT

## 2022-05-10 PROCEDURE — 4040F PNEUMOC VAC/ADMIN/RCVD: CPT | Performed by: INTERNAL MEDICINE

## 2022-05-10 PROCEDURE — G0103 PSA SCREENING: HCPCS

## 2022-05-10 NOTE — PATIENT INSTRUCTIONS
MRI pelvis rectal staging with without contrast-  [facility] [id number]  Labs today-CBC, CMP, CEA and PSA  Referral Dr. Erickson Berg colorectal surgery at [facility]  Routine follow-up with me in 2 weeks to review MRI results.

## 2022-05-10 NOTE — PROGRESS NOTES
[address] CANCER [address] [place] [address]  Dept: [phone]  Loc: [phone]   Hematology/Oncology Consult (Clinic)        5/10/22     Courtney Lopez   1945     No ref. provider found   Marlena Drake       Reason: New diagnosis of rectal cancer referred for evaluation and treatment. Chief Complaint   Patient presents with    New Patient     Colon Cancer          HPI: Jacobo Ritchie is a relatively healthy 19-year-old gentleman with a prior history of polyps but no family history of colon cancer. He underwent a routine screening colonoscopy on 4/18/2022. He is asymptomatic with no observation of bright red blood per rectum, melena or difficulty passing stool. The colonoscopy revealed a large 2 to 3 cm mass in the rectum at 5% 5 to 7 cm from the anus. It was, incompletely removed by cold snare polypectomy. Pathology revealed a tubular adenoma with high-grade dysplasia. MRI of the abdomen with contrast 4/20/2022 ordered by another provider showed sludge versus small stones within the gallbladder. Stable benign subcentimeter nonenhancing simple hepatic cysts. On 4/25/2022 he underwent a staging rectal ultrasound by Dr. Beau Fonseca. Findings included 2 cm rectal lesion at 6 cm from the anal verge. EUS staging T1 N0 MX.    ROS:  Review of Systems he remains entirely asymptomatic.     PMH:   Past Medical History:   Diagnosis Date    CAD (coronary artery disease)     dr Jose G Schilling    Colon cancer [facility]) 04/2022    COPD (chronic obstructive pulmonary disease) ([facility] [address]     E jossie    Hyperlipidemia     Hypertension     Pneumonia         Social HX:   Social History     Socioeconomic History    Marital status:      Spouse name: Rigoberto Brar Number of children: 2    Years of education: Not on file    Highest education level: Not on file   Occupational History    Not on file   Tobacco Use    Smoking status: Former Smoker Packs/day: 1.00     Years: 30.00     Pack years: 30.00     Types: Cigarettes     Start date: 5/3/1953     Quit date: 1983     Years since quittin.9    Smokeless tobacco: Never Used   Vaping Use    Vaping Use: Never used   Substance and Sexual Activity    Alcohol use: No     Alcohol/week: 0.0 standard drinks     Types: 1 - 2 Cans of beer per week    Drug use: No    Sexual activity: Not on file   Other Topics Concern    Not on file   Social History Narrative    Not on file     Social Determinants of Health     Financial Resource Strain:     Difficulty of Paying Living Expenses: Not on file   Food Insecurity:     Worried About Running Out of Food in the Last Year: Not on file    Morales of Food in the Last Year: Not on file   Transportation Needs:     Lack of Transportation (Medical): Not on file    Lack of Transportation (Non-Medical): Not on file   Physical Activity:     Days of Exercise per Week: Not on file    Minutes of Exercise per Session: Not on file   Stress:     Feeling of Stress : Not on file   Social Connections:     Frequency of Communication with Friends and Family: Not on file    Frequency of Social Gatherings with Friends and Family: Not on file    Attends [employer] Services: Not on file    Active Member of [address] [place] [address] [employer] or Organizations: Not on file    Attends Club or Organization Meetings: Not on file    Marital Status: Not on file   Intimate Partner Violence:     Fear of Current or Ex-Partner: Not on file    Emotionally Abused: Not on file    Physically Abused: Not on file    Sexually Abused: Not on file   Housing Stability:     Unable to Pay for Housing in the Last Year: Not on file    Number of [place] in the Last Year: Not on file    Unstable Housing in the Last Year: Not on file        IlanaKathy Sanchez    Phone: [phone] [phone] Self-A-r-T     Employment:  Ret .     Immunizations:  Immunization History   Administered Date(s) Administered    COVID-19, [employer] top, DO NOT Dilute, Levar-Sucrose, 12+ yrs, PF, 30 mcg/0.3 mL dose 03/30/2022    COVID-19, Pfizer Purple top, DILUTE for use, 12+ yrs, 30mcg/0.3mL dose 01/29/2021, 02/19/2021, 10/10/2021    Influenza Vaccine, unspecified formulation 10/30/2016, 10/11/2018        Health Screenings:    C-Scope: 4/18/2022 Dr. Sophy Shea  Prostate: Not evaluated        Health Maintenance Due   Topic Date Due    Annual Wellness Visit (AWV)  Never done    Depression Screen  Never done    Hepatitis C screen  Never done    DTaP/Tdap/Td vaccine (1 - Tdap) Never done    Shingles vaccine (1 of 2) Never done    Lipids  04/14/2015        Interests:   cars    Fam HX:   Family History   Problem Relation Age of Onset    High Cholesterol Father     Cancer Sister         lung    Cancer Brother         prostate    Heart Disease Brother         Hospitalizations:   None recent; Lung reduction  3/21. Allergies:   Allergies   Allergen Reactions    Cefpodoxime Diarrhea    Flonase [Fluticasone Propionate]      Pt shakes and feels anxious    Mometasone     Mometasone Furoate      Shakes,feels anxious    Spiriva Handihaler [Tiotropium Bromide Monohydrate]     Umeclidinium [place]         Adult Illness:  Patient Active Problem List   Diagnosis    Steroid-dependent COPD ([address]    Atherosclerosis    Wheezing    Preop testing    Nasal congestion    Deviated nasal septum    Tinnitus    Hypercalcemia    Chronic respiratory failure with hypoxia, on home O2 therapy (HCC)    HTN (hypertension)    Precordial pain    Chest pain    Epigastric pain    Coronary artery disease with angina pectoris (HCC)    HLD (hyperlipidemia)    Opacities of both lungs present on chest x-ray    Constipated    Activity intolerance    Angina, class III (HCC)    Angina of effort (HCC)    Bullous emphysema ([address]    Personal history of tobacco use        Surgery:  Past Surgical History:   Procedure Laterality Date  CARDIAC CATHETERIZATION  2012    STENT    COLONOSCOPY  04/18/2022    ENDOSCOPY, COLON, DIAGNOSTIC  11/10/2021    FINGER SURGERY Right 12/2018    LUNG REMOVAL, PARTIAL  2021    30% resection done on dale side of lungs-[facility]    ROTATOR CUFF REPAIR Right     UPPER GASTROINTESTINAL ENDOSCOPY N/A 04/25/2022    RECTAL [email or web address]        Medications:  Current Outpatient Medications   Medication Sig Dispense Refill    isosorbide mononitrate (IMDUR) 30 MG extended release tablet TAKE 1 TABLET BY MOUTH  DAILY 90 tablet 2    predniSONE (DELTASONE) 2.5 MG tablet TAKE 1 TABLET BY MOUTH  DAILY 90 tablet 3    metoprolol tartrate (LOPRESSOR) 25 MG tablet TAKE 1 TABLET BY MOUTH  TWICE DAILY 180 tablet 2    clopidogrel (PLAVIX) 75 MG tablet TAKE 1 TABLET BY MOUTH  DAILY 90 tablet 2    pravastatin (PRAVACHOL) 20 MG tablet TAKE 1 TABLET BY MOUTH  DAILY 90 tablet 2    budesonide (PULMICORT) 0.5 MG/2ML nebulizer suspension INHALE ONE VIAL ( 2 MLS ) VIA NEBULIZER BY MOUTH TWO TIMES A  mL 11    albuterol (PROVENTIL) (2.5 MG/3ML) 0.083% nebulizer solution Take 3 mLs by nebulization every 6 hours as needed for Wheezing or Shortness of Breath 360 each 5    PROAIR RESPICLICK 259 (90 Base) MCG/ACT aerosol powder inhalation INHALE 2 PUFFS INTO THE  LUNGS EVERY 6 HOURS AS  NEEDED FOR WHEEZING OR  SHORTNESS OF BREATH 4 each 3    pantoprazole (PROTONIX) 40 MG tablet Take 1 tablet by mouth 2 times daily (before meals) 60 tablet 3    alendronate (FOSAMAX) 70 MG tablet Take 1 tablet by mouth every 7 days 12 tablet 1    guaiFENesin (MUCINEX) 600 MG extended release tablet Take 600 mg by mouth Daily      Calcium Carb-Cholecalciferol (CALCIUM 600 + D PO) Take 600 mg by mouth daily      Oxygen Concentrator 2 L by Nasal route as needed.  nitroGLYCERIN (NITROSTAT) 0.4 MG SL tablet Place 1 tablet under the tongue every 5 minutes as needed.  (Patient not taking: Reported on 5/10/2022) 25 tablet 3     No current facility-administered medications for this visit. EXAM:   height is 5' 4\" (1.626 m) and weight is 154 lb (69.9 kg). His oral temperature is 98.1 °F (36.7 °C). His blood pressure is 124/73 and his pulse is 91. His respiration is 20 and oxygen saturation is 93%. Estimated body surface area is 1.78 meters squared as calculated from the following:    Height as of this encounter: 5' 4\" (1.626 m). Weight as of this encounter: 154 lb (69.9 kg). ECO  General: Non-ill appearing. Very pleasant relaxed and in no distress. HEENT: NC/AT,nonicteric,  Neck: normal thyroid, no masses. Nodes: No adenopathy  Lungs/chest: clear, no rales,rhonchi or wheezing, lung bases clear  CV: rrr, no rubs ,gallops or murmurs  Breasts: Not examined  Abd/Rectal: soft, non-tender,bowel sounds normal , no HSM,no masses  Back: normal curvature, No midline tenderness. flanks nontender  : Not Examined  Extremities: no cyanosis,clubbing or edema. Skin: unremarkable  Neuro: A and O x 4, CN exam nonfocal, Motor- no deficits, Sensory- no deficits, gait-nl, speech- fluent, no ataxia.   Devices: none      DATA:    LAB:     CBC with Differential:      Lab Results   Component Value Date    WBC 7.0 2020    RBC 4.15 2020    RBC 2.76 2012    HGB 12.0 2020    HCT 38.4 2020     2020    MCV 92.5 2020    MCH 28.9 2020    MCHC 31.3 2020    RDW 13.9 2017    NRBC 0 2020    NRBC 0 2012    SEGSPCT 75.3 2020    MONOPCT 8.0 2020    MONOSABS 0.5 2020    LYMPHSABS 0.9 2020    EOSABS 0.1 2020    BASOSABS 0.0 2020     Lab Results   Component Value Date/Time    SEGSABS 5.1 2020 01:00 PM       CMP:    Lab Results   Component Value Date     2020    K 4.2 2020     2020    CO2 29 2020    BUN 16 2020    CREATININE 0.9 2020    LABGLOM 82 2020    GLUCOSE 90 06/08/2020    GLUCOSE 88 03/24/2012    PROT 7.1 04/29/2020    PROT 6.2 04/14/2014    LABALBU 4.4 04/29/2020    LABALBU 4.1 03/20/2012    CALCIUM 9.7 06/08/2020    BILITOT 0.4 04/29/2020    ALKPHOS 42 04/29/2020    AST 18 04/29/2020    ALT 16 04/29/2020       BMP:    Lab Results   Component Value Date     06/08/2020    K 4.2 06/08/2020     06/08/2020    CO2 29 06/08/2020    BUN 16 06/08/2020    LABALBU 4.4 04/29/2020    LABALBU 4.1 03/20/2012    CREATININE 0.9 06/08/2020    CALCIUM 9.7 06/08/2020    LABGLOM 82 06/08/2020    GLUCOSE 90 06/08/2020    GLUCOSE 88 03/24/2012       Magnesium:    Lab Results   Component Value Date    MG 2.0 04/29/2020     PT/INR:    Lab Results   Component Value Date    INR 1.00 06/08/2020     TSH:    Lab Results   Component Value Date    TSH 0.844 04/29/2020           IMAGING:    CT CHEST W CONTRAST  Result Date: 5/4/2022  1. No acute intrathoracic process. Severe centrilobular emphysema. No pulmonary mass or nodules observed. 2. No metastatic disease identified. 3. Scattered hypoattenuating liver lesions some of which are too small to characterize definitively remain stable. 4. Colonic diverticulosis without diverticulitis. Prostatomegaly. Correlate with PSA levels. Multiple stable chronic findings are discussed. **This report has been created using voice recognition software. It may contain minor errors which are inherent in voice recognition technology. ** Final report electronically signed by Dr Wendy Espinal on 5/4/2022 2:29 PM    CT ABDOMEN PELVIS W IV CONTRAST Additional Contrast? Oral (IV CONTRAST)  Result Date: 5/4/2022  1. No acute intrathoracic process. Severe centrilobular emphysema. No pulmonary mass or nodules observed. 2. No metastatic disease identified. 3. Scattered hypoattenuating liver lesions some of which are too small to characterize definitively remain stable. 4. Colonic diverticulosis without diverticulitis. Prostatomegaly. Correlate with PSA levels. Multiple stable chronic findings are discussed. **This report has been created using voice recognition software. It may contain minor errors which are inherent in voice recognition technology. ** Final report electronically signed by Dr Chan Hernandez on 5/4/2022 2:21 PM     MRI abdomen with without contrast, MRCP 4/1/2022     Impression   1. Sludge versus small stones layer within the dependent portion of the gallbladder. No abnormal enhancement, pericholecystic inflammation, biliary ductal dilatation or choledocholithiasis observed.       2. Stable benign subcentimeter nonenhancing simple hepatic cysts. No liver lesions. No acute findings.             PROCEDURES:  -Colonoscopy  -Endoscopic ultrasound of the rectum    PATHOLOGY:   Path report by [facility] lab in [place] 4/18/2022  Rectum, mass biopsy: Tubular adenoma with high-grade dysplasia; cannot exclude malignancy. Multiple polypoid fragments of colorectal mucosa present with adenomatous dysplasia and predominantly tubular formations. Occasional foci are present with admixed architectural and cytologic high-grade dysplasia. No invasion of dysplastic glands through the muscularis mucosa present. The polypectomy margin cannot be assessed due to specimen fragmentation. GENETICS:  None    MOLECULAR:  None    ASSESSMENT/PLAN:    1: Diagnosis: 25-year-old gentleman with a screening colonoscopy that shows a 2 to 3 cm mass at about 6 cm from the anal verge. Path above shows a tubular adenoma with high-grade dysplasia; cannot exclude malignancy. EUS done as above T1N0. He is asymptomatic. 2) Prognosis / Disease Status: Appears to have early stage rectal cancer likely. 3) Work-up:    Labs: CBC, CMP, CEA and PSA   Imaging: Staging rectal MRI. Procedures: Colonoscopy and rectal EUS done   Consults: Consult Dr. Margarette Tse colorectal surgery at [facility] as this patient may be a candidate for Endo mucosal resection.    Other: None    4) Symptom Management: He is asymptomatic      5) Supportive care provided. Level of care is appropriate. Teaching done today. Reviewed that this may be a dysplastic polyp that given time could become invasive. It is also likely that he could already have invasive disease therefore more aggressive complete biopsy is in order. Options include a left colectomy versus perhaps other mucosal resection. Patient would like outside referral for the latter consideration        6) Treatment goal:      Treatment plan:      Pending completion of diagnostic and staging work-up. 7) Medications reviewed. Prescriptions today: None            No orders of the defined types were placed in this encounter. OARRS:  Controlled Substance Monitoring:    Acute and Chronic Pain Monitoring:   No flowsheet data found. 8) Research Options:   Not applicable      9) Other:       Large prostate on imaging therefore check PSA. 10) Follow Up: Follow-up with me in 2 weeks to review MRI findings. Referral made to 5/10/2022 to Dr. Meron Williamson colorectal surgery at [facility].         Carmina Mahajan MD

## 2022-05-10 NOTE — PROGRESS NOTES
Name: Christi Motnanez  : 1945  MRN: [id number]    Oncology Navigation- Initial Note:    Intake-  Contact Type: Medical Oncology    Diagnosis: GI- malignant- colorectal  DX with routine colonoscopy- NO SXS    Home Disposition: Lives with other who is able to assist  -[address]  -sons nearby  -Nonsmoking- 1 ppd x30 yrs; quit in     Patient needs and barriers to care: Coordination of Care and Knowledge deficit     Referral Source: Outpatient    Receptive to Advanced Care Planning/ Palliative Care:  deferred    Interventions-   General Interventions: [facility] program discussed; welcome folder given & reviewed, including contact info     Education/Screenings:  yes -     ONC POC:  - MRI- Rectal  -Labwork'  -Return to review results     Referrals: Supportive Therapies      Continuum of Care: Diagnosis/Active Treatment    Notes: Frantz following to assist & support as needed.     Electronically signed by Dale Juárez RN on 5/10/2022 at 4:23 PM

## 2022-05-13 ENCOUNTER — TELEPHONE (OUTPATIENT)
Dept: ONCOLOGY | Age: 77
End: 2022-05-13

## 2022-05-19 ENCOUNTER — HOSPITAL ENCOUNTER (OUTPATIENT)
Dept: MRI IMAGING | Age: 77
Discharge: HOME | End: 2022-05-19
Payer: MEDICARE

## 2022-05-19 DIAGNOSIS — C20 RECTAL CANCER (HCC): ICD-10-CM

## 2022-05-19 DIAGNOSIS — Z12.5 SCREENING PSA (PROSTATE SPECIFIC ANTIGEN): ICD-10-CM

## 2022-05-19 PROCEDURE — 6360000004 HC RX CONTRAST MEDICATION: Performed by: INTERNAL MEDICINE

## 2022-05-19 PROCEDURE — 72197 MRI PELVIS W/O & W/DYE: CPT

## 2022-05-19 PROCEDURE — A9579 GAD-BASE MR CONTRAST NOS,1ML: HCPCS | Performed by: INTERNAL MEDICINE

## 2022-05-19 RX ADMIN — GADOTERIDOL 15 ML: 279.3 INJECTION, SOLUTION INTRAVENOUS at 15:55

## 2022-05-26 ENCOUNTER — OFFICE VISIT (OUTPATIENT)
Dept: ONCOLOGY | Age: 77
End: 2022-05-26
Payer: MEDICARE

## 2022-05-26 ENCOUNTER — HOSPITAL ENCOUNTER (OUTPATIENT)
Dept: INFUSION THERAPY | Age: 77
Discharge: HOME | End: 2022-05-26
Payer: MEDICARE

## 2022-05-26 VITALS
TEMPERATURE: 97.5 F | DIASTOLIC BLOOD PRESSURE: 70 MMHG | WEIGHT: 152 LBS | HEART RATE: 72 BPM | BODY MASS INDEX: 25.95 KG/M2 | SYSTOLIC BLOOD PRESSURE: 110 MMHG | OXYGEN SATURATION: 94 % | HEIGHT: 64 IN | RESPIRATION RATE: 16 BRPM

## 2022-05-26 VITALS
TEMPERATURE: 97.5 F | DIASTOLIC BLOOD PRESSURE: 70 MMHG | SYSTOLIC BLOOD PRESSURE: 110 MMHG | RESPIRATION RATE: 16 BRPM | HEART RATE: 72 BPM

## 2022-05-26 DIAGNOSIS — N42.89 PROSTATE MASS: Primary | ICD-10-CM

## 2022-05-26 PROCEDURE — G8417 CALC BMI ABV UP PARAM F/U: HCPCS | Performed by: INTERNAL MEDICINE

## 2022-05-26 PROCEDURE — 1123F ACP DISCUSS/DSCN MKR DOCD: CPT | Performed by: INTERNAL MEDICINE

## 2022-05-26 PROCEDURE — 99214 OFFICE O/P EST MOD 30 MIN: CPT | Performed by: INTERNAL MEDICINE

## 2022-05-26 PROCEDURE — G8427 DOCREV CUR MEDS BY ELIG CLIN: HCPCS | Performed by: INTERNAL MEDICINE

## 2022-05-26 PROCEDURE — 99211 OFF/OP EST MAY X REQ PHY/QHP: CPT

## 2022-05-26 PROCEDURE — 1036F TOBACCO NON-USER: CPT | Performed by: INTERNAL MEDICINE

## 2022-05-26 NOTE — PROGRESS NOTES
[address] CANCER [address] [place] [address]  Dept: [phone]  Loc: [phone]   Hematology/Oncology Consult (Clinic)        5/10/22     Laury Harris   1945     No ref. provider found   Guerline Chowdary       Reason: New diagnosis of rectal cancer referred for evaluation and treatment. Chief Complaint   Patient presents with    Follow-up     Rectal cancer [facility])    Results     MRI pelvis 5/19      DIAGNOSIS:  -Rectal adenocarcinoma. Local EUS staging T1N0. Local MRI stagingT2N0    -1 cm suspicious prostate lesion found on staging MRI of the pelvis. (Urology referral). PSA = 3.18    TREATMENT:  -Colorectal Dr. Lizeth Holliday surgery referral to [facility]  -Urology consultation    SUBJECTIVE: Patient here for results of his recent MRI pelvis rectal staging results. Patient aware that the MRI suggested T2 lesion therefore endoscopic resection less likely. Still prefer referral to colorectal surgery at [facility]. Rectal stage MRI also showed a 1 cm focal lesion right posterior transition zone apex prostate felt to be suspicious. Patient agrees to urology consultation. PSA is 3.2 no urinary complaints. HPI: Jazmyne Hawkins is a relatively healthy 70-year-old gentleman with a prior history of polyps but no family history of colon cancer. He underwent a routine screening colonoscopy on 4/18/2022. He is asymptomatic with no observation of bright red blood per rectum, melena or difficulty passing stool. The colonoscopy revealed a large 2 to 3 cm mass in the rectum at 5% 5 to 7 cm from the anus. It was, incompletely removed by cold snare polypectomy. Pathology revealed a tubular adenoma with high-grade dysplasia. MRI of the abdomen with contrast 4/20/2022 ordered by another provider showed sludge versus small stones within the gallbladder. Stable benign subcentimeter nonenhancing simple hepatic cysts.   On 4/25/2022 he underwent a staging Attends Taoism Services: Not on file    Active Member of Clubs or Organizations: Not on file    Attends Club or Organization Meetings: Not on file    Marital Status: Not on file   Intimate Partner Violence:     Fear of Current or Ex-Partner: Not on file    Emotionally Abused: Not on file    Physically Abused: Not on file    Sexually Abused: Not on file   Housing Stability:     Unable to Pay for Housing in the Last Year: Not on file    Number of [place] in the Last Year: Not on file    Unstable Housing in the Last Year: Not on file        Lexie Perez    Phone: [phone] [phone] [address] [employer]     Employment:  Ret . Immunizations:  Immunization History   Administered Date(s) Administered    COVID-19, [employer] top, DO NOT Dilute, Levar-Sucrose, 12+ yrs, PF, 30 mcg/0.3 mL dose 03/30/2022    COVID-19, Pfizer Purple top, DILUTE for use, 12+ yrs, 30mcg/0.3mL dose 01/29/2021, 02/19/2021, 10/10/2021    Influenza Vaccine, unspecified formulation 10/30/2016, 10/11/2018        Health Screenings:    C-Scope: 4/18/2022 Dr. Rula Vasquez  Prostate: Not evaluated        Health Maintenance Due   Topic Date Due    Annual Wellness Visit (AWV)  Never done    Depression Screen  Never done    Hepatitis C screen  Never done    DTaP/Tdap/Td vaccine (1 - Tdap) Never done    Shingles vaccine (1 of 2) Never done    Lipids  04/14/2015        Interests:   cars    Fam HX:   Family History   Problem Relation Age of Onset    High Cholesterol Father     Cancer Sister         lung    Cancer Brother         prostate    Heart Disease Brother         Hospitalizations:   None recent; Lung reduction  3/21. Allergies:   Allergies   Allergen Reactions    Cefpodoxime Diarrhea    Flonase [Fluticasone Propionate]      Pt shakes and feels anxious    Mometasone     Mometasone Furoate      Shakes,feels anxious    Spiriva Handihaler [Tiotropium Bromide Monohydrate]     Umeclidinium [place]         Adult Illness:  Patient Active Problem List   Diagnosis    Steroid-dependent COPD ([address]    Atherosclerosis    Wheezing    Preop testing    Nasal congestion    Deviated nasal septum    Tinnitus    Hypercalcemia    Chronic respiratory failure with hypoxia, on home O2 therapy (HCC)    HTN (hypertension)    Precordial pain    Chest pain    Epigastric pain    Coronary artery disease with angina pectoris (HCC)    HLD (hyperlipidemia)    Opacities of both lungs present on chest x-ray    Constipated    Activity intolerance    Angina, class III (HCC)    Angina of effort ([address]    Bullous emphysema ([address]    Personal history of tobacco use        Surgery:  Past Surgical History:   Procedure Laterality Date    CARDIAC CATHETERIZATION  2012    STENT    COLONOSCOPY  04/18/2022    ENDOSCOPY, COLON, DIAGNOSTIC  11/10/2021    FINGER SURGERY Right 12/2018    LUNG REMOVAL, PARTIAL  2021    30% resection [facility] dale side of lungs-[facility]    ROTATOR CUFF REPAIR Right     UPPER GASTROINTESTINAL ENDOSCOPY N/A 04/25/2022    RECTAL EUS performed by Mary Lazo MD at [facility]        Medications:  Current Outpatient Medications   Medication Sig Dispense Refill    isosorbide mononitrate (IMDUR) 30 MG extended release tablet TAKE 1 TABLET BY MOUTH  DAILY 90 tablet 2    predniSONE (DELTASONE) 2.5 MG tablet TAKE 1 TABLET BY MOUTH  DAILY 90 tablet 3    metoprolol tartrate (LOPRESSOR) 25 MG tablet TAKE 1 TABLET BY MOUTH  TWICE DAILY 180 tablet 2    clopidogrel (PLAVIX) 75 MG tablet TAKE 1 TABLET BY MOUTH  DAILY 90 tablet 2    pravastatin (PRAVACHOL) 20 MG tablet TAKE 1 TABLET BY MOUTH  DAILY 90 tablet 2    budesonide (PULMICORT) 0.5 MG/2ML nebulizer suspension INHALE ONE VIAL ( 2 MLS ) VIA NEBULIZER BY MOUTH TWO TIMES A  mL 11    albuterol (PROVENTIL) (2.5 MG/3ML) 0.083% nebulizer solution Take 3 mLs by nebulization every 6 hours as needed for Wheezing or Shortness of Breath 360 each 5    PROAIR RESPICLICK 756 (90 Base) MCG/ACT aerosol powder inhalation INHALE 2 PUFFS INTO THE  LUNGS EVERY 6 HOURS AS  NEEDED FOR WHEEZING OR  SHORTNESS OF BREATH 4 each 3    pantoprazole (PROTONIX) 40 MG tablet Take 1 tablet by mouth 2 times daily (before meals) 60 tablet 3    alendronate (FOSAMAX) 70 MG tablet Take 1 tablet by mouth every 7 days 12 tablet 1    guaiFENesin (MUCINEX) 600 MG extended release tablet Take 600 mg by mouth Daily      Calcium Carb-Cholecalciferol (CALCIUM 600 + D PO) Take 600 mg by mouth daily      Oxygen Concentrator 2 L by Nasal route as needed.  nitroGLYCERIN (NITROSTAT) 0.4 MG SL tablet Place 1 tablet under the tongue every 5 minutes as needed. (Patient not taking: Reported on 5/10/2022) 25 tablet 3     No current facility-administered medications for this visit. EXAM:   height is 5' 4\" (1.626 m) and weight is 152 lb (68.9 kg). His oral temperature is 97.5 °F (36.4 °C). His blood pressure is 110/70 and his pulse is 72. His respiration is 16 and oxygen saturation is 94%. Estimated body surface area is 1.76 meters squared as calculated from the following:    Height as of this encounter: 5' 4\" (1.626 m). Weight as of this encounter: 152 lb (68.9 kg). ECO  General: Non-ill appearing. Very pleasant relaxed and in no distress. HEENT: NC/AT,nonicteric,  Neck: normal thyroid, no masses. Nodes: No adenopathy  Lungs/chest: clear, no rales,rhonchi or wheezing, lung bases clear  CV: rrr, no rubs ,gallops or murmurs  Breasts: Not examined  Abd/Rectal: soft, non-tender,bowel sounds normal , no HSM,no masses  Back: normal curvature, No midline tenderness. flanks nontender  : Not Examined  Extremities: no cyanosis,clubbing or edema. Skin: unremarkable  Neuro: A and O x 4, CN exam nonfocal, Motor- no deficits, Sensory- no deficits, gait-nl, speech- fluent, no ataxia.   Devices: none      DATA:    LAB:   CEA= 2.8  PSA = 3.18    CBC with Differential:      Lab Results   Component Value Date    WBC 6.1 05/10/2022    RBC 4.40 05/10/2022    RBC 2.76 03/24/2012    HGB 13.3 05/10/2022    HCT 41.6 05/10/2022     05/10/2022    MCV 95 05/10/2022    MCH 30.2 05/10/2022    MCHC 32.0 05/10/2022    RDW 14.3 05/10/2022    NRBC 0 04/29/2020    NRBC 0 03/20/2012    SEGSPCT 75.3 04/29/2020    MONOPCT 8.0 04/29/2020    MONOSABS 0.5 05/10/2022    LYMPHSABS 1.1 05/10/2022    EOSABS 0.1 05/10/2022    BASOSABS 0.0 05/10/2022     Lab Results   Component Value Date/Time    SEGSABS 4.3 05/10/2022 03:00 PM       CMP:    Lab Results   Component Value Date     05/10/2022     06/08/2020    K 4.7 05/10/2022    K 4.2 06/08/2020     06/08/2020    CO2 29 06/08/2020    BUN 16 06/08/2020    CREATININE 0.9 05/10/2022    CREATININE 0.9 06/08/2020    LABGLOM 82 06/08/2020    GLUCOSE 90 06/08/2020    GLUCOSE 88 03/24/2012    PROT 6.6 05/10/2022    PROT 6.2 04/14/2014    LABALBU 4.4 05/10/2022    LABALBU 4.1 03/20/2012    CALCIUM 9.7 06/08/2020    BILITOT 0.4 05/10/2022    ALKPHOS 52 05/10/2022    AST 23 05/10/2022    ALT 20 05/10/2022       BMP:    Lab Results   Component Value Date     05/10/2022     06/08/2020    K 4.7 05/10/2022    K 4.2 06/08/2020     06/08/2020    CO2 29 06/08/2020    BUN 16 06/08/2020    LABALBU 4.4 05/10/2022    LABALBU 4.1 03/20/2012    CREATININE 0.9 05/10/2022    CREATININE 0.9 06/08/2020    CALCIUM 9.7 06/08/2020    LABGLOM 82 06/08/2020    GLUCOSE 90 06/08/2020    GLUCOSE 88 03/24/2012       Magnesium:    Lab Results   Component Value Date    MG 2.0 04/29/2020     PT/INR:    Lab Results   Component Value Date    INR 1.00 06/08/2020     TSH:    Lab Results   Component Value Date    TSH 0.844 04/29/2020           IMAGING:    CT CHEST W CONTRAST  Result Date: 5/4/2022  1. No acute intrathoracic process. Severe centrilobular emphysema. No pulmonary mass or nodules observed. 2. No metastatic disease identified.  3. Scattered hypoattenuating at the level of the anterior peritoneal reflection. No suspicious lymph nodes described. Per my discussion with the radiologist although not in the report he describes as a T2N0 lesion    PROCEDURES:  -Colonoscopy  -Endoscopic ultrasound of the rectum  -Rectal staging MRI 5/19/2022    PATHOLOGY:   Path report by gastro health lab in [facility] 4/18/2022  Rectum, mass biopsy: Tubular adenoma with high-grade dysplasia; cannot exclude malignancy. Multiple polypoid fragments of colorectal mucosa present with adenomatous dysplasia and predominantly tubular formations. Occasional foci are present with admixed architectural and cytologic high-grade dysplasia. No invasion of dysplastic glands through the muscularis mucosa present. The polypectomy margin cannot be assessed due to specimen fragmentation. GENETICS:  None    MOLECULAR:  None    ASSESSMENT/PLAN:    1: Diagnosis: 65-year-old gentleman with a screening colonoscopy that shows a 2 to 3 cm mass at about 6 cm from the anal verge. Path above shows a tubular adenoma with high-grade dysplasia; cannot exclude malignancy. EUS done as above T1N0. He is asymptomatic. Staging rectal MRI references this is a T2N0 lesion. Also, incidental finding of a suspicious prostate lesion measuring 1 cm with a PSA of 3.2. 2) Prognosis / Disease Status: Appears to have early stage rectal cancer likely. T2 therefore not likely to be a candidate for endoscopic resection unfortunately. Also has already prostate lesion that needs evaluation by urology. 3) Work-up:    Labs: Labs reviewed above   Imaging: See above   Procedures: Colonoscopy and rectal EUS done   Consults: Consult Dr. Gerhard Hunter colorectal surgery at [facility] even though based on their staging rectal MRI the patient appears no longer to be a candidate for Endo mucosal resection.    Other: Neurology referral because of the suspicious lesion in the prostate found on the MRI of the pelvis done for rectal staging on 5/19. 4) Symptom Management: He is asymptomatic      5) Supportive care provided. Level of care is appropriate. Teaching done today. Reviewed that if this stays a T2 lesion after surgery he would not need chemo or radiation. If this is a T3 lesion he would need postsurgical adjuvant chemoradiation. Prostate lesion also needs evaluation by urology with biopsy etc.    6) Treatment goal:      Treatment plan:  Pending surgical result      7) Medications reviewed. Prescriptions today: None            No orders of the defined types were placed in this encounter. OARRS:  Controlled Substance Monitoring:    Acute and Chronic Pain Monitoring:   No flowsheet data found. 8) Research Options:   Not applicable      9) Other:       See above    10) Follow Up:  Referral made to [address] [place] [address] on May 10. Patient has not yet heard regarding an appointment with colorectal surgery. We will follow-up. Ask about urology consultation as well.   Routine follow-up with me in approximately 2 months        Jane Hanna MD

## 2022-05-26 NOTE — PATIENT INSTRUCTIONS
Referred to Dr. Brandon Mcclure. Patient has not heard. Please follow-up regarding this consultation appointment  Urology consultation without delay. Prostate mass on rectal MRI staging. Routine follow-up with me in 2 months.

## 2022-06-03 ENCOUNTER — TELEPHONE (OUTPATIENT)
Dept: ONCOLOGY | Age: 77
End: 2022-06-03

## 2022-06-03 NOTE — TELEPHONE ENCOUNTER
Called and talked to [phone] stated they received the  Referral. Just need to review the referral and will contact the pt.      Will continue to monitor

## 2022-06-06 ENCOUNTER — OFFICE VISIT (OUTPATIENT)
Dept: UROLOGY | Age: 77
End: 2022-06-06
Payer: MEDICARE

## 2022-06-06 VITALS
BODY MASS INDEX: 25.44 KG/M2 | HEIGHT: 64 IN | WEIGHT: 149 LBS | DIASTOLIC BLOOD PRESSURE: 68 MMHG | SYSTOLIC BLOOD PRESSURE: 110 MMHG

## 2022-06-06 DIAGNOSIS — R97.20 ELEVATED PSA: ICD-10-CM

## 2022-06-06 DIAGNOSIS — N42.89 PROSTATE MASS: Primary | ICD-10-CM

## 2022-06-06 PROCEDURE — G8417 CALC BMI ABV UP PARAM F/U: HCPCS | Performed by: UROLOGY

## 2022-06-06 PROCEDURE — 1036F TOBACCO NON-USER: CPT | Performed by: UROLOGY

## 2022-06-06 PROCEDURE — 1123F ACP DISCUSS/DSCN MKR DOCD: CPT | Performed by: UROLOGY

## 2022-06-06 PROCEDURE — G8427 DOCREV CUR MEDS BY ELIG CLIN: HCPCS | Performed by: UROLOGY

## 2022-06-06 PROCEDURE — 99204 OFFICE O/P NEW MOD 45 MIN: CPT | Performed by: UROLOGY

## 2022-06-06 NOTE — PROGRESS NOTES
Valery Drew MD.    [address] [address]  Dept: [phone]  Dept Fax: [phone]  Loc: [facility] Urology Office Note -     Patient:  Duncan Christine  YOB: 1945    The patient is a 67 y.o. male who presents today for evaluation of the following problems:   Chief Complaint   Patient presents with    New Patient     referred by Ana Taveras MD for prostate mass.  Rectal Cancer    referred/consultation requested by [facility]. History of Present Illness:    Prostate mass  Hx of rectal cancer  Incidentally found prostate lesion on scan  Follows with Dr Barak Chávez    Elevated PSA  No biopsy in the past    Diagnosis of rectal cancer  Following up with [facility] soon for this    Requested/reviewed records from [facility] office and/or outside physician/EMR    (Patient's old records have been requested, reviewed and pertinent findings summarized in today's note.)    Procedures Today: N/A      Last several PSA's:  Lab Results   Component Value Date    PSA 3.18 (H) 05/10/2022       Last total testosterone:  No results found for: TESTOSTERONE    Urinalysis today:  No results found for this visit on 06/06/22. Last BUN and creatinine:  Lab Results   Component Value Date    BUN 16 06/08/2020     Lab Results   Component Value Date    CREATININE 0.9 05/10/2022         Imaging Reviewed during this Office Visit:   Ashley Bennett MD independently reviewed the images and verified the radiology reports from:    No results found.     PAST MEDICAL, FAMILY AND SOCIAL HISTORY:  Past Medical History:   Diagnosis Date    CAD (coronary artery disease)     dr Mamadou Hartman Colon cancer [facility]) 04/2022    COPD (chronic obstructive pulmonary disease) ([facility]     E jossie    Hyperlipidemia     Hypertension     Pneumonia      Past Surgical History:   Procedure Laterality Date    CARDIAC CATHETERIZATION  2012 STENT    COLONOSCOPY  04/18/2022    ENDOSCOPY, COLON, DIAGNOSTIC  11/10/2021    FINGER SURGERY Right 12/2018    LUNG REMOVAL, PARTIAL  2021    30% [facility] dale side of lungs-[facility]    ROTATOR CUFF REPAIR Right     UPPER GASTROINTESTINAL ENDOSCOPY N/A 04/25/2022    RECTAL EUS performed by Colt Abbott MD at [facility]     Family History   Problem Relation Age of Onset    High Cholesterol Father     Cancer Sister         lung    Cancer Brother         prostate    Heart Disease Brother      Outpatient Medications Marked as Taking for the 6/6/22 encounter (Office Visit) with Mckenzie Saha MD   Medication Sig Dispense Refill    isosorbide mononitrate (IMDUR) 30 MG extended release tablet TAKE 1 TABLET BY MOUTH  DAILY 90 tablet 2    predniSONE (DELTASONE) 2.5 MG tablet TAKE 1 TABLET BY MOUTH  DAILY 90 tablet 3    metoprolol tartrate (LOPRESSOR) 25 MG tablet TAKE 1 TABLET BY MOUTH  TWICE DAILY 180 tablet 2    clopidogrel (PLAVIX) 75 MG tablet TAKE 1 TABLET BY MOUTH  DAILY 90 tablet 2    pravastatin (PRAVACHOL) 20 MG tablet TAKE 1 TABLET BY MOUTH  DAILY 90 tablet 2    budesonide (PULMICORT) 0.5 MG/2ML nebulizer suspension INHALE ONE VIAL ( 2 MLS ) VIA NEBULIZER BY MOUTH TWO TIMES A  mL 11    albuterol (PROVENTIL) (2.5 MG/3ML) 0.083% nebulizer solution Take 3 mLs by nebulization every 6 hours as needed for Wheezing or Shortness of Breath 360 each 5    PROAIR RESPICLICK 476 (90 Base) MCG/ACT aerosol powder inhalation INHALE 2 PUFFS INTO THE  LUNGS EVERY 6 HOURS AS  NEEDED FOR WHEEZING OR  SHORTNESS OF BREATH 4 each 3    pantoprazole (PROTONIX) 40 MG tablet Take 1 tablet by mouth 2 times daily (before meals) 60 tablet 3    guaiFENesin (MUCINEX) 600 MG extended release tablet Take 600 mg by mouth Daily      Calcium Carb-Cholecalciferol (CALCIUM 600 + D PO) Take 600 mg by mouth daily      nitroGLYCERIN (NITROSTAT) 0.4 MG SL tablet Place 1 tablet under the tongue every 5 minutes as needed. 25 tablet 3    Oxygen Concentrator 2 L by Nasal route as needed. Cefpodoxime, Flonase [fluticasone propionate], Mometasone, Mometasone furoate, Spiriva handihaler [tiotropium bromide monohydrate], and Umeclidinium bromide  Social History     Tobacco Use   Smoking Status Former Smoker    Packs/day: 1.00    Years: 30.00    Pack years: 30.00    Types: Cigarettes    Start date: 5/3/1953   Connye Sole Quit date: 1983    Years since quittin.0   Smokeless Tobacco Never Used      (If patient a smoker, smoking cessation counseling offered)   Social History     Substance and Sexual Activity   Alcohol Use No    Alcohol/week: 0.0 standard drinks    Types: 1 - 2 Cans of beer per week       REVIEW OF SYSTEMS:  Constitutional: negative  Eyes: negative  Respiratory: negative  Cardiovascular: negative  Gastrointestinal: negative  Genitourinary: see HPI  Musculoskeletal: negative  Skin: negative   Neurological: negative  Hematological/Lymphatic: negative  Psychological: negative        Physical Exam:    This a 67 y.o. male  Vitals:    22 1337   BP: 110/68     Body mass index is 25.58 kg/m². Assessment and Plan        1. Prostate mass    2. Elevated PSA               Plan:       Some mild/moderate LUTS. Will call if he wants flomax. Getting surgery for rectal cancer soon  Will follow PSA and prostate lesion as he has more concerning malignancy that needs treatment at this time  Will possibly need prostate MRI in future- may/may not consider biopsy depending on life expectancy and performance status over these coming months. PSA in six months      Prescriptions Ordered:  No orders of the defined types were placed in this encounter. Orders Placed:  No orders of the defined types were placed in this encounter.            Tj Hernandez MD

## 2022-06-13 ENCOUNTER — TELEPHONE (OUTPATIENT)
Dept: ONCOLOGY | Age: 77
End: 2022-06-13

## 2022-07-05 ENCOUNTER — TELEPHONE (OUTPATIENT)
Dept: CARDIOLOGY CLINIC | Age: 77
End: 2022-07-05

## 2022-07-05 NOTE — TELEPHONE ENCOUNTER
Pre op Risk Assessment    Procedure EXCISION OF RECTUM TUMOR  Physician DR Kierra Giordano  Date of surgery/procedure 7-    Last OV 12-14-21  Last Stress 4-30-20  Last Echo 4-30-20  Last Cath 6-8-20  Last Stent 7-9-13  Is patient on blood thinners PLAVIX  Hold Meds/how many days ? ? Mamie Braden [phone]    ALSO ASKING FOR MOST RECENT EKG.

## 2022-07-28 ENCOUNTER — HOSPITAL ENCOUNTER (OUTPATIENT)
Dept: INFUSION THERAPY | Age: 77
Discharge: HOME | End: 2022-07-28
Payer: MEDICARE

## 2022-07-28 ENCOUNTER — OFFICE VISIT (OUTPATIENT)
Dept: ONCOLOGY | Age: 77
End: 2022-07-28
Payer: MEDICARE

## 2022-07-28 VITALS
RESPIRATION RATE: 16 BRPM | SYSTOLIC BLOOD PRESSURE: 104 MMHG | WEIGHT: 148 LBS | BODY MASS INDEX: 25.27 KG/M2 | HEART RATE: 86 BPM | HEIGHT: 64 IN | OXYGEN SATURATION: 94 % | TEMPERATURE: 98.6 F | DIASTOLIC BLOOD PRESSURE: 60 MMHG

## 2022-07-28 VITALS
RESPIRATION RATE: 16 BRPM | TEMPERATURE: 98.6 F | SYSTOLIC BLOOD PRESSURE: 104 MMHG | DIASTOLIC BLOOD PRESSURE: 60 MMHG | HEART RATE: 86 BPM

## 2022-07-28 DIAGNOSIS — C20 RECTAL CANCER (HCC): Primary | ICD-10-CM

## 2022-07-28 DIAGNOSIS — N42.89 PROSTATE MASS: ICD-10-CM

## 2022-07-28 PROCEDURE — 99214 OFFICE O/P EST MOD 30 MIN: CPT | Performed by: INTERNAL MEDICINE

## 2022-07-28 PROCEDURE — 99211 OFF/OP EST MAY X REQ PHY/QHP: CPT

## 2022-07-28 PROCEDURE — 1123F ACP DISCUSS/DSCN MKR DOCD: CPT | Performed by: INTERNAL MEDICINE

## 2022-07-28 NOTE — PROGRESS NOTES
[address] CANCER [address] [place] [address]  Dept: [phone]  Loc: [phone]   Hematology/Oncology Consult (Clinic)        5/10/22     Lori Ramos   1945     No ref. provider found   Surendra Maddox       Reason: New diagnosis of rectal cancer referred for evaluation and treatment. Chief Complaint   Patient presents with    Follow-up     Rectal cancer [facility])      DIAGNOSIS:  -Rectal Rectum, mass biopsy: Tubular adenoma with high-grade dysplasia; cannot exclude malignancy. Transanal reexcision with Dr. Miguelina Shi at [facility]  7/18/22 shows no residual malignancy. Local EUS staging T1N0. Local MRI staging-T2N0    -1 cm suspicious prostate lesion found on staging MRI of the pelvis. (Urology referral). PSA = 3.18  Being followed with surveillance by Dr. Jina Rodgers of urology. TREATMENT:  -Colorectal Dr. Miguelina Shi surgery referral to [facility]; continue excision 7/18/2022 showed negative for carcinoma or dysplasia. -Urology consultation;     SUBJECTIVE: Patient has follow-up August 17 with Dr. Miguelina Shi. I did find the path that showed no residual malignancy and therefore he certainly needs no additional therapy beyond the excision that he had for this premalignant lesion. He also has follow-up with urology regarding the prostate nodule found on imaging and mildly elevated PSA. HPI: [place] is a relatively healthy 39-year-old gentleman with a prior history of polyps but no family history of colon cancer. He underwent a routine screening colonoscopy on 4/18/2022. He is asymptomatic with no observation of bright red blood per rectum, melena or difficulty passing stool. The colonoscopy revealed a large 2 to 3 cm mass in the rectum at 5% 5 to 7 cm from the anus. It was, incompletely removed by cold snare polypectomy. Pathology revealed a tubular adenoma with high-grade dysplasia.   MRI of the abdomen with contrast 4/20/2022 ordered by mcg/0.3 mL 03/30/2022    COVID-19, PFIZER PURPLE top, DILUTE for use, (age 15 y+), 30mcg/0.3mL 01/29/2021, 02/19/2021, 10/10/2021    Influenza Vaccine, unspecified formulation 10/30/2016, 10/11/2018        Health Screenings:    C-Scope: 4/18/2022 Dr. Marisa Wang  Prostate: Not evaluated        Health Maintenance Due   Topic Date Due    Annual Wellness Visit (AWV)  Never done    Depression Screen  Never done    Hepatitis C screen  Never done    DTaP/Tdap/Td vaccine (1 - Tdap) Never done    Shingles vaccine (1 of 2) Never done    Lipids  04/14/2015        Interests:   cars    Fam HX:   Family History   Problem Relation Age of Onset    High Cholesterol Father     Cancer Sister         lung    Cancer Brother         prostate    Heart Disease Brother         Hospitalizations:   None recent; Lung reduction  3/21. Allergies:   Allergies   Allergen Reactions    Cefpodoxime Diarrhea    Flonase [Fluticasone Propionate]      Pt shakes and feels anxious    Mometasone     Mometasone Furoate      Shakes,feels anxious    Spiriva Handihaler [Tiotropium Bromide Monohydrate]     Umeclidinium Bromide         Adult Illness:  Patient Active Problem List   Diagnosis    Steroid-dependent COPD ([address]    Atherosclerosis    Wheezing    Preop testing    Nasal congestion    Deviated nasal septum    Tinnitus    Hypercalcemia    Chronic respiratory failure with hypoxia, on home O2 therapy (HCC)    HTN (hypertension)    Precordial pain    Chest pain    Epigastric pain    Coronary artery disease with angina pectoris (HCC)    HLD (hyperlipidemia)    Opacities of both lungs present on chest x-ray    Constipated    Activity intolerance    Angina, class III (HCC)    Angina of effort ([address]    Bullous emphysema ([address]    Personal history of tobacco use        Surgery:  Past Surgical History:   Procedure Laterality Date    CARDIAC CATHETERIZATION  2012    STENT    COLONOSCOPY  04/18/2022    ENDOSCOPY, COLON, DIAGNOSTIC  11/10/2021    FINGER SURGERY Right 12/2018    LUNG REMOVAL, PARTIAL  2021    30% resection done on [place] side of lungs-[facility]    RECTAL SURGERY  07/18/2022    excision rectal tumor-rectal approach-[facility]    ROTATOR CUFF REPAIR Right     UPPER GASTROINTESTINAL ENDOSCOPY N/A 04/25/2022    RECTAL EUS performed by Narendra Russell MD at [facility]        Medications:  Current Outpatient Medications   Medication Sig Dispense Refill    isosorbide mononitrate (IMDUR) 30 MG extended release tablet TAKE 1 TABLET BY MOUTH  DAILY 90 tablet 2    predniSONE (DELTASONE) 2.5 MG tablet TAKE 1 TABLET BY MOUTH  DAILY 90 tablet 3    metoprolol tartrate (LOPRESSOR) 25 MG tablet TAKE 1 TABLET BY MOUTH  TWICE DAILY 180 tablet 2    clopidogrel (PLAVIX) 75 MG tablet TAKE 1 TABLET BY MOUTH  DAILY 90 tablet 2    pravastatin (PRAVACHOL) 20 MG tablet TAKE 1 TABLET BY MOUTH  DAILY 90 tablet 2    budesonide (PULMICORT) 0.5 MG/2ML nebulizer suspension INHALE ONE VIAL ( 2 MLS ) VIA NEBULIZER BY MOUTH TWO TIMES A  mL 11    albuterol (PROVENTIL) (2.5 MG/3ML) 0.083% nebulizer solution Take 3 mLs by nebulization every 6 hours as needed for Wheezing or Shortness of Breath 360 each 5    PROAIR RESPICLICK 445 (90 Base) MCG/ACT aerosol powder inhalation INHALE 2 PUFFS INTO THE  LUNGS EVERY 6 HOURS AS  NEEDED FOR WHEEZING OR  SHORTNESS OF BREATH 4 each 3    pantoprazole (PROTONIX) 40 MG tablet Take 1 tablet by mouth 2 times daily (before meals) 60 tablet 3    alendronate (FOSAMAX) 70 MG tablet Take 1 tablet by mouth every 7 days 12 tablet 1    guaiFENesin (MUCINEX) 600 MG extended release tablet Take 600 mg by mouth Daily      Calcium Carb-Cholecalciferol (CALCIUM 600 + D PO) Take 600 mg by mouth daily      Oxygen Concentrator 2 L by Nasal route as needed. nitroGLYCERIN (NITROSTAT) 0.4 MG SL tablet Place 1 tablet under the tongue every 5 minutes as needed. (Patient not taking: Reported on 7/28/2022) 25 tablet 3     No current facility-administered medications for this visit. EXAM:   height is 5' 4\" (1.626 m) and weight is 148 lb (67.1 kg). His oral temperature is 98.6 °F (37 °C). His blood pressure is 104/60 and his pulse is 86. His respiration is 16 and oxygen saturation is 94%. Estimated body surface area is 1.74 meters squared as calculated from the following:    Height as of this encounter: 5' 4\" (1.626 m). Weight as of this encounter: 148 lb (67.1 kg). ECO  General: Non-ill appearing. Very pleasant relaxed and in no distress. HEENT: NC/AT,nonicteric,  Neck: normal thyroid, no masses. Nodes: No adenopathy  Lungs/chest: clear, no rales,rhonchi or wheezing, lung bases clear  CV: rrr, no rubs ,gallops or murmurs  Breasts: Not examined  Abd/Rectal: soft, non-tender,bowel sounds normal , no HSM,no masses  Back: normal curvature, No midline tenderness. flanks nontender  : Not Examined  Extremities: no cyanosis,clubbing or edema. Skin: unremarkable  Neuro: A and O x 4, CN exam nonfocal, Motor- no deficits, Sensory- no deficits, gait-nl, speech- fluent, no ataxia.   Devices: none      DATA:    LAB:   CEA= 2.8  PSA = 3.18    CBC with Differential:      Lab Results   Component Value Date/Time    WBC 6.1 05/10/2022 03:00 PM    RBC 4.40 05/10/2022 03:00 PM    RBC 2.76 2012 03:32 AM    HGB 13.3 05/10/2022 03:00 PM    HCT 41.6 05/10/2022 03:00 PM     05/10/2022 03:00 PM    MCV 95 05/10/2022 03:00 PM    MCH 30.2 05/10/2022 03:00 PM    MCHC 32.0 05/10/2022 03:00 PM    RDW 14.3 05/10/2022 03:00 PM    NRBC 0 2020 01:00 PM    NRBC 0 2012 05:13 AM    SEGSPCT 75.3 2020 01:00 PM    MONOPCT 8.0 2020 01:00 PM    MONOSABS 0.5 05/10/2022 03:00 PM    LYMPHSABS 1.1 05/10/2022 03:00 PM    EOSABS 0.1 05/10/2022 03:00 PM    BASOSABS 0.0 05/10/2022 03:00 PM     Lab Results   Component Value Date/Time    SEGSABS 4.3 05/10/2022 03:00 PM       CMP:    Lab Results   Component Value Date/Time     05/10/2022 03:00 PM     2020 12:19 PM    K 4.7 05/10/2022 03:00 PM    K 4.2 06/08/2020 12:19 PM     06/08/2020 12:19 PM    CO2 29 06/08/2020 12:19 PM    BUN 16 06/08/2020 12:19 PM    CREATININE 0.9 05/10/2022 03:00 PM    CREATININE 0.9 06/08/2020 12:19 PM    LABGLOM 82 06/08/2020 12:19 PM    GLUCOSE 90 06/08/2020 12:19 PM    GLUCOSE 88 03/24/2012 03:32 AM    PROT 6.6 05/10/2022 02:59 PM    PROT 6.2 04/14/2014 12:00 AM    LABALBU 4.4 05/10/2022 02:59 PM    LABALBU 4.1 03/20/2012 05:13 AM    CALCIUM 9.7 06/08/2020 12:19 PM    BILITOT 0.4 05/10/2022 02:59 PM    ALKPHOS 52 05/10/2022 02:59 PM    AST 23 05/10/2022 02:59 PM    ALT 20 05/10/2022 02:59 PM       BMP:    Lab Results   Component Value Date/Time     05/10/2022 03:00 PM     06/08/2020 12:19 PM    K 4.7 05/10/2022 03:00 PM    K 4.2 06/08/2020 12:19 PM     06/08/2020 12:19 PM    CO2 29 06/08/2020 12:19 PM    BUN 16 06/08/2020 12:19 PM    LABALBU 4.4 05/10/2022 02:59 PM    LABALBU 4.1 03/20/2012 05:13 AM    CREATININE 0.9 05/10/2022 03:00 PM    CREATININE 0.9 06/08/2020 12:19 PM    CALCIUM 9.7 06/08/2020 12:19 PM    LABGLOM 82 06/08/2020 12:19 PM    GLUCOSE 90 06/08/2020 12:19 PM    GLUCOSE 88 03/24/2012 03:32 AM       Magnesium:    Lab Results   Component Value Date/Time    MG 2.0 04/29/2020 01:00 PM     PT/INR:    Lab Results   Component Value Date/Time    INR 1.00 06/08/2020 11:23 AM     TSH:    Lab Results   Component Value Date/Time    TSH 0.844 04/29/2020 01:00 PM           IMAGING:    CT CHEST W CONTRAST  Result Date: 5/4/2022  1. No acute intrathoracic process. Severe centrilobular emphysema. No pulmonary mass or nodules observed. 2. No metastatic disease identified. 3. Scattered hypoattenuating liver lesions some of which are too small to characterize definitively remain stable. 4. Colonic diverticulosis without diverticulitis. Prostatomegaly. Correlate with PSA levels. Multiple stable chronic findings are discussed.  **This report has been created using voice recognition software. It may contain minor errors which are inherent in voice recognition technology. ** Final report electronically signed by Dr Waldemar Koenig on 5/4/2022 2:29 PM    CT ABDOMEN PELVIS W IV CONTRAST Additional Contrast? Oral (IV CONTRAST)  Result Date: 5/4/2022  1. No acute intrathoracic process. Severe centrilobular emphysema. No pulmonary mass or nodules observed. 2. No metastatic disease identified. 3. Scattered hypoattenuating liver lesions some of which are too small to characterize definitively remain stable. 4. Colonic diverticulosis without diverticulitis. Prostatomegaly. Correlate with PSA levels. Multiple stable chronic findings are discussed. **This report has been created using voice recognition software. It may contain minor errors which are inherent in voice recognition technology. ** Final report electronically signed by Dr Waldemar Koenig on 5/4/2022 2:21 PM     MRI abdomen with without contrast, MRCP 4/1/2022     Impression   1. Sludge versus small stones layer within the dependent portion of the gallbladder. No abnormal enhancement, pericholecystic inflammation, biliary ductal dilatation or choledocholithiasis observed. 2. Stable benign subcentimeter nonenhancing simple hepatic cysts. No liver lesions. No acute findings. MRI pelvis rectal staging 5/19/2022  Rectal mass distance of the inferior border to the anal verge 9.6 cm. Distance of the inferior border to the anorectal junction 7.1 cm. Craniocaudal dimension 1.9 cm. Actual measurements 2.2 x 0.9 cm. Rectal wall involvement: Involves the muscularis propria at about the 4 o'clock position without extension into. Mesorectal fat. Described as at the level of the anterior peritoneal reflection. No suspicious lymph nodes described.   Per my discussion with the radiologist although not in the report he describes as a T2N0 lesion    PROCEDURES:  -Colonoscopy  -Endoscopic ultrasound of the rectum  -Rectal staging MRI 5/19/2022    PATHOLOGY:   Path report by gastro [facility] lab in [place] 4/18/2022  Rectum, mass biopsy: Tubular adenoma with high-grade dysplasia; cannot exclude malignancy. Multiple polypoid fragments of colorectal mucosa present with adenomatous dysplasia and predominantly tubular formations. Occasional foci are present with admixed architectural and cytologic high-grade dysplasia. No invasion of dysplastic glands through the muscularis mucosa present. The polypectomy margin cannot be assessed due to specimen fragmentation. GENETICS:  None    MOLECULAR:  None    ASSESSMENT/PLAN:    1: Diagnosis: 34-year-old gentleman with a screening colonoscopy that shows a 2 to 3 cm mass at about 6 cm from the anal verge. Path above shows a tubular adenoma with high-grade dysplasia; cannot exclude malignancy. Transanal excision shows no residual malignancy. .  Also, incidental finding of a suspicious prostate lesion measuring 1 cm with a PSA of 3.2. Patient not being followed by urology regarding this. 2) Prognosis / Disease Status:   3) Work-up:    Labs: Labs reviewed above   Imaging: See above   Procedures: Transanal excision per Dr. Nohemy Vicente at [facility]. Consults: Keep follow-up with Dr. Nohemy Vicente August 17. Other: Urology referral because of the suspicious lesion in the prostate found on the MRI of the pelvis done for rectal staging on 5/19. Defer management to urology. 4) Symptom Management: He is asymptomatic      5) Supportive care provided. Level of care is appropriate. Teaching done today. With Dr. Nohemy Vicente. It appears that he has had this rectal lesion removed before invasion. 6) Treatment goal:      Treatment plan:  No additional therapy needed. 7) Medications reviewed. Prescriptions today: None            No orders of the defined types were placed in this encounter. OARRS:  Controlled Substance Monitoring:    Acute and Chronic Pain Monitoring:   No flowsheet data found.        8)

## 2022-07-28 NOTE — PATIENT INSTRUCTIONS
Neftali gray in September. Follow-up with Dr. Nitin Baca at [facility] in August 17.   Follow-up with me the last week of August

## 2022-08-30 ENCOUNTER — HOSPITAL ENCOUNTER (OUTPATIENT)
Dept: INFUSION THERAPY | Age: 77
Discharge: HOME | End: 2022-08-30
Payer: MEDICARE

## 2022-08-30 ENCOUNTER — OFFICE VISIT (OUTPATIENT)
Dept: ONCOLOGY | Age: 77
End: 2022-08-30
Payer: MEDICARE

## 2022-08-30 VITALS
HEART RATE: 79 BPM | TEMPERATURE: 98.1 F | RESPIRATION RATE: 16 BRPM | SYSTOLIC BLOOD PRESSURE: 122 MMHG | DIASTOLIC BLOOD PRESSURE: 68 MMHG

## 2022-08-30 VITALS
DIASTOLIC BLOOD PRESSURE: 68 MMHG | HEART RATE: 79 BPM | BODY MASS INDEX: 24.59 KG/M2 | TEMPERATURE: 98.1 F | OXYGEN SATURATION: 95 % | SYSTOLIC BLOOD PRESSURE: 122 MMHG | HEIGHT: 64 IN | RESPIRATION RATE: 16 BRPM | WEIGHT: 144 LBS

## 2022-08-30 DIAGNOSIS — C20 RECTAL CANCER (HCC): Primary | ICD-10-CM

## 2022-08-30 PROCEDURE — 99211 OFF/OP EST MAY X REQ PHY/QHP: CPT

## 2022-08-30 PROCEDURE — 1123F ACP DISCUSS/DSCN MKR DOCD: CPT | Performed by: INTERNAL MEDICINE

## 2022-08-30 PROCEDURE — G8420 CALC BMI NORM PARAMETERS: HCPCS | Performed by: INTERNAL MEDICINE

## 2022-08-30 PROCEDURE — 1036F TOBACCO NON-USER: CPT | Performed by: INTERNAL MEDICINE

## 2022-08-30 PROCEDURE — G8427 DOCREV CUR MEDS BY ELIG CLIN: HCPCS | Performed by: INTERNAL MEDICINE

## 2022-08-30 PROCEDURE — 99213 OFFICE O/P EST LOW 20 MIN: CPT | Performed by: INTERNAL MEDICINE

## 2022-08-30 NOTE — PROGRESS NOTES
[address] [place] [address]  Dept: [phone]  Loc: [phone]   Hematology/Oncology Consult (Clinic)        8/30/22     Blake Mayorgaing   1945     No ref. provider found   Maria Esther Benjamin       Reason: New diagnosis of rectal cancer referred for evaluation and treatment. Chief Complaint   Patient presents with    Follow-up     Rectal cancer [facility])      DIAGNOSIS:  -Rectal Rectum, mass biopsy: Tubular adenoma with high-grade dysplasia; cannot exclude malignancy. Transanal reexcision with Dr. Julia Matamoros at [address] [place] [address]  7/18/22 shows no residual malignancy. Local EUS staging T1N0. Local MRI staging-T2N0    -1 cm suspicious prostate lesion found on staging MRI of the pelvis. (Urology referral). PSA = 3.18  Being followed with surveillance by Dr. Paxton Yarbrough of urology. TREATMENT:  -Colorectal Dr. Julia Matamoros surgery referral to [address] [place] [address]; excision 7/18/2022 showed negative for carcinoma or dysplasia. -Urology consultation. SUBJECTIVE: Patient had follow-up August 17 with Dr. Julia Matamoros. The path  showed no residual malignancy and therefore he certainly needs no additional therapy beyond the excision that he had for this premalignant lesion. He also has follow-up with urology regarding the prostate nodule found on imaging and mildly elevated PSA. HPI: Wai Ross is a relatively healthy 50-year-old gentleman with a prior history of polyps but no family history of colon cancer. He underwent a routine screening colonoscopy on 4/18/2022. He is asymptomatic with no observation of bright red blood per rectum, melena or difficulty passing stool. The colonoscopy revealed a large 2 to 3 cm mass in the rectum at 5% 5 to 7 cm from the anus. It was, incompletely removed by cold snare polypectomy. Pathology revealed a tubular adenoma with high-grade dysplasia.   MRI of the abdomen with contrast 4/20/2022 ordered by another provider showed COVID-19, PFIZER PURPLE top, DILUTE for use, (age 15 y+), 30mcg/0.3mL 01/29/2021, 02/19/2021, 10/10/2021    Influenza Vaccine, unspecified formulation 10/30/2016, 10/11/2018        Health Screenings:    C-Scope: 4/18/2022 Dr. Aime Ware  Prostate: Not evaluated        Health Maintenance Due   Topic Date Due    Annual Wellness Visit (AWV)  Never done    Depression Screen  Never done    Hepatitis C screen  Never done    DTaP/Tdap/Td vaccine (1 - Tdap) Never done    Shingles vaccine (1 of 2) Never done    Lipids  04/14/2015        Interests:   cars    Fam HX:   Family History   Problem Relation Age of Onset    High Cholesterol Father     Cancer Sister         lung    Cancer Brother         prostate    Heart Disease Brother         Hospitalizations:   None recent; Lung reduction  3/21. Allergies:   Allergies   Allergen Reactions    Cefpodoxime Diarrhea    Flonase [Fluticasone Propionate]      Pt shakes and feels anxious    Mometasone     Mometasone Furoate      Shakes,feels anxious    Spiriva Handihaler [Tiotropium Bromide Monohydrate]     Umeclidinium Bromide         Adult Illness:  Patient Active Problem List   Diagnosis    Steroid-dependent COPD ([address]    Atherosclerosis    Wheezing    Preop testing    Nasal congestion    Deviated nasal septum    Tinnitus    Hypercalcemia    Chronic respiratory failure with hypoxia, on home O2 therapy (HCC)    HTN (hypertension)    Precordial pain    Chest pain    Epigastric pain    Coronary artery disease with angina pectoris (HCC)    HLD (hyperlipidemia)    Opacities of both lungs present on chest x-ray    Constipated    Activity intolerance    Angina, class III (HCC)    Angina of effort ([address]    Bullous emphysema ([address]    Personal history of tobacco use        Surgery:  Past Surgical History:   Procedure Laterality Date    CARDIAC CATHETERIZATION  2012    STENT    COLONOSCOPY  04/18/2022    ENDOSCOPY, COLON, DIAGNOSTIC  11/10/2021    FINGER SURGERY Right 12/2018    LUNG 5' 4\" (1.626 m) and weight is 144 lb (65.3 kg). His oral temperature is 98.1 °F (36.7 °C). His blood pressure is 122/68 and his pulse is 79. His respiration is 16 and oxygen saturation is 95%. Estimated body surface area is 1.72 meters squared as calculated from the following:    Height as of this encounter: 5' 4\" (1.626 m). Weight as of this encounter: 144 lb (65.3 kg). ECO  General: Non-ill appearing. Very pleasant relaxed and in no distress. Note 2022-not reexamined today  HEENT: NC/AT,nonicteric,  Neck: normal thyroid, no masses. Nodes: No adenopathy  Lungs/chest: clear, no rales,rhonchi or wheezing, lung bases clear  CV: rrr, no rubs ,gallops or murmurs  Breasts: Not examined  Abd/Rectal: soft, non-tender,bowel sounds normal , no HSM,no masses  Back: normal curvature, No midline tenderness. flanks nontender  : Not Examined  Extremities: no cyanosis,clubbing or edema. Skin: unremarkable  Neuro: A and O x 4, CN exam nonfocal, Motor- no deficits, Sensory- no deficits, gait-nl, speech- fluent, no ataxia.   Devices: none      DATA:    LAB:   CEA= 2.8  PSA = 3.18    CBC with Differential:      Lab Results   Component Value Date/Time    WBC 6.1 05/10/2022 03:00 PM    RBC 4.40 05/10/2022 03:00 PM    RBC 2.76 2012 03:32 AM    HGB 13.3 05/10/2022 03:00 PM    HCT 41.6 05/10/2022 03:00 PM     05/10/2022 03:00 PM    MCV 95 05/10/2022 03:00 PM    MCH 30.2 05/10/2022 03:00 PM    MCHC 32.0 05/10/2022 03:00 PM    RDW 14.3 05/10/2022 03:00 PM    NRBC 0 2020 01:00 PM    NRBC 0 2012 05:13 AM    SEGSPCT 75.3 2020 01:00 PM    MONOPCT 8.0 2020 01:00 PM    MONOSABS 0.5 05/10/2022 03:00 PM    LYMPHSABS 1.1 05/10/2022 03:00 PM    EOSABS 0.1 05/10/2022 03:00 PM    BASOSABS 0.0 05/10/2022 03:00 PM     Lab Results   Component Value Date/Time    SEGSABS 4.3 05/10/2022 03:00 PM       CMP:    Lab Results   Component Value Date/Time     05/10/2022 03:00 PM     2020 12:19 PM    K 4.7 05/10/2022 03:00 PM    K 4.2 06/08/2020 12:19 PM     06/08/2020 12:19 PM    CO2 29 06/08/2020 12:19 PM    BUN 16 06/08/2020 12:19 PM    CREATININE 0.9 05/10/2022 03:00 PM    CREATININE 0.9 06/08/2020 12:19 PM    LABGLOM 82 06/08/2020 12:19 PM    GLUCOSE 90 06/08/2020 12:19 PM    GLUCOSE 88 03/24/2012 03:32 AM    PROT 6.6 05/10/2022 02:59 PM    PROT 6.2 04/14/2014 12:00 AM    LABALBU 4.4 05/10/2022 02:59 PM    LABALBU 4.1 03/20/2012 05:13 AM    CALCIUM 9.7 06/08/2020 12:19 PM    BILITOT 0.4 05/10/2022 02:59 PM    ALKPHOS 52 05/10/2022 02:59 PM    AST 23 05/10/2022 02:59 PM    ALT 20 05/10/2022 02:59 PM       BMP:    Lab Results   Component Value Date/Time     05/10/2022 03:00 PM     06/08/2020 12:19 PM    K 4.7 05/10/2022 03:00 PM    K 4.2 06/08/2020 12:19 PM     06/08/2020 12:19 PM    CO2 29 06/08/2020 12:19 PM    BUN 16 06/08/2020 12:19 PM    LABALBU 4.4 05/10/2022 02:59 PM    LABALBU 4.1 03/20/2012 05:13 AM    CREATININE 0.9 05/10/2022 03:00 PM    CREATININE 0.9 06/08/2020 12:19 PM    CALCIUM 9.7 06/08/2020 12:19 PM    LABGLOM 82 06/08/2020 12:19 PM    GLUCOSE 90 06/08/2020 12:19 PM    GLUCOSE 88 03/24/2012 03:32 AM       Magnesium:    Lab Results   Component Value Date/Time    MG 2.0 04/29/2020 01:00 PM     PT/INR:    Lab Results   Component Value Date/Time    INR 1.00 06/08/2020 11:23 AM     TSH:    Lab Results   Component Value Date/Time    TSH 0.844 04/29/2020 01:00 PM           IMAGING:    CT CHEST W CONTRAST  Result Date: 5/4/2022  1. No acute intrathoracic process. Severe centrilobular emphysema. No pulmonary mass or nodules observed. 2. No metastatic disease identified. 3. Scattered hypoattenuating liver lesions some of which are too small to characterize definitively remain stable. 4. Colonic diverticulosis without diverticulitis. Prostatomegaly. Correlate with PSA levels. Multiple stable chronic findings are discussed.  **This report has been created using voice recognition software. It may contain minor errors which are inherent in voice recognition technology. ** Final report electronically signed by Dr Jenna Marin on 5/4/2022 2:29 PM    CT ABDOMEN PELVIS W IV CONTRAST Additional Contrast? Oral (IV CONTRAST)  Result Date: 5/4/2022  1. No acute intrathoracic process. Severe centrilobular emphysema. No pulmonary mass or nodules observed. 2. No metastatic disease identified. 3. Scattered hypoattenuating liver lesions some of which are too small to characterize definitively remain stable. 4. Colonic diverticulosis without diverticulitis. Prostatomegaly. Correlate with PSA levels. Multiple stable chronic findings are discussed. **This report has been created using voice recognition software. It may contain minor errors which are inherent in voice recognition technology. ** Final report electronically signed by Dr Jenna Marin on 5/4/2022 2:21 PM     MRI abdomen with without contrast, MRCP 4/1/2022     Impression   1. Sludge versus small stones layer within the dependent portion of the gallbladder. No abnormal enhancement, pericholecystic inflammation, biliary ductal dilatation or choledocholithiasis observed. 2. Stable benign subcentimeter nonenhancing simple hepatic cysts. No liver lesions. No acute findings. MRI pelvis rectal staging 5/19/2022  Rectal mass distance of the inferior border to the anal verge 9.6 cm. Distance of the inferior border to the anorectal junction 7.1 cm. Craniocaudal dimension 1.9 cm. Actual measurements 2.2 x 0.9 cm. Rectal wall involvement: Involves the muscularis propria at about the 4 o'clock position without extension into. Mesorectal fat. Described as at the level of the anterior peritoneal reflection. No suspicious lymph nodes described.   Per my discussion with the radiologist although not in the report he describes as a T2N0 lesion    PROCEDURES:  -Colonoscopy  -Endoscopic ultrasound of the rectum  -Rectal staging MRI 5/19/2022    PATHOLOGY:   Path report by gastro health lab in [place] 4/18/2022  Rectum, mass biopsy: Tubular adenoma with high-grade dysplasia; cannot exclude malignancy. Multiple polypoid fragments of colorectal mucosa present with adenomatous dysplasia and predominantly tubular formations. Occasional foci are present with admixed architectural and cytologic high-grade dysplasia. No invasion of dysplastic glands through the muscularis mucosa present. The polypectomy margin cannot be assessed due to specimen fragmentation. GENETICS:  None    MOLECULAR:  None    ASSESSMENT/PLAN:    1: Diagnosis: 69-year-old gentleman with a screening colonoscopy that shows a 2 to 3 cm mass at about 6 cm from the anal verge. Path above shows a tubular adenoma with high-grade dysplasia; cannot exclude malignancy. Transanal excision shows no residual malignancy. .    Also, incidental finding of a suspicious prostate lesion measuring 1 cm with a PSA of 3.2. Urology has been consulted    2) Prognosis / Disease Status: Excellent/very low risk. 3) Work-up:    Labs: Labs reviewed above   Imaging: See above   Procedures: Transanal excision per Dr. Danielle Barrera at [facility]. Consults: Keep follow-up with Dr. Danielle Barrera in November at which time we will have a flexible sigmoidoscopy and pelvic MRI at [facility]. Other: Urology referral because of the suspicious lesion in the prostate found on the MRI of the pelvis done for rectal staging on 5/19. Defer management to urology. 4) Symptom Management: He is asymptomatic      5) Supportive care provided. Level of care is appropriate. 6) Treatment goal:      Treatment plan:  No additional therapy needed. 7) Medications reviewed. Prescriptions today: None            No orders of the defined types were placed in this encounter. OARRS:  Controlled Substance Monitoring:    Acute and Chronic Pain Monitoring:   No flowsheet data found.        8) Research Options:   Not applicable      9) Other:       See above    10) Follow Up:  Keep 3-month follow-up with Dr. Blayne Mandujano at [place] at which time we will have a flexible sigmoidoscopy and pelvic MRI.   Annual follow-up here        Vonnie Beal MD

## 2022-08-30 NOTE — PATIENT INSTRUCTIONS
Continue 3-month follow-up with Dr. Acacia Rhodes at which time we do have a MRI of the pelvis and also a flexible sigmoidoscopy    Routine follow-up here in 1 year.

## 2022-11-07 RX ORDER — PRAVASTATIN SODIUM 20 MG
TABLET ORAL
Qty: 90 TABLET | Refills: 0 | Status: SHIPPED | OUTPATIENT
Start: 2022-11-07

## 2022-11-07 RX ORDER — CLOPIDOGREL BISULFATE 75 MG/1
TABLET ORAL
Qty: 90 TABLET | Refills: 0 | Status: SHIPPED | OUTPATIENT
Start: 2022-11-07

## 2022-11-07 RX ORDER — ISOSORBIDE MONONITRATE 30 MG/1
30 TABLET, EXTENDED RELEASE ORAL DAILY
Qty: 90 TABLET | Refills: 0 | Status: SHIPPED | OUTPATIENT
Start: 2022-11-07

## 2022-12-13 ENCOUNTER — OFFICE VISIT (OUTPATIENT)
Dept: CARDIOLOGY CLINIC | Age: 77
End: 2022-12-13
Payer: MEDICARE

## 2022-12-13 VITALS
HEIGHT: 64 IN | BODY MASS INDEX: 24.96 KG/M2 | WEIGHT: 146.2 LBS | DIASTOLIC BLOOD PRESSURE: 66 MMHG | SYSTOLIC BLOOD PRESSURE: 132 MMHG | HEART RATE: 82 BPM

## 2022-12-13 DIAGNOSIS — I10 PRIMARY HYPERTENSION: ICD-10-CM

## 2022-12-13 DIAGNOSIS — I25.10 CORONARY ARTERY DISEASE INVOLVING NATIVE CORONARY ARTERY OF NATIVE HEART WITHOUT ANGINA PECTORIS: Primary | ICD-10-CM

## 2022-12-13 DIAGNOSIS — E78.01 FAMILIAL HYPERCHOLESTEROLEMIA: ICD-10-CM

## 2022-12-13 PROCEDURE — 99214 OFFICE O/P EST MOD 30 MIN: CPT | Performed by: NUCLEAR MEDICINE

## 2022-12-13 PROCEDURE — G8417 CALC BMI ABV UP PARAM F/U: HCPCS | Performed by: NUCLEAR MEDICINE

## 2022-12-13 PROCEDURE — 1123F ACP DISCUSS/DSCN MKR DOCD: CPT | Performed by: NUCLEAR MEDICINE

## 2022-12-13 PROCEDURE — G8427 DOCREV CUR MEDS BY ELIG CLIN: HCPCS | Performed by: NUCLEAR MEDICINE

## 2022-12-13 PROCEDURE — 3074F SYST BP LT 130 MM HG: CPT | Performed by: NUCLEAR MEDICINE

## 2022-12-13 PROCEDURE — 3078F DIAST BP <80 MM HG: CPT | Performed by: NUCLEAR MEDICINE

## 2022-12-13 PROCEDURE — 1036F TOBACCO NON-USER: CPT | Performed by: NUCLEAR MEDICINE

## 2022-12-13 PROCEDURE — G8484 FLU IMMUNIZE NO ADMIN: HCPCS | Performed by: NUCLEAR MEDICINE

## 2022-12-13 NOTE — PROGRESS NOTES
[address] [address] [address]  [address] [facility],[address] [address] [facility]  Dept: [phone]  Dept Fax: [phone]  Loc: [phone]    Visit Date: 2022    Maylin Dailey is a 65 y.o. male who presents todayfor:  Chief Complaint   Patient presents with    Follow-up     1 year     Hypertension    Hyperlipidemia    Coronary Artery Disease   Going for colon resection   Colon cancer  Known COPD and CAD  LM stent  No chest pain   Some baseline dyspnea  No home O2  BP seems under control   No dizziness  No syncope  On statins or hyperlipidemia  No issues with that       HPI:  HPI  Past Medical History:   Diagnosis Date    CAD (coronary artery disease)     dr Karli Mujica cancer [facility]) 2022    COPD (chronic obstructive pulmonary disease) ([facility]     E jossie    Hyperlipidemia     Hypertension     Pneumonia       Past Surgical History:   Procedure Laterality Date    CARDIAC CATHETERIZATION  2012    STENT    COLONOSCOPY  2022    ENDOSCOPY, COLON, DIAGNOSTIC  11/10/2021    FINGER SURGERY Right 2018    LUNG REMOVAL, PARTIAL      30% resection done on [place] side of lungs-[facility]    RECTAL SURGERY  2022    excision rectal tumor-rectal approach-[facility]    RECTAL SURGERY      ROTATOR CUFF REPAIR Right     UPPER GASTROINTESTINAL ENDOSCOPY N/A 2022    RECTAL EUS performed by Kenneth Delgado MD at [facility]     Family History   Problem Relation Age of Onset    High Cholesterol Father     Cancer Sister         lung    Cancer Brother         prostate    Heart Disease Brother      Social History     Tobacco Use    Smoking status: Former     Packs/day: 1.00     Years: 30.00     Pack years: 30.00     Types: Cigarettes     Start date: 5/3/1953     Quit date: 1983     Years since quittin.5    Smokeless tobacco: Never   Substance Use Topics    Alcohol use: No     Alcohol/week: 0.0 standard drinks     Types: 1 - 2 Cans of beer per week      Current Outpatient Medications   Medication Sig Dispense Refill    clopidogrel (PLAVIX) 75 MG tablet TAKE 1 TABLET BY MOUTH  DAILY 90 tablet 0    metoprolol tartrate (LOPRESSOR) 25 MG tablet TAKE 1 TABLET BY MOUTH  TWICE DAILY 180 tablet 0    pravastatin (PRAVACHOL) 20 MG tablet TAKE 1 TABLET BY MOUTH  DAILY 90 tablet 0    isosorbide mononitrate (IMDUR) 30 MG extended release tablet TAKE 1 TABLET BY MOUTH  DAILY 90 tablet 0    predniSONE (DELTASONE) 2.5 MG tablet TAKE 1 TABLET BY MOUTH  DAILY 90 tablet 3    budesonide (PULMICORT) 0.5 MG/2ML nebulizer suspension INHALE ONE VIAL ( 2 MLS ) VIA NEBULIZER BY MOUTH TWO TIMES A  mL 11    albuterol (PROVENTIL) (2.5 MG/3ML) 0.083% nebulizer solution Take 3 mLs by nebulization every 6 hours as needed for Wheezing or Shortness of Breath 360 each 5    pantoprazole (PROTONIX) 40 MG tablet Take 1 tablet by mouth 2 times daily (before meals) 60 tablet 3    guaiFENesin (MUCINEX) 600 MG extended release tablet Take 600 mg by mouth Daily      Calcium Carb-Cholecalciferol (CALCIUM 600 + D PO) Take 600 mg by mouth daily      nitroGLYCERIN (NITROSTAT) 0.4 MG SL tablet Place 1 tablet under the tongue every 5 minutes as needed. 25 tablet 3    Oxygen Concentrator 2 L by Nasal route as needed. PROAIR RESPICLICK 515 (90 Base) MCG/ACT aerosol powder inhalation INHALE 2 PUFFS INTO THE  LUNGS EVERY 6 HOURS AS  NEEDED FOR WHEEZING OR  SHORTNESS OF BREATH 4 each 3    alendronate (FOSAMAX) 70 MG tablet Take 1 tablet by mouth every 7 days 12 tablet 1     No current facility-administered medications for this visit.      Allergies   Allergen Reactions    Cefpodoxime Diarrhea    Flonase [Fluticasone Propionate]      Pt shakes and feels anxious    Mometasone     Mometasone Furoate      Shakes,feels anxious    Spiriva Handihaler [Tiotropium Bromide Monohydrate]     Umeclidinium [place]      Health Maintenance   Topic Date Due    Depression Screen  Never done    Hepatitis C screen  Never done DTaP/Tdap/Td vaccine (1 - Tdap) Never done    Shingles vaccine (1 of 2) Never done    Lipids  04/14/2015    Annual Wellness Visit (AWV)  Never done    Flu vaccine  Completed    Pneumococcal 65+ years Vaccine  Completed    COVID-19 Vaccine  Completed    Hepatitis A vaccine  Aged Out    Hib vaccine  Aged Out    Meningococcal (ACWY) vaccine  Aged Out       Subjective:  Review of Systems  General:   No fever, no chills, some fatigue or weight loss  Pulmonary:    some dyspnea, no wheezing  Cardiac:    Denies recent chest pain,   GI:     No nausea or vomiting, no abdominal pain  Neuro:     No dizziness or light headedness,   Musculoskeletal:  No recent active issues  Extremities:   No edema, no obvious claudication     Objective:  Physical Exam  /66   Pulse 82   Ht 5' 4\" (1.626 m)   Wt 146 lb 3.2 oz (66.3 kg)   BMI 25.10 kg/m²   General:   Well developed, well nourished  Lungs:    Decreased air entry   Heart:    Normal S1 S2, Slight murmur. no rubs, no gallops  Abdomen:   Soft, non tender, no organomegalies, positive bowel sounds  Extremities:   No edema, no cyanosis, good peripheral pulses  Neurological:   Awake, alert, oriented. No obvious focal deficits  Musculoskelatal:  No obvious deformities    Assessment:      Diagnosis Orders   1. Coronary artery disease involving native coronary artery of native heart without angina pectoris        2. Primary hypertension        3. Familial hypercholesterolemia        As above  Higher risk patient   Seems stable cardiac wise  Seeing pulmonary Friday for pulmonary       Plan:  No follow-ups on file. As above  Clear for surgery   Continue risk factor modification and medical management  Thank you for allowing me to participate in the care of your patient. Please don't hesitate to contact me regarding any further issues related to the patient care    Orders Placed:  No orders of the defined types were placed in this encounter.       Medications Prescribed:  No orders of the defined types were placed in this encounter. Discussed use, benefit, and side effects of prescribed medications. All patient questions answered. Pt voicedunderstanding. Instructed to continue current medications, diet and exercise. Continue risk factor modification and medical management. Patient agreed with treatment plan. Follow up as directed.     Electronically signedby Sandrine Moseley MD on 12/13/2022 at 12:02 PM

## 2022-12-13 NOTE — PROGRESS NOTES
Pt C/O Pt has no cardiac symptoms at this. Pt having rectal sx on 1/30/2023 @ [facility] with Dr. Babar Jasso asking to hold thinner for 7 days.        Pt denies CP, SOB, Headache, dizziness, heart palpitations, swelling, fatigue

## 2022-12-16 ENCOUNTER — TELEPHONE (OUTPATIENT)
Dept: PULMONOLOGY | Age: 77
End: 2022-12-16

## 2022-12-16 ENCOUNTER — OFFICE VISIT (OUTPATIENT)
Dept: PULMONOLOGY | Age: 77
End: 2022-12-16
Payer: MEDICARE

## 2022-12-16 VITALS
DIASTOLIC BLOOD PRESSURE: 70 MMHG | SYSTOLIC BLOOD PRESSURE: 128 MMHG | HEIGHT: 64 IN | OXYGEN SATURATION: 99 % | TEMPERATURE: 98 F | WEIGHT: 146.4 LBS | BODY MASS INDEX: 25 KG/M2 | HEART RATE: 87 BPM

## 2022-12-16 DIAGNOSIS — J44.9 STEROID-DEPENDENT COPD (HCC): Primary | ICD-10-CM

## 2022-12-16 DIAGNOSIS — Z98.890 STATUS POST LUNG SURGERY: ICD-10-CM

## 2022-12-16 DIAGNOSIS — J96.11 CHRONIC RESPIRATORY FAILURE WITH HYPOXIA, ON HOME O2 THERAPY (HCC): ICD-10-CM

## 2022-12-16 DIAGNOSIS — Z99.81 CHRONIC RESPIRATORY FAILURE WITH HYPOXIA, ON HOME O2 THERAPY (HCC): ICD-10-CM

## 2022-12-16 DIAGNOSIS — Z87.891 PERSONAL HISTORY OF TOBACCO USE: ICD-10-CM

## 2022-12-16 PROBLEM — R13.10 DYSPHAGIA: Status: ACTIVE | Noted: 2021-04-01

## 2022-12-16 PROBLEM — K21.00 GASTROESOPHAGEAL REFLUX DISEASE WITH ESOPHAGITIS: Status: ACTIVE | Noted: 2019-03-26

## 2022-12-16 PROBLEM — I10 ESSENTIAL HYPERTENSION: Status: ACTIVE | Noted: 2020-05-05

## 2022-12-16 PROBLEM — J41.1 MUCOPURULENT CHRONIC BRONCHITIS (HCC): Status: ACTIVE | Noted: 2017-04-12

## 2022-12-16 PROCEDURE — 3023F SPIROM DOC REV: CPT | Performed by: NURSE PRACTITIONER

## 2022-12-16 PROCEDURE — 3074F SYST BP LT 130 MM HG: CPT | Performed by: NURSE PRACTITIONER

## 2022-12-16 PROCEDURE — G8484 FLU IMMUNIZE NO ADMIN: HCPCS | Performed by: NURSE PRACTITIONER

## 2022-12-16 PROCEDURE — 1036F TOBACCO NON-USER: CPT | Performed by: NURSE PRACTITIONER

## 2022-12-16 PROCEDURE — 3078F DIAST BP <80 MM HG: CPT | Performed by: NURSE PRACTITIONER

## 2022-12-16 PROCEDURE — G8427 DOCREV CUR MEDS BY ELIG CLIN: HCPCS | Performed by: NURSE PRACTITIONER

## 2022-12-16 PROCEDURE — 99214 OFFICE O/P EST MOD 30 MIN: CPT | Performed by: NURSE PRACTITIONER

## 2022-12-16 PROCEDURE — G8417 CALC BMI ABV UP PARAM F/U: HCPCS | Performed by: NURSE PRACTITIONER

## 2022-12-16 PROCEDURE — 1123F ACP DISCUSS/DSCN MKR DOCD: CPT | Performed by: NURSE PRACTITIONER

## 2022-12-16 RX ORDER — BUDESONIDE 0.5 MG/2ML
INHALANT ORAL
Qty: 120 ML | Refills: 11 | Status: SHIPPED | OUTPATIENT
Start: 2022-12-16

## 2022-12-16 RX ORDER — PREDNISONE 2.5 MG
2.5 TABLET ORAL DAILY
Qty: 90 TABLET | Refills: 3 | Status: SHIPPED | OUTPATIENT
Start: 2024-01-18 | End: 2025-01-17

## 2022-12-16 ASSESSMENT — ENCOUNTER SYMPTOMS
SHORTNESS OF BREATH: 1
EYES NEGATIVE: 1
WHEEZING: 1
CHEST TIGHTNESS: 0
COUGH: 0

## 2022-12-16 NOTE — TELEPHONE ENCOUNTER
[address] [facility] called and stated the RX for pulmicort needs to be resent with a diagnosis due to him being medicare they can not bill it without the diagnosis code. Thanks!

## 2022-12-16 NOTE — PROGRESS NOTES
[facility]     Patient: Berny Lindquist, 65 y.o.   : 2022    Pt of Dr. Matteo Hyde   Patient presents with    Follow-up     1 year COPD no testing        HPI  Rosenberg Lam is here for 1 year follow up for COPD      Current symptoms:   Patient is experiencing SOB: yes, little with exertion  Patient is experiencing wheezing: occ  Patient states they have had an occ cough. Phlegm is color:  clear  Patient is coughing up blood: no  Patient has been experiencing chest pains: non-existent  Patient is currently taking the following inhaler(s): Proair  Patient is currently taking the following nebulizer treatment(s): Pulmicort BID ,  Proventil BID   Takes 2.5 mg PO prednisone daily      Patient is using their rescue inhaler rarely  Patient is currently using 2 liters of oxygen during sleep. Other: Patient had rectum cancer removed and goes back to have more cancer and colon removed in January. At [facility] in [place]. Any recent exacerbations that have required oral atb or steroids No  Breathing has been good. Walks daily     Last spirometry:     Patient is on home oxygen therapy:   Current Oxygen order: 2LPM w/ sleep   is  using O2 compliantly     UTD with flu vaccine Yes  UTD with pneumonia vaccine Yes  UTD COVID vaccine yes    Interval history   S/p bilateral LVRS at [address] [place] [address] 3/29/2021  Seen Dr Selene Saenz 2021final pathology revealed fibrous pleuritis and severe emphysema.  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    SOCIAL HISTORY:  Social History     Tobacco Use    Smoking status: Former     Packs/day: 1.00     Years: 30.00     Pack years: 30.00     Types: Cigarettes     Start date: 5/3/1953     Quit date: 1983     Years since quittin.5    Smokeless tobacco: Never   Vaping Use    Vaping Use: Never used   Substance Use Topics    Alcohol use: No     Alcohol/week: 0.0 standard drinks     Types: 1 - 2 Cans of beer per week    Drug use: No         CURRENT MEDICATIONS:  Current Outpatient Medications   Medication Sig Dispense Refill    [START ON 1/18/2024] predniSONE (DELTASONE) 2.5 MG tablet Take 1 tablet by mouth daily 90 tablet 3    budesonide (PULMICORT) 0.5 MG/2ML nebulizer suspension INHALE ONE VIAL ( 2 MLS ) VIA NEBULIZER BY MOUTH TWO TIMES A  mL 11    clopidogrel (PLAVIX) 75 MG tablet TAKE 1 TABLET BY MOUTH  DAILY 90 tablet 0    metoprolol tartrate (LOPRESSOR) 25 MG tablet TAKE 1 TABLET BY MOUTH  TWICE DAILY 180 tablet 0    pravastatin (PRAVACHOL) 20 MG tablet TAKE 1 TABLET BY MOUTH  DAILY 90 tablet 0    isosorbide mononitrate (IMDUR) 30 MG extended release tablet TAKE 1 TABLET BY MOUTH  DAILY 90 tablet 0    albuterol (PROVENTIL) (2.5 MG/3ML) 0.083% nebulizer solution Take 3 mLs by nebulization every 6 hours as needed for Wheezing or Shortness of Breath 360 each 5    pantoprazole (PROTONIX) 40 MG tablet Take 1 tablet by mouth 2 times daily (before meals) 60 tablet 3    guaiFENesin (MUCINEX) 600 MG extended release tablet Take 600 mg by mouth Daily      Calcium Carb-Cholecalciferol (CALCIUM 600 + D PO) Take 600 mg by mouth daily      nitroGLYCERIN (NITROSTAT) 0.4 MG SL tablet Place 1 tablet under the tongue every 5 minutes as needed. 25 tablet 3    Oxygen Concentrator 2 L by Nasal route as needed. PROAIR RESPICLICK 491 (90 Base) MCG/ACT aerosol powder inhalation INHALE 2 PUFFS INTO THE  LUNGS EVERY 6 HOURS AS  NEEDED FOR WHEEZING OR  SHORTNESS OF BREATH 4 each 3    alendronate (FOSAMAX) 70 MG tablet Take 1 tablet by mouth every 7 days 12 tablet 1     No current facility-administered medications for this visit. Luther CALLAHAN   Review of Systems   Constitutional:  Negative for activity change, appetite change, chills, fatigue, fever and unexpected weight change. HENT: Negative. Eyes: Negative. Respiratory:  Positive for shortness of breath and wheezing. Negative for cough and chest tightness. Cardiovascular:  Negative for chest pain, palpitations and leg swelling. Genitourinary: Negative. Musculoskeletal: Negative. Skin: Negative. Neurological: Negative. Hematological: Negative. Psychiatric/Behavioral: Negative. Negative for sleep disturbance. Physical exam   /70 (Site: Right Upper Arm, Position: Sitting, Cuff Size: Medium Adult)   Pulse 87   Temp 98 °F (36.7 °C) (Skin)   Ht 5' 4\" (1.626 m)   Wt 146 lb 6.4 oz (66.4 kg)   SpO2 99%   BMI 25.13 kg/m²       Wt Readings from Last 3 Encounters:   12/16/22 146 lb 6.4 oz (66.4 kg)   12/13/22 146 lb 3.2 oz (66.3 kg)   08/30/22 144 lb (65.3 kg)     Physical Exam  Vitals and nursing note reviewed. Constitutional:       General: He is not in acute distress. Appearance: He is well-developed and normal weight. HENT:      Mouth/Throat:      Lips: Pink. Mouth: Mucous membranes are moist.      Pharynx: Oropharynx is clear. No oropharyngeal exudate or posterior oropharyngeal erythema. Eyes:      Conjunctiva/sclera: Conjunctivae normal.   Neck:      Vascular: No JVD. Cardiovascular:      Rate and Rhythm: Normal rate and regular rhythm. Heart sounds: No murmur heard. No friction rub. Pulmonary:      Effort: Pulmonary effort is normal. No accessory muscle usage or respiratory distress. Breath sounds: Normal breath sounds. No wheezing, rhonchi or rales. Chest:      Chest wall: No tenderness. Musculoskeletal:      Right lower leg: No edema. Left lower leg: No edema. Skin:     General: Skin is warm and dry. Capillary Refill: Capillary refill takes less than 2 seconds. Nails: There is no clubbing. Neurological:      Mental Status: He is alert and oriented to person, place, and time. Psychiatric:         Mood and Affect: Mood normal.         Behavior: Behavior normal.         Thought Content:  Thought content normal.         Judgment: Judgment normal.        Test results   Lung Nodule Screening     [] Qualifies    [x]Does not qualify   [] Declined    [] Completed    0642     Assessment       ICD-10-CM    1. Steroid-dependent COPD ([facility] [address] Controlled J44.9       2. Chronic respiratory failure with hypoxia, on home O2 therapy (HCC) Stable J96.11     Z99.81       3. Personal history of tobacco use  Z87.891       4. Status post lung surgery  Z98.890     left upper lobe wedge resection 9/2/2021          Plan      -COPD Symptoms currently optimally controlled , continue current therapies with Budesonide nebs BID, albuterol sulfate nebs BID, prednisone 2.5 mg PO daily  (Refills of prednisone / budesonide escribed)   -continues to follow with CT surgery at [facility], per pt he is planned to have imaging and PFTs next year.   -continue oxygen 2LPM w/ sleep .  SPO2 99% today at rest on RA, no O2 needed during the day   -avoid ill contacts  -keep UTD on recommended vaccinations    (Please note that portions of this note may have been with a voice recognition program.  Efforts were made to edit the dictation but occasionally words are mis-transcribed )     Will see Stephanie Pelaez in: 1 year  billing based on medical decision making   Electronically signed by SEBASTIAN Cruz CNP on 12/16/2022 at 8:32 AM

## 2022-12-16 NOTE — PROGRESS NOTES
Patient is experiencing SOB: yes, little with exertion    Patient is experiencing wheezing: occ    Patient states they have had an occ cough. Phlegm is color:  clear    Patient is coughing up blood: no    Patient has been experiencing chest pains: non-existent    Patient is currently taking the following inhaler(s): Proair    Patient is currently taking the following nebulizer treatment(s): Pulmicort, Proventil    Patient is using their rescue inhaler rarely    Patient is currently using 2 liters of oxygen during sleep. Other: Patient had rectum cancer removed and goes back to have more cancer and colon removed in January. At [facility] in [place].

## 2022-12-19 RX ORDER — BUDESONIDE 0.5 MG/2ML
INHALANT ORAL
Qty: 120 ML | Refills: 11 | Status: SHIPPED | OUTPATIENT
Start: 2022-12-19

## 2022-12-20 DIAGNOSIS — J44.9 STEROID-DEPENDENT COPD (HCC): ICD-10-CM

## 2022-12-21 RX ORDER — BUDESONIDE 0.5 MG/2ML
INHALANT ORAL
Qty: 120 ML | Refills: 11 | OUTPATIENT
Start: 2022-12-21

## 2022-12-21 RX ORDER — FORMOTEROL FUMARATE 20 UG/2ML
SOLUTION RESPIRATORY (INHALATION)
Qty: 120 ML | Refills: 11 | OUTPATIENT
Start: 2022-12-21

## 2022-12-27 ENCOUNTER — TELEPHONE (OUTPATIENT)
Dept: PULMONOLOGY | Age: 77
End: 2022-12-27

## 2022-12-27 DIAGNOSIS — J44.9 STEROID-DEPENDENT COPD (HCC): Primary | ICD-10-CM

## 2022-12-27 NOTE — TELEPHONE ENCOUNTER
Patient called and stated that he needs his performist to be sent to [facility] in [facility]. I did inform him that you will be out until the 3rd, he stated he is okay to wait unitl then. Please advise.

## 2022-12-28 DIAGNOSIS — J44.9 STEROID-DEPENDENT COPD (HCC): ICD-10-CM

## 2022-12-30 RX ORDER — FORMOTEROL FUMARATE 20 UG/2ML
20 SOLUTION RESPIRATORY (INHALATION) EVERY 12 HOURS
Qty: 120 ML | Refills: 6 | Status: SHIPPED | OUTPATIENT
Start: 2022-12-30 | End: 2023-01-29

## 2023-01-04 RX ORDER — FORMOTEROL FUMARATE 20 UG/2ML
SOLUTION RESPIRATORY (INHALATION)
Qty: 120 ML | Refills: 11 | OUTPATIENT
Start: 2023-01-04

## 2023-01-04 NOTE — TELEPHONE ENCOUNTER
Received refill request for Perforomist Nebulizer Solution. Medication was last ordered by Bryan Nelson. Medication was last ordered on 12/30/22 with 6 refills. Patient was last seen in the office 12/16/22. Does patient have a scheduled follow up?: yes - 12/15/23    Medication needs to be sent to [facility] [id number] - [address] [facility] [address] [phone]. Spoke with pharmacy and they state he has 6 refills remaining. Disregard request.    Patient's Allergies:   Allergies   Allergen Reactions    Cefpodoxime Diarrhea    Flonase [Fluticasone Propionate]      Pt shakes and feels anxious    Mometasone     Mometasone Furoate      Shakes,feels anxious    Spiriva Handihaler [Tiotropium Bromide Monohydrate]     Umeclidinium Bromide

## 2023-01-09 RX ORDER — ISOSORBIDE MONONITRATE 30 MG/1
30 TABLET, EXTENDED RELEASE ORAL DAILY
Qty: 90 TABLET | Refills: 3 | Status: SHIPPED | OUTPATIENT
Start: 2023-01-09

## 2023-01-09 RX ORDER — PRAVASTATIN SODIUM 20 MG
TABLET ORAL
Qty: 90 TABLET | Refills: 3 | Status: SHIPPED | OUTPATIENT
Start: 2023-01-09

## 2023-01-09 RX ORDER — CLOPIDOGREL BISULFATE 75 MG/1
TABLET ORAL
Qty: 90 TABLET | Refills: 3 | Status: SHIPPED | OUTPATIENT
Start: 2023-01-09

## 2023-01-19 ENCOUNTER — TELEPHONE (OUTPATIENT)
Dept: CARDIOLOGY CLINIC | Age: 78
End: 2023-01-19

## 2023-01-19 DIAGNOSIS — Z01.818 PRE-OP TESTING: Primary | ICD-10-CM

## 2023-01-19 DIAGNOSIS — I25.10 CORONARY ARTERY DISEASE INVOLVING NATIVE CORONARY ARTERY OF NATIVE HEART WITHOUT ANGINA PECTORIS: ICD-10-CM

## 2023-01-19 DIAGNOSIS — I10 PRIMARY HYPERTENSION: ICD-10-CM

## 2023-01-19 NOTE — TELEPHONE ENCOUNTER
Pre op Risk Assessment    Procedure colectomy partial w/ coloproctostomy and colostomy open  Physician Dr. May Spivey  Date of surgery/procedure 01/30/2023    Last OV 12/13/2022  Last Stress 04/30/2020  Last Echo 04/30/2020  Last Cath 06/08/2020  Last Stent 11/26/2013  Is patient on blood thinners Plavix  Hold Meds/how many days 7 days    Fax: [phone]

## 2023-01-20 NOTE — TELEPHONE ENCOUNTER
Stress test and echo scheduled 01-24-23 @ [facility]  Call to pt, date, time and instructions reviewed

## 2023-01-25 DIAGNOSIS — Z01.818 PRE-OP TESTING: ICD-10-CM

## 2023-01-25 DIAGNOSIS — I25.10 CORONARY ARTERY DISEASE INVOLVING NATIVE CORONARY ARTERY OF NATIVE HEART WITHOUT ANGINA PECTORIS: ICD-10-CM

## 2023-01-25 DIAGNOSIS — I10 PRIMARY HYPERTENSION: ICD-10-CM

## 2023-01-27 ENCOUNTER — TELEPHONE (OUTPATIENT)
Dept: PULMONOLOGY | Age: 78
End: 2023-01-27

## 2023-01-27 NOTE — TELEPHONE ENCOUNTER
Received a call from [facility] at [address] [place] [address]. Pt will be having a partial Colectomy with possible Ileostomy on Monday morning, 1/30/23. They have not received Pre-op Clearance from you. You last saw him 12/2022 but no surgery or clearance was noted. Is he cleared for the procedure?

## 2023-07-11 ENCOUNTER — HOSPITAL ENCOUNTER (OUTPATIENT)
Dept: CT IMAGING | Age: 78
Discharge: HOME | End: 2023-07-11
Payer: MEDICARE

## 2023-07-11 DIAGNOSIS — C20 RECTAL CANCER (HCC): ICD-10-CM

## 2023-07-11 LAB — POC CREATININE WHOLE BLOOD: 0.7 MG/DL (ref 0.5–1.2)

## 2023-07-11 PROCEDURE — 6360000004 HC RX CONTRAST MEDICATION: Performed by: NURSE PRACTITIONER

## 2023-07-11 PROCEDURE — 74177 CT ABD & PELVIS W/CONTRAST: CPT

## 2023-07-11 PROCEDURE — 82565 ASSAY OF CREATININE: CPT

## 2023-07-11 RX ADMIN — IOHEXOL 50 ML: 240 INJECTION, SOLUTION INTRATHECAL; INTRAVASCULAR; INTRAVENOUS; ORAL at 12:57

## 2023-07-11 RX ADMIN — IOPAMIDOL 80 ML: 755 INJECTION, SOLUTION INTRAVENOUS at 12:57

## 2023-07-15 DIAGNOSIS — J44.9 STEROID-DEPENDENT COPD (HCC): ICD-10-CM

## 2023-07-18 RX ORDER — FORMOTEROL FUMARATE DIHYDRATE 20 UG/2ML
SOLUTION RESPIRATORY (INHALATION)
Qty: 120 ML | Refills: 6 | Status: SHIPPED | OUTPATIENT
Start: 2023-07-18

## 2023-08-29 DIAGNOSIS — N42.89 PROSTATE MASS: ICD-10-CM

## 2023-08-29 DIAGNOSIS — R97.20 PSA ELEVATION: ICD-10-CM

## 2023-08-29 DIAGNOSIS — C20 RECTAL CANCER (HCC): Primary | ICD-10-CM

## 2023-08-29 NOTE — PROGRESS NOTES
from the anal verge. Path above shows a tubular adenoma with high-grade dysplasia; cannot exclude malignancy. Transanal excision shows no residual malignancy. .    Also, incidental finding of a suspicious prostate lesion measuring 1 cm with a PSA of 3.2. Urology has been consulted    2) Prognosis / Disease Status: Excellent/very low risk. 3) Work-up:    Labs: cbc,cmp,CEA,PSA   Imaging: CT A/P 7/11/23   Procedures: Transanal excision per Dr. Miesha Jauregui at [facility]. Consults: Keep follow-up with Dr. Miesha Jauregui in Dec 2023 at which time we will have a colonoscopy. Other: Urology referral because of the suspicious lesion in the prostate found on the MRI of the pelvis done for rectal staging on 5/19. Defer management to urology. 4) Symptom Management: He is asymptomatic      5) Supportive care provided. Level of care is appropriate. 6) Treatment goal:      Treatment plan:  No additional therapy needed. 7) Medications reviewed. Prescriptions today: None            No orders of the defined types were placed in this encounter. OARRS:  Controlled Substance Monitoring:    Acute and Chronic Pain Monitoring:   No flowsheet data found. 8) Research Options:   Not applicable      9) Other:       Patient bothered by hernia in the right lower abdomen. Dr. Miesha Jauregui aware. Uses binder occasionally. 10) Follow Up:  Keep 6-month follow-up with Dr. Miesha Jauregui at [facility]. Neftali 9 months w me.         Kalyani Chapin MD

## 2023-08-30 ENCOUNTER — HOSPITAL ENCOUNTER (OUTPATIENT)
Dept: INFUSION THERAPY | Age: 78
Discharge: HOME | End: 2023-08-30
Payer: MEDICARE

## 2023-08-30 ENCOUNTER — OFFICE VISIT (OUTPATIENT)
Dept: ONCOLOGY | Age: 78
End: 2023-08-30
Payer: MEDICARE

## 2023-08-30 VITALS
HEIGHT: 64 IN | TEMPERATURE: 97.8 F | DIASTOLIC BLOOD PRESSURE: 67 MMHG | OXYGEN SATURATION: 95 % | WEIGHT: 144.8 LBS | SYSTOLIC BLOOD PRESSURE: 128 MMHG | RESPIRATION RATE: 16 BRPM | BODY MASS INDEX: 24.72 KG/M2 | HEART RATE: 75 BPM

## 2023-08-30 VITALS
OXYGEN SATURATION: 95 % | SYSTOLIC BLOOD PRESSURE: 128 MMHG | RESPIRATION RATE: 16 BRPM | HEART RATE: 75 BPM | TEMPERATURE: 97.8 F | DIASTOLIC BLOOD PRESSURE: 67 MMHG

## 2023-08-30 DIAGNOSIS — R97.20 PSA ELEVATION: ICD-10-CM

## 2023-08-30 DIAGNOSIS — C20 RECTAL CANCER (HCC): ICD-10-CM

## 2023-08-30 DIAGNOSIS — N42.89 PROSTATE MASS: ICD-10-CM

## 2023-08-30 DIAGNOSIS — C20 RECTAL CANCER (HCC): Primary | ICD-10-CM

## 2023-08-30 LAB
ABSOLUTE IMMATURE GRANULOCYTE: 0.01 THOU/MM3 (ref 0–0.07)
ALBUMIN SERPL BCG-MCNC: 4 G/DL (ref 3.5–5.1)
ALP SERPL-CCNC: 39 U/L (ref 38–126)
ALT SERPL W/O P-5'-P-CCNC: 14 U/L (ref 11–66)
AST SERPL-CCNC: 15 U/L (ref 5–40)
BASOPHILS ABSOLUTE: 0 THOU/MM3 (ref 0–0.1)
BASOPHILS NFR BLD AUTO: 0 % (ref 0–3)
BILIRUB CONJ SERPL-MCNC: < 0.2 MG/DL (ref 0–0.3)
BILIRUB SERPL-MCNC: 0.2 MG/DL (ref 0.3–1.2)
BUN BLDP-MCNC: 30 MG/DL (ref 8–26)
CEA SERPL-MCNC: 4.6 NG/ML (ref 0–5)
CHLORIDE BLD-SCNC: 104 MEQ/L (ref 98–109)
CREAT BLD-MCNC: 0.8 MG/DL (ref 0.5–1.2)
EOSINOPHIL NFR BLD AUTO: 4 % (ref 0–4)
EOSINOPHILS ABSOLUTE: 0.2 THOU/MM3 (ref 0–0.4)
ERYTHROCYTE [DISTWIDTH] IN BLOOD BY AUTOMATED COUNT: 14.3 % (ref 11.5–14.5)
GFR SERPL CREATININE-BSD FRML MDRD: > 60 ML/MIN/1.73M2
GLUCOSE BLD-MCNC: 75 MG/DL (ref 70–108)
HCT VFR BLD AUTO: 40.3 % (ref 42–52)
HGB BLD-MCNC: 12.5 GM/DL (ref 14–18)
IMMATURE GRANULOCYTES: 0 %
IONIZED CALCIUM, WHOLE BLOOD: 1.27 MMOL/L (ref 1.12–1.32)
LYMPHOCYTES ABSOLUTE: 1.1 THOU/MM3 (ref 1–4.8)
LYMPHOCYTES NFR BLD AUTO: 20 % (ref 15–47)
MCH RBC QN AUTO: 28.4 PG (ref 26–33)
MCHC RBC AUTO-ENTMCNC: 31 GM/DL (ref 32.2–35.5)
MCV RBC AUTO: 92 FL (ref 80–94)
MONOCYTES ABSOLUTE: 0.7 THOU/MM3 (ref 0.4–1.3)
MONOCYTES NFR BLD AUTO: 12 % (ref 0–12)
NEUTROPHILS NFR BLD AUTO: 64 % (ref 43–75)
PLATELET # BLD AUTO: 298 THOU/MM3 (ref 130–400)
PMV BLD AUTO: 9 FL (ref 9.4–12.4)
POTASSIUM BLD-SCNC: 3.5 MEQ/L (ref 3.5–4.9)
PROT SERPL-MCNC: 6.5 G/DL (ref 6.1–8)
PSA SERPL-MCNC: 1.97 NG/ML (ref 0–1)
RBC # BLD AUTO: 4.4 MILL/MM3 (ref 4.7–6.1)
SEGMENTED NEUTROPHILS ABSOLUTE COUNT: 3.7 THOU/MM3 (ref 1.8–7.7)
SODIUM BLD-SCNC: 142 MEQ/L (ref 138–146)
TOTAL CO2, WHOLE BLOOD: 30 MEQ/L (ref 23–33)
WBC # BLD AUTO: 5.7 THOU/MM3 (ref 4.8–10.8)

## 2023-08-30 PROCEDURE — 84153 ASSAY OF PSA TOTAL: CPT

## 2023-08-30 PROCEDURE — G8420 CALC BMI NORM PARAMETERS: HCPCS | Performed by: INTERNAL MEDICINE

## 2023-08-30 PROCEDURE — 3074F SYST BP LT 130 MM HG: CPT | Performed by: INTERNAL MEDICINE

## 2023-08-30 PROCEDURE — 1123F ACP DISCUSS/DSCN MKR DOCD: CPT | Performed by: INTERNAL MEDICINE

## 2023-08-30 PROCEDURE — G8427 DOCREV CUR MEDS BY ELIG CLIN: HCPCS | Performed by: INTERNAL MEDICINE

## 2023-08-30 PROCEDURE — 85025 COMPLETE CBC W/AUTO DIFF WBC: CPT

## 2023-08-30 PROCEDURE — 3078F DIAST BP <80 MM HG: CPT | Performed by: INTERNAL MEDICINE

## 2023-08-30 PROCEDURE — 99214 OFFICE O/P EST MOD 30 MIN: CPT | Performed by: INTERNAL MEDICINE

## 2023-08-30 PROCEDURE — 80076 HEPATIC FUNCTION PANEL: CPT

## 2023-08-30 PROCEDURE — 1036F TOBACCO NON-USER: CPT | Performed by: INTERNAL MEDICINE

## 2023-08-30 PROCEDURE — 36415 COLL VENOUS BLD VENIPUNCTURE: CPT

## 2023-08-30 PROCEDURE — 82378 CARCINOEMBRYONIC ANTIGEN: CPT

## 2023-08-30 PROCEDURE — 80047 BASIC METABLC PNL IONIZED CA: CPT

## 2023-08-30 PROCEDURE — 99211 OFF/OP EST MAY X REQ PHY/QHP: CPT

## 2023-08-30 NOTE — PATIENT INSTRUCTIONS
December 2020 to Dr. Joceline Skinner colorectal surgeon at [facility] today endoscopic procedure possibly colonoscopy that showed tumor in the proximal rectum. Request that procedure note and path report from [facility].   Labs drawn today including PSA and CEA  Follow-up with me in 9 months

## 2023-10-09 DIAGNOSIS — J44.9 STEROID-DEPENDENT COPD (HCC): ICD-10-CM

## 2023-10-09 DIAGNOSIS — Z92.241 STEROID-DEPENDENT COPD (HCC): ICD-10-CM

## 2023-10-09 RX ORDER — FORMOTEROL FUMARATE 20 UG/2ML
20 SOLUTION RESPIRATORY (INHALATION) EVERY 12 HOURS SCHEDULED
Qty: 120 ML | Refills: 6 | Status: SHIPPED | OUTPATIENT
Start: 2023-10-09

## 2023-10-09 RX ORDER — BUDESONIDE 0.5 MG/2ML
INHALANT ORAL
Qty: 120 ML | Refills: 11 | Status: SHIPPED | OUTPATIENT
Start: 2023-10-09

## 2023-10-09 NOTE — TELEPHONE ENCOUNTER
Patient is requesting that the performist and pulmicort be sent to [facility] in [place] as his current pharmacy is not carrying them .

## 2023-11-27 ENCOUNTER — TELEPHONE (OUTPATIENT)
Dept: PULMONOLOGY | Age: 78
End: 2023-11-27

## 2023-11-27 DIAGNOSIS — J44.9 STEROID-DEPENDENT COPD (HCC): ICD-10-CM

## 2023-11-27 DIAGNOSIS — Z92.241 STEROID-DEPENDENT COPD (HCC): ICD-10-CM

## 2023-11-27 RX ORDER — BUDESONIDE 1 MG/2ML
1 INHALANT ORAL 2 TIMES DAILY
Qty: 60 ML | Refills: 5 | Status: SHIPPED | OUTPATIENT
Start: 2023-11-27 | End: 2024-11-26

## 2023-11-27 NOTE — TELEPHONE ENCOUNTER
Patient called and stated he is unable to get current nebulizer solution of budesonide 0.5mg. Called [facility] in [facility] and they have the 1mg of Budesonide, can you send this to his pharmacy. [facility] in [facility]? Thanks! Yuli is out for the week.

## 2023-12-13 RX ORDER — CLOPIDOGREL BISULFATE 75 MG/1
TABLET ORAL
Qty: 90 TABLET | Refills: 3 | Status: SHIPPED | OUTPATIENT
Start: 2023-12-13

## 2023-12-13 RX ORDER — ISOSORBIDE MONONITRATE 30 MG/1
30 TABLET, EXTENDED RELEASE ORAL DAILY
Qty: 90 TABLET | Refills: 3 | Status: SHIPPED | OUTPATIENT
Start: 2023-12-13

## 2023-12-13 RX ORDER — PRAVASTATIN SODIUM 20 MG
TABLET ORAL
Qty: 90 TABLET | Refills: 3 | Status: SHIPPED | OUTPATIENT
Start: 2023-12-13

## 2024-01-02 ENCOUNTER — OFFICE VISIT (OUTPATIENT)
Dept: PULMONOLOGY | Age: 79
End: 2024-01-02
Payer: MEDICARE

## 2024-01-02 VITALS
HEIGHT: 64 IN | OXYGEN SATURATION: 94 % | HEART RATE: 83 BPM | WEIGHT: 146.4 LBS | TEMPERATURE: 98.3 F | DIASTOLIC BLOOD PRESSURE: 68 MMHG | SYSTOLIC BLOOD PRESSURE: 132 MMHG | BODY MASS INDEX: 25 KG/M2

## 2024-01-02 DIAGNOSIS — J43.9 BULLOUS EMPHYSEMA (HCC): ICD-10-CM

## 2024-01-02 DIAGNOSIS — Z87.891 PERSONAL HISTORY OF TOBACCO USE: ICD-10-CM

## 2024-01-02 DIAGNOSIS — J96.11 CHRONIC RESPIRATORY FAILURE WITH HYPOXIA, ON HOME O2 THERAPY (HCC): ICD-10-CM

## 2024-01-02 DIAGNOSIS — J44.9 STEROID-DEPENDENT COPD (HCC): Primary | ICD-10-CM

## 2024-01-02 DIAGNOSIS — Z99.81 CHRONIC RESPIRATORY FAILURE WITH HYPOXIA, ON HOME O2 THERAPY (HCC): ICD-10-CM

## 2024-01-02 DIAGNOSIS — Z92.241 STEROID-DEPENDENT COPD (HCC): Primary | ICD-10-CM

## 2024-01-02 PROCEDURE — G8427 DOCREV CUR MEDS BY ELIG CLIN: HCPCS | Performed by: NURSE PRACTITIONER

## 2024-01-02 PROCEDURE — 1036F TOBACCO NON-USER: CPT | Performed by: NURSE PRACTITIONER

## 2024-01-02 PROCEDURE — 1123F ACP DISCUSS/DSCN MKR DOCD: CPT | Performed by: NURSE PRACTITIONER

## 2024-01-02 PROCEDURE — G8484 FLU IMMUNIZE NO ADMIN: HCPCS | Performed by: NURSE PRACTITIONER

## 2024-01-02 PROCEDURE — 99214 OFFICE O/P EST MOD 30 MIN: CPT | Performed by: NURSE PRACTITIONER

## 2024-01-02 PROCEDURE — 3023F SPIROM DOC REV: CPT | Performed by: NURSE PRACTITIONER

## 2024-01-02 PROCEDURE — 3075F SYST BP GE 130 - 139MM HG: CPT | Performed by: NURSE PRACTITIONER

## 2024-01-02 PROCEDURE — 3078F DIAST BP <80 MM HG: CPT | Performed by: NURSE PRACTITIONER

## 2024-01-02 PROCEDURE — G8419 CALC BMI OUT NRM PARAM NOF/U: HCPCS | Performed by: NURSE PRACTITIONER

## 2024-01-02 RX ORDER — ALBUTEROL SULFATE 90 UG/1
2 POWDER, METERED RESPIRATORY (INHALATION) EVERY 6 HOURS PRN
Qty: 3 EACH | Refills: 3 | Status: SHIPPED | OUTPATIENT
Start: 2024-01-02 | End: 2025-01-01

## 2024-01-02 ASSESSMENT — ENCOUNTER SYMPTOMS
SHORTNESS OF BREATH: 1
EYES NEGATIVE: 1
COUGH: 0
NAUSEA: 1
VOMITING: 0
WHEEZING: 1
ABDOMINAL DISTENTION: 1
DIARRHEA: 0
ABDOMINAL PAIN: 1

## 2024-01-02 NOTE — PROGRESS NOTES
[facility]     Patient: LANE MARCH, 78 y.o.   : 1945    Pt of Dr. winchester     Subjective     Chief Complaint   Patient presents with    Follow-up     COPD 1 year f/u no testing.         HPI       Patient presents for 1 year   COPD follow up, accompanied by his wife   Steroid dependent on 2.5 mg PO daily     Has not been able to get budesonide due to national shortage.   Using his albuterol HFA more often with benefit   Still using formoterol BID   Currently feels , slightly more SOB than baseline attributes to all his recent surgeries and deconditioning.   Has not been able to get up and walk or do much   He felt better when he was more active   Wants to keep his meds the same, does not want to try ICS INH in place of the budesonide nebs   Denies cough, occ. Wheeze, currently feels pretty good.   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   Is still following with [facility] pulmonary department      6 min walk? 2020, A1A :  declined testing   Last spirometry:  in [place], pt reports more recent PFTs at [facility]   Requires Oxygen?  2LPM with sleep     Immunization History   Administered Date(s) Administered    COVID-19, PFIZER Bivalent, DO NOT Dilute, (age 12y+), IM, 30 mcg/0.3 mL 10/27/2022    COVID-19, PFIZER GRAY top, DO NOT Dilute, (age 12 y+), IM, 30 mcg/0.3 mL 2022    COVID-19, PFIZER PURPLE top, DILUTE for use, (age 12 y+), 30mcg/0.3mL 2021, 2021, 10/10/2021    COVID-19, PFIZER, (- formula), (age 12y+), IM, 30mcg/0.3mL 2023    Influenza Vaccine, unspecified

## 2024-01-03 RX ORDER — PREDNISONE 2.5 MG/1
2.5 TABLET ORAL DAILY
Qty: 90 TABLET | Refills: 3 | Status: SHIPPED | OUTPATIENT
Start: 2024-01-03 | End: 2025-01-02

## 2024-01-08 ENCOUNTER — TELEPHONE (OUTPATIENT)
Dept: PULMONOLOGY | Age: 79
End: 2024-01-08

## 2024-01-08 RX ORDER — ALBUTEROL SULFATE 90 UG/1
2 AEROSOL, METERED RESPIRATORY (INHALATION) EVERY 6 HOURS PRN
Qty: 3 EACH | Refills: 3 | Status: SHIPPED | OUTPATIENT
Start: 2024-01-08

## 2024-01-08 NOTE — TELEPHONE ENCOUNTER
Received fax from [employer] that proair respiclick is not covered by patients insurance. Covered alternitives would include Ventolin, or xopenex. Please advise.   Thanks!

## 2024-05-15 DIAGNOSIS — C20 RECTAL CANCER (HCC): Primary | ICD-10-CM

## 2024-05-27 NOTE — PROGRESS NOTES
[facility] [place] [facility]  [facility]  [address] [place] [address]  [address]  [place] [id number]  Dept: [phone]  Loc: [phone]   Hematology/Oncology Consult (Clinic)        5/28/24    Yogesh Solano   1945     No ref. provider found   Nathan Elaine DO       Reason: New diagnosis of rectal cancer referred for evaluation and treatment.  Chief Complaint   Patient presents with    Follow-up     Rectal cancer (HCC)      DIAGNOSIS:  -Rectal Rectum, mass biopsy: Tubular adenoma with high-grade dysplasia; cannot exclude malignancy.  Transanal reexcision with Dr. Brennan at [facility]  7/18/22 shows no residual malignancy.  Local EUS staging T1N0.  Local MRI staging-T2N0  -Patient had endoscopy December 2022 that found a more proximal rectal cancer.  Taken to the OR by Dr. Brennan at [facility] 1/30/2023 due to the mid to distal rectal tumor underwent open low anterior resection with colorectal anastomosis.  Leak test was positive therefore had to undergo a diverting loop ileostomy which was eventually reversed in June.  Path report from that procedure showed a tubulovillous adenoma with high-grade dysplasia measuring 3.1 cm no evidence of any invasive adenocarcinoma in all 10 lymph nodes were benign.    -1 cm suspicious prostate lesion found on staging MRI of the pelvis.  (Urology referral).  PSA = 3.18  Being followed with surveillance by Dr. Grace of urology.    TREATMENT:  -Colorectal Dr. Brennan surgery referral to [facility]; excision 7/18/2022 showed negative for carcinoma or dysplasia.   January 2023-found malignancy in the more proximal rectum and underwent surgical resection and temporary diverting loop ileostomy.  Status post diverting loop ileostomy and closure of same on 6/12/2023.  Hernia surgery x 2 in [place].  -Urology consultation.Dr Bee.     PARAMETERS:  -Abdominal pelvic CT with contrast 7/11/2023  -CEA 8/30/23- 4.6  -PSA 8/30/23- 1.97    SUBJECTIVE:  Last seen by me

## 2024-05-28 ENCOUNTER — NURSE ONLY (OUTPATIENT)
Dept: LAB | Age: 79
End: 2024-05-28

## 2024-05-28 ENCOUNTER — HOSPITAL ENCOUNTER (OUTPATIENT)
Dept: INFUSION THERAPY | Age: 79
Discharge: HOME | End: 2024-05-28
Payer: MEDICARE

## 2024-05-28 ENCOUNTER — OFFICE VISIT (OUTPATIENT)
Dept: ONCOLOGY | Age: 79
End: 2024-05-28
Payer: MEDICARE

## 2024-05-28 VITALS
DIASTOLIC BLOOD PRESSURE: 81 MMHG | SYSTOLIC BLOOD PRESSURE: 169 MMHG | BODY MASS INDEX: 25.58 KG/M2 | OXYGEN SATURATION: 93 % | HEART RATE: 89 BPM | WEIGHT: 149 LBS

## 2024-05-28 VITALS
HEART RATE: 89 BPM | RESPIRATION RATE: 18 BRPM | DIASTOLIC BLOOD PRESSURE: 81 MMHG | SYSTOLIC BLOOD PRESSURE: 169 MMHG | OXYGEN SATURATION: 93 %

## 2024-05-28 DIAGNOSIS — N42.89 PROSTATE MASS: ICD-10-CM

## 2024-05-28 DIAGNOSIS — C20 RECTAL CANCER (HCC): Primary | ICD-10-CM

## 2024-05-28 DIAGNOSIS — C20 RECTAL CANCER (HCC): ICD-10-CM

## 2024-05-28 LAB
ALBUMIN SERPL BCG-MCNC: 3.8 G/DL (ref 3.5–5.1)
ALP SERPL-CCNC: 49 U/L (ref 38–126)
ALT SERPL W/O P-5'-P-CCNC: 15 U/L (ref 11–66)
ANION GAP SERPL CALC-SCNC: 13 MEQ/L (ref 8–16)
AST SERPL-CCNC: 18 U/L (ref 5–40)
BASOPHILS ABSOLUTE: 0 THOU/MM3 (ref 0–0.1)
BASOPHILS NFR BLD AUTO: 0.5 %
BILIRUB CONJ SERPL-MCNC: < 0.2 MG/DL (ref 0–0.3)
BILIRUB SERPL-MCNC: < 0.2 MG/DL (ref 0.3–1.2)
BUN SERPL-MCNC: 25 MG/DL (ref 7–22)
CALCIUM SERPL-MCNC: 9.3 MG/DL (ref 8.5–10.5)
CEA SERPL-MCNC: 13.1 NG/ML (ref 0–5)
CHLORIDE SERPL-SCNC: 105 MEQ/L (ref 98–111)
CO2 SERPL-SCNC: 26 MEQ/L (ref 23–33)
CREAT SERPL-MCNC: 0.8 MG/DL (ref 0.4–1.2)
DEPRECATED RDW RBC AUTO: 55.4 FL (ref 35–45)
EOSINOPHIL NFR BLD AUTO: 3.6 %
EOSINOPHILS ABSOLUTE: 0.2 THOU/MM3 (ref 0–0.4)
ERYTHROCYTE [DISTWIDTH] IN BLOOD BY AUTOMATED COUNT: 18.1 % (ref 11.5–14.5)
GFR SERPL CREATININE-BSD FRML MDRD: 90 ML/MIN/1.73M2
GLUCOSE SERPL-MCNC: 94 MG/DL (ref 70–108)
HCT VFR BLD AUTO: 40.1 % (ref 42–52)
HGB BLD-MCNC: 12.7 GM/DL (ref 14–18)
IMM GRANULOCYTES # BLD AUTO: 0 THOU/MM3 (ref 0–0.07)
IMM GRANULOCYTES NFR BLD AUTO: 0 %
LYMPHOCYTES ABSOLUTE: 1.7 THOU/MM3 (ref 1–4.8)
LYMPHOCYTES NFR BLD AUTO: 27.4 %
MCH RBC QN AUTO: 27 PG (ref 26–33)
MCHC RBC AUTO-ENTMCNC: 31.7 GM/DL (ref 32.2–35.5)
MCV RBC AUTO: 85.1 FL (ref 80–94)
MONOCYTES ABSOLUTE: 0.8 THOU/MM3 (ref 0.4–1.3)
MONOCYTES NFR BLD AUTO: 12.4 %
NEUTROPHILS ABSOLUTE: 3.4 THOU/MM3 (ref 1.8–7.7)
NEUTROPHILS NFR BLD AUTO: 56.1 %
NRBC BLD AUTO-RTO: 0 /100 WBC
PLATELET # BLD AUTO: 358 THOU/MM3 (ref 130–400)
PMV BLD AUTO: 10.1 FL (ref 9.4–12.4)
POTASSIUM SERPL-SCNC: 3.9 MEQ/L (ref 3.5–5.2)
PROT SERPL-MCNC: 7 G/DL (ref 6.1–8)
RBC # BLD AUTO: 4.71 MILL/MM3 (ref 4.7–6.1)
SODIUM SERPL-SCNC: 144 MEQ/L (ref 135–145)
WBC # BLD AUTO: 6.1 THOU/MM3 (ref 4.8–10.8)

## 2024-05-28 PROCEDURE — G8428 CUR MEDS NOT DOCUMENT: HCPCS | Performed by: INTERNAL MEDICINE

## 2024-05-28 PROCEDURE — 3079F DIAST BP 80-89 MM HG: CPT | Performed by: INTERNAL MEDICINE

## 2024-05-28 PROCEDURE — 99211 OFF/OP EST MAY X REQ PHY/QHP: CPT

## 2024-05-28 PROCEDURE — G8419 CALC BMI OUT NRM PARAM NOF/U: HCPCS | Performed by: INTERNAL MEDICINE

## 2024-05-28 PROCEDURE — 3077F SYST BP >= 140 MM HG: CPT | Performed by: INTERNAL MEDICINE

## 2024-05-28 PROCEDURE — 1123F ACP DISCUSS/DSCN MKR DOCD: CPT | Performed by: INTERNAL MEDICINE

## 2024-05-28 PROCEDURE — 1036F TOBACCO NON-USER: CPT | Performed by: INTERNAL MEDICINE

## 2024-05-28 PROCEDURE — 99214 OFFICE O/P EST MOD 30 MIN: CPT | Performed by: INTERNAL MEDICINE

## 2024-05-28 NOTE — PATIENT INSTRUCTIONS
Please request the [facility] Dr. Brennan's last note including colonoscopy procedure from 2023 5/28 labs drawn at [facility] and pending    Routine follow-up with me in 6 months and will draw a PSA and CEA CBC and CMP that day/ordered  12/3/24

## 2024-06-05 ENCOUNTER — TELEPHONE (OUTPATIENT)
Dept: PULMONOLOGY | Age: 79
End: 2024-06-05

## 2024-06-05 DIAGNOSIS — J44.9 STEROID-DEPENDENT COPD (HCC): Primary | ICD-10-CM

## 2024-06-05 DIAGNOSIS — J43.9 BULLOUS EMPHYSEMA (HCC): ICD-10-CM

## 2024-06-05 DIAGNOSIS — Z92.241 STEROID-DEPENDENT COPD (HCC): Primary | ICD-10-CM

## 2024-06-05 DIAGNOSIS — Z92.241 STEROID-DEPENDENT COPD (HCC): ICD-10-CM

## 2024-06-05 DIAGNOSIS — J44.9 STEROID-DEPENDENT COPD (HCC): ICD-10-CM

## 2024-06-05 RX ORDER — FORMOTEROL FUMARATE DIHYDRATE 20 UG/2ML
20 SOLUTION RESPIRATORY (INHALATION) EVERY 12 HOURS SCHEDULED
Qty: 120 ML | Refills: 6 | Status: SHIPPED | OUTPATIENT
Start: 2024-06-05

## 2024-06-06 RX ORDER — FORMOTEROL FUMARATE DIHYDRATE 20 UG/2ML
SOLUTION RESPIRATORY (INHALATION)
Qty: 120 ML | Refills: 0 | OUTPATIENT
Start: 2024-06-06

## 2024-06-12 NOTE — PROGRESS NOTES
[facility] [place] [facility]  [facility]  [address]  [address]  [place] [id number]  Dept: [phone]  Loc: [phone]   Hematology/Oncology Consult (Clinic)        5/28/24    Yogesh Xiomara   1945     No ref. provider found   Nathan Elaine DO       Reason: New diagnosis of rectal cancer referred for evaluation and treatment.  No chief complaint on file.     DIAGNOSIS:  -Rectal Rectum, mass biopsy: Tubular adenoma with high-grade dysplasia; cannot exclude malignancy.  Transanal reexcision with Dr. Brennan at [facility]  7/18/22 shows no residual malignancy.  Local EUS staging T1N0.  Local MRI staging-T2N0  -Patient had endoscopy December 2022 that found a more proximal rectal cancer.  Taken to the OR by Dr. Brennan at [facility] 1/30/2023 due to the mid to distal rectal tumor underwent open low anterior resection with colorectal anastomosis.  Leak test was positive therefore had to undergo a diverting loop ileostomy which was eventually reversed in June.  Path report from that procedure showed a tubulovillous adenoma with high-grade dysplasia measuring 3.1 cm no evidence of any invasive adenocarcinoma in all 10 lymph nodes were benign.    -1 cm suspicious prostate lesion found on staging MRI of the pelvis.  (Urology referral).  PSA = 3.18  Being followed with surveillance by Dr. Grace of urology.    TREATMENT:  -Colorectal Dr. Brennan surgery referral to [facility]; excision 7/18/2022 showed negative for carcinoma or dysplasia.   January 2023-found malignancy in the more proximal rectum and underwent surgical resection and temporary diverting loop ileostomy.  Status post diverting loop ileostomy and closure of same on 6/12/2023.  Hernia surgery x 2 in [place].  -Urology consultation.Dr Bee.     PARAMETERS:  -Abdominal pelvic CT with contrast 7/11/2023  -CEA 8/30/23- 4.6  -PSA 8/30/23- 1.97    SUBJECTIVE:  Last seen by me   8/30/23.  Seen 6/28/23 by Dr Brennan of Surgery. Next appt TBD

## 2024-06-21 ENCOUNTER — HOSPITAL ENCOUNTER (OUTPATIENT)
Dept: CT IMAGING | Age: 79
Discharge: HOME | End: 2024-06-21
Payer: MEDICARE

## 2024-06-21 DIAGNOSIS — R10.12 LUQ ABDOMINAL PAIN: ICD-10-CM

## 2024-06-21 PROCEDURE — 74176 CT ABD & PELVIS W/O CONTRAST: CPT

## 2024-07-15 ENCOUNTER — OFFICE VISIT (OUTPATIENT)
Dept: PULMONOLOGY | Age: 79
End: 2024-07-15
Payer: MEDICARE

## 2024-07-15 VITALS
TEMPERATURE: 98.1 F | DIASTOLIC BLOOD PRESSURE: 68 MMHG | WEIGHT: 144.2 LBS | SYSTOLIC BLOOD PRESSURE: 126 MMHG | BODY MASS INDEX: 24.62 KG/M2 | OXYGEN SATURATION: 92 % | HEIGHT: 64 IN | HEART RATE: 96 BPM

## 2024-07-15 DIAGNOSIS — J96.11 CHRONIC RESPIRATORY FAILURE WITH HYPOXIA, ON HOME O2 THERAPY (HCC): ICD-10-CM

## 2024-07-15 DIAGNOSIS — J44.1 COPD EXACERBATION (HCC): Primary | ICD-10-CM

## 2024-07-15 DIAGNOSIS — Z99.81 CHRONIC RESPIRATORY FAILURE WITH HYPOXIA, ON HOME O2 THERAPY (HCC): ICD-10-CM

## 2024-07-15 DIAGNOSIS — Z92.241 STEROID-DEPENDENT COPD (HCC): ICD-10-CM

## 2024-07-15 DIAGNOSIS — J44.9 STEROID-DEPENDENT COPD (HCC): ICD-10-CM

## 2024-07-15 PROCEDURE — 99214 OFFICE O/P EST MOD 30 MIN: CPT | Performed by: NURSE PRACTITIONER

## 2024-07-15 PROCEDURE — 3074F SYST BP LT 130 MM HG: CPT | Performed by: NURSE PRACTITIONER

## 2024-07-15 PROCEDURE — 3078F DIAST BP <80 MM HG: CPT | Performed by: NURSE PRACTITIONER

## 2024-07-15 PROCEDURE — G8420 CALC BMI NORM PARAMETERS: HCPCS | Performed by: NURSE PRACTITIONER

## 2024-07-15 PROCEDURE — 3023F SPIROM DOC REV: CPT | Performed by: NURSE PRACTITIONER

## 2024-07-15 PROCEDURE — 1123F ACP DISCUSS/DSCN MKR DOCD: CPT | Performed by: NURSE PRACTITIONER

## 2024-07-15 PROCEDURE — G8427 DOCREV CUR MEDS BY ELIG CLIN: HCPCS | Performed by: NURSE PRACTITIONER

## 2024-07-15 PROCEDURE — 1036F TOBACCO NON-USER: CPT | Performed by: NURSE PRACTITIONER

## 2024-07-15 RX ORDER — ALBUTEROL SULFATE 2.5 MG/3ML
2.5 SOLUTION RESPIRATORY (INHALATION) EVERY 6 HOURS PRN
Qty: 360 EACH | Refills: 5 | Status: SHIPPED | OUTPATIENT
Start: 2024-07-15

## 2024-07-15 RX ORDER — AZITHROMYCIN 500 MG/1
500 TABLET, FILM COATED ORAL DAILY
Qty: 3 TABLET | Refills: 0 | Status: SHIPPED | OUTPATIENT
Start: 2024-07-15 | End: 2024-07-18

## 2024-07-15 RX ORDER — FORMOTEROL FUMARATE DIHYDRATE 20 UG/2ML
20 SOLUTION RESPIRATORY (INHALATION) EVERY 12 HOURS SCHEDULED
Qty: 120 ML | Refills: 6 | Status: SHIPPED | OUTPATIENT
Start: 2024-07-15

## 2024-07-15 RX ORDER — BUDESONIDE 1 MG/2ML
1 INHALANT ORAL 2 TIMES DAILY
Qty: 60 ML | Refills: 11 | Status: SHIPPED | OUTPATIENT
Start: 2024-07-15 | End: 2025-07-15

## 2024-07-15 RX ORDER — PREDNISONE 10 MG/1
10 TABLET ORAL DAILY
Qty: 30 TABLET | Refills: 0 | Status: SHIPPED | OUTPATIENT
Start: 2024-07-15 | End: 2024-08-14

## 2024-07-15 NOTE — PROGRESS NOTES
[facility]     Patient: LANE MARCH, 78 y.o.   : 1945  7/15/2024    Pt of Dr. Carter     Subjective     Chief Complaint   Patient presents with    Follow-up     6 month pulmonary follow up, patient requested appointment to discuss medications, no recent testing.         HPI       Patient presents for COPD follow up accompanied by his wife  Pt requested appt due to increased SOB, chest tightness, \"a lot of phlegm\" , difficulty expelling phlegm   Is using albuterol nebs Q 4 H, Perforomist nebs BID and takes oral prednisone 2.5 mg PO daily , he is prescribed budesonide nebulized solution to use twice daily.  He reports he has not had this medication in over a year due to national shortage.  He reported that he has checked with multiple pharmacies around the area and has been unsuccessful in getting any supply  He denies chest pain, hemoptysis, any recent travel or recent surgeries in the last 3 months   Symptoms started about 2-3 days ago.   Is on home oxygen 2 L/min nasal cannula with sleep.  He has been using this also as needed during the day.         Immunization History   Administered Date(s) Administered    COVID-19, PFIZER Bivalent, DO NOT Dilute, (age 12y+), IM, 30 mcg/0.3 mL 10/27/2022    COVID-19, PFIZER GRAY top, DO NOT Dilute, (age 12 y+), IM, 30 mcg/0.3 mL 2022    COVID-19, PFIZER PURPLE top, DILUTE for use, (age 12 y+), 30mcg/0.3mL 2021, 2021, 10/10/2021    COVID-19, PFIZER, (- formula), (age 12y+), IM, 30mcg/0.3mL 2023    Influenza Vaccine, unspecified formulation 10/30/2016, 10/11/2018    Influenza, FLUZONE (age 65 y+), High Dose, 0.7mL 10/14/2020, 10/14/2021, 10/13/2022, 2023    Influenza, High Dose (Fluzone 65 yrs and older) 2017, 10/10/2018, 10/16/2019    Pneumococcal, PPSV23, PNEUMOVAX 23, (age 2y+), SC/IM, 0.5mL 2018       SOCIAL HISTORY:  Social History     Tobacco Use    Smoking status: Former

## 2024-08-07 ENCOUNTER — TELEPHONE (OUTPATIENT)
Dept: PULMONOLOGY | Age: 79
End: 2024-08-07

## 2024-08-07 DIAGNOSIS — Z92.241 STEROID-DEPENDENT COPD (HCC): ICD-10-CM

## 2024-08-07 DIAGNOSIS — J44.9 STEROID-DEPENDENT COPD (HCC): ICD-10-CM

## 2024-08-07 RX ORDER — FORMOTEROL FUMARATE DIHYDRATE 20 UG/2ML
20 SOLUTION RESPIRATORY (INHALATION) EVERY 12 HOURS SCHEDULED
Qty: 120 ML | Refills: 6 | Status: SHIPPED | OUTPATIENT
Start: 2024-08-07

## 2024-08-07 RX ORDER — ALBUTEROL SULFATE 2.5 MG/3ML
2.5 SOLUTION RESPIRATORY (INHALATION) EVERY 6 HOURS PRN
Qty: 360 EACH | Refills: 5 | Status: SHIPPED | OUTPATIENT
Start: 2024-08-07

## 2024-08-07 RX ORDER — BUDESONIDE 1 MG/2ML
1 INHALANT ORAL 2 TIMES DAILY
Qty: 60 ML | Refills: 11 | Status: SHIPPED
Start: 2024-08-07 | End: 2024-08-08

## 2024-08-07 NOTE — TELEPHONE ENCOUNTER
Yogesh's wife called today regarding his refills that were sent out 7/15/24 to [facility]. [facility] says they never got them. So if you could please resend those. /thank you

## 2024-08-08 RX ORDER — BUDESONIDE 0.5 MG/2ML
0.5 INHALANT ORAL ONCE
Status: SHIPPED | OUTPATIENT
Start: 2024-08-08

## 2024-08-08 NOTE — TELEPHONE ENCOUNTER
Elizabeth from [facility] called stating that they only have budesonide in the 0.5 / 2 mL dosage. Could you send in a new script with that dosage? She also requested chart notes and demographics which I will send now to [phone].

## 2024-08-14 ENCOUNTER — TELEPHONE (OUTPATIENT)
Dept: PULMONOLOGY | Age: 79
End: 2024-08-14

## 2024-08-14 DIAGNOSIS — J44.9 STEROID-DEPENDENT COPD (HCC): ICD-10-CM

## 2024-08-14 DIAGNOSIS — Z92.241 STEROID-DEPENDENT COPD (HCC): ICD-10-CM

## 2024-08-14 RX ORDER — ALBUTEROL SULFATE 2.5 MG/3ML
2.5 SOLUTION RESPIRATORY (INHALATION) 4 TIMES DAILY
Qty: 360 EACH | Refills: 5 | Status: SHIPPED | OUTPATIENT
Start: 2024-08-14

## 2024-08-14 RX ORDER — BUDESONIDE 0.5 MG/2ML
1 INHALANT ORAL 2 TIMES DAILY
Qty: 120 ML | Refills: 11 | Status: SHIPPED | OUTPATIENT
Start: 2024-08-14 | End: 2025-08-09

## 2024-08-14 NOTE — TELEPHONE ENCOUNTER
I have never heard of this!  Medicare will not cover meds that are \" as needed\" . As  long as a pt is prescribed the med at least once daily it should be covered. Please have Elizabeth double check this.  If they are going to be too complicated I will have the patient get the albuterol from local pharmacy

## 2024-08-14 NOTE — TELEPHONE ENCOUNTER
Elizabeth called from [facility] and Pts Budesonide was not received at the pharmacy It needs to be 0.5 ml per 2ml. It doesn't look like it went all the way through on escript, and I don't see where it printed either. Pharmacy is loaded to chart. I could not reload med? And an FYI they were questioning the Albuterol PRN and I told them that is how it's ordered fro rescue.  They may call again.  Unsure what is going on there?

## 2024-08-14 NOTE — TELEPHONE ENCOUNTER
Elizabeth called back and stated that Medicare wont cover it if has more than once a day. Medicare will only cover it if it says once a day (QD) she said that the albuterol had Q6 which is more than once a day. Please advise.

## 2024-08-14 NOTE — TELEPHONE ENCOUNTER
I re-sent the budesonide and also changed the albuterol to 4x daily for medicare to cover. Sent both electronically

## 2024-11-15 ENCOUNTER — OFFICE VISIT (OUTPATIENT)
Dept: PULMONOLOGY | Age: 79
End: 2024-11-15

## 2024-11-15 VITALS
DIASTOLIC BLOOD PRESSURE: 60 MMHG | OXYGEN SATURATION: 93 % | WEIGHT: 144.2 LBS | SYSTOLIC BLOOD PRESSURE: 128 MMHG | HEIGHT: 64 IN | BODY MASS INDEX: 24.62 KG/M2 | HEART RATE: 86 BPM | TEMPERATURE: 97.8 F

## 2024-11-15 DIAGNOSIS — J44.9 STEROID-DEPENDENT COPD (HCC): ICD-10-CM

## 2024-11-15 DIAGNOSIS — Z92.241 STEROID-DEPENDENT COPD (HCC): ICD-10-CM

## 2024-11-15 DIAGNOSIS — J96.11 CHRONIC RESPIRATORY FAILURE WITH HYPOXIA, ON HOME O2 THERAPY: ICD-10-CM

## 2024-11-15 DIAGNOSIS — Z99.81 CHRONIC RESPIRATORY FAILURE WITH HYPOXIA, ON HOME O2 THERAPY: ICD-10-CM

## 2024-11-15 DIAGNOSIS — J43.9 BULLOUS EMPHYSEMA (HCC): Primary | ICD-10-CM

## 2024-11-15 PROBLEM — M18.12 ARTHRITIS OF CARPOMETACARPAL (CMC) JOINT OF LEFT THUMB: Status: ACTIVE | Noted: 2024-08-01

## 2024-11-15 PROBLEM — Z85.038 HISTORY OF MALIGNANT NEOPLASM OF COLON: Status: ACTIVE | Noted: 2023-07-20

## 2024-11-15 PROBLEM — K21.9 GERD (GASTROESOPHAGEAL REFLUX DISEASE): Status: ACTIVE | Noted: 2021-03-29

## 2024-11-15 RX ORDER — PREDNISONE 2.5 MG/1
2.5 TABLET ORAL DAILY
Qty: 90 TABLET | Refills: 3 | Status: SHIPPED | OUTPATIENT
Start: 2025-01-02 | End: 2026-01-02

## 2024-11-15 ASSESSMENT — ENCOUNTER SYMPTOMS: SHORTNESS OF BREATH: 1

## 2024-11-15 NOTE — PROGRESS NOTES
Cognition and Memory: Cognition normal.         Judgment: Judgment normal.           Test results   Lung Nodule Screening     [] Qualifies    [x]Does not qualify   [] Declined    [] Completed    CT abdomen/ pelvis - report reviewed.  Completed on 6/21/2024    FINDINGS:     There are emphysematous changes in the right and left lower lobes..   The base  of the heart is within appropriate limits.     The liver is grossly normal. The spleen is normal. The adrenal glands  are  grossly normal. There is a slightly atrophic pancreas. There are tiny gallstones  present..  There is no hydronephrosis or stones of either kidney. No contour  deforming renal masses are noted.     There is a small hiatal hernia.    There are postoperative changes along the ventral abdominal wall.    No abnormalities of the small bowel loops are noted. There is atherosclerotic  calcification in the abdominal aorta and iliac arteries.. There is no  adenopathy.     The urinary bladder is normal. The prostate gland measures 4.4 x 3.5 cm in size.  There is no pelvic free fluid. There are postoperative changes involving the  colon.. There is no adenopathy.     There is lumbar spondylosis.     IMPRESSION:     1. Stable CT scan of the abdomen and pelvis, no interval change since previous  study dated 7/11/2023..    Six Minute Walk Test  Yogesh Solano 1945    Six minute walk test done in my office today by my medical assistant. Eduardos oxygen saturation at rest on room air was 92%. His oxygen saturation dropped to 84% on room air with exertion after walking 324 feet and within 1.5 minutes.    The six minute walk test was repeated with oxygen supplementation. Oxygen supplementation was started with 1 LPM via nasal cannula and titrated to 2 LPM via nasal cannula. At the end of the test Yogesh Posey oxygen saturation remained at 90% on 2 LPM with exertion. He is mobile in the home and requires oxygen as outlined above. Patient ambulated a total of 432

## 2024-11-18 RX ORDER — METOPROLOL TARTRATE 25 MG/1
TABLET, FILM COATED ORAL
Qty: 60 TABLET | Refills: 0 | Status: SHIPPED | OUTPATIENT
Start: 2024-11-18

## 2024-11-18 RX ORDER — CLOPIDOGREL BISULFATE 75 MG/1
TABLET ORAL
Qty: 30 TABLET | Refills: 0 | Status: SHIPPED | OUTPATIENT
Start: 2024-11-18

## 2024-11-18 RX ORDER — ISOSORBIDE MONONITRATE 30 MG/1
30 TABLET, EXTENDED RELEASE ORAL DAILY
Qty: 30 TABLET | Refills: 0 | Status: SHIPPED | OUTPATIENT
Start: 2024-11-18

## 2024-11-18 RX ORDER — PRAVASTATIN SODIUM 20 MG
TABLET ORAL
Qty: 30 TABLET | Refills: 0 | Status: SHIPPED | OUTPATIENT
Start: 2024-11-18

## 2024-11-25 NOTE — PROGRESS NOTES
Spouse: Janis    Phone: [phone] [address] [place] [address]     Employment:  Ret .    Immunizations:  Immunization History   Administered Date(s) Administered    COVID-19, PFIZER Bivalent, DO NOT Dilute, (age 12y+), IM, 30 mcg/0.3 mL 10/27/2022    COVID-19, PFIZER GRAY top, DO NOT Dilute, (age 12 y+), IM, 30 mcg/0.3 mL 03/30/2022    COVID-19, PFIZER PURPLE top, DILUTE for use, (age 12 y+), 30mcg/0.3mL 01/29/2021, 02/19/2021, 10/10/2021    COVID-19, PFIZER, (age 12y+), IM, 30mcg/0.3mL 11/17/2023, 11/11/2024    Influenza Vaccine, unspecified formulation 10/30/2016, 10/11/2018    Influenza, FLUZONE High Dose (age 65 y+), IM, Quadv, 0.7mL 10/14/2020, 10/14/2021, 10/13/2022, 11/02/2023    Influenza, FLUZONE High Dose, (age 65 y+), IM, Trivalent PF, 0.5mL 11/02/2017, 10/10/2018, 10/16/2019, 10/17/2024    Pneumococcal, PCV-13, PREVNAR 13, (age 6w+), IM, 0.5mL 03/20/2019, 03/26/2019    Pneumococcal, PPSV23, PNEUMOVAX 23, (age 2y+), SC/IM, 0.5mL 02/13/2007, 05/13/2012, 10/18/2012, 03/20/2018        Health Screenings:    C-Scope: 4/18/2022 Dr. Elvia Guerra  Prostate: Not evaluated        Health Maintenance Due   Topic Date Due    Depression Screen  Never done    DTaP/Tdap/Td vaccine (1 - Tdap) Never done    Shingles vaccine (1 of 2) Never done    Lipids  04/14/2015    Respiratory Syncytial Virus (RSV) Pregnant or age 60 yrs+ (1 - 1-dose 75+ series) Never done    Annual Wellness Visit (Medicare)  Never done        Interests:   cars    Fam HX:   Family History   Problem Relation Age of Onset    High Cholesterol Father     Cancer Sister         lung    Cancer Brother         prostate    Heart Disease Brother         Hospitalizations:   None recent; Lung reduction  3/21.    Allergies:  Allergies   Allergen Reactions    Cefpodoxime Diarrhea    Flonase [Fluticasone Propionate]      Pt shakes and feels anxious    Mometasone     Mometasone Furoate      Shakes,feels anxious    Spiriva Handihaler

## 2024-11-26 ENCOUNTER — OFFICE VISIT (OUTPATIENT)
Dept: ONCOLOGY | Age: 79
End: 2024-11-26
Payer: MEDICARE

## 2024-11-26 ENCOUNTER — HOSPITAL ENCOUNTER (OUTPATIENT)
Dept: INFUSION THERAPY | Age: 79
Discharge: HOME | End: 2024-11-26
Payer: MEDICARE

## 2024-11-26 VITALS
RESPIRATION RATE: 16 BRPM | OXYGEN SATURATION: 91 % | HEART RATE: 61 BPM | TEMPERATURE: 98.4 F | SYSTOLIC BLOOD PRESSURE: 143 MMHG | DIASTOLIC BLOOD PRESSURE: 70 MMHG

## 2024-11-26 VITALS
OXYGEN SATURATION: 91 % | BODY MASS INDEX: 24.92 KG/M2 | WEIGHT: 145.2 LBS | TEMPERATURE: 98.4 F | RESPIRATION RATE: 16 BRPM | DIASTOLIC BLOOD PRESSURE: 70 MMHG | SYSTOLIC BLOOD PRESSURE: 143 MMHG | HEART RATE: 61 BPM

## 2024-11-26 DIAGNOSIS — C20 RECTAL CANCER (HCC): Primary | ICD-10-CM

## 2024-11-26 DIAGNOSIS — N42.89 PROSTATE MASS: ICD-10-CM

## 2024-11-26 DIAGNOSIS — C20 RECTAL CANCER (HCC): ICD-10-CM

## 2024-11-26 LAB
ALBUMIN SERPL BCG-MCNC: 3.7 G/DL (ref 3.5–5.1)
ALP SERPL-CCNC: 58 U/L (ref 38–126)
ALT SERPL W/O P-5'-P-CCNC: 14 U/L (ref 11–66)
AST SERPL-CCNC: 18 U/L (ref 5–40)
BASOPHILS ABSOLUTE: 0 THOU/MM3 (ref 0–0.1)
BASOPHILS NFR BLD AUTO: 0 % (ref 0–3)
BILIRUB CONJ SERPL-MCNC: < 0.1 MG/DL (ref 0.1–13.8)
BILIRUB SERPL-MCNC: 0.3 MG/DL (ref 0.3–1.2)
BUN BLDP-MCNC: 23 MG/DL (ref 8–26)
CEA SERPL-MCNC: 35.4 NG/ML (ref 0–5)
CHLORIDE BLD-SCNC: 101 MEQ/L (ref 98–109)
CREAT BLD-MCNC: 0.9 MG/DL (ref 0.5–1.2)
EOSINOPHIL NFR BLD AUTO: 1 % (ref 0–4)
EOSINOPHILS ABSOLUTE: 0.1 THOU/MM3 (ref 0–0.4)
ERYTHROCYTE [DISTWIDTH] IN BLOOD BY AUTOMATED COUNT: 15.3 % (ref 11.5–14.5)
GFR SERPL CREATININE-BSD FRML MDRD: 86 ML/MIN/1.73M2
GLUCOSE BLD-MCNC: 97 MG/DL (ref 70–108)
HCT VFR BLD AUTO: 40.2 % (ref 42–52)
HGB BLD-MCNC: 12.5 GM/DL (ref 14–18)
IMMATURE GRANULOCYTES %: 0 %
IMMATURE GRANULOCYTES ABSOLUTE: 0.01 THOU/MM3 (ref 0–0.07)
IONIZED CALCIUM, WHOLE BLOOD: 1.23 MMOL/L (ref 1.12–1.32)
LYMPHOCYTES ABSOLUTE: 0.8 THOU/MM3 (ref 1–4.8)
LYMPHOCYTES NFR BLD AUTO: 9 % (ref 15–47)
MCH RBC QN AUTO: 28.9 PG (ref 26–33)
MCHC RBC AUTO-ENTMCNC: 31.1 GM/DL (ref 32.2–35.5)
MCV RBC AUTO: 93 FL (ref 80–94)
MONOCYTES ABSOLUTE: 0.9 THOU/MM3 (ref 0.4–1.3)
MONOCYTES NFR BLD AUTO: 9 % (ref 0–12)
NEUTROPHILS ABSOLUTE: 7.6 THOU/MM3 (ref 1.8–7.7)
NEUTROPHILS NFR BLD AUTO: 81 % (ref 43–75)
PLATELET # BLD AUTO: 260 THOU/MM3 (ref 130–400)
PMV BLD AUTO: 8.9 FL (ref 9.4–12.4)
POTASSIUM BLD-SCNC: 3.9 MEQ/L (ref 3.5–4.9)
PROT SERPL-MCNC: 6.4 G/DL (ref 6.1–8)
PSA SERPL-MCNC: 6.01 NG/ML (ref 0–1)
RBC # BLD AUTO: 4.33 MILL/MM3 (ref 4.7–6.1)
SODIUM BLD-SCNC: 142 MEQ/L (ref 138–146)
TOTAL CO2, WHOLE BLOOD: 31 MEQ/L (ref 23–33)
WBC # BLD AUTO: 9.4 THOU/MM3 (ref 4.8–10.8)

## 2024-11-26 PROCEDURE — 1159F MED LIST DOCD IN RCRD: CPT | Performed by: INTERNAL MEDICINE

## 2024-11-26 PROCEDURE — G8427 DOCREV CUR MEDS BY ELIG CLIN: HCPCS | Performed by: INTERNAL MEDICINE

## 2024-11-26 PROCEDURE — 80076 HEPATIC FUNCTION PANEL: CPT

## 2024-11-26 PROCEDURE — 3077F SYST BP >= 140 MM HG: CPT | Performed by: INTERNAL MEDICINE

## 2024-11-26 PROCEDURE — 84153 ASSAY OF PSA TOTAL: CPT

## 2024-11-26 PROCEDURE — 99214 OFFICE O/P EST MOD 30 MIN: CPT | Performed by: INTERNAL MEDICINE

## 2024-11-26 PROCEDURE — 3078F DIAST BP <80 MM HG: CPT | Performed by: INTERNAL MEDICINE

## 2024-11-26 PROCEDURE — 36415 COLL VENOUS BLD VENIPUNCTURE: CPT

## 2024-11-26 PROCEDURE — 1036F TOBACCO NON-USER: CPT | Performed by: INTERNAL MEDICINE

## 2024-11-26 PROCEDURE — 82378 CARCINOEMBRYONIC ANTIGEN: CPT

## 2024-11-26 PROCEDURE — G8482 FLU IMMUNIZE ORDER/ADMIN: HCPCS | Performed by: INTERNAL MEDICINE

## 2024-11-26 PROCEDURE — G8420 CALC BMI NORM PARAMETERS: HCPCS | Performed by: INTERNAL MEDICINE

## 2024-11-26 PROCEDURE — 1123F ACP DISCUSS/DSCN MKR DOCD: CPT | Performed by: INTERNAL MEDICINE

## 2024-11-26 PROCEDURE — 85025 COMPLETE CBC W/AUTO DIFF WBC: CPT

## 2024-11-26 PROCEDURE — 99211 OFF/OP EST MAY X REQ PHY/QHP: CPT

## 2024-11-26 PROCEDURE — 80047 BASIC METABLC PNL IONIZED CA: CPT

## 2024-11-27 NOTE — PROGRESS NOTES
[facility] [place] [facility]  [facility]  [address]  [address]  [place] [id number]  Dept: [phone]  Loc: [phone]   Hematology/Oncology Consult (Clinic)        11/26/24    Yogesh Solano   1945     No ref. provider found   Nathan Elaine DO       Reason: New diagnosis of rectal cancer referred for evaluation and treatment.  No chief complaint on file.     DIAGNOSIS:  -Rectal Rectum, mass biopsy: Tubular adenoma with high-grade dysplasia; cannot exclude malignancy.  Transanal reexcision with Dr. Brennan at [facility]  7/18/22 shows no residual malignancy.  Local EUS staging T1N0.  Local MRI staging-T2N0  -Patient had endoscopy December 2022 that found a more proximal rectal cancer.  Taken to the OR by Dr. Brennan at [facility] 1/30/2023 due to the mid to distal rectal tumor underwent open low anterior resection with colorectal anastomosis.  Leak test was positive therefore had to undergo a diverting loop ileostomy which was eventually reversed in June.  Path report from that procedure showed a tubulovillous adenoma with high-grade dysplasia measuring 3.1 cm no evidence of any invasive adenocarcinoma in all 10 lymph nodes were benign.    -1 cm suspicious prostate lesion found on staging MRI of the pelvis.  (Urology referral).  PSA = 3.18  Being followed with surveillance by Dr. Grace of urology.    TREATMENT:  -Colorectal Dr. Brennan surgery referral to [facility]; excision 7/18/2022 showed negative for carcinoma or dysplasia.   January 2023-found malignancy in the more proximal rectum and underwent surgical resection and temporary diverting loop ileostomy.  Status post diverting loop ileostomy and closure of same on 6/12/2023.  Hernia surgery x 2 in [place].  Much improved.  -Urology consultation.Dr Bee.  Patient says he saw him once and did not have to return for follow-up.  No new urinary complaints.  Will check PSA today.    PARAMETERS:  -Abdominal pelvic CT with contrast

## 2024-12-02 ENCOUNTER — HOSPITAL ENCOUNTER (OUTPATIENT)
Dept: CT IMAGING | Age: 79
Discharge: HOME | End: 2024-12-02
Attending: INTERNAL MEDICINE
Payer: MEDICARE

## 2024-12-02 DIAGNOSIS — C20 RECTAL CANCER (HCC): ICD-10-CM

## 2024-12-02 DIAGNOSIS — N42.89 PROSTATE MASS: ICD-10-CM

## 2024-12-02 PROCEDURE — 71260 CT THORAX DX C+: CPT

## 2024-12-02 PROCEDURE — 6360000004 HC RX CONTRAST MEDICATION: Performed by: INTERNAL MEDICINE

## 2024-12-02 PROCEDURE — 74177 CT ABD & PELVIS W/CONTRAST: CPT

## 2024-12-02 RX ORDER — IOPAMIDOL 755 MG/ML
85 INJECTION, SOLUTION INTRAVASCULAR
Status: COMPLETED | OUTPATIENT
Start: 2024-12-02 | End: 2024-12-02

## 2024-12-02 RX ADMIN — IOHEXOL 50 ML: 240 INJECTION, SOLUTION INTRATHECAL; INTRAVASCULAR; INTRAVENOUS; ORAL at 12:52

## 2024-12-02 RX ADMIN — IOPAMIDOL 85 ML: 755 INJECTION, SOLUTION INTRAVENOUS at 12:52

## 2024-12-03 ENCOUNTER — TELEPHONE (OUTPATIENT)
Dept: PULMONOLOGY | Age: 79
End: 2024-12-03

## 2024-12-03 ENCOUNTER — HOSPITAL ENCOUNTER (OUTPATIENT)
Dept: MRI IMAGING | Age: 79
Discharge: HOME | End: 2024-12-03
Attending: INTERNAL MEDICINE
Payer: MEDICARE

## 2024-12-03 ENCOUNTER — TELEPHONE (OUTPATIENT)
Dept: ONCOLOGY | Age: 79
End: 2024-12-03

## 2024-12-03 ENCOUNTER — CLINICAL DOCUMENTATION (OUTPATIENT)
Dept: ONCOLOGY | Age: 79
End: 2024-12-03

## 2024-12-03 DIAGNOSIS — C20 RECTAL CANCER (HCC): ICD-10-CM

## 2024-12-03 DIAGNOSIS — R91.8 LUNG MASS: ICD-10-CM

## 2024-12-03 DIAGNOSIS — C20 RECTAL CANCER (HCC): Primary | ICD-10-CM

## 2024-12-03 PROCEDURE — 6360000004 HC RX CONTRAST MEDICATION: Performed by: INTERNAL MEDICINE

## 2024-12-03 PROCEDURE — 70553 MRI BRAIN STEM W/O & W/DYE: CPT

## 2024-12-03 PROCEDURE — A9579 GAD-BASE MR CONTRAST NOS,1ML: HCPCS | Performed by: INTERNAL MEDICINE

## 2024-12-03 RX ADMIN — GADOTERIDOL 10 ML: 279.3 INJECTION, SOLUTION INTRAVENOUS at 18:01

## 2024-12-03 NOTE — PROGRESS NOTES
tenderness.flanks nontender  : Not Examined  Extremities: no cyanosis,clubbing or edema.  Skin: unremarkable  Neuro: A and O x 4, CN exam nonfocal, Motor- no deficits, Sensory- no deficits, gait-nl, speech- fluent, no ataxia.  Devices: none      DATA:    LAB:     CBC with Differential:      Lab Results   Component Value Date/Time    WBC 9.4 11/26/2024 10:46 AM    RBC 4.33 11/26/2024 10:46 AM    RBC 2.76 03/24/2012 03:32 AM    HGB 12.5 11/26/2024 10:46 AM    HGB 8.7 03/24/2012 03:32 AM    HCT 40.2 11/26/2024 10:46 AM     11/26/2024 10:46 AM    MCV 93 11/26/2024 10:46 AM    MCH 28.9 11/26/2024 10:46 AM    MCHC 31.1 11/26/2024 10:46 AM    RDW 15.3 11/26/2024 10:46 AM    NRBC 0 05/28/2024 08:52 AM    NRBC 0 03/20/2012 05:13 AM    MONOPCT 12.4 05/28/2024 08:52 AM    EOSPCT 1 11/26/2024 10:46 AM    MONOSABS 0.9 11/26/2024 10:46 AM    LYMPHSABS 0.8 11/26/2024 10:46 AM    EOSABS 0.1 11/26/2024 10:46 AM    BASOSABS 0.0 11/26/2024 10:46 AM     No components found for: \"SEGSABS\"      CMP:    Lab Results   Component Value Date/Time     11/26/2024 10:46 AM     05/28/2024 08:52 AM    K 3.9 11/26/2024 10:46 AM    K 3.9 05/28/2024 08:52 AM    K 4.2 06/08/2020 12:19 PM     05/28/2024 08:52 AM    CO2 26 05/28/2024 08:52 AM    BUN 25 05/28/2024 08:52 AM    CREATININE 0.9 11/26/2024 10:46 AM    CREATININE 0.8 05/28/2024 08:52 AM    LABGLOM 86 11/26/2024 10:46 AM    LABGLOM >60 08/30/2023 09:04 AM    GLUCOSE 94 05/28/2024 08:52 AM    GLUCOSE 88 03/24/2012 03:32 AM    CALCIUM 9.3 05/28/2024 08:52 AM    BILITOT 0.3 11/26/2024 10:46 AM    ALKPHOS 58 11/26/2024 10:46 AM    AST 18 11/26/2024 10:46 AM    ALT 14 11/26/2024 10:46 AM       BMP:    Lab Results   Component Value Date/Time     11/26/2024 10:46 AM     05/28/2024 08:52 AM    K 3.9 11/26/2024 10:46 AM    K 3.9 05/28/2024 08:52 AM    K 4.2 06/08/2020 12:19 PM     05/28/2024 08:52 AM    CO2 26 05/28/2024 08:52 AM    BUN 25 05/28/2024 08:52 AM

## 2024-12-03 NOTE — TELEPHONE ENCOUNTER
Patients wife called regarding Patients CT CAP done yesterday. Wondering the results since his next appointment isn't until May please advise.

## 2024-12-06 ENCOUNTER — OFFICE VISIT (OUTPATIENT)
Dept: PULMONOLOGY | Age: 79
End: 2024-12-06
Payer: MEDICARE

## 2024-12-06 ENCOUNTER — TELEPHONE (OUTPATIENT)
Dept: PULMONOLOGY | Age: 79
End: 2024-12-06

## 2024-12-06 VITALS
HEIGHT: 64 IN | TEMPERATURE: 98.6 F | SYSTOLIC BLOOD PRESSURE: 128 MMHG | BODY MASS INDEX: 24.79 KG/M2 | OXYGEN SATURATION: 95 % | DIASTOLIC BLOOD PRESSURE: 78 MMHG | WEIGHT: 145.2 LBS | HEART RATE: 57 BPM

## 2024-12-06 DIAGNOSIS — J44.9 STAGE 3 SEVERE COPD BY GOLD CLASSIFICATION (HCC): Primary | ICD-10-CM

## 2024-12-06 DIAGNOSIS — R91.1 NODULE OF UPPER LOBE OF RIGHT LUNG: ICD-10-CM

## 2024-12-06 PROCEDURE — 3023F SPIROM DOC REV: CPT | Performed by: INTERNAL MEDICINE

## 2024-12-06 PROCEDURE — 1123F ACP DISCUSS/DSCN MKR DOCD: CPT | Performed by: INTERNAL MEDICINE

## 2024-12-06 PROCEDURE — 3074F SYST BP LT 130 MM HG: CPT | Performed by: INTERNAL MEDICINE

## 2024-12-06 PROCEDURE — G8482 FLU IMMUNIZE ORDER/ADMIN: HCPCS | Performed by: INTERNAL MEDICINE

## 2024-12-06 PROCEDURE — 1036F TOBACCO NON-USER: CPT | Performed by: INTERNAL MEDICINE

## 2024-12-06 PROCEDURE — G8420 CALC BMI NORM PARAMETERS: HCPCS | Performed by: INTERNAL MEDICINE

## 2024-12-06 PROCEDURE — 3078F DIAST BP <80 MM HG: CPT | Performed by: INTERNAL MEDICINE

## 2024-12-06 PROCEDURE — G8427 DOCREV CUR MEDS BY ELIG CLIN: HCPCS | Performed by: INTERNAL MEDICINE

## 2024-12-06 PROCEDURE — 1159F MED LIST DOCD IN RCRD: CPT | Performed by: INTERNAL MEDICINE

## 2024-12-06 PROCEDURE — 99215 OFFICE O/P EST HI 40 MIN: CPT | Performed by: INTERNAL MEDICINE

## 2024-12-06 NOTE — TELEPHONE ENCOUNTER
I called to Morenita at [facility] to make sure they will do the CT portion of PET scan with a super D protocol. She did talk to the supervisor and yes they will make a note of that. Thank you

## 2024-12-06 NOTE — PROGRESS NOTES
Neck Circumference -  16   Mallampati - 1    Lung Nodule Screening     [] Qualifies    [x] Does not qualify   [] Declined    [] Completed    
mg via nebulization twice daily  -Continue Performist 20 mcg / 2 mL nebulization twice daily  -Continue prednisone 2.5 mg p.o. daily.  -He was advised to use 2 L/min of home oxygen with exercise and during nighttime as prescribed by Ms. Guille Medley CNP.  No need for home oxygen at rest.  -Will request radiology department at [facility] to do the CT portion of PET scan with a super D protocol.  Will inform the CT /PET technician.  -Yogesh Solano was advised to make early appointment with my clinic if he develops any constitutional symptoms including loss of weight, poor appetite or hemoptysis. He verbalizes under standing.  -Please schedule patient for follow-up my clinic 2 to 3 days after the PET scan to go over the PET scan report along with the full PFTs to decide about further plan of right upper lobe lung nodule due to his history of LV RS surgery involving both upper lobes.  -Patient and his wife were educated about my impression and plan. They verbalizes understanding.      -I personally reviewed updated the Past medical hx, Past surgical hx,Social hx, Family hx, Medications, Allergies in the discrete data section of the patient chart along with labs, Pulmonary medicine,Sleep medicine related, Pathological, Microbiological and Radiological investigations.     Total time spent interviewing the patient and/or family, evaluating lab data and X-ray data and processing orders was 45 Minutes.I personally spent more than 50% of the appointment time face to face with the patient providing counseling and coordinating the patient's care.

## 2024-12-06 NOTE — PATIENT INSTRUCTIONS
Recommendations/Plan:  -He was advised to keep his appointment for the planned PET scan on 23 December 2024  -Will schedule patient for full PFTs before clinic visit  -He was advised to continue his current inhalers and nebulizers as prescribed by Ms. Guille Medley including  -Continue albuterol HFA 2 puffs every 6 hourly as needed  -He was instructed to use Albuterol HFA  inhaler 2 puffs Q6h prn or Albuterol 2.5mg nebs Q6h prn (the nebulizer) at one time as rescue medication not both at the same time. He verbalizes understanding.   -Continue Pulmicort 0.5 mg via nebulization twice daily  -Continue Performist 20 mcg / 2 mL nebulization twice daily  -Continue prednisone 2.5 mg p.o. daily.  -He was advised to use 2 L/min of home oxygen with exercise and during nighttime as prescribed by Ms. Guille Medley, CNP.  No need for home oxygen at rest.  -Will request radiology department at [facility] to do the CT portion of PET scan with a super D protocol.  Will inform the CT /PET technician.  -Yogesh Solano was advised to make early appointment with my clinic if he develops any constitutional symptoms including loss of weight, poor appetite or hemoptysis. He verbalizes under standing.  -Please schedule patient for follow-up my clinic 2 to 3 days after the PET scan to go over the PET scan report along with the full PFTs to decide about further plan of right upper lobe lung nodule due to his history of LV RS surgery involving both upper lobes.  -Patient and his wife were educated about my impression and plan. They verbalizes understanding.

## 2024-12-10 ENCOUNTER — OFFICE VISIT (OUTPATIENT)
Dept: CARDIOLOGY CLINIC | Age: 79
End: 2024-12-10
Payer: MEDICARE

## 2024-12-10 VITALS
DIASTOLIC BLOOD PRESSURE: 92 MMHG | HEART RATE: 62 BPM | WEIGHT: 143.2 LBS | HEIGHT: 64 IN | SYSTOLIC BLOOD PRESSURE: 162 MMHG | BODY MASS INDEX: 24.45 KG/M2

## 2024-12-10 DIAGNOSIS — E78.01 FAMILIAL HYPERCHOLESTEROLEMIA: ICD-10-CM

## 2024-12-10 DIAGNOSIS — I25.10 CORONARY ARTERY DISEASE INVOLVING NATIVE CORONARY ARTERY OF NATIVE HEART WITHOUT ANGINA PECTORIS: Primary | ICD-10-CM

## 2024-12-10 DIAGNOSIS — I10 PRIMARY HYPERTENSION: ICD-10-CM

## 2024-12-10 PROCEDURE — 1123F ACP DISCUSS/DSCN MKR DOCD: CPT | Performed by: NUCLEAR MEDICINE

## 2024-12-10 PROCEDURE — 1159F MED LIST DOCD IN RCRD: CPT | Performed by: NUCLEAR MEDICINE

## 2024-12-10 PROCEDURE — G8427 DOCREV CUR MEDS BY ELIG CLIN: HCPCS | Performed by: NUCLEAR MEDICINE

## 2024-12-10 PROCEDURE — G8482 FLU IMMUNIZE ORDER/ADMIN: HCPCS | Performed by: NUCLEAR MEDICINE

## 2024-12-10 PROCEDURE — 3080F DIAST BP >= 90 MM HG: CPT | Performed by: NUCLEAR MEDICINE

## 2024-12-10 PROCEDURE — 99214 OFFICE O/P EST MOD 30 MIN: CPT | Performed by: NUCLEAR MEDICINE

## 2024-12-10 PROCEDURE — G8420 CALC BMI NORM PARAMETERS: HCPCS | Performed by: NUCLEAR MEDICINE

## 2024-12-10 PROCEDURE — 1036F TOBACCO NON-USER: CPT | Performed by: NUCLEAR MEDICINE

## 2024-12-10 PROCEDURE — 3077F SYST BP >= 140 MM HG: CPT | Performed by: NUCLEAR MEDICINE

## 2024-12-10 NOTE — PROGRESS NOTES
Shakes,feels anxious    Spiriva Handihaler [Tiotropium Bromide Monohydrate]     Umeclidinium [place]      Health Maintenance   Topic Date Due    Depression Screen  Never done    DTaP/Tdap/Td vaccine (1 - Tdap) Never done    Shingles vaccine (1 of 2) Never done    Lipids  04/14/2015    Respiratory Syncytial Virus (RSV) Pregnant or age 60 yrs+ (1 - 1-dose 75+ series) Never done    Annual Wellness Visit (Medicare)  Never done    Prostate Specific Antigen (PSA) Screening or Monitoring  11/26/2025    Flu vaccine  Completed    Pneumococcal 65+ years Vaccine  Completed    COVID-19 Vaccine  Completed    Hepatitis A vaccine  Aged Out    Hepatitis B vaccine  Aged Out    Hib vaccine  Aged Out    Polio vaccine  Aged Out    Meningococcal (ACWY) vaccine  Aged Out    AAA screen  Discontinued    Colonoscopy  Discontinued       Subjective:  General:   No fever, no chills, some fatigue or weight loss  Pulmonary:    Some baseline dyspnea, no wheezing  Cardiac:    Denies recent chest pain,   GI:     No nausea or vomiting, no abdominal pain  Neuro:    No dizziness or light headedness,   Musculoskeletal:  No recent active issues  Extremities:   No edema, no obvious claudication       Objective:  General:   Well developed, well nourished  Lungs:   Clear to auscultation  Heart:    Normal S1 S2, Slight murmur. no rubs, no gallops  Abdomen:   Soft, non tender, no organomegalies, positive bowel sounds  Extremities:   No edema, no cyanosis, good peripheral pulses  Neurological:   Awake, alert, oriented. No obvious focal deficits  Musculoskelatal:  No obvious deformities   BP (!) 162/92   Pulse 62   Ht 1.626 m (5' 4\")   Wt 65 kg (143 lb 3.2 oz)   BMI 24.58 kg/m²     Assessment:  Assessment & Plan    Diagnosis Orders   1. Coronary artery disease involving native coronary artery of native heart without angina pectoris        2. Primary hypertension        3. Familial hypercholesterolemia        Multiple risk factors for CAD  Getting work up

## 2024-12-16 ENCOUNTER — HOSPITAL ENCOUNTER (OUTPATIENT)
Dept: PET IMAGING | Age: 79
Discharge: HOME | End: 2024-12-16
Attending: INTERNAL MEDICINE
Payer: MEDICARE

## 2024-12-16 DIAGNOSIS — C20 RECTAL CANCER (HCC): ICD-10-CM

## 2024-12-16 PROCEDURE — 3430000000 HC RX DIAGNOSTIC RADIOPHARMACEUTICAL: Performed by: INTERNAL MEDICINE

## 2024-12-16 PROCEDURE — A9609 HC RX DIAGNOSTIC RADIOPHARMACEUTICAL: HCPCS | Performed by: INTERNAL MEDICINE

## 2024-12-16 PROCEDURE — 78815 PET IMAGE W/CT SKULL-THIGH: CPT

## 2024-12-16 RX ORDER — FLUDEOXYGLUCOSE F 18 200 MCI/ML
10.4 INJECTION, SOLUTION INTRAVENOUS
Status: COMPLETED | OUTPATIENT
Start: 2024-12-16 | End: 2024-12-16

## 2024-12-16 RX ADMIN — FLUDEOXYGLUCOSE F 18 10.4 MILLICURIE: 200 INJECTION, SOLUTION INTRAVENOUS at 07:22

## 2024-12-17 NOTE — PROGRESS NOTES
[facility] [place] [facility]  [facility]  [address]  [address]  [place] [id number]  Dept: [phone]  Loc: [phone]   Hematology/Oncology Consult (Clinic)        12/18/24    Yogesh Solano   1945     No ref. provider found   Nathan Elaine DO            DIAGNOSIS:  -Rectal Rectum, mass biopsy: Tubular adenoma with high-grade dysplasia; cannot exclude malignancy.  Transanal reexcision with Dr. Brennan at [facility]  7/18/22 shows no residual malignancy.  Local EUS staging T1N0.  Local MRI staging-T2N0  -Patient had endoscopy December 2022 that found a more proximal rectal cancer.  Taken to the OR by Dr. Brennan at [facility] 1/30/2023 due to the mid to distal rectal tumor underwent open low anterior resection with colorectal anastomosis.  Leak test was positive therefore had to undergo a diverting loop ileostomy which was eventually reversed in June.  Path report from that procedure showed a tubulovillous adenoma with high-grade dysplasia measuring 3.1 cm no evidence of any invasive adenocarcinoma in all 10 lymph nodes were benign.    -1 cm suspicious prostate lesion found on staging MRI of the pelvis.  (Urology referral).  PSA = 3.18  Being followed with surveillance by Dr. Grace of urology.    -November 2024 new approximately 3 cm right upper lung suspicious lung mass and PET scan hypermetabolic including a normal-sized pretracheal node.  Diagnostic workup and biopsy to be done. (Workup initiated by a rising CEA)      TREATMENT:  -Colorectal Dr. Brennan surgery referral to [facility]; excision 7/18/2022 showed negative for carcinoma or dysplasia.   January 2023-found malignancy in the more proximal rectum and underwent surgical resection and temporary diverting loop ileostomy.  Status post diverting loop ileostomy and closure of same on 6/12/2023.  Hernia surgery x 2 in [place].  Much improved.  -Urology consultation.Dr Bee.  Patient says he saw him once and did not have to return

## 2024-12-18 ENCOUNTER — HOSPITAL ENCOUNTER (OUTPATIENT)
Dept: PULMONOLOGY | Age: 79
Discharge: HOME | End: 2024-12-18
Attending: INTERNAL MEDICINE
Payer: MEDICARE

## 2024-12-18 ENCOUNTER — HOSPITAL ENCOUNTER (OUTPATIENT)
Dept: INFUSION THERAPY | Age: 79
Discharge: HOME | End: 2024-12-18
Payer: MEDICARE

## 2024-12-18 ENCOUNTER — OFFICE VISIT (OUTPATIENT)
Dept: ONCOLOGY | Age: 79
End: 2024-12-18
Payer: MEDICARE

## 2024-12-18 VITALS
SYSTOLIC BLOOD PRESSURE: 140 MMHG | OXYGEN SATURATION: 90 % | WEIGHT: 143 LBS | TEMPERATURE: 97.9 F | BODY MASS INDEX: 24.55 KG/M2 | DIASTOLIC BLOOD PRESSURE: 76 MMHG | RESPIRATION RATE: 16 BRPM | HEART RATE: 82 BPM

## 2024-12-18 VITALS
TEMPERATURE: 97.9 F | DIASTOLIC BLOOD PRESSURE: 76 MMHG | HEART RATE: 82 BPM | RESPIRATION RATE: 16 BRPM | SYSTOLIC BLOOD PRESSURE: 140 MMHG | OXYGEN SATURATION: 90 %

## 2024-12-18 DIAGNOSIS — J44.9 STAGE 3 SEVERE COPD BY GOLD CLASSIFICATION (HCC): ICD-10-CM

## 2024-12-18 DIAGNOSIS — R91.1 NODULE OF UPPER LOBE OF RIGHT LUNG: ICD-10-CM

## 2024-12-18 DIAGNOSIS — R91.8 LUNG MASS: ICD-10-CM

## 2024-12-18 DIAGNOSIS — C20 RECTAL CANCER (HCC): Primary | ICD-10-CM

## 2024-12-18 PROCEDURE — 3078F DIAST BP <80 MM HG: CPT | Performed by: INTERNAL MEDICINE

## 2024-12-18 PROCEDURE — G8482 FLU IMMUNIZE ORDER/ADMIN: HCPCS | Performed by: INTERNAL MEDICINE

## 2024-12-18 PROCEDURE — 99214 OFFICE O/P EST MOD 30 MIN: CPT | Performed by: INTERNAL MEDICINE

## 2024-12-18 PROCEDURE — 94729 DIFFUSING CAPACITY: CPT

## 2024-12-18 PROCEDURE — 94726 PLETHYSMOGRAPHY LUNG VOLUMES: CPT

## 2024-12-18 PROCEDURE — 99211 OFF/OP EST MAY X REQ PHY/QHP: CPT

## 2024-12-18 PROCEDURE — 1036F TOBACCO NON-USER: CPT | Performed by: INTERNAL MEDICINE

## 2024-12-18 PROCEDURE — 1159F MED LIST DOCD IN RCRD: CPT | Performed by: INTERNAL MEDICINE

## 2024-12-18 PROCEDURE — 94060 EVALUATION OF WHEEZING: CPT

## 2024-12-18 PROCEDURE — 3077F SYST BP >= 140 MM HG: CPT | Performed by: INTERNAL MEDICINE

## 2024-12-18 PROCEDURE — G8420 CALC BMI NORM PARAMETERS: HCPCS | Performed by: INTERNAL MEDICINE

## 2024-12-18 PROCEDURE — 1126F AMNT PAIN NOTED NONE PRSNT: CPT | Performed by: INTERNAL MEDICINE

## 2024-12-18 PROCEDURE — G8427 DOCREV CUR MEDS BY ELIG CLIN: HCPCS | Performed by: INTERNAL MEDICINE

## 2024-12-18 PROCEDURE — 1123F ACP DISCUSS/DSCN MKR DOCD: CPT | Performed by: INTERNAL MEDICINE

## 2024-12-18 NOTE — PATIENT INSTRUCTIONS
-Please schedule follow-up with me for 1/8/2025  -Placed all to Dr. Zana Gonzalez pulmonary to expedite doing a [facility] EBUS.

## 2024-12-23 NOTE — PROGRESS NOTES
Neck Circumference -   16 inches  Mallampati - 2    Lung Nodule Screening     [] Qualifies    [x] Does not qualify   [] Declined    [] Completed    
His PET scan was reported as abnormal with a hypermetabolic right upper lobe pulmonary mass along with FDG avid mediastinal lymph nodes.  Differential diagnose include primary pulmonary neoplastic process VS metastasis from his colon cancer.  -Severe chronic obstructive pulmonary disease-under control.   -Coronary artery disease S/p 2 stents placement.  His last stent in the coronary arteries was placed in 2012.  -Essential hypertension on treatment medications under control  -Hx colon cancer I.e tubular adenoma with high-grade dysplasia s/p Transanal resection of colon by Dr. Mika MD at [facility]. on 18 July 2022 .  He never received chemotherapy or radiation therapy.  He follows with Dr. Elvia Guerra [facility] along with Dr. Chuck Correia MD from medical oncology service.  -S/p lung volume reduction surgery by VATS on 29 March 2021 at [facility] by Dr. Macario Mae MD in his both upper lobes.  He denies any endobronchial valve placement.    Recommendations/Plan:  -Schedule patient for CT guided biopsy of right upper lobe lung nodule with interventional radiology service at [facility] as soon as possible. Patient was informed about complications of procedure including but not limited to development of pneumothorax with requirement of chest tube placement. Patient verbalizes understanding.  I spoke with Dr. Abelardo Rangel MD from interventional radiology service at [facility].  He graciously agreed to do the CT-guided biopsy.  I informed Dr. Abelardo Rangel regarding patient's severe COPD and current home oxygen dependency.  -However after going through the CT-guided biopsy risks including development of pneumothorax with requirement of chest tube placement and respiratory failure, patient and patient wife decided to go for robotic bronchoscopy assisted biopsy of the right upper lobe lung mass along with the EBUS procedure by Dr. Menjivar 
92

## 2024-12-24 ENCOUNTER — OFFICE VISIT (OUTPATIENT)
Dept: PULMONOLOGY | Age: 79
End: 2024-12-24
Payer: MEDICARE

## 2024-12-24 VITALS
HEART RATE: 76 BPM | BODY MASS INDEX: 25.03 KG/M2 | HEIGHT: 64 IN | DIASTOLIC BLOOD PRESSURE: 78 MMHG | SYSTOLIC BLOOD PRESSURE: 120 MMHG | WEIGHT: 146.6 LBS | OXYGEN SATURATION: 96 % | TEMPERATURE: 97.7 F

## 2024-12-24 DIAGNOSIS — J44.9 STAGE 3 SEVERE COPD BY GOLD CLASSIFICATION (HCC): ICD-10-CM

## 2024-12-24 DIAGNOSIS — R91.1 NODULE OF UPPER LOBE OF RIGHT LUNG: Primary | ICD-10-CM

## 2024-12-24 PROCEDURE — G8482 FLU IMMUNIZE ORDER/ADMIN: HCPCS | Performed by: INTERNAL MEDICINE

## 2024-12-24 PROCEDURE — 1159F MED LIST DOCD IN RCRD: CPT | Performed by: INTERNAL MEDICINE

## 2024-12-24 PROCEDURE — 3023F SPIROM DOC REV: CPT | Performed by: INTERNAL MEDICINE

## 2024-12-24 PROCEDURE — 3078F DIAST BP <80 MM HG: CPT | Performed by: INTERNAL MEDICINE

## 2024-12-24 PROCEDURE — 1036F TOBACCO NON-USER: CPT | Performed by: INTERNAL MEDICINE

## 2024-12-24 PROCEDURE — 99214 OFFICE O/P EST MOD 30 MIN: CPT | Performed by: INTERNAL MEDICINE

## 2024-12-24 PROCEDURE — 1123F ACP DISCUSS/DSCN MKR DOCD: CPT | Performed by: INTERNAL MEDICINE

## 2024-12-24 PROCEDURE — G8427 DOCREV CUR MEDS BY ELIG CLIN: HCPCS | Performed by: INTERNAL MEDICINE

## 2024-12-24 PROCEDURE — 3074F SYST BP LT 130 MM HG: CPT | Performed by: INTERNAL MEDICINE

## 2024-12-24 PROCEDURE — G8419 CALC BMI OUT NRM PARAM NOF/U: HCPCS | Performed by: INTERNAL MEDICINE

## 2024-12-24 NOTE — PATIENT INSTRUCTIONS
Recommendations/Plan:  -Schedule patient for CT guided biopsy of right upper lobe lung nodule with interventional radiology service at [facility] as soon as possible. Patient was informed about complications of procedure including but not limited to development of pneumothorax with requirement of chest tube placement. Patient verbalizes understanding.  I spoke with Dr. Abelardo Rangel MD from interventional radiology service at [facility].  He graciously agreed to do the CT-guided biopsy.  I informed Dr. Abelardo Rangel regarding patient's severe COPD and current home oxygen dependency.  -However after going through the CT-guided biopsy risks including development of pneumothorax with requirement of chest tube placement and respiratory failure, patient and patient wife decided to go for robotic bronchoscopy assisted biopsy of the right upper lobe lung mass along with the EBUS procedure by Dr. Otto Alberto DO.   -Refer patient to Dr. Otto Colunga DO, [facility] pulmonary clinic to consider patient for Robotic bronchoscopy guided biopsy of the right upper lobe lung mass along with the EBUS to sample mediastinal lymph node due to difficult anatomical location.   -He was advised to contact Dr. Rajendra Mckeon MD service to get clearance to stop his Plavix for the planned CT-guided biopsy for at least 5 days before the planned CT guided biopsy of the right upper lobe lung nodule.  -He was advised to continue his current inhalers and nebulizers as prescribed by Ms. Guille Medley including  -Continue albuterol HFA 2 puffs every 6 hourly as needed  -He was instructed to use Albuterol HFA  inhaler 2 puffs Q6h prn or Albuterol 2.5mg nebs Q6h prn (the nebulizer) at one time as rescue medication not both at the same time. He verbalizes understanding.   -Continue Pulmicort 0.5 mg via nebulization twice daily  -Continue Performist 20 mcg / 2 mL nebulization twice daily  -Continue prednisone 2.5

## 2024-12-27 RX ORDER — PRAVASTATIN SODIUM 20 MG
TABLET ORAL
Qty: 30 TABLET | Refills: 11 | Status: SHIPPED | OUTPATIENT
Start: 2024-12-27

## 2024-12-27 RX ORDER — ISOSORBIDE MONONITRATE 30 MG/1
30 TABLET, EXTENDED RELEASE ORAL DAILY
Qty: 30 TABLET | Refills: 11 | Status: SHIPPED | OUTPATIENT
Start: 2024-12-27

## 2024-12-27 RX ORDER — CLOPIDOGREL BISULFATE 75 MG/1
TABLET ORAL
Qty: 30 TABLET | Refills: 11 | Status: SHIPPED | OUTPATIENT
Start: 2024-12-27

## 2024-12-27 RX ORDER — METOPROLOL TARTRATE 25 MG/1
TABLET, FILM COATED ORAL
Qty: 60 TABLET | Refills: 11 | Status: SHIPPED | OUTPATIENT
Start: 2024-12-27

## 2024-12-28 NOTE — PROGRESS NOTES
[facility]  Pulmonary medicine clinic follow up note.       Patient: Yogesh Solano  : 1945      Chief complaint/[place]:     Yogesh Solano is a 79 y.o. old male came for further evaluation regarding his right upper lobe lung nodule after having Bronchoscopy with right upper lobe transbronchial biopsy and the EBUS from 4R and station 7.    He was initially referred from Dr. Chuck Correia DO.  He usually follows with Ms. Guille Medley CNP at [facility] pulmonary office.  Patient was seen by Ms. Guille Medley CNP for the last time on 15 November 2024.    He was recently evaluated by Dr. Chuck Correia MD on 2024.  Patient underwent CT scan of chest, CT scan of abdomen,pelvis and MRI scan of the brain as a part of follow-up of his colon cancer.  He was noted to have a New spiculated right upper lobe mass measures 30 x 50 mm.  He was sent to my clinic for further evaluation.    On today's questioning:  He admits to cough with clear expectoration.  He denies hemoptysis.  He coughed up bloodstained sputum for approximately 4 days after having bronchoscopy and EBUS procedure.  His hemoptysis is completely resolved.  He resumed his Plavix and tolerating it well.  He denies fever or chills.   He denies recent hospitalizations or emergency room visits.     He is using his prescribed inhalers with excellent compliance.   He is using his rescue inhaler rarely.     He denies recent loss of weight or appetite changes.   He denies recent decline in functional status.    He is currently on treatment with the following inhalers:  Albuterol HFA 2 puffs every 6 hourly as needed  Albuterol 2.5 mg 1 nebulization 4 times daily  Pulmicort 0.5 mg via nebulization twice daily  Performist 20 mcg / 2 mL nebulization twice daily  Prednisone 2.5 mg p.o. daily    He was ever seen by a pulmonologist in the past: Ms. Guille Medley CNP  He was ever diagnosed with lung nodule in the

## 2025-01-10 NOTE — PROGRESS NOTES
Neck Circumference -   15.5 inches  Mallampati - 1    Lung Nodule Screening     [] Qualifies    [] Does not qualify   [] Declined    [x] Completed

## 2025-01-13 ENCOUNTER — OFFICE VISIT (OUTPATIENT)
Dept: PULMONOLOGY | Age: 80
End: 2025-01-13
Payer: MEDICARE

## 2025-01-13 VITALS
BODY MASS INDEX: 24.69 KG/M2 | HEART RATE: 74 BPM | WEIGHT: 144.6 LBS | HEIGHT: 64 IN | DIASTOLIC BLOOD PRESSURE: 70 MMHG | SYSTOLIC BLOOD PRESSURE: 126 MMHG | OXYGEN SATURATION: 98 % | TEMPERATURE: 98.1 F

## 2025-01-13 DIAGNOSIS — J44.9 STAGE 3 SEVERE COPD BY GOLD CLASSIFICATION (HCC): ICD-10-CM

## 2025-01-13 DIAGNOSIS — R94.8 ABNORMAL PET SCAN OF MEDIASTINUM: ICD-10-CM

## 2025-01-13 DIAGNOSIS — R91.1 NODULE OF UPPER LOBE OF RIGHT LUNG: Primary | ICD-10-CM

## 2025-01-13 PROCEDURE — 99214 OFFICE O/P EST MOD 30 MIN: CPT | Performed by: INTERNAL MEDICINE

## 2025-01-13 PROCEDURE — 1036F TOBACCO NON-USER: CPT | Performed by: INTERNAL MEDICINE

## 2025-01-13 PROCEDURE — G8420 CALC BMI NORM PARAMETERS: HCPCS | Performed by: INTERNAL MEDICINE

## 2025-01-13 PROCEDURE — 1159F MED LIST DOCD IN RCRD: CPT | Performed by: INTERNAL MEDICINE

## 2025-01-13 PROCEDURE — 1123F ACP DISCUSS/DSCN MKR DOCD: CPT | Performed by: INTERNAL MEDICINE

## 2025-01-13 PROCEDURE — 3078F DIAST BP <80 MM HG: CPT | Performed by: INTERNAL MEDICINE

## 2025-01-13 PROCEDURE — 3023F SPIROM DOC REV: CPT | Performed by: INTERNAL MEDICINE

## 2025-01-13 PROCEDURE — 3074F SYST BP LT 130 MM HG: CPT | Performed by: INTERNAL MEDICINE

## 2025-01-13 PROCEDURE — G8427 DOCREV CUR MEDS BY ELIG CLIN: HCPCS | Performed by: INTERNAL MEDICINE

## 2025-01-13 NOTE — PROGRESS NOTES
4.6  PSA 23- 1.97    IMAGING:  PET/CT 2024  Abelardo Jaramillo MD  [phone] 2024      Narrative & Impression  PET/CT skull-base to mid-thigh     INDICATION: 79-year-old male with a history of rectal cancer. Recent CT  demonstrated a new right upper lobe pulmonary nodule     RADIOPHARMACEUTICAL: 10.4 mCi F18-FDG, intravenously.     TECHNIQUE: PET/CT imaging was performed from the skull-base to the mid-thigh  levels using routine PET acquisition.      Injection site: Left antecubital fossa  Time of FDG injection: 7:22 a.m.  Serum glucose: 90 mg/dL at 7:18 a.m.  Time of imagin:23 a.m.     COMPARISON STUDIES: Correlation made with CT of the chest 2024     FINDINGS:     NECK: There is no hypermetabolic cervical lymphadenopathy.     CHEST: The heart is normal in size. There is no pericardial effusion. There are  coronary artery calcifications. There are severe emphysematous changes  throughout the lungs. Scarring/bullous changes are seen in the left lung apex.  An irregular hypermetabolic spiculated pulmonary mass is in the right upper lobe  measuring approximately 3.6 x 3.2 cm. Maximum SUV is 18.3. There is a  nonenlarged pretracheal lymph node on image #122 demonstrating FDG avidity with  a maximum SUV of 7.1. There is no pleural effusion or pneumothorax. No abnormal  hypermetabolic activity. There is no pleural or pericardial effusion. No  FDG-avid pulmonary parenchymal or pleural nodules. No hypermetabolic axillary  lymphadenopathy. Degenerative changes are seen in the thoracic spine without  evidence of hypermetabolic osseous metastatic disease.     ABDOMEN/PELVIS: There are some stones in the lumen of the gallbladder. Diffuse  atherosclerosis is in the abdominal aorta. There are some diverticula in the  colon. There is no abnormal hypermetabolic activity. Physiologic activity is  seen in the liver, spleen, urinary collecting system and gastrointestinal tract.  No FDG-avid mesenteric,

## 2025-01-13 NOTE — PATIENT INSTRUCTIONS
Recommendations/Plan:  -He restarted taking Plavix at his previous dose.  -He was advised to keep his scheduled follow-up appointment with Dr. Chuck Correia MD in a.m. I.e on 14 January 2025 for further evaluation and management of right upper lobe poorly differentiated adenocarcinoma with positive lymph node at station 7.  -Patient was advised to discuss with Dr. Chuck Correia MD regarding referral to radiation oncology service.  -He was advised to continue his current inhalers and nebulizers as prescribed by Ms. Guille Medley including  -Continue albuterol HFA 2 puffs every 6 hourly as needed  -He was instructed to use Albuterol HFA  inhaler 2 puffs Q6h prn or Albuterol 2.5mg nebs Q6h prn (the nebulizer) at one time as rescue medication not both at the same time. He verbalizes understanding.   -Continue Pulmicort 0.5 mg via nebulization twice daily  -Continue Performist 20 mcg / 2 mL nebulization twice daily  -Continue prednisone 2.5 mg p.o. daily.  -He was advised to use 2 L/min of home oxygen with exercise and during nighttime as prescribed by Ms. Guille Medley CNP.  No need for home oxygen at rest.  -Yogesh Solano was advised to make early appointment with my clinic if he develops any constitutional symptoms including loss of weight, poor appetite or hemoptysis. He verbalizes under standing.  -Will reschedule his follow-up appointment with Ms. Guille Medley CNP in 3 months from May 2025 for clinical reevaluation regarding his COPD and right upper lobe lung mass management.  -Patient, his son and his wife were educated about my impression and plan. They verbalizes understanding.

## 2025-01-14 ENCOUNTER — OFFICE VISIT (OUTPATIENT)
Dept: ONCOLOGY | Age: 80
End: 2025-01-14
Payer: MEDICARE

## 2025-01-14 ENCOUNTER — HOSPITAL ENCOUNTER (OUTPATIENT)
Dept: INFUSION THERAPY | Age: 80
Discharge: HOME | End: 2025-01-14
Payer: MEDICARE

## 2025-01-14 ENCOUNTER — CLINICAL DOCUMENTATION (OUTPATIENT)
Dept: CASE MANAGEMENT | Age: 80
End: 2025-01-14

## 2025-01-14 VITALS
SYSTOLIC BLOOD PRESSURE: 144 MMHG | WEIGHT: 144 LBS | DIASTOLIC BLOOD PRESSURE: 70 MMHG | OXYGEN SATURATION: 94 % | HEART RATE: 80 BPM | RESPIRATION RATE: 16 BRPM | TEMPERATURE: 97.9 F | BODY MASS INDEX: 24.72 KG/M2

## 2025-01-14 VITALS
RESPIRATION RATE: 16 BRPM | OXYGEN SATURATION: 94 % | HEART RATE: 80 BPM | DIASTOLIC BLOOD PRESSURE: 70 MMHG | SYSTOLIC BLOOD PRESSURE: 144 MMHG | TEMPERATURE: 97.9 F

## 2025-01-14 DIAGNOSIS — R97.20 ELEVATED PROSTATE SPECIFIC ANTIGEN (PSA): ICD-10-CM

## 2025-01-14 DIAGNOSIS — N42.89 PROSTATE MASS: ICD-10-CM

## 2025-01-14 DIAGNOSIS — C34.90 PRIMARY ADENOCARCINOMA OF LUNG, UNSPECIFIED LATERALITY (HCC): ICD-10-CM

## 2025-01-14 DIAGNOSIS — C34.90 PRIMARY ADENOCARCINOMA OF LUNG, UNSPECIFIED LATERALITY (HCC): Primary | ICD-10-CM

## 2025-01-14 DIAGNOSIS — C20 RECTAL CANCER (HCC): ICD-10-CM

## 2025-01-14 DIAGNOSIS — R97.20 ELEVATED PROSTATE SPECIFIC ANTIGEN (PSA): Primary | ICD-10-CM

## 2025-01-14 DIAGNOSIS — Z12.5 PROSTATE CANCER SCREENING: ICD-10-CM

## 2025-01-14 LAB
ALBUMIN SERPL BCG-MCNC: 3.8 G/DL (ref 3.5–5.1)
ALP SERPL-CCNC: 52 U/L (ref 38–126)
ALT SERPL W/O P-5'-P-CCNC: 19 U/L (ref 11–66)
ANION GAP SERPL CALC-SCNC: 11 MEQ/L (ref 8–16)
AST SERPL-CCNC: 15 U/L (ref 5–40)
BASOPHILS ABSOLUTE: 0 THOU/MM3 (ref 0–0.1)
BASOPHILS NFR BLD AUTO: 0.4 %
BILIRUB CONJ SERPL-MCNC: < 0.1 MG/DL (ref 0.1–13.8)
BILIRUB SERPL-MCNC: 0.2 MG/DL (ref 0.3–1.2)
BUN SERPL-MCNC: 23 MG/DL (ref 7–22)
CALCIUM SERPL-MCNC: 9.9 MG/DL (ref 8.5–10.5)
CEA SERPL-MCNC: 61 NG/ML (ref 0–5)
CHLORIDE SERPL-SCNC: 103 MEQ/L (ref 98–111)
CO2 SERPL-SCNC: 28 MEQ/L (ref 23–33)
CREAT SERPL-MCNC: 0.7 MG/DL (ref 0.4–1.2)
DEPRECATED RDW RBC AUTO: 53.9 FL (ref 35–45)
EOSINOPHIL NFR BLD AUTO: 1.8 %
EOSINOPHILS ABSOLUTE: 0.1 THOU/MM3 (ref 0–0.4)
ERYTHROCYTE [DISTWIDTH] IN BLOOD BY AUTOMATED COUNT: 15.4 % (ref 11.5–14.5)
GFR SERPL CREATININE-BSD FRML MDRD: > 90 ML/MIN/1.73M2
GLUCOSE SERPL-MCNC: 89 MG/DL (ref 70–108)
HCT VFR BLD AUTO: 40.9 % (ref 42–52)
HGB BLD-MCNC: 12.6 GM/DL (ref 14–18)
IMM GRANULOCYTES # BLD AUTO: 0.03 THOU/MM3 (ref 0–0.07)
IMM GRANULOCYTES NFR BLD AUTO: 0.4 %
LYMPHOCYTES ABSOLUTE: 1.1 THOU/MM3 (ref 1–4.8)
LYMPHOCYTES NFR BLD AUTO: 14.1 %
MCH RBC QN AUTO: 29.1 PG (ref 26–33)
MCHC RBC AUTO-ENTMCNC: 30.8 GM/DL (ref 32.2–35.5)
MCV RBC AUTO: 94.5 FL (ref 80–94)
MONOCYTES ABSOLUTE: 0.7 THOU/MM3 (ref 0.4–1.3)
MONOCYTES NFR BLD AUTO: 9.3 %
NEUTROPHILS ABSOLUTE: 5.7 THOU/MM3 (ref 1.8–7.7)
NEUTROPHILS NFR BLD AUTO: 74 %
NRBC BLD AUTO-RTO: 0 /100 WBC
PLATELET # BLD AUTO: 310 THOU/MM3 (ref 130–400)
PMV BLD AUTO: 9.7 FL (ref 9.4–12.4)
POTASSIUM SERPL-SCNC: 4.4 MEQ/L (ref 3.5–5.2)
PROT SERPL-MCNC: 6.6 G/DL (ref 6.1–8)
PSA SERPL-MCNC: 2.95 NG/ML (ref 0–1)
RBC # BLD AUTO: 4.33 MILL/MM3 (ref 4.7–6.1)
SODIUM SERPL-SCNC: 142 MEQ/L (ref 135–145)
WBC # BLD AUTO: 7.7 THOU/MM3 (ref 4.8–10.8)

## 2025-01-14 PROCEDURE — G8427 DOCREV CUR MEDS BY ELIG CLIN: HCPCS | Performed by: INTERNAL MEDICINE

## 2025-01-14 PROCEDURE — 1123F ACP DISCUSS/DSCN MKR DOCD: CPT | Performed by: INTERNAL MEDICINE

## 2025-01-14 PROCEDURE — 99215 OFFICE O/P EST HI 40 MIN: CPT | Performed by: INTERNAL MEDICINE

## 2025-01-14 PROCEDURE — 82248 BILIRUBIN DIRECT: CPT

## 2025-01-14 PROCEDURE — 80053 COMPREHEN METABOLIC PANEL: CPT

## 2025-01-14 PROCEDURE — 84153 ASSAY OF PSA TOTAL: CPT

## 2025-01-14 PROCEDURE — 3078F DIAST BP <80 MM HG: CPT | Performed by: INTERNAL MEDICINE

## 2025-01-14 PROCEDURE — G8420 CALC BMI NORM PARAMETERS: HCPCS | Performed by: INTERNAL MEDICINE

## 2025-01-14 PROCEDURE — 36415 COLL VENOUS BLD VENIPUNCTURE: CPT

## 2025-01-14 PROCEDURE — 82378 CARCINOEMBRYONIC ANTIGEN: CPT

## 2025-01-14 PROCEDURE — 1159F MED LIST DOCD IN RCRD: CPT | Performed by: INTERNAL MEDICINE

## 2025-01-14 PROCEDURE — 3077F SYST BP >= 140 MM HG: CPT | Performed by: INTERNAL MEDICINE

## 2025-01-14 PROCEDURE — 99211 OFF/OP EST MAY X REQ PHY/QHP: CPT

## 2025-01-14 PROCEDURE — G0102 PROSTATE CA SCREENING; DRE: HCPCS | Performed by: INTERNAL MEDICINE

## 2025-01-14 PROCEDURE — 1126F AMNT PAIN NOTED NONE PRSNT: CPT | Performed by: INTERNAL MEDICINE

## 2025-01-14 PROCEDURE — 1036F TOBACCO NON-USER: CPT | Performed by: INTERNAL MEDICINE

## 2025-01-14 PROCEDURE — 85025 COMPLETE CBC W/AUTO DIFF WBC: CPT

## 2025-01-14 RX ORDER — SODIUM CHLORIDE 9 MG/ML
5-250 INJECTION, SOLUTION INTRAVENOUS PRN
OUTPATIENT
Start: 2025-02-04

## 2025-01-14 RX ORDER — HEPARIN SODIUM (PORCINE) LOCK FLUSH IV SOLN 100 UNIT/ML 100 UNIT/ML
500 SOLUTION INTRAVENOUS PRN
OUTPATIENT
Start: 2025-02-04

## 2025-01-14 RX ORDER — FAMOTIDINE 10 MG/ML
20 INJECTION, SOLUTION INTRAVENOUS
OUTPATIENT
Start: 2025-02-04

## 2025-01-14 RX ORDER — DIPHENHYDRAMINE HYDROCHLORIDE 50 MG/ML
50 INJECTION INTRAMUSCULAR; INTRAVENOUS ONCE
OUTPATIENT
Start: 2025-02-04 | End: 2025-02-04

## 2025-01-14 RX ORDER — LIDOCAINE/PRILOCAINE 2.5 %-2.5%
CREAM (GRAM) TOPICAL
Qty: 5 G | Refills: 1 | Status: SHIPPED | OUTPATIENT
Start: 2025-01-14

## 2025-01-14 RX ORDER — EPINEPHRINE 1 MG/ML
0.3 INJECTION, SOLUTION, CONCENTRATE INTRAVENOUS PRN
OUTPATIENT
Start: 2025-02-04

## 2025-01-14 RX ORDER — HYDROCORTISONE SODIUM SUCCINATE 100 MG/2ML
100 INJECTION INTRAMUSCULAR; INTRAVENOUS
OUTPATIENT
Start: 2025-02-04

## 2025-01-14 RX ORDER — FAMOTIDINE 10 MG/ML
20 INJECTION, SOLUTION INTRAVENOUS ONCE
OUTPATIENT
Start: 2025-02-04 | End: 2025-02-04

## 2025-01-14 RX ORDER — SODIUM CHLORIDE 0.9 % (FLUSH) 0.9 %
5-40 SYRINGE (ML) INJECTION PRN
OUTPATIENT
Start: 2025-02-04

## 2025-01-14 RX ORDER — DIPHENHYDRAMINE HYDROCHLORIDE 50 MG/ML
50 INJECTION INTRAMUSCULAR; INTRAVENOUS
OUTPATIENT
Start: 2025-02-04

## 2025-01-14 RX ORDER — PROCHLORPERAZINE MALEATE 10 MG
10 TABLET ORAL EVERY 6 HOURS PRN
Qty: 30 TABLET | Refills: 1 | Status: SHIPPED | OUTPATIENT
Start: 2025-01-14

## 2025-01-14 RX ORDER — SODIUM CHLORIDE 9 MG/ML
INJECTION, SOLUTION INTRAVENOUS CONTINUOUS
OUTPATIENT
Start: 2025-02-04

## 2025-01-14 RX ORDER — PALONOSETRON 0.05 MG/ML
0.25 INJECTION, SOLUTION INTRAVENOUS ONCE
OUTPATIENT
Start: 2025-02-04 | End: 2025-02-04

## 2025-01-14 RX ORDER — ONDANSETRON 8 MG/1
8 TABLET, ORALLY DISINTEGRATING ORAL EVERY 8 HOURS PRN
Qty: 10 TABLET | Refills: 2 | Status: SHIPPED | OUTPATIENT
Start: 2025-01-14

## 2025-01-14 RX ORDER — ALBUTEROL SULFATE 90 UG/1
4 INHALANT RESPIRATORY (INHALATION) PRN
OUTPATIENT
Start: 2025-02-04

## 2025-01-14 RX ORDER — ONDANSETRON 2 MG/ML
8 INJECTION INTRAMUSCULAR; INTRAVENOUS
OUTPATIENT
Start: 2025-02-04

## 2025-01-14 RX ORDER — ACETAMINOPHEN 325 MG/1
650 TABLET ORAL
OUTPATIENT
Start: 2025-02-04

## 2025-01-14 RX ORDER — MEPERIDINE HYDROCHLORIDE 50 MG/ML
12.5 INJECTION INTRAMUSCULAR; INTRAVENOUS; SUBCUTANEOUS PRN
OUTPATIENT
Start: 2025-02-04

## 2025-01-16 ENCOUNTER — INITIAL CONSULT (OUTPATIENT)
Dept: RADIATION ONCOLOGY | Age: 80
End: 2025-01-16
Payer: MEDICARE

## 2025-01-16 VITALS
HEART RATE: 80 BPM | WEIGHT: 143.2 LBS | DIASTOLIC BLOOD PRESSURE: 75 MMHG | TEMPERATURE: 97.7 F | BODY MASS INDEX: 24.45 KG/M2 | RESPIRATION RATE: 20 BRPM | OXYGEN SATURATION: 96 % | HEIGHT: 64 IN | SYSTOLIC BLOOD PRESSURE: 136 MMHG

## 2025-01-16 DIAGNOSIS — C34.11 PRIMARY MALIGNANT NEOPLASM OF RIGHT UPPER LOBE OF LUNG (HCC): Primary | ICD-10-CM

## 2025-01-16 PROCEDURE — 3078F DIAST BP <80 MM HG: CPT

## 2025-01-16 PROCEDURE — 99205 OFFICE O/P NEW HI 60 MIN: CPT

## 2025-01-16 PROCEDURE — 3075F SYST BP GE 130 - 139MM HG: CPT

## 2025-01-16 PROCEDURE — 1159F MED LIST DOCD IN RCRD: CPT

## 2025-01-16 PROCEDURE — G8420 CALC BMI NORM PARAMETERS: HCPCS

## 2025-01-16 PROCEDURE — G8427 DOCREV CUR MEDS BY ELIG CLIN: HCPCS

## 2025-01-16 PROCEDURE — 1123F ACP DISCUSS/DSCN MKR DOCD: CPT

## 2025-01-16 PROCEDURE — 1126F AMNT PAIN NOTED NONE PRSNT: CPT

## 2025-01-16 PROCEDURE — 1036F TOBACCO NON-USER: CPT

## 2025-01-16 ASSESSMENT — ENCOUNTER SYMPTOMS
CONSTIPATION: 0
APNEA: 0
WHEEZING: 0
COUGH: 1
SORE THROAT: 0
SHORTNESS OF BREATH: 1
BACK PAIN: 0
BLOOD IN STOOL: 0
TROUBLE SWALLOWING: 0
VOMITING: 0
NAUSEA: 0
RECTAL PAIN: 0
DIARRHEA: 0
CHEST TIGHTNESS: 0
ABDOMINAL PAIN: 0

## 2025-01-16 NOTE — PROGRESS NOTES
[facility]          Radiation Oncology     [address] [address]  Phone: [phone] - Option 2  Fax:[phone]               INITIAL CONSULTATION    Date of Service: 2025  Patient ID: Yogesh Solano   : 1945  MRN: [id number]   Acct Number:          Requesting Provider: Dr. Chuck Correia ([facility])  Reason for request: Evaluation for the potential role of definitive radiation therapy.     CONSULTANT: Maryellen Toney PA-C    CHIEF COMPLAINT: Evaluation for the potential role of definitive radiation therapy.    ASSESSMENT:   Cancer Staging   Primary adenocarcinoma of lung (HCC)  Staging form: Lung, AJCC V9  - Clinical stage from 2024: Stage IIIB (cT2b, cN2b, cM0) - Signed by Maryellen Toney PA-C on 2025    PLAN:  With regards to radiation to the lung, I discussed the possible short-term side effects of skin irritation (causing redness, dryness, or peeling), tiredness, low blood counts (causing infection or bleeding), inflammation of the lungs (causing shortness of breath and cough), trouble/pain with swallowing and hair loss in treated area.  Possible long-term side effects discussed included hyperpigmentation of the skin, scarring of the lungs (causing shortness of breath and cough), damage to the nerves (causing numbness, weakness, or paralysis) and damage to the heart.    Yogesh Solano is a 79 y.o. male who presents today with his wife for consultation regarding radiation therapy for his non-small cell lung cancer, namely adenocarcinoma of the right upper lobe (RUL). He was followed for rectal cancer (or at least high grade dysplasia s/p resection with Dr. Brennan 22), when a rising CEA level prompted CT imaging of the chest 24 which demonstrated a 5 by 3 cm RUL lung mass. PET/CT 24 demonstrated avidity of the RUL mass as well as of mediastinal lymph node. Bronchoscopy

## 2025-01-17 NOTE — PROGRESS NOTES
Name: Yogesh Solano  : 1945  MRN: [id number]    Oncology Navigation Follow-Up Note    Contact Type:  Medical Oncology    Subjective: appt with ONC    Objective: Colon Ca;  new lung mass.    Oncology Plan of Care:    -ONC review of lung BX;   -Smoking HX: stopped   -ONC reviewed PSA; discussed prostate bx in future  -Molecular testing on lung tissue:  EGFR, ALK, PDL-1  -Discussed tx with low dose weekly chemo with XRT: Carbo/Taxol  -Surgical consult for port  -Rad ONC consult for combined tx  -Return 3 weeks to see ONC /5 & start tx, IF XRT ready to go.  Pt requesting daily XRT be at [facility];  Prefers Weekly Chemo in [place], under Dr. Correia' care.    MIGUEL- Rad ONC mgr updated    Referrals: surgical consult for port;  rad onc consult for combine tx    Plan of Care Reviewed / Education:  yes    Assistance Needed: denies    Receptive to Advanced Care Planning / Palliative Care:  deferred today    Notes: Navigator is following to assist & support as needed.    Electronically signed by Jo Carpenter RN on 2025 at 3:09 PM

## 2025-01-20 ENCOUNTER — OFFICE VISIT (OUTPATIENT)
Dept: SURGERY | Age: 80
End: 2025-01-20
Payer: MEDICARE

## 2025-01-20 ENCOUNTER — PREP FOR PROCEDURE (OUTPATIENT)
Dept: SURGERY | Age: 80
End: 2025-01-20

## 2025-01-20 ENCOUNTER — TELEPHONE (OUTPATIENT)
Dept: SURGERY | Age: 80
End: 2025-01-20

## 2025-01-20 ENCOUNTER — SOCIAL WORK (OUTPATIENT)
Dept: ONCOLOGY | Age: 80
End: 2025-01-20

## 2025-01-20 VITALS
WEIGHT: 143 LBS | HEIGHT: 64 IN | OXYGEN SATURATION: 96 % | HEART RATE: 92 BPM | SYSTOLIC BLOOD PRESSURE: 124 MMHG | TEMPERATURE: 98 F | BODY MASS INDEX: 24.41 KG/M2 | RESPIRATION RATE: 18 BRPM | DIASTOLIC BLOOD PRESSURE: 76 MMHG

## 2025-01-20 DIAGNOSIS — C34.91 ADENOCARCINOMA OF RIGHT LUNG (HCC): Primary | ICD-10-CM

## 2025-01-20 PROBLEM — C34.90 LUNG CANCER (HCC): Status: ACTIVE | Noted: 2025-01-20

## 2025-01-20 PROCEDURE — 99214 OFFICE O/P EST MOD 30 MIN: CPT | Performed by: NURSE PRACTITIONER

## 2025-01-20 PROCEDURE — 1159F MED LIST DOCD IN RCRD: CPT | Performed by: NURSE PRACTITIONER

## 2025-01-20 PROCEDURE — 1126F AMNT PAIN NOTED NONE PRSNT: CPT | Performed by: NURSE PRACTITIONER

## 2025-01-20 PROCEDURE — G8420 CALC BMI NORM PARAMETERS: HCPCS | Performed by: NURSE PRACTITIONER

## 2025-01-20 PROCEDURE — 1123F ACP DISCUSS/DSCN MKR DOCD: CPT | Performed by: NURSE PRACTITIONER

## 2025-01-20 PROCEDURE — 3074F SYST BP LT 130 MM HG: CPT | Performed by: NURSE PRACTITIONER

## 2025-01-20 PROCEDURE — G8427 DOCREV CUR MEDS BY ELIG CLIN: HCPCS | Performed by: NURSE PRACTITIONER

## 2025-01-20 PROCEDURE — 3078F DIAST BP <80 MM HG: CPT | Performed by: NURSE PRACTITIONER

## 2025-01-20 PROCEDURE — 1036F TOBACCO NON-USER: CPT | Performed by: NURSE PRACTITIONER

## 2025-01-20 NOTE — TELEPHONE ENCOUNTER
Pt of Dr. Medina's last seen 12/10/24 is scheduled for single lumen Mediport insertion on 1/27/25 with Dr. Lemon. Can he be cleared for surgery? Ok for pt to hold Plavix 5 days prior?  Pt was instructed he could remain on baby ASA.

## 2025-01-20 NOTE — PROGRESS NOTES
[facility] [place] [facility]  [facility] GENERAL SURGERY  [address]  [facility] [address]  Dept: [phone]  Dept Fax: [phone]  Loc: [phone]    Visit Date: 1/20/2025    Yogesh Solano is a 79 y.o. male who presents today for:  Chief Complaint   Patient presents with    Surgical Consult     Est pt last seen 2022 ref by Dr. Correia - adenocarcinoma of the right lung in need if Mediport       HPI:     HPI    Yogesh is a 79-year-old male patient who presents today for consultation for Mediport placement.  Patient was recently diagnosed in November with right upper lung adenocarcinoma and needing to proceed with chemotherapy.  History of rectal cancer status post transanal reexcision with Dr. Brennan at [facility] in July 2022 but then endoscopy in Dec 2022 found more proximal rectal cancer. He was then taken in Jan 2023 and had low anterior resection with colorectal anastomosis. Leak test was positive and therefore had to undergo a diverting loop ileostomy which was revered then in June. In December had follow up imaging after elevated CEA with oncology demonstrating new spiculated right upper lobe mass measures 30 x 50 mm. Then had PET on 12/16 demonstrating hypermetabolic right upper lobe pulmonary mass concerning for malignancy. Biopsy via bronchoscopy EBUS on 1/17/25 confirmed positive for adenocarcinoma. He has recently seen pulmonary, wears oxygen at all times. Chronic SOB. Patient due to colonoscopy with Dr. Brennan this month. Has not called for an appointment yet. Eating and drinking without complaints. No nausea or vomiting. No bowel changes. No melena or hematochezia. No fevers or chills. No chest pain. No urinary complaints. History of heart stents and takes Plavix.    Past Medical History:   Diagnosis Date    CAD (coronary artery disease)     dr Medina    Colon cancer (HCC) 04/2022    COPD (chronic obstructive pulmonary disease) (HCC)     AKBAR sethi    Hyperlipidemia

## 2025-01-20 NOTE — PROGRESS NOTES
Oncology Social Work    Date: 1/20/2025  Time: 11:27 AM  Name: Yogesh Solano  MRN: [id number]     Contact Type: Distress Tool Follow-up    Note:   Situation: This staff called Yogesh Solano via phone support to introduce myself as the Oncology Social Worker.     Background: Yogesh was referred to this staff by the [facility] after a recent appointment here. This staff was calling to review the Distress Thermometer provided during their visit.    Coping Score 0    Areas of Concern Identified    Physical Concern: Sleep disturbance, Fatigue or excessive sweating, and Loss or change of physical abilities    Expressive Concern: None selected    Social Concern: None selected    Practical Concern: None selected    Existential Concern: None selected    Assessment: This staff had the opportunity to speak with Yogesh and his wife. They seemed pleasant as we discussed the call's purpose was to educate about the services provided by the SW. He has a couple of concerns and shared \"he will have to be doing a lot of treatment.\"  - The opportunity to review the value to completing an Advance Directive was provided since his is not available in Medical Records. He will consider and let me know if he chooses to complete it in the future.  - No community referrals were placed now because he is too early to know or understand what services he may need. Therefore, his referral services may be reconsidered should his needs regarding assistance change.    Recommendation: Follow-up will be initiated based on need.  provided my contact information and will remain available for support.        JELENA Galindo, ELVIRA, ROSA  Oncology Social Worker      Electronically signed by JELENA Galindo LSW, ROSA on 1/20/2025 at 11:27 AM

## 2025-01-20 NOTE — PROGRESS NOTES
New chemotherapy validation note:    Diagnosis for chemotherapy: adenocarcinoma of the right lung     Regimen ordered: Taxol/Carbo w/ XRT      Reference or literature used for validation: NCCN       Date literature or guideline last updated 6/2024     Deviation from literature or guideline used: N/A    Summary of any verbal or telephone information obtained: N/A    Amador Rodrigues RPH, PharmD, BCPS  Clinical Pharmacy Specialist  1/20/2025 1:26 PM

## 2025-01-21 ENCOUNTER — CLINICAL DOCUMENTATION (OUTPATIENT)
Dept: ONCOLOGY | Age: 80
End: 2025-01-21

## 2025-01-21 NOTE — PROGRESS NOTES
molecular testing for EGFR, ALK and PD-L1  -Consult surgery for placement of port  -Plan weekly carboplatin Taxol concurrent with radiation initially.  -Follow-up 3 weeks with me on 2/4.  Tentative start of concurrent XRT with chemotherapy.Plan entered.  ,    JANICE ZHAO MD     ADDENDUM 12/3/24  Patient has CT chest abdomen pelvis recently that shows a surprising 3 x 5 cm mass in the right upper lobe.  3 years ago at [facility] he underwent a lung reduction right upper lobectomy for his COPD.  CT imaging prompted by his rising CEA with a history of rectal cancer.  Mediastinum looks good.  Hilum looks good.  Liver looks good.  The only abnormality is this right upper lobe very suspicious mass in the background of significant COPD with blebs.  Patient's wife says he has been more short of breath than his baseline with some increased cough but no hemoptysis.  Denies any weight loss.  No headaches or imbalance.  No bone or back pain.  Reviewed the findings with his wife as suspicious possibly for a lung primary versus a metastatic focus.  Patient sees  of pulmonary.  Will refer patient back to him for evaluation for bronchoscopy and biopsy.  If this is unsuccessful, will need IR consult for transthoracic biopsy.  Brain MRI to rule out occult CNS involvement as I suspect this will be a lung malignancy  Order PET scan.  Follow-up with me on 12/18/2024.

## 2025-01-22 ASSESSMENT — ENCOUNTER SYMPTOMS
DIARRHEA: 0
EYE DISCHARGE: 0
SORE THROAT: 0
SHORTNESS OF BREATH: 1
BACK PAIN: 0
CHOKING: 0
NAUSEA: 0
ALLERGIC/IMMUNOLOGIC NEGATIVE: 1
VOMITING: 0
BLOOD IN STOOL: 0
CHEST TIGHTNESS: 0
CONSTIPATION: 0
WHEEZING: 0
ANAL BLEEDING: 0
SINUS PRESSURE: 0
COUGH: 1
ABDOMINAL PAIN: 0
EYE PAIN: 0
APNEA: 0
RHINORRHEA: 0
ABDOMINAL DISTENTION: 0
PHOTOPHOBIA: 0
COLOR CHANGE: 0
EYE ITCHING: 0

## 2025-01-23 ENCOUNTER — PREP FOR PROCEDURE (OUTPATIENT)
Dept: SURGERY | Age: 80
End: 2025-01-23

## 2025-01-23 RX ORDER — SODIUM CHLORIDE 9 MG/ML
INJECTION, SOLUTION INTRAVENOUS PRN
Status: CANCELLED | OUTPATIENT
Start: 2025-01-23

## 2025-01-23 RX ORDER — SODIUM CHLORIDE 0.9 % (FLUSH) 0.9 %
5-40 SYRINGE (ML) INJECTION PRN
Status: CANCELLED | OUTPATIENT
Start: 2025-01-23

## 2025-01-23 RX ORDER — SODIUM CHLORIDE 0.9 % (FLUSH) 0.9 %
5-40 SYRINGE (ML) INJECTION EVERY 12 HOURS SCHEDULED
Status: CANCELLED | OUTPATIENT
Start: 2025-01-23

## 2025-01-23 RX ORDER — SODIUM CHLORIDE 9 MG/ML
INJECTION, SOLUTION INTRAVENOUS CONTINUOUS
Status: CANCELLED | OUTPATIENT
Start: 2025-01-23

## 2025-01-24 NOTE — H&P
[facility] [place] [facility]  [facility] GENERAL SURGERY  [address]  [facility] [address]  Dept: [phone]  Dept Fax: [phone]  Loc: [phone]     Visit Date: 1/20/2025     Yogesh Solano is a 79 y.o. male who presents today for:       Chief Complaint   Patient presents with    Surgical Consult       Est pt last seen 2022 ref by Dr. Correia - adenocarcinoma of the right lung in need if Mediport         HPI:      HPI     Yogesh is a 79-year-old male patient who presents today for consultation for Mediport placement.  Patient was recently diagnosed in November with right upper lung adenocarcinoma and needing to proceed with chemotherapy.  History of rectal cancer status post transanal reexcision with Dr. Brennan at [facility] in July 2022 but then endoscopy in Dec 2022 found more proximal rectal cancer. He was then taken in Jan 2023 and had low anterior resection with colorectal anastomosis. Leak test was positive and therefore had to undergo a diverting loop ileostomy which was revered then in June. In December had follow up imaging after elevated CEA with oncology demonstrating new spiculated right upper lobe mass measures 30 x 50 mm. Then had PET on 12/16 demonstrating hypermetabolic right upper lobe pulmonary mass concerning for malignancy. Biopsy via bronchoscopy EBUS on 1/17/25 confirmed positive for adenocarcinoma. He has recently seen pulmonary, wears oxygen at all times. Chronic SOB. Patient due to colonoscopy with Dr. Brennan this month. Has not called for an appointment yet. Eating and drinking without complaints. No nausea or vomiting. No bowel changes. No melena or hematochezia. No fevers or chills. No chest pain. No urinary complaints. History of heart stents and takes Plavix.     Past Medical History        Past Medical History:   Diagnosis Date    CAD (coronary artery disease)       dr Medina    Colon cancer (HCC) 04/2022    COPD (chronic obstructive pulmonary disease)

## 2025-01-27 ENCOUNTER — APPOINTMENT (OUTPATIENT)
Dept: GENERAL RADIOLOGY | Age: 80
End: 2025-01-27
Attending: SURGERY
Payer: MEDICARE

## 2025-01-27 ENCOUNTER — ANESTHESIA (OUTPATIENT)
Dept: OPERATING ROOM | Age: 80
End: 2025-01-27
Payer: MEDICARE

## 2025-01-27 ENCOUNTER — HOSPITAL ENCOUNTER (OUTPATIENT)
Age: 80
Setting detail: OUTPATIENT SURGERY
Discharge: HOME | End: 2025-01-27
Attending: SURGERY | Admitting: SURGERY
Payer: MEDICARE

## 2025-01-27 ENCOUNTER — ANESTHESIA EVENT (OUTPATIENT)
Dept: OPERATING ROOM | Age: 80
End: 2025-01-27
Payer: MEDICARE

## 2025-01-27 VITALS
SYSTOLIC BLOOD PRESSURE: 148 MMHG | HEART RATE: 85 BPM | OXYGEN SATURATION: 97 % | TEMPERATURE: 97.6 F | DIASTOLIC BLOOD PRESSURE: 75 MMHG | HEIGHT: 64 IN | WEIGHT: 142.2 LBS | BODY MASS INDEX: 24.28 KG/M2 | RESPIRATION RATE: 16 BRPM

## 2025-01-27 DIAGNOSIS — Z45.2 ENCOUNTER FOR INSERTION OF TUNNELED CENTRAL VENOUS CATHETER (CVC) WITH PORT: Primary | ICD-10-CM

## 2025-01-27 PROCEDURE — 7100000010 HC PHASE II RECOVERY - FIRST 15 MIN: Performed by: SURGERY

## 2025-01-27 PROCEDURE — 3700000001 HC ADD 15 MINUTES (ANESTHESIA): Performed by: SURGERY

## 2025-01-27 PROCEDURE — 71045 X-RAY EXAM CHEST 1 VIEW: CPT

## 2025-01-27 PROCEDURE — 2500000003 HC RX 250 WO HCPCS: Performed by: NURSE PRACTITIONER

## 2025-01-27 PROCEDURE — 3600000002 HC SURGERY LEVEL 2 BASE: Performed by: SURGERY

## 2025-01-27 PROCEDURE — 36561 INSERT TUNNELED CV CATH: CPT | Performed by: SURGERY

## 2025-01-27 PROCEDURE — 3600000012 HC SURGERY LEVEL 2 ADDTL 15MIN: Performed by: SURGERY

## 2025-01-27 PROCEDURE — 2580000003 HC RX 258: Performed by: NURSE PRACTITIONER

## 2025-01-27 PROCEDURE — 77001 FLUOROGUIDE FOR VEIN DEVICE: CPT

## 2025-01-27 PROCEDURE — C1788 PORT, INDWELLING, IMP: HCPCS | Performed by: SURGERY

## 2025-01-27 PROCEDURE — 7100000011 HC PHASE II RECOVERY - ADDTL 15 MIN: Performed by: SURGERY

## 2025-01-27 PROCEDURE — 3700000000 HC ANESTHESIA ATTENDED CARE: Performed by: SURGERY

## 2025-01-27 PROCEDURE — 2500000003 HC RX 250 WO HCPCS: Performed by: REGISTERED NURSE

## 2025-01-27 PROCEDURE — 6360000002 HC RX W HCPCS: Performed by: SURGERY

## 2025-01-27 PROCEDURE — 6360000002 HC RX W HCPCS: Performed by: NURSE PRACTITIONER

## 2025-01-27 PROCEDURE — 6360000002 HC RX W HCPCS: Performed by: REGISTERED NURSE

## 2025-01-27 PROCEDURE — 2709999900 HC NON-CHARGEABLE SUPPLY: Performed by: SURGERY

## 2025-01-27 DEVICE — POWERPORT CLEARVUE ISP IMPLANTABLE PORT WITH ATTACHABLE 8F POLYURETHANE OPEN-ENDED SINGLE-LUMEN VENOUS CATHETER PROCEDURAL KIT
Type: IMPLANTABLE DEVICE | Site: CHEST | Status: FUNCTIONAL
Brand: POWERPORT CLEARVUE

## 2025-01-27 RX ORDER — MORPHINE SULFATE 4 MG/ML
4 INJECTION, SOLUTION INTRAMUSCULAR; INTRAVENOUS
Status: CANCELLED | OUTPATIENT
Start: 2025-01-27

## 2025-01-27 RX ORDER — DEXAMETHASONE SODIUM PHOSPHATE 10 MG/ML
INJECTION, EMULSION INTRAMUSCULAR; INTRAVENOUS
Status: DISCONTINUED | OUTPATIENT
Start: 2025-01-27 | End: 2025-01-27 | Stop reason: SDUPTHER

## 2025-01-27 RX ORDER — LIDOCAINE HYDROCHLORIDE 10 MG/ML
INJECTION, SOLUTION EPIDURAL; INFILTRATION; INTRACAUDAL; PERINEURAL PRN
Status: DISCONTINUED | OUTPATIENT
Start: 2025-01-27 | End: 2025-01-27 | Stop reason: ALTCHOICE

## 2025-01-27 RX ORDER — SODIUM CHLORIDE 0.9 % (FLUSH) 0.9 %
5-40 SYRINGE (ML) INJECTION PRN
Status: DISCONTINUED | OUTPATIENT
Start: 2025-01-27 | End: 2025-01-27 | Stop reason: HOSPADM

## 2025-01-27 RX ORDER — FAMOTIDINE 20 MG/1
20 TABLET, FILM COATED ORAL 2 TIMES DAILY
Status: CANCELLED | OUTPATIENT
Start: 2025-01-27

## 2025-01-27 RX ORDER — HYDROCODONE BITARTRATE AND ACETAMINOPHEN 5; 325 MG/1; MG/1
1-2 TABLET ORAL EVERY 6 HOURS PRN
Qty: 5 TABLET | Refills: 0 | Status: SHIPPED | OUTPATIENT
Start: 2025-01-27 | End: 2025-02-03

## 2025-01-27 RX ORDER — SODIUM CHLORIDE 9 MG/ML
INJECTION, SOLUTION INTRAVENOUS CONTINUOUS
Status: DISCONTINUED | OUTPATIENT
Start: 2025-01-27 | End: 2025-01-27

## 2025-01-27 RX ORDER — HEPARIN 100 UNIT/ML
SYRINGE INTRAVENOUS PRN
Status: DISCONTINUED | OUTPATIENT
Start: 2025-01-27 | End: 2025-01-27 | Stop reason: ALTCHOICE

## 2025-01-27 RX ORDER — OXYCODONE HYDROCHLORIDE 5 MG/1
10 TABLET ORAL EVERY 4 HOURS PRN
Status: CANCELLED | OUTPATIENT
Start: 2025-01-27

## 2025-01-27 RX ORDER — LIDOCAINE HYDROCHLORIDE 20 MG/ML
INJECTION, SOLUTION INTRAVENOUS
Status: DISCONTINUED | OUTPATIENT
Start: 2025-01-27 | End: 2025-01-27 | Stop reason: SDUPTHER

## 2025-01-27 RX ORDER — SODIUM CHLORIDE 0.9 % (FLUSH) 0.9 %
5-40 SYRINGE (ML) INJECTION PRN
Status: CANCELLED | OUTPATIENT
Start: 2025-01-27

## 2025-01-27 RX ORDER — SODIUM CHLORIDE 0.9 % (FLUSH) 0.9 %
5-40 SYRINGE (ML) INJECTION EVERY 12 HOURS SCHEDULED
Status: CANCELLED | OUTPATIENT
Start: 2025-01-27

## 2025-01-27 RX ORDER — KETOROLAC TROMETHAMINE 10 MG/1
10 TABLET, FILM COATED ORAL EVERY 8 HOURS PRN
Qty: 10 TABLET | Refills: 0 | Status: SHIPPED | OUTPATIENT
Start: 2025-01-27

## 2025-01-27 RX ORDER — OXYCODONE HYDROCHLORIDE 5 MG/1
5 TABLET ORAL EVERY 4 HOURS PRN
Status: CANCELLED | OUTPATIENT
Start: 2025-01-27

## 2025-01-27 RX ORDER — SODIUM CHLORIDE 9 MG/ML
INJECTION, SOLUTION INTRAVENOUS PRN
Status: DISCONTINUED | OUTPATIENT
Start: 2025-01-27 | End: 2025-01-27 | Stop reason: HOSPADM

## 2025-01-27 RX ORDER — ACETAMINOPHEN 325 MG/1
650 TABLET ORAL EVERY 4 HOURS PRN
Status: CANCELLED | OUTPATIENT
Start: 2025-01-27

## 2025-01-27 RX ORDER — MIDAZOLAM HYDROCHLORIDE 1 MG/ML
INJECTION, SOLUTION INTRAMUSCULAR; INTRAVENOUS
Status: DISCONTINUED | OUTPATIENT
Start: 2025-01-27 | End: 2025-01-27 | Stop reason: SDUPTHER

## 2025-01-27 RX ORDER — SODIUM CHLORIDE 9 MG/ML
INJECTION, SOLUTION INTRAVENOUS PRN
Status: CANCELLED | OUTPATIENT
Start: 2025-01-27

## 2025-01-27 RX ORDER — PROPOFOL 10 MG/ML
INJECTION, EMULSION INTRAVENOUS
Status: DISCONTINUED | OUTPATIENT
Start: 2025-01-27 | End: 2025-01-27 | Stop reason: SDUPTHER

## 2025-01-27 RX ORDER — ONDANSETRON 4 MG/1
4 TABLET, ORALLY DISINTEGRATING ORAL EVERY 8 HOURS PRN
Status: CANCELLED | OUTPATIENT
Start: 2025-01-27

## 2025-01-27 RX ORDER — MORPHINE SULFATE 2 MG/ML
2 INJECTION, SOLUTION INTRAMUSCULAR; INTRAVENOUS
Status: CANCELLED | OUTPATIENT
Start: 2025-01-27

## 2025-01-27 RX ORDER — SODIUM CHLORIDE 0.9 % (FLUSH) 0.9 %
5-40 SYRINGE (ML) INJECTION EVERY 12 HOURS SCHEDULED
Status: DISCONTINUED | OUTPATIENT
Start: 2025-01-27 | End: 2025-01-27 | Stop reason: HOSPADM

## 2025-01-27 RX ORDER — ONDANSETRON 2 MG/ML
4 INJECTION INTRAMUSCULAR; INTRAVENOUS EVERY 6 HOURS PRN
Status: CANCELLED | OUTPATIENT
Start: 2025-01-27

## 2025-01-27 RX ADMIN — MIDAZOLAM 1 MG: 1 INJECTION INTRAMUSCULAR; INTRAVENOUS at 10:27

## 2025-01-27 RX ADMIN — Medication 25 MG: at 10:27

## 2025-01-27 RX ADMIN — WATER 2000 MG: 1 INJECTION INTRAMUSCULAR; INTRAVENOUS; SUBCUTANEOUS at 10:30

## 2025-01-27 RX ADMIN — LIDOCAINE HYDROCHLORIDE 100 MG: 20 INJECTION, SOLUTION INTRAVENOUS at 10:27

## 2025-01-27 RX ADMIN — MIDAZOLAM 1 MG: 1 INJECTION INTRAMUSCULAR; INTRAVENOUS at 10:22

## 2025-01-27 RX ADMIN — SODIUM CHLORIDE: 9 INJECTION, SOLUTION INTRAVENOUS at 10:14

## 2025-01-27 RX ADMIN — DEXAMETHASONE SODIUM PHOSPHATE 10 MG: 10 INJECTION, EMULSION INTRAMUSCULAR; INTRAVENOUS at 10:27

## 2025-01-27 RX ADMIN — Medication 25 MG: at 10:22

## 2025-01-27 RX ADMIN — PROPOFOL 70 MCG/KG/MIN: 10 INJECTION, EMULSION INTRAVENOUS at 10:27

## 2025-01-27 ASSESSMENT — COPD QUESTIONNAIRES: CAT_SEVERITY: SEVERE

## 2025-01-27 NOTE — ANESTHESIA POSTPROCEDURE EVALUATION
Department of Anesthesiology  Postprocedure Note    Patient: Yogesh Solano  MRN: [id number]  YOB: 1945  Date of evaluation: 1/27/2025    Procedure Summary       Date: 01/27/25 [facility]    Anesthesia Start: 1022 Anesthesia Stop: 1106    Procedure: Single lumen smartport insertion right subclavian (Left: Chest) Diagnosis:       Lung cancer (HCC)      (Lung cancer (HCC) [C34.90])    Surgeons: Otto Lemon MD Responsible Provider: Az Gaffney DO    Anesthesia Type: MAC ASA Status: 4            Anesthesia Type: No value filed.    Tiffanie Phase I: Tiffanie Score: 10    Tiffanie Phase II: Tiffanie Score: 10    Anesthesia Post Evaluation    Comments: [facility]  POST-ANESTHESIA NOTE       Name:  Yogesh Solano                                         Age:  79 y.o.  MRN:  [id number]      Last Vitals:  BP (!) 143/69   Pulse 97   Temp 97.6 °F (36.4 °C) (Temporal)   Resp 16   Ht 1.626 m (5' 4\")   Wt 64.5 kg (142 lb 3.2 oz)   SpO2 98%   BMI 24.41 kg/m²   Patient Vitals in the past 4 hrs:  01/27/25 1115, BP:(!) 143/69, Temp:97.6 °F (36.4 °C), Temp src:Temporal, Pulse:97, Resp:16, SpO2:98 %  01/27/25 0945, BP:(!) 144/80, Temp:97.8 °F (36.6 °C), Temp src:Temporal, Pulse:(!) 107, Resp:16, SpO2:96 %, Height:1.626 m (5' 4\"), Weight:64.5 kg (142 lb 3.2 oz)    Level of Consciousness:  Awake    Respiratory:  Stable    Oxygen Saturation:  Stable    Cardiovascular:  Stable    Hydration:  Adequate    PONV:  Stable    Post-op Pain:  Adequate analgesia    Post-op Assessment:  No apparent anesthetic complications    Additional Follow-Up / Treatment / Comment:  None    Az Gaffney DO  January 27, 2025   11:26 AM      No notable events documented.

## 2025-01-27 NOTE — BRIEF OP NOTE
Brief Postoperative Note      Patient: Yogesh Solano  YOB: 1945  MRN: [id number]    Date of Procedure: 1/27/2025    Preoperative diagnosis:  Right lung poorly differentiated adenocarcinoma  History of colorectal cancer - s/p low anterior resection with colorectal anastomosis in 2023    Post-Op Diagnosis: Same       Procedure(s):  Single lumen smartport insertion right subclavian; intraoperative fluoroscopy    Surgeon(s):  Otto Lemon MD    Assistant:  First Assistant: Hector Becker RN; Jalen GARCIA    Anesthesia: Monitor Anesthesia Care/local    Estimated Blood Loss (mL): 10ml    Complications: None    Specimens:   * No specimens in log *    Implants:  Implant Name Type Inv. Item Serial No.  Lot No. LRB No. Used Action   PORT INFUS SGL LUMN ATTCH POLYUR OPN END CATH 8FR POWERPRT - [id number]  PORT INFUS SGL LUMN ATTCH POLYUR OPN END CATH 8FR POWERPRT  [id number] 1 Implanted         Drains: * No LDAs found *    Findings:  Infection Present At Time Of Surgery (PATOS) (choose all levels that have infection present):  No infection present    Other Findings: see op note    Electronically signed by Otto Lemon MD on 1/27/2025 at 10:54 AM

## 2025-01-27 NOTE — PROGRESS NOTES
Pt returned to [facility] room 1. Vitals and assessment as charted. 0.9 infusing,to count from OR. Pt has crackers and water. Family at the bedside. Pt and family verbalized understanding of discharge criteria and call light use. Call light in reach.

## 2025-01-27 NOTE — OP NOTE
[id number]                            OPERATIVE REPORT      PATIENT NAME: LANE MARCH                 : 1945  MED REC NO: [id number]                       ROOM: [facility]                                               (General) POOL                                               RM    ACCOUNT NO: [id number]                       ADMIT DATE: 2025  PROVIDER: Otto Lemon MD      DATE OF PROCEDURE:  2025    SURGEON:  Otto Lemon MD    PREOPERATIVE DIAGNOSES:    1. Right lung poorly differentiated adenocarcinoma.  2. History of colorectal cancer, status post low anterior resection, with anastomosis in .    POSTOPERATIVE DIAGNOSES:    1. Right lung poorly differentiated adenocarcinoma.  2. History of colorectal cancer, status post low anterior resection, with anastomosis in .    PROCEDURES:    1. Insertion of tunneled right subclavian vein single-lumen port insertion.  2. Intraoperative fluoroscopy.    ASSISTANTS:  Prosper Becker RN and RADHA Foley.    ANESTHESIA:  IV sedation/local.    ESTIMATED BLOOD LOSS:  10 mL.    DRAINS:  None.    COMPLICATIONS:  None.    DISPOSITION:  Stable to the recovery room.    INDICATIONS:  The patient is an 79-year-old male with a history of colorectal cancer and was found to have poorly differentiated adenocarcinoma of the right lung.  He was in need of chemotherapy.  He wished to proceed with a port.  Both operative and nonoperative intervention plans were discussed.  Risks of surgery were further discussed.  Some of the risks included, but were not limited to, bleeding, infection, the need for reoperation, severe chronic postoperative pain or numbness, major vascular or nerve injury, cardiopulmonary complications, anesthetic complications, seroma or hematoma formation, air embolism, pneumothorax, DVT, port/catheter dislodgement/malfunction, chronic pain, and

## 2025-01-27 NOTE — INTERVAL H&P NOTE
Update History & Physical    The patient's History and Physical was reviewed with the patient and I examined the patient. There was no change. The surgical site was confirmed by the patient and me.     Plan: The risks, benefits, expected outcome, and alternative to the recommended procedure have been discussed with the patient. Patient understands and wants to proceed with the procedure.     Electronically signed by Otto Lemon MD on 1/27/2025 at 9:46 AM

## 2025-01-27 NOTE — PROGRESS NOTES
Patient admitted to [facility] room 1 with wife at bedside. Bed in low position side rails up call light in reach. Patient denies questions at this time.     Janis [phone]

## 2025-01-27 NOTE — DISCHARGE INSTRUCTIONS
DR. SINGH'S DISCHARGE INSTRUCTIONS    Pt Name: Yogesh Solano  Medical Record Number: [id number]  Today's Date: 1/27/2025    GENERAL ANESTHESIA OR SEDATION  1. Do not drive or operate hazardous machinery for 24 hours.  2. Do not make important business or personal decisions for 24 hours.  3. Do not drink alcoholic beverages or use tobacco for 24 hours.    ACTIVITY INSTRUCTIONS:  [] Rest today. Resume light to normal activity tomorrow.   [x] You may resume normal activity tomorrow. Do not engage in strenuous activity that may place stress on your incision.  [x] Do not drive for 3-5 days and avoid heavy lifting, tugging, pullings greater than 10-20 lbs until seen in the office.      DIET INSTRUCTIONS:  [x]Begin with clear liquids. If not nauseated, may increase to a low-fat diet when you desire. Greasy and spicy foods are not advised.  [x]Regular diet as tolerated.  []Other:     MEDICATIONS  [x]Prescription sent with you to be used as directed.   []Valium   [x]Norco   []Percocet   [x]Toradol   []Oxycontin     Do not drink alcohol or drive while taking these medications. You may experience dizziness or drowsiness with these medications. You may also experience constipation which can be relieved with stool softners or laxatives.    - Take Colace 100 mg 1-2 tabs daily as needed for constipation  - Take MiraLax 1-2 cap fulls daily as needed for constipation    [x]You may resume your daily prescription medication schedule unless otherwise specified.  []Do not take 325mg Aspirin or other blood thinners such as Coumadin or Plavix for 5 days.    **Pain medication at discharge - use only as prescribed- refills may be available to you at your follow up appointments if needed and warranted.  Narcotics should be used for only short term and we highly encouraged our patients to wean off appropriately and use other means for pain such as non pharmacologic measures and over the counter tylenol or ibuprofen if no restrictions apply.

## 2025-01-27 NOTE — PROGRESS NOTES

## 2025-01-30 ENCOUNTER — HOSPITAL ENCOUNTER (OUTPATIENT)
Dept: INFUSION THERAPY | Age: 80
Discharge: HOME | End: 2025-01-30
Payer: MEDICARE

## 2025-01-30 PROCEDURE — 99212 OFFICE O/P EST SF 10 MIN: CPT

## 2025-01-30 NOTE — PLAN OF CARE
Problem: Discharge Planning  Goal: Discharge to home or other facility with appropriate resources  Outcome: Progressing     Problem: Safety - Adult  Goal: Free from fall injury  Outcome: Progressing     Problem: Chronic Conditions and Co-morbidities  Goal: Patient's chronic conditions and co-morbidity symptoms are monitored and maintained or improved  Outcome: Progressing   Care plan reviewed with patient.  Patient verbalizes understanding of the plan of care and contributes to goal setting.

## 2025-01-30 NOTE — PROGRESS NOTES
Chemotherapy/Immunotherapy Teaching Checklist    Treatment Plan: taxol/carboplatin  Frequency: weekly  Patient accompanied by : Spouse      Day of chemo instructions:  [x] Must have    [x] Eat light breakfast  [x] Bring pain medications/other routine medications scheduled during appointment time  [] Hold blood pressure medications morning of treatment    Treatment process:  [x] Flow of appointment  [x] IV access Peripheral start  or  PORT access process [x] EMLA cream  [x] Premedication/ Hydration  [x] Length of treatment  [x] Tour of clinic    Diet and hydration:  [x] Discuss [place] diet    [x] Eating Hints book provided  [x] Importance of hydration 48- 64 ounces daily   [x] Hydration handout provided     Side Effects:  [x] Chemotherapy and you book provided  [x] Side effects and management discussed   [x] Diarrhea[x]Nausea/Vomiting []home antiemetic [x]hair loss[x]Neuropathy[x] Constipation[x] Fatigue[x]Mouth sores[x] Dehydration[x] Skin/Nail Changes []Pneumonitis []Colitis [] Hepatitis []Thyroid changes  []Fertility issues (birth control, sperm/egg banking issues)    Labs:  [x]BMP- renal function and electrolytes discussed  [x] CBC- RBC, WBC with ANC, platelet counts discussed  [x]Understanding your Blood hand out given   [x]Neutropenia handout/Reduce infection handout [x]Thrombocytopenia handout   [x][place] discussed    Complications that require immediate attention from physician:  [x]Fever 100.3 [x]signs of infection- chills, fever, burning with urination, worsening cough   [x]Uncontrolled vomiting [x]Uncontrolled diarrhea/constipation   [x]Unable to drink 48 ounces [x]Uncontrolled pain  [x] When to notify provider handout given  [x] After hours contact process discussed    Home instructions:  [x] Instruct to shut the lid and double flush toilet for 48 hours after chemotherapy  [x] Instruct family members to wear gloves when will be handling  body fluids    Support Services:  [x] Financial navigator

## 2025-02-04 NOTE — PROGRESS NOTES
[facility] [place] [facility]  [facility]  [address]  [address]  [place] [id number]  Dept: [phone]  Loc: [phone]   Hematology/Oncology Consult (Clinic)        2/5/25    Yogesh Lore   1945     No ref. provider found   Nathan Elaine DO            DIAGNOSIS:  -Rectal Rectum, mass biopsy: Tubular adenoma with high-grade dysplasia; cannot exclude malignancy.  Transanal reexcision with Dr. Brennan at [facility]  7/18/22 shows no residual malignancy.  Local EUS staging T1N0.  Local MRI staging-T2N0  -Patient had endoscopy December 2022 that found a more proximal rectal cancer.  Taken to the OR by Dr. Brennan at [facility] 1/30/2023 due to the mid to distal rectal tumor underwent open low anterior resection with colorectal anastomosis.  Leak test was positive therefore had to undergo a diverting loop ileostomy which was eventually reversed in June.  Path report from that procedure showed a tubulovillous adenoma with high-grade dysplasia measuring 3.1 cm no evidence of any invasive adenocarcinoma in all 10 lymph nodes were benign.    -1 cm suspicious prostate lesion found on staging MRI of the pelvis.  (Urology referral).  PSA = 3.18  Being followed with surveillance by Dr. Grace of urology.  PSA 11/26/2020 4-6.0    -Adenocarcinoma right upper lung T2 N2 biopsy-proven /stage IIIa   November 2024 new approximately 3 cm right upper lung suspicious lung mass and PET scan hypermetabolic including a normal-sized pretracheal node.    PD-L1 TPS high = 85%.  EGFR-not detected ,ALK- negative.    TREATMENT:  -Colorectal Dr. Brennan surgery referral to [facility]; excision 7/18/2022 showed negative for carcinoma or dysplasia.   January 2023-found malignancy in the more proximal rectum and underwent surgical resection and temporary diverting loop ileostomy.  Status post diverting loop ileostomy and closure of same on 6/12/2023.  Hernia surgery x 2 in [place].  Much improved.  -Urology 
English

## 2025-02-05 ENCOUNTER — HOSPITAL ENCOUNTER (OUTPATIENT)
Dept: INFUSION THERAPY | Age: 80
End: 2025-02-05

## 2025-02-05 ENCOUNTER — HOSPITAL ENCOUNTER (OUTPATIENT)
Dept: INFUSION THERAPY | Age: 80
Discharge: HOME | End: 2025-02-05
Payer: MEDICARE

## 2025-02-05 ENCOUNTER — OFFICE VISIT (OUTPATIENT)
Dept: ONCOLOGY | Age: 80
End: 2025-02-05
Payer: MEDICARE

## 2025-02-05 VITALS
BODY MASS INDEX: 24.8 KG/M2 | OXYGEN SATURATION: 97 % | DIASTOLIC BLOOD PRESSURE: 77 MMHG | HEIGHT: 64 IN | RESPIRATION RATE: 16 BRPM | SYSTOLIC BLOOD PRESSURE: 142 MMHG | WEIGHT: 145.25 LBS | TEMPERATURE: 97.8 F | HEART RATE: 74 BPM

## 2025-02-05 VITALS
RESPIRATION RATE: 16 BRPM | DIASTOLIC BLOOD PRESSURE: 62 MMHG | HEIGHT: 64 IN | HEART RATE: 78 BPM | WEIGHT: 145.25 LBS | TEMPERATURE: 98 F | SYSTOLIC BLOOD PRESSURE: 125 MMHG | OXYGEN SATURATION: 98 % | BODY MASS INDEX: 24.8 KG/M2

## 2025-02-05 DIAGNOSIS — C34.90 PRIMARY ADENOCARCINOMA OF LUNG, UNSPECIFIED LATERALITY (HCC): Primary | ICD-10-CM

## 2025-02-05 LAB
ALBUMIN SERPL BCG-MCNC: 3.8 G/DL (ref 3.5–5.1)
ALP SERPL-CCNC: 47 U/L (ref 38–126)
ALT SERPL W/O P-5'-P-CCNC: 18 U/L (ref 11–66)
AST SERPL-CCNC: 18 U/L (ref 5–40)
BASOPHILS ABSOLUTE: 0 THOU/MM3 (ref 0–0.1)
BASOPHILS NFR BLD AUTO: 0 % (ref 0–3)
BILIRUB CONJ SERPL-MCNC: < 0.1 MG/DL (ref 0.1–13.8)
BILIRUB SERPL-MCNC: < 0.2 MG/DL (ref 0.3–1.2)
BUN BLDP-MCNC: 30 MG/DL (ref 8–26)
CHLORIDE BLD-SCNC: 102 MEQ/L (ref 98–109)
CREAT BLD-MCNC: 0.8 MG/DL (ref 0.5–1.2)
EOSINOPHIL NFR BLD AUTO: 4 % (ref 0–4)
EOSINOPHILS ABSOLUTE: 0.3 THOU/MM3 (ref 0–0.4)
ERYTHROCYTE [DISTWIDTH] IN BLOOD BY AUTOMATED COUNT: 15.1 % (ref 11.5–14.5)
GFR SERPL CREATININE-BSD FRML MDRD: 89 ML/MIN/1.73M2
GLUCOSE BLD-MCNC: 111 MG/DL (ref 70–108)
HCT VFR BLD AUTO: 36.7 % (ref 42–52)
HGB BLD-MCNC: 11.9 GM/DL (ref 14–18)
IMMATURE GRANULOCYTES %: 0 %
IMMATURE GRANULOCYTES ABSOLUTE: 0.02 THOU/MM3 (ref 0–0.07)
IONIZED CALCIUM, WHOLE BLOOD: 1.35 MMOL/L (ref 1.12–1.32)
LYMPHOCYTES ABSOLUTE: 1.1 THOU/MM3 (ref 1–4.8)
LYMPHOCYTES NFR BLD AUTO: 13 % (ref 15–47)
MCH RBC QN AUTO: 30.8 PG (ref 26–33)
MCHC RBC AUTO-ENTMCNC: 32.4 GM/DL (ref 32.2–35.5)
MCV RBC AUTO: 95 FL (ref 80–94)
MONOCYTES ABSOLUTE: 0.9 THOU/MM3 (ref 0.4–1.3)
MONOCYTES NFR BLD AUTO: 10 % (ref 0–12)
NEUTROPHILS ABSOLUTE: 6.4 THOU/MM3 (ref 1.8–7.7)
NEUTROPHILS NFR BLD AUTO: 73 % (ref 43–75)
PLATELET # BLD AUTO: 226 THOU/MM3 (ref 130–400)
PMV BLD AUTO: 9.2 FL (ref 9.4–12.4)
POTASSIUM BLD-SCNC: 4.1 MEQ/L (ref 3.5–4.9)
PROT SERPL-MCNC: 6.4 G/DL (ref 6.1–8)
RBC # BLD AUTO: 3.86 MILL/MM3 (ref 4.7–6.1)
SODIUM BLD-SCNC: 141 MEQ/L (ref 138–146)
TOTAL CO2, WHOLE BLOOD: 35 MEQ/L (ref 23–33)
WBC # BLD AUTO: 8.7 THOU/MM3 (ref 4.8–10.8)

## 2025-02-05 PROCEDURE — 96367 TX/PROPH/DG ADDL SEQ IV INF: CPT

## 2025-02-05 PROCEDURE — 80047 BASIC METABLC PNL IONIZED CA: CPT

## 2025-02-05 PROCEDURE — 96375 TX/PRO/DX INJ NEW DRUG ADDON: CPT

## 2025-02-05 PROCEDURE — 99211 OFF/OP EST MAY X REQ PHY/QHP: CPT

## 2025-02-05 PROCEDURE — 2580000003 HC RX 258: Performed by: INTERNAL MEDICINE

## 2025-02-05 PROCEDURE — 96413 CHEMO IV INFUSION 1 HR: CPT

## 2025-02-05 PROCEDURE — 3078F DIAST BP <80 MM HG: CPT | Performed by: INTERNAL MEDICINE

## 2025-02-05 PROCEDURE — 3077F SYST BP >= 140 MM HG: CPT | Performed by: INTERNAL MEDICINE

## 2025-02-05 PROCEDURE — G8427 DOCREV CUR MEDS BY ELIG CLIN: HCPCS | Performed by: INTERNAL MEDICINE

## 2025-02-05 PROCEDURE — 99214 OFFICE O/P EST MOD 30 MIN: CPT | Performed by: INTERNAL MEDICINE

## 2025-02-05 PROCEDURE — 96417 CHEMO IV INFUS EACH ADDL SEQ: CPT

## 2025-02-05 PROCEDURE — 2500000003 HC RX 250 WO HCPCS: Performed by: INTERNAL MEDICINE

## 2025-02-05 PROCEDURE — 1036F TOBACCO NON-USER: CPT | Performed by: INTERNAL MEDICINE

## 2025-02-05 PROCEDURE — G8420 CALC BMI NORM PARAMETERS: HCPCS | Performed by: INTERNAL MEDICINE

## 2025-02-05 PROCEDURE — 1123F ACP DISCUSS/DSCN MKR DOCD: CPT | Performed by: INTERNAL MEDICINE

## 2025-02-05 PROCEDURE — 80076 HEPATIC FUNCTION PANEL: CPT

## 2025-02-05 PROCEDURE — 85025 COMPLETE CBC W/AUTO DIFF WBC: CPT

## 2025-02-05 PROCEDURE — 6360000002 HC RX W HCPCS: Performed by: INTERNAL MEDICINE

## 2025-02-05 PROCEDURE — 1159F MED LIST DOCD IN RCRD: CPT | Performed by: INTERNAL MEDICINE

## 2025-02-05 RX ORDER — ONDANSETRON 2 MG/ML
8 INJECTION INTRAMUSCULAR; INTRAVENOUS
Status: CANCELLED | OUTPATIENT
Start: 2025-02-05

## 2025-02-05 RX ORDER — DIPHENHYDRAMINE HYDROCHLORIDE 50 MG/ML
50 INJECTION INTRAMUSCULAR; INTRAVENOUS ONCE
Status: COMPLETED | OUTPATIENT
Start: 2025-02-05 | End: 2025-02-05

## 2025-02-05 RX ORDER — SODIUM CHLORIDE 9 MG/ML
INJECTION, SOLUTION INTRAVENOUS CONTINUOUS
Status: CANCELLED | OUTPATIENT
Start: 2025-02-05

## 2025-02-05 RX ORDER — EPINEPHRINE 1 MG/ML
0.3 INJECTION, SOLUTION INTRAMUSCULAR; SUBCUTANEOUS PRN
Status: CANCELLED | OUTPATIENT
Start: 2025-02-05

## 2025-02-05 RX ORDER — SODIUM CHLORIDE 0.9 % (FLUSH) 0.9 %
5-40 SYRINGE (ML) INJECTION PRN
Status: CANCELLED | OUTPATIENT
Start: 2025-02-05

## 2025-02-05 RX ORDER — HEPARIN 100 UNIT/ML
500 SYRINGE INTRAVENOUS PRN
Status: DISCONTINUED | OUTPATIENT
Start: 2025-02-05 | End: 2025-02-06 | Stop reason: HOSPADM

## 2025-02-05 RX ORDER — ALBUTEROL SULFATE 90 UG/1
4 INHALANT RESPIRATORY (INHALATION) PRN
Status: CANCELLED | OUTPATIENT
Start: 2025-02-05

## 2025-02-05 RX ORDER — PALONOSETRON 0.05 MG/ML
0.25 INJECTION, SOLUTION INTRAVENOUS ONCE
Status: COMPLETED | OUTPATIENT
Start: 2025-02-05 | End: 2025-02-05

## 2025-02-05 RX ORDER — ACETAMINOPHEN 325 MG/1
650 TABLET ORAL
Status: CANCELLED | OUTPATIENT
Start: 2025-02-05

## 2025-02-05 RX ORDER — HYDROCORTISONE SODIUM SUCCINATE 100 MG/2ML
100 INJECTION INTRAMUSCULAR; INTRAVENOUS
Status: CANCELLED | OUTPATIENT
Start: 2025-02-05

## 2025-02-05 RX ORDER — DIPHENHYDRAMINE HYDROCHLORIDE 50 MG/ML
50 INJECTION INTRAMUSCULAR; INTRAVENOUS
Status: CANCELLED | OUTPATIENT
Start: 2025-02-05

## 2025-02-05 RX ORDER — SODIUM CHLORIDE 9 MG/ML
5-250 INJECTION, SOLUTION INTRAVENOUS PRN
Status: DISCONTINUED | OUTPATIENT
Start: 2025-02-05 | End: 2025-02-06 | Stop reason: HOSPADM

## 2025-02-05 RX ORDER — HEPARIN 100 UNIT/ML
500 SYRINGE INTRAVENOUS PRN
Status: CANCELLED | OUTPATIENT
Start: 2025-02-05

## 2025-02-05 RX ORDER — SODIUM CHLORIDE 0.9 % (FLUSH) 0.9 %
5-40 SYRINGE (ML) INJECTION PRN
Status: DISCONTINUED | OUTPATIENT
Start: 2025-02-05 | End: 2025-02-06 | Stop reason: HOSPADM

## 2025-02-05 RX ORDER — SODIUM CHLORIDE 9 MG/ML
25 INJECTION, SOLUTION INTRAVENOUS PRN
Status: CANCELLED | OUTPATIENT
Start: 2025-02-05

## 2025-02-05 RX ADMIN — FAMOTIDINE 20 MG: 10 INJECTION, SOLUTION INTRAVENOUS at 08:53

## 2025-02-05 RX ADMIN — CARBOPLATIN 185 MG: 10 INJECTION INTRAVENOUS at 10:46

## 2025-02-05 RX ADMIN — DIPHENHYDRAMINE HYDROCHLORIDE 50 MG: 50 INJECTION INTRAMUSCULAR; INTRAVENOUS at 08:57

## 2025-02-05 RX ADMIN — HEPARIN 500 UNITS: 100 SYRINGE at 11:28

## 2025-02-05 RX ADMIN — SODIUM CHLORIDE 20 ML/HR: 0.9 INJECTION, SOLUTION INTRAVENOUS at 08:49

## 2025-02-05 RX ADMIN — SODIUM CHLORIDE, PRESERVATIVE FREE 30 ML: 5 INJECTION INTRAVENOUS at 08:19

## 2025-02-05 RX ADMIN — PACLITAXEL 78 MG: 6 INJECTION, SOLUTION INTRAVENOUS at 09:41

## 2025-02-05 RX ADMIN — DEXAMETHASONE SODIUM PHOSPHATE 12 MG: 4 INJECTION, SOLUTION INTRAMUSCULAR; INTRAVENOUS at 09:02

## 2025-02-05 RX ADMIN — PALONOSETRON 0.25 MG: 0.05 INJECTION, SOLUTION INTRAVENOUS at 08:51

## 2025-02-05 NOTE — PATIENT INSTRUCTIONS
Proceed with week #1 of 6 of low-dose carboplatin Taxol with XRT today 2/5/2025  CBC and chemistries reviewed  Follow-up next week with nurse practitioner and treatment  Follow-up 2 weeks with me and treatment

## 2025-02-05 NOTE — PROGRESS NOTES
Patient tolerated taxol/carboplatin without any complications.  Patient observed for 20 minuets. Denies dizziness, lightheadedness, acute nausea or vomiting, headache, heart palpitations, rash/itching or increased SOB.  Discussed the importance of monitoring and reporting temperature of 100.4 or greater to our office [phone] or going directly to Emergency Dept.    Last vital signs  /62   Pulse 78   Temp 98 °F (36.7 °C) (Oral)   Resp 16   Ht 1.626 m (5' 4\")   Wt 65.9 kg (145 lb 4 oz)   SpO2 98%   BMI 24.93 kg/m²     Patient instructed if they experience any of the above symptoms following today's visit, he/she is to notify the Physician or go to the Emergency Dept.    Discharge instructions given to patient, Verbalizes understanding. Ambulated off unit per self in stable condition with all belongings.

## 2025-02-05 NOTE — PLAN OF CARE
Problem: Discharge Planning  Goal: Discharge to home or other facility with appropriate resources  Outcome: Progressing     Problem: Safety - Adult  Goal: Free from fall injury  Outcome: Progressing     Problem: Chronic Conditions and Co-morbidities  Goal: Patient's chronic conditions and co-morbidity symptoms are monitored and maintained or improved  Outcome: Progressing  Flowsheets (Taken 2/5/2025 7028)  Care Plan - Patient's Chronic Conditions and Co-Morbidity Symptoms are Monitored and Maintained or Improved: Monitor and assess patient's chronic conditions and comorbid symptoms for stability, deterioration, or improvement  Note:   Chemotherapy Teaching     What is Chemotherapy   Drug action [x]   Method of Administration [x]   Handouts given []     Side Effects  Nausea/vomiting [x]   Diarrhea [x]   Fatigue [x]   Signs / Symptoms of infection [x]   Neutropenia [x]   Thrombocytopenia [x]   Alopecia [x]   neuropathy [x]   [place] diet &  the importance of fluids [x]       Micellaneous  Importance of nutrition [x]   Importance of oral hygiene [x]   When to call the MD [x]   Monitoring labs [x]   Use of supportive services []     Explanation of Drug Regimen / Frequency  Taxol/carboplatin     Comments  Verbalized understanding to drug,action,side effects and when to call MD    Care plan reviewed with patient.  Patient verbalizes understanding of the plan of care and contributes to goal setting.

## 2025-02-05 NOTE — PROGRESS NOTES
Chemotherapy Administration    Pre-assessment Data: Antineoplastic Agents  See toxicity flow sheet for assessment                                          [x]         Interventions:   Chemotherapy SQ injection given []   Taxol administered-VS per protocol [x]   Blood pressure meds held 12 hours prior to Rituxan/Ruxience []   Rituxan/Ruxience administered- VS and precautions per guidelines []   Emergency drugs available as appropriate [x]   Anaphylaxis assessment completed [x]   Pre-medications administered as ordered [x]   Blood return noted upon initiation of chemotherapy [x]   Blood return noted each 1-2ml of a vesicant medication if given IV push []   Navelbine, Vincristine and Velban given as a monitored wide open drip, blood return noted before during and after infusion. []   Blood return noted each 2-3ml of a non-vesicant medication if given IV push []   Patient aware of potential Immunotherapy toxicities []   Monitor for signs / symptoms of hypersensitivity reaction [x]   Chemotherapy orders (drug/dose/rate) verified by 2 Chemo certified RN’s [x]   Monitor IV site and blood return throughout the infusion of the medication [x]   Document IV site checks on the IV assessment form [x]   Document chemotherapy teaching on the Patient Education tab [x]   Document patient verbalizes understanding of medications being administered [x]   If IV infiltration, see ONS Guidelines []   Other:      []

## 2025-02-06 ENCOUNTER — OFFICE VISIT (OUTPATIENT)
Dept: RADIATION ONCOLOGY | Age: 80
End: 2025-02-06

## 2025-02-06 VITALS
OXYGEN SATURATION: 96 % | RESPIRATION RATE: 20 BRPM | BODY MASS INDEX: 25.08 KG/M2 | WEIGHT: 146.16 LBS | TEMPERATURE: 97.7 F | DIASTOLIC BLOOD PRESSURE: 78 MMHG | HEART RATE: 84 BPM | SYSTOLIC BLOOD PRESSURE: 132 MMHG

## 2025-02-06 DIAGNOSIS — C34.11 PRIMARY MALIGNANT NEOPLASM OF RIGHT UPPER LOBE OF LUNG (HCC): Primary | ICD-10-CM

## 2025-02-06 NOTE — PROGRESS NOTES
[facility]          Radiation Oncology     [address] [address]  Phone: [phone] - Option 2  Fax:[phone]               Dr. Rolly Kelly MD MS        Dr. Julia Ross PhD MD       On Treatment Visit Note (OTV)    Date of Service: 2025  Patient ID: Yogesh Solano   : 1945  MRN: [id number]   Acct Number:        RADIATION ONCOLOGY ATTENDING:  Rolly Kelly MD MS    DIAGNOSIS:   Cancer Staging   Primary adenocarcinoma of lung (HCC)  Staging form: Lung, AJCC V9  - Clinical stage from 2024: Stage IIIB (cT2b, cN2b, cM0) - Signed by Maryellen Toney PA-C on 2025      Treatment Area: Thoracic    Current Total Dose(cGy): 400  Current Fraction:   Final/Cumulative Rx. Dose (cGy): 6000    Patient was seen today for weekly visit.     Wt Readings from Last 3 Encounters:   25 65.9 kg (145 lb 4 oz)   25 65.9 kg (145 lb 4 oz)   25 64.5 kg (142 lb 3.2 oz)       There were no vitals taken for this visit.    Lab Results   Component Value Date    WBC 8.7 2025    HGB 11.9 (L) 2025    HCT 36.7 (L) 2025     2025       Comfort Alteration  Fatigue:Able to perform daily activities with rest periods    Pain Location: N/A  Pain Intensity (Current): 0 No Pain  Pain Treatment: N/A  Pain Relief: n/a    Emotional Alteration:   Coping: effective    Nutritional Alteration  Anorexia: Loss of appetite but able to eat normal portions of food  Nausea: No nausea noted  Vomiting: No vomiting   Dyspepsia/Heartburn: None  Dysphagia/Esophagitis: None    Skin Alteration   Skin reaction: No changes noted  Alopecia: No loss    Mucous Membrane Alteration  Mucositis XRT Related: None  Pharynx and Esophagus- Acute: No change over baseline  Voice Changes: Normal    Ventilation Alteration  Cough: Nonproductive  Hemoptysis: None  Dyspnea: Dyspnea on exertion , O2

## 2025-02-07 ENCOUNTER — CLINICAL DOCUMENTATION (OUTPATIENT)
Dept: INFUSION THERAPY | Age: 80
End: 2025-02-07

## 2025-02-07 DIAGNOSIS — C34.90 PRIMARY ADENOCARCINOMA OF LUNG, UNSPECIFIED LATERALITY (HCC): Primary | ICD-10-CM

## 2025-02-07 NOTE — PROGRESS NOTES
Follow up chemotherapy call made. Yogesh received Cycle #1 Taxol/Carboplatin on 2/5/25. Denies any nausea, vomiting, diarrhea, constipation, fever, chills, or pain.  Appetite good, drinking 48+oz of fluid daily.  Urinating well. Appointment for next week confirmed. Denies any needs at this time. Instructed to call office with any questions or concerns.

## 2025-02-11 DIAGNOSIS — C34.90 PRIMARY ADENOCARCINOMA OF LUNG, UNSPECIFIED LATERALITY (HCC): Primary | ICD-10-CM

## 2025-02-11 RX ORDER — MEPERIDINE HYDROCHLORIDE 50 MG/ML
12.5 INJECTION INTRAMUSCULAR; INTRAVENOUS; SUBCUTANEOUS PRN
Status: CANCELLED | OUTPATIENT
Start: 2025-02-12

## 2025-02-11 RX ORDER — PALONOSETRON 0.05 MG/ML
0.25 INJECTION, SOLUTION INTRAVENOUS ONCE
Status: CANCELLED | OUTPATIENT
Start: 2025-02-12 | End: 2025-02-12

## 2025-02-11 RX ORDER — DIPHENHYDRAMINE HYDROCHLORIDE 50 MG/ML
50 INJECTION INTRAMUSCULAR; INTRAVENOUS
Status: CANCELLED | OUTPATIENT
Start: 2025-02-12

## 2025-02-11 RX ORDER — HEPARIN SODIUM (PORCINE) LOCK FLUSH IV SOLN 100 UNIT/ML 100 UNIT/ML
500 SOLUTION INTRAVENOUS PRN
Status: CANCELLED | OUTPATIENT
Start: 2025-02-12

## 2025-02-11 RX ORDER — ACETAMINOPHEN 325 MG/1
650 TABLET ORAL
Status: CANCELLED | OUTPATIENT
Start: 2025-02-12

## 2025-02-11 RX ORDER — HEPARIN SODIUM (PORCINE) LOCK FLUSH IV SOLN 100 UNIT/ML 100 UNIT/ML
500 SOLUTION INTRAVENOUS PRN
OUTPATIENT
Start: 2025-02-19

## 2025-02-11 RX ORDER — SODIUM CHLORIDE 0.9 % (FLUSH) 0.9 %
5-40 SYRINGE (ML) INJECTION PRN
OUTPATIENT
Start: 2025-02-19

## 2025-02-11 RX ORDER — ONDANSETRON 2 MG/ML
8 INJECTION INTRAMUSCULAR; INTRAVENOUS
Status: CANCELLED | OUTPATIENT
Start: 2025-02-12

## 2025-02-11 RX ORDER — DIPHENHYDRAMINE HYDROCHLORIDE 50 MG/ML
50 INJECTION INTRAMUSCULAR; INTRAVENOUS ONCE
OUTPATIENT
Start: 2025-02-19 | End: 2025-02-19

## 2025-02-11 RX ORDER — ONDANSETRON 2 MG/ML
8 INJECTION INTRAMUSCULAR; INTRAVENOUS
OUTPATIENT
Start: 2025-02-19

## 2025-02-11 RX ORDER — SODIUM CHLORIDE 9 MG/ML
5-250 INJECTION, SOLUTION INTRAVENOUS PRN
OUTPATIENT
Start: 2025-02-19

## 2025-02-11 RX ORDER — SODIUM CHLORIDE 9 MG/ML
5-250 INJECTION, SOLUTION INTRAVENOUS PRN
Status: CANCELLED | OUTPATIENT
Start: 2025-02-12

## 2025-02-11 RX ORDER — FAMOTIDINE 10 MG/ML
20 INJECTION, SOLUTION INTRAVENOUS ONCE
OUTPATIENT
Start: 2025-02-19 | End: 2025-02-19

## 2025-02-11 RX ORDER — EPINEPHRINE 1 MG/ML
0.3 INJECTION, SOLUTION, CONCENTRATE INTRAVENOUS PRN
Status: CANCELLED | OUTPATIENT
Start: 2025-02-12

## 2025-02-11 RX ORDER — FAMOTIDINE 10 MG/ML
20 INJECTION, SOLUTION INTRAVENOUS
OUTPATIENT
Start: 2025-02-19

## 2025-02-11 RX ORDER — FAMOTIDINE 10 MG/ML
20 INJECTION, SOLUTION INTRAVENOUS
Status: CANCELLED | OUTPATIENT
Start: 2025-02-12

## 2025-02-11 RX ORDER — DIPHENHYDRAMINE HYDROCHLORIDE 50 MG/ML
50 INJECTION INTRAMUSCULAR; INTRAVENOUS ONCE
Status: CANCELLED | OUTPATIENT
Start: 2025-02-12 | End: 2025-02-12

## 2025-02-11 RX ORDER — EPINEPHRINE 1 MG/ML
0.3 INJECTION, SOLUTION, CONCENTRATE INTRAVENOUS PRN
OUTPATIENT
Start: 2025-02-19

## 2025-02-11 RX ORDER — MEPERIDINE HYDROCHLORIDE 50 MG/ML
12.5 INJECTION INTRAMUSCULAR; INTRAVENOUS; SUBCUTANEOUS PRN
OUTPATIENT
Start: 2025-02-19

## 2025-02-11 RX ORDER — ALBUTEROL SULFATE 90 UG/1
4 INHALANT RESPIRATORY (INHALATION) PRN
Status: CANCELLED | OUTPATIENT
Start: 2025-02-12

## 2025-02-11 RX ORDER — DIPHENHYDRAMINE HYDROCHLORIDE 50 MG/ML
50 INJECTION INTRAMUSCULAR; INTRAVENOUS
OUTPATIENT
Start: 2025-02-19

## 2025-02-11 RX ORDER — FAMOTIDINE 10 MG/ML
20 INJECTION, SOLUTION INTRAVENOUS ONCE
Status: CANCELLED | OUTPATIENT
Start: 2025-02-12 | End: 2025-02-12

## 2025-02-11 RX ORDER — SODIUM CHLORIDE 9 MG/ML
INJECTION, SOLUTION INTRAVENOUS CONTINUOUS
OUTPATIENT
Start: 2025-02-19

## 2025-02-11 RX ORDER — HYDROCORTISONE SODIUM SUCCINATE 100 MG/2ML
100 INJECTION INTRAMUSCULAR; INTRAVENOUS
OUTPATIENT
Start: 2025-02-19

## 2025-02-11 RX ORDER — SODIUM CHLORIDE 9 MG/ML
INJECTION, SOLUTION INTRAVENOUS CONTINUOUS
Status: CANCELLED | OUTPATIENT
Start: 2025-02-12

## 2025-02-11 RX ORDER — PALONOSETRON 0.05 MG/ML
0.25 INJECTION, SOLUTION INTRAVENOUS ONCE
OUTPATIENT
Start: 2025-02-19 | End: 2025-02-19

## 2025-02-11 RX ORDER — HYDROCORTISONE SODIUM SUCCINATE 100 MG/2ML
100 INJECTION INTRAMUSCULAR; INTRAVENOUS
Status: CANCELLED | OUTPATIENT
Start: 2025-02-12

## 2025-02-11 RX ORDER — SODIUM CHLORIDE 0.9 % (FLUSH) 0.9 %
5-40 SYRINGE (ML) INJECTION PRN
Status: CANCELLED | OUTPATIENT
Start: 2025-02-12

## 2025-02-11 RX ORDER — ALBUTEROL SULFATE 90 UG/1
4 INHALANT RESPIRATORY (INHALATION) PRN
OUTPATIENT
Start: 2025-02-19

## 2025-02-11 RX ORDER — ACETAMINOPHEN 325 MG/1
650 TABLET ORAL
OUTPATIENT
Start: 2025-02-19

## 2025-02-11 NOTE — PROGRESS NOTES
apparent distress, calm and cooperative.  HEENT: Pupils equal, round, and reactive to light. Conjunctivae/corneas clear. Oral mucosa moist  Neck: Supple, with full range of motion. Trachea midline.   Respiratory:  Normal respiratory effort. Clear to auscultation all lung fields.  Cardiovascular: RRR, S1/S2  Abdomen: Soft, non-tender, non-distended with active BS  Musculoskeletal: No clubbing, cyanosis or edema bilaterally.  He is able to ambulate in office  Skin: Skin color, texture, turgor normal.  No visible rashes or lesions.  Neurologic:  Neurovascularly intact without any focal sensory/motor deficits. Cranial nerves: II-XII intact, grossly non-focal.  Psychiatric: Alert and oriented x 3, thought content appropriate, normal insight    Imaging Studies and Labs:   CBC:   Lab Results   Component Value Date    WBC 5.2 02/12/2025    HGB 11.5 (L) 02/12/2025    HCT 35.1 (L) 02/12/2025    MCV 94 02/12/2025     02/12/2025     BMP:   Lab Results   Component Value Date/Time     02/12/2025 08:14 AM     01/14/2025 01:04 PM    K 3.7 02/12/2025 08:14 AM    K 4.4 01/14/2025 01:04 PM    K 4.2 06/08/2020 12:19 PM     01/14/2025 01:04 PM    CO2 28 01/14/2025 01:04 PM    BUN 23 01/14/2025 01:04 PM    CREATININE 0.7 02/12/2025 08:14 AM    CREATININE 0.7 01/14/2025 01:04 PM    GLUCOSE 89 01/14/2025 01:04 PM    GLUCOSE 88 03/24/2012 03:32 AM    CALCIUM 9.9 01/14/2025 01:04 PM      LFT:   Lab Results   Component Value Date    ALT 18 02/05/2025    AST 18 02/05/2025    ALKPHOS 47 02/05/2025    BILITOT <0.2 (L) 02/05/2025      Assessment & Plan   Assessment and Plan:     1. Primary adenocarcinoma of lung, unspecified laterality (HCC)  Diagnosed November 2024 with new approximately 3 cm right upper lung suspicious lung mass.  PET scan hypermetabolic including a normal-sized pretracheal node.  T2 N2 biopsy-proven stage IIIa.  PD-L1 1 TPS high 85%.  EGFR not detected ALK negative.    2. Encounter for chemotherapy

## 2025-02-12 ENCOUNTER — HOSPITAL ENCOUNTER (OUTPATIENT)
Dept: INFUSION THERAPY | Age: 80
Discharge: HOME | End: 2025-02-12
Payer: MEDICARE

## 2025-02-12 ENCOUNTER — OFFICE VISIT (OUTPATIENT)
Dept: ONCOLOGY | Age: 80
End: 2025-02-12
Payer: MEDICARE

## 2025-02-12 VITALS
DIASTOLIC BLOOD PRESSURE: 79 MMHG | WEIGHT: 145.8 LBS | TEMPERATURE: 97.7 F | OXYGEN SATURATION: 96 % | RESPIRATION RATE: 17 BRPM | HEIGHT: 64 IN | BODY MASS INDEX: 24.89 KG/M2 | HEART RATE: 84 BPM | SYSTOLIC BLOOD PRESSURE: 165 MMHG

## 2025-02-12 VITALS
OXYGEN SATURATION: 96 % | DIASTOLIC BLOOD PRESSURE: 79 MMHG | TEMPERATURE: 98 F | BODY MASS INDEX: 24.89 KG/M2 | HEART RATE: 95 BPM | SYSTOLIC BLOOD PRESSURE: 156 MMHG | HEIGHT: 64 IN | RESPIRATION RATE: 18 BRPM | WEIGHT: 145.8 LBS

## 2025-02-12 DIAGNOSIS — C34.90 PRIMARY ADENOCARCINOMA OF LUNG, UNSPECIFIED LATERALITY (HCC): Primary | ICD-10-CM

## 2025-02-12 DIAGNOSIS — Z51.11 ENCOUNTER FOR CHEMOTHERAPY MANAGEMENT: ICD-10-CM

## 2025-02-12 DIAGNOSIS — D64.9 ANEMIA, UNSPECIFIED TYPE: ICD-10-CM

## 2025-02-12 LAB
ALBUMIN SERPL BCG-MCNC: 4 G/DL (ref 3.5–5.1)
ALP SERPL-CCNC: 48 U/L (ref 38–126)
ALT SERPL W/O P-5'-P-CCNC: 19 U/L (ref 11–66)
AST SERPL-CCNC: 18 U/L (ref 5–40)
BASOPHILS ABSOLUTE: 0 THOU/MM3 (ref 0–0.1)
BASOPHILS NFR BLD AUTO: 0 % (ref 0–3)
BILIRUB CONJ SERPL-MCNC: < 0.1 MG/DL (ref 0–0.3)
BILIRUB SERPL-MCNC: 0.3 MG/DL (ref 0.3–1.2)
BUN BLDP-MCNC: 18 MG/DL (ref 8–26)
CHLORIDE BLD-SCNC: 100 MEQ/L (ref 98–109)
CREAT BLD-MCNC: 0.7 MG/DL (ref 0.5–1.2)
EOSINOPHIL NFR BLD AUTO: 5 % (ref 0–4)
EOSINOPHILS ABSOLUTE: 0.3 THOU/MM3 (ref 0–0.4)
ERYTHROCYTE [DISTWIDTH] IN BLOOD BY AUTOMATED COUNT: 14.7 % (ref 11.5–14.5)
GFR SERPL CREATININE-BSD FRML MDRD: > 90 ML/MIN/1.73M2
GLUCOSE BLD-MCNC: 99 MG/DL (ref 70–108)
HCT VFR BLD AUTO: 35.1 % (ref 42–52)
HGB BLD-MCNC: 11.5 GM/DL (ref 14–18)
IMMATURE GRANULOCYTES %: 0 %
IMMATURE GRANULOCYTES ABSOLUTE: 0.01 THOU/MM3 (ref 0–0.07)
IONIZED CALCIUM, WHOLE BLOOD: 1.26 MMOL/L (ref 1.12–1.32)
LYMPHOCYTES ABSOLUTE: 0.9 THOU/MM3 (ref 1–4.8)
LYMPHOCYTES NFR BLD AUTO: 17 % (ref 15–47)
MCH RBC QN AUTO: 30.9 PG (ref 26–33)
MCHC RBC AUTO-ENTMCNC: 32.8 GM/DL (ref 32.2–35.5)
MCV RBC AUTO: 94 FL (ref 80–94)
MONOCYTES ABSOLUTE: 0.5 THOU/MM3 (ref 0.4–1.3)
MONOCYTES NFR BLD AUTO: 9 % (ref 0–12)
NEUTROPHILS ABSOLUTE: 3.6 THOU/MM3 (ref 1.8–7.7)
NEUTROPHILS NFR BLD AUTO: 69 % (ref 43–75)
PLATELET # BLD AUTO: 219 THOU/MM3 (ref 130–400)
PMV BLD AUTO: 9 FL (ref 9.4–12.4)
POTASSIUM BLD-SCNC: 3.7 MEQ/L (ref 3.5–4.9)
PROT SERPL-MCNC: 6.4 G/DL (ref 6.1–8)
RBC # BLD AUTO: 3.72 MILL/MM3 (ref 4.7–6.1)
SODIUM BLD-SCNC: 137 MEQ/L (ref 138–146)
TOTAL CO2, WHOLE BLOOD: 30 MEQ/L (ref 23–33)
WBC # BLD AUTO: 5.2 THOU/MM3 (ref 4.8–10.8)

## 2025-02-12 PROCEDURE — 96413 CHEMO IV INFUSION 1 HR: CPT

## 2025-02-12 PROCEDURE — 1159F MED LIST DOCD IN RCRD: CPT | Performed by: NURSE PRACTITIONER

## 2025-02-12 PROCEDURE — 2500000003 HC RX 250 WO HCPCS: Performed by: INTERNAL MEDICINE

## 2025-02-12 PROCEDURE — G8427 DOCREV CUR MEDS BY ELIG CLIN: HCPCS | Performed by: NURSE PRACTITIONER

## 2025-02-12 PROCEDURE — 96367 TX/PROPH/DG ADDL SEQ IV INF: CPT

## 2025-02-12 PROCEDURE — 96415 CHEMO IV INFUSION ADDL HR: CPT

## 2025-02-12 PROCEDURE — 1123F ACP DISCUSS/DSCN MKR DOCD: CPT | Performed by: NURSE PRACTITIONER

## 2025-02-12 PROCEDURE — 99214 OFFICE O/P EST MOD 30 MIN: CPT | Performed by: NURSE PRACTITIONER

## 2025-02-12 PROCEDURE — 1160F RVW MEDS BY RX/DR IN RCRD: CPT | Performed by: NURSE PRACTITIONER

## 2025-02-12 PROCEDURE — 3077F SYST BP >= 140 MM HG: CPT | Performed by: NURSE PRACTITIONER

## 2025-02-12 PROCEDURE — 96375 TX/PRO/DX INJ NEW DRUG ADDON: CPT

## 2025-02-12 PROCEDURE — 3078F DIAST BP <80 MM HG: CPT | Performed by: NURSE PRACTITIONER

## 2025-02-12 PROCEDURE — 80076 HEPATIC FUNCTION PANEL: CPT

## 2025-02-12 PROCEDURE — 99211 OFF/OP EST MAY X REQ PHY/QHP: CPT

## 2025-02-12 PROCEDURE — 1036F TOBACCO NON-USER: CPT | Performed by: NURSE PRACTITIONER

## 2025-02-12 PROCEDURE — G8419 CALC BMI OUT NRM PARAM NOF/U: HCPCS | Performed by: NURSE PRACTITIONER

## 2025-02-12 PROCEDURE — 96417 CHEMO IV INFUS EACH ADDL SEQ: CPT

## 2025-02-12 PROCEDURE — 85025 COMPLETE CBC W/AUTO DIFF WBC: CPT

## 2025-02-12 PROCEDURE — 80047 BASIC METABLC PNL IONIZED CA: CPT

## 2025-02-12 PROCEDURE — 6360000002 HC RX W HCPCS: Performed by: INTERNAL MEDICINE

## 2025-02-12 PROCEDURE — 2580000003 HC RX 258: Performed by: INTERNAL MEDICINE

## 2025-02-12 RX ORDER — HYDROCORTISONE SODIUM SUCCINATE 100 MG/2ML
100 INJECTION INTRAMUSCULAR; INTRAVENOUS
Status: CANCELLED | OUTPATIENT
Start: 2025-02-12

## 2025-02-12 RX ORDER — HEPARIN 100 UNIT/ML
500 SYRINGE INTRAVENOUS PRN
Status: CANCELLED | OUTPATIENT
Start: 2025-02-12

## 2025-02-12 RX ORDER — DIPHENHYDRAMINE HYDROCHLORIDE 50 MG/ML
50 INJECTION INTRAMUSCULAR; INTRAVENOUS
Status: CANCELLED | OUTPATIENT
Start: 2025-02-12

## 2025-02-12 RX ORDER — SODIUM CHLORIDE 9 MG/ML
INJECTION, SOLUTION INTRAVENOUS CONTINUOUS
Status: CANCELLED | OUTPATIENT
Start: 2025-02-12

## 2025-02-12 RX ORDER — ONDANSETRON 2 MG/ML
8 INJECTION INTRAMUSCULAR; INTRAVENOUS
Status: CANCELLED | OUTPATIENT
Start: 2025-02-12

## 2025-02-12 RX ORDER — ACETAMINOPHEN 325 MG/1
650 TABLET ORAL
Status: CANCELLED | OUTPATIENT
Start: 2025-02-12

## 2025-02-12 RX ORDER — EPINEPHRINE 1 MG/ML
0.3 INJECTION, SOLUTION INTRAMUSCULAR; SUBCUTANEOUS PRN
Status: CANCELLED | OUTPATIENT
Start: 2025-02-12

## 2025-02-12 RX ORDER — SODIUM CHLORIDE 9 MG/ML
5-250 INJECTION, SOLUTION INTRAVENOUS PRN
Status: DISCONTINUED | OUTPATIENT
Start: 2025-02-12 | End: 2025-02-13 | Stop reason: HOSPADM

## 2025-02-12 RX ORDER — HEPARIN 100 UNIT/ML
500 SYRINGE INTRAVENOUS PRN
Status: DISCONTINUED | OUTPATIENT
Start: 2025-02-12 | End: 2025-02-13 | Stop reason: HOSPADM

## 2025-02-12 RX ORDER — SODIUM CHLORIDE 0.9 % (FLUSH) 0.9 %
5-40 SYRINGE (ML) INJECTION PRN
Status: DISCONTINUED | OUTPATIENT
Start: 2025-02-12 | End: 2025-02-13 | Stop reason: HOSPADM

## 2025-02-12 RX ORDER — PALONOSETRON 0.05 MG/ML
0.25 INJECTION, SOLUTION INTRAVENOUS ONCE
Status: COMPLETED | OUTPATIENT
Start: 2025-02-12 | End: 2025-02-12

## 2025-02-12 RX ORDER — SODIUM CHLORIDE 9 MG/ML
25 INJECTION, SOLUTION INTRAVENOUS PRN
Status: CANCELLED | OUTPATIENT
Start: 2025-02-12

## 2025-02-12 RX ORDER — DIPHENHYDRAMINE HYDROCHLORIDE 50 MG/ML
50 INJECTION INTRAMUSCULAR; INTRAVENOUS ONCE
Status: COMPLETED | OUTPATIENT
Start: 2025-02-12 | End: 2025-02-12

## 2025-02-12 RX ORDER — SODIUM CHLORIDE 0.9 % (FLUSH) 0.9 %
5-40 SYRINGE (ML) INJECTION PRN
Status: CANCELLED | OUTPATIENT
Start: 2025-02-12

## 2025-02-12 RX ORDER — ALBUTEROL SULFATE 90 UG/1
4 INHALANT RESPIRATORY (INHALATION) PRN
Status: CANCELLED | OUTPATIENT
Start: 2025-02-12

## 2025-02-12 RX ADMIN — PALONOSETRON 0.25 MG: 0.05 INJECTION, SOLUTION INTRAVENOUS at 09:15

## 2025-02-12 RX ADMIN — FAMOTIDINE 20 MG: 10 INJECTION, SOLUTION INTRAVENOUS at 09:16

## 2025-02-12 RX ADMIN — SODIUM CHLORIDE, PRESERVATIVE FREE 20 ML: 5 INJECTION INTRAVENOUS at 11:42

## 2025-02-12 RX ADMIN — SODIUM CHLORIDE 55 ML/HR: 9 INJECTION, SOLUTION INTRAVENOUS at 09:02

## 2025-02-12 RX ADMIN — PACLITAXEL 78 MG: 6 INJECTION, SOLUTION INTRAVENOUS at 10:02

## 2025-02-12 RX ADMIN — CARBOPLATIN 200 MG: 10 INJECTION INTRAVENOUS at 11:03

## 2025-02-12 RX ADMIN — DEXAMETHASONE SODIUM PHOSPHATE 12 MG: 4 INJECTION, SOLUTION INTRAMUSCULAR; INTRAVENOUS at 09:24

## 2025-02-12 RX ADMIN — HEPARIN 500 UNITS: 100 SYRINGE at 11:42

## 2025-02-12 RX ADMIN — SODIUM CHLORIDE, PRESERVATIVE FREE 30 ML: 5 INJECTION INTRAVENOUS at 08:10

## 2025-02-12 RX ADMIN — DIPHENHYDRAMINE HYDROCHLORIDE 50 MG: 50 INJECTION INTRAMUSCULAR; INTRAVENOUS at 09:18

## 2025-02-12 NOTE — PROGRESS NOTES
Patient assessed for the following post chemotherapy:    Dizziness   No  Lightheadedness  No      Acute nausea/vomiting No  Headache   No  Chest pain/pressure  No  Rash/itching   No  Shortness of breath  Yes    Patient kept for 20 minutes observation post infusion chemotherapy.    Patient tolerated chemotherapy treatment Taxo/Carboplatin without any complications.    Last vital signs:   BP (!) 165/79   Pulse 84   Temp 97.7 °F (36.5 °C) (Oral)   Resp 17   Ht 1.626 m (5' 4\")   Wt 66.1 kg (145 lb 12.8 oz)   SpO2 96%   BMI 25.03 kg/m²     Physician's instructions:   Ok for treatment today   Return to clinic as scheduled to see Dr. Correia with labs   Return to clinic for treatment in 1 week    Patient instructed if experience any of the above symptoms following today's infusion, he is to notify MD immediately or go to the emergency department.    Discharge instructions given to patient. Verbalizes understanding. Ambulated off unit per self, accompanied by family, with belongings.

## 2025-02-12 NOTE — PATIENT INSTRUCTIONS
Ok for treatment today   Return to clinic as scheduled to see Dr. Correia with labs   Return to clinic for treatment in 1 week

## 2025-02-12 NOTE — DISCHARGE INSTRUCTIONS
Please contact your Oncologist if you have any questions regarding the chemotherapy Taxol/Carboplatin that you received today.      Patient instructed if experience any of the symptoms following today's chemotherapy treatment to notify MD immediately or go to emergency department.    * dizziness/lightheadedness  *acute nausea/vomiting - not relieved with medication  *headache - not relieved from Tylenol/pain medication  *chest pain/pressure  *rash/itching  *shortness of breath        Drink fluids - 48oz fluids daily  Call if develop fever/ chills/ signs or symptoms of infection     Physician's instructions:  Instructions   Ok for treatment today   Return to clinic as scheduled to see Dr. Correia with labs   Return to clinic for treatment in 1 week

## 2025-02-12 NOTE — ONCOLOGY
Chemotherapy Administration    Pre-assessment Data: Antineoplastic Agents  See toxicity flow sheet for assessment                                          [x]         Interventions:   Chemotherapy SQ injection given []   Taxol administered-VS per protocol [x]   Blood pressure meds held 12 hours prior to Rituxan/Ruxience []   Rituxan/Ruxience administered- VS and precautions per guidelines []   Emergency drugs available as appropriate [x]   Anaphylaxis assessment completed [x]   Pre-medications administered as ordered [x]   Blood return noted upon initiation of chemotherapy [x]   Blood return noted each 1-2ml of a vesicant medication if given IV push []   Navelbine, Vincristine and Velban given as a monitored wide open drip, blood return noted before during and after infusion. []   Blood return noted each 2-3ml of a non-vesicant medication if given IV push []   Patient aware of potential Immunotherapy toxicities []   Monitor for signs / symptoms of hypersensitivity reaction [x]   Chemotherapy orders (drug/dose/rate) verified by 2 Chemo certified RN’s [x]   Monitor IV site and blood return throughout the infusion of the medication [x]   Document IV site checks on the IV assessment form [x]   Document chemotherapy teaching on the Patient Education tab [x]   Document patient verbalizes understanding of medications being administered [x]   If IV infiltration, see ONS Guidelines []   Other:     Taxol/Carboplatin []

## 2025-02-12 NOTE — PLAN OF CARE
Problem: Discharge Planning  Goal: Discharge to home or other facility with appropriate resources  Outcome: Progressing  Flowsheets (Taken 2/12/2025 1150)  Discharge to home or other facility with appropriate resources: Identify barriers to discharge with patient and caregiver     Problem: Safety - Adult  Goal: Free from fall injury  Outcome: Progressing  Flowsheets (Taken 2/12/2025 1150)  Free From Fall Injury: Instruct family/caregiver on patient safety     Problem: Chronic Conditions and Co-morbidities  Goal: Patient's chronic conditions and co-morbidity symptoms are monitored and maintained or improved  Outcome: Progressing  Flowsheets (Taken 2/12/2025 1150)  Care Plan - Patient's Chronic Conditions and Co-Morbidity Symptoms are Monitored and Maintained or Improved: Monitor and assess patient's chronic conditions and comorbid symptoms for stability, deterioration, or improvement  Note:   Chemotherapy Teaching     What is Chemotherapy   Drug action [x]   Method of Administration [x]   Handouts given []     Side Effects  Nausea/vomiting [x]   Diarrhea [x]   Fatigue [x]   Signs / Symptoms of infection [x]   Neutropenia [x]   Thrombocytopenia [x]   Alopecia [x]   neuropathy [x]   [place] diet &  the importance of fluids [x]       Micellaneous  Importance of nutrition [x]   Importance of oral hygiene [x]   When to call the MD [x]   Monitoring labs [x]   Use of supportive services []     Explanation of Drug Regimen / Frequency  Taxol/Carboplatin     Comments  Verbalized understanding to drug,action,side effects and when to call MD         Problem: Infection - Adult  Goal: Absence of infection at discharge  Flowsheets (Taken 2/12/2025 1150)  Absence of infection at discharge: Monitor all insertion sites i.e., indwelling lines, tubes and drains  Note: Mediport site with no redness or warmth. Skin over port site intact with no signs of breakdown noted. Patient verbalizes signs/symptoms of port infection and when to

## 2025-02-13 ENCOUNTER — OFFICE VISIT (OUTPATIENT)
Dept: RADIATION ONCOLOGY | Age: 80
End: 2025-02-13

## 2025-02-13 VITALS
DIASTOLIC BLOOD PRESSURE: 78 MMHG | WEIGHT: 146.39 LBS | HEART RATE: 78 BPM | SYSTOLIC BLOOD PRESSURE: 124 MMHG | RESPIRATION RATE: 20 BRPM | OXYGEN SATURATION: 94 % | TEMPERATURE: 97.7 F | BODY MASS INDEX: 25.11 KG/M2

## 2025-02-13 DIAGNOSIS — C34.11 PRIMARY MALIGNANT NEOPLASM OF RIGHT UPPER LOBE OF LUNG (HCC): Primary | ICD-10-CM

## 2025-02-13 NOTE — PROGRESS NOTES
release tablet Take 1 tablet by mouth Daily      Calcium Carb-Cholecalciferol (CALCIUM 600 + D PO) Take 600 mg by mouth daily      nitroGLYCERIN (NITROSTAT) 0.4 MG SL tablet Place 1 tablet under the tongue every 5 minutes as needed. (Patient not taking: Reported on 2025) 25 tablet 3    Oxygen Concentrator 2 L by Nasal route as needed.       No current facility-administered medications for this visit.     * New    PHYSICAL EXAM:       ECO - Symptomatic, <50% in bed during the day (Ambulatory and capable of all self care but unable to carry out any work activities. Up and about more than 50% of waking hours)     General: NAD, AO x 3, Mentation is clear with appropriate affect.  HEENT: Normocephalic, atraumatic  Thorax:  Unlabored on supplemental O2 via nasal cannula   Abdomen:  Non-distended  Neuro:  Cranial nerves grossly intact  Skin - treatment portal: SEE above.       Chemotherapy Update: Concurrent chemotherapy    Treatment Imaging: CBCT and All imaging reviewed and approved by Dr. Ross/    ASSESSMENT: No significant radiation side effects. Responding appropriately to symptomatic management.    New medications, diagnostic results: Continue treatment as planned    PLAN: Again reviewed potential side effects of radiation for the patient's treatment.    Continue local/topical care.    Continue current radiation course as prescribed.

## 2025-02-17 RX ORDER — ALBUTEROL SULFATE 90 UG/1
AEROSOL, METERED RESPIRATORY (INHALATION)
Qty: 72 G | Refills: 3 | Status: SHIPPED | OUTPATIENT
Start: 2025-02-17

## 2025-02-18 NOTE — PROGRESS NOTES
Father     Cancer Sister         lung    Cancer Brother         prostate    Heart Disease Brother         Hospitalizations:   None recent; Lung reduction  3/21.    Allergies:  Allergies   Allergen Reactions    Cefpodoxime Diarrhea    Flonase [Fluticasone Propionate]      Pt shakes and feels anxious    Mometasone     Mometasone Furoate      Shakes,feels anxious    Spiriva Handihaler [Tiotropium Bromide Monohydrate]     Umeclidinium Bromide         Adult Illness:  Patient Active Problem List   Diagnosis    Steroid-dependent COPD (HCC)    Atherosclerosis    Wheezing    Preop testing    Nasal congestion    Deviated nasal septum    Tinnitus    Hypercalcemia    Chronic respiratory failure with hypoxia, on home O2 therapy    HTN (hypertension)    Precordial pain    Chest pain    Epigastric pain    Coronary artery disease with angina pectoris (HCC)    HLD (hyperlipidemia)    Opacities of both lungs present on chest x-ray    Constipated    Activity intolerance    Angina, class III (HCC)    Angina of effort (HCC)    Bullous emphysema (HCC)    Personal history of tobacco use    Dysphagia    Gastroesophageal reflux disease with esophagitis    Mucopurulent chronic bronchitis (HCC)    Essential hypertension    Arthritis of carpometacarpal (CMC) joint of left thumb    GERD (gastroesophageal reflux disease)    History of malignant neoplasm of colon    Primary adenocarcinoma of lung (HCC)    Lung cancer (HCC)   Denies hypertension.  He is on antiplatelet agents for his stents.  Stents placed in 2012 and 2013  Sees Dr. Medina of cardiology no diabetes no strokes no heart attacks.      Surgery:  Past Surgical History:   Procedure Laterality Date    BRONCHOSCOPY  01/03/2025    w/Dr Colunga    CARDIAC CATHETERIZATION  2012    STENT    COLONOSCOPY  04/18/2022    ENDOSCOPY, COLON, DIAGNOSTIC  11/10/2021    FINGER SURGERY Right 12/2018    HERNIA REPAIR  01/2024    LUNG REMOVAL, PARTIAL  2021    30% resection done on [place] side of lungs-[facility]

## 2025-02-19 ENCOUNTER — HOSPITAL ENCOUNTER (OUTPATIENT)
Dept: INFUSION THERAPY | Age: 80
Discharge: HOME | End: 2025-02-19
Payer: MEDICARE

## 2025-02-19 ENCOUNTER — OFFICE VISIT (OUTPATIENT)
Dept: ONCOLOGY | Age: 80
End: 2025-02-19
Payer: MEDICARE

## 2025-02-19 VITALS
SYSTOLIC BLOOD PRESSURE: 151 MMHG | OXYGEN SATURATION: 96 % | WEIGHT: 145.25 LBS | HEIGHT: 64 IN | TEMPERATURE: 98 F | HEART RATE: 74 BPM | BODY MASS INDEX: 24.8 KG/M2 | DIASTOLIC BLOOD PRESSURE: 74 MMHG | RESPIRATION RATE: 16 BRPM

## 2025-02-19 VITALS
RESPIRATION RATE: 16 BRPM | WEIGHT: 145.25 LBS | DIASTOLIC BLOOD PRESSURE: 65 MMHG | OXYGEN SATURATION: 95 % | HEART RATE: 74 BPM | TEMPERATURE: 98 F | HEIGHT: 64 IN | SYSTOLIC BLOOD PRESSURE: 137 MMHG | BODY MASS INDEX: 24.8 KG/M2

## 2025-02-19 DIAGNOSIS — C34.90 PRIMARY ADENOCARCINOMA OF LUNG, UNSPECIFIED LATERALITY (HCC): Primary | ICD-10-CM

## 2025-02-19 LAB
ALBUMIN SERPL BCG-MCNC: 3.9 G/DL (ref 3.5–5.1)
ALP SERPL-CCNC: 46 U/L (ref 38–126)
ALT SERPL W/O P-5'-P-CCNC: 16 U/L (ref 11–66)
AST SERPL-CCNC: 13 U/L (ref 5–40)
BASOPHILS ABSOLUTE: 0 THOU/MM3 (ref 0–0.1)
BASOPHILS NFR BLD AUTO: 1 % (ref 0–3)
BILIRUB CONJ SERPL-MCNC: < 0.1 MG/DL (ref 0–0.2)
BILIRUB SERPL-MCNC: 0.2 MG/DL (ref 0.3–1.2)
BUN BLDP-MCNC: 25 MG/DL (ref 8–26)
CHLORIDE BLD-SCNC: 101 MEQ/L (ref 98–109)
CREAT BLD-MCNC: 0.7 MG/DL (ref 0.5–1.2)
EOSINOPHIL NFR BLD AUTO: 3 % (ref 0–4)
EOSINOPHILS ABSOLUTE: 0.1 THOU/MM3 (ref 0–0.4)
ERYTHROCYTE [DISTWIDTH] IN BLOOD BY AUTOMATED COUNT: 15 % (ref 11.5–14.5)
GFR SERPL CREATININE-BSD FRML MDRD: > 90 ML/MIN/1.73M2
GLUCOSE BLD-MCNC: 96 MG/DL (ref 70–108)
HCT VFR BLD AUTO: 33.7 % (ref 42–52)
HGB BLD-MCNC: 10.7 GM/DL (ref 14–18)
IMMATURE GRANULOCYTES %: 0 %
IMMATURE GRANULOCYTES ABSOLUTE: 0.01 THOU/MM3 (ref 0–0.07)
IONIZED CALCIUM, WHOLE BLOOD: 1.36 MMOL/L (ref 1.12–1.32)
LYMPHOCYTES ABSOLUTE: 0.5 THOU/MM3 (ref 1–4.8)
LYMPHOCYTES NFR BLD AUTO: 11 % (ref 15–47)
MCH RBC QN AUTO: 30 PG (ref 26–33)
MCHC RBC AUTO-ENTMCNC: 31.8 GM/DL (ref 32.2–35.5)
MCV RBC AUTO: 94 FL (ref 80–94)
MONOCYTES ABSOLUTE: 0.4 THOU/MM3 (ref 0.4–1.3)
MONOCYTES NFR BLD AUTO: 11 % (ref 0–12)
NEUTROPHILS ABSOLUTE: 3 THOU/MM3 (ref 1.8–7.7)
NEUTROPHILS NFR BLD AUTO: 74 % (ref 43–75)
PLATELET # BLD AUTO: 240 THOU/MM3 (ref 130–400)
PMV BLD AUTO: 8.9 FL (ref 9.4–12.4)
POTASSIUM BLD-SCNC: 3.8 MEQ/L (ref 3.5–4.9)
PROT SERPL-MCNC: 6.2 G/DL (ref 6.1–8)
RBC # BLD AUTO: 3.57 MILL/MM3 (ref 4.7–6.1)
SODIUM BLD-SCNC: 140 MEQ/L (ref 138–146)
TOTAL CO2, WHOLE BLOOD: 34 MEQ/L (ref 23–33)
WBC # BLD AUTO: 4 THOU/MM3 (ref 4.8–10.8)

## 2025-02-19 PROCEDURE — 99211 OFF/OP EST MAY X REQ PHY/QHP: CPT

## 2025-02-19 PROCEDURE — 96367 TX/PROPH/DG ADDL SEQ IV INF: CPT

## 2025-02-19 PROCEDURE — 3075F SYST BP GE 130 - 139MM HG: CPT | Performed by: INTERNAL MEDICINE

## 2025-02-19 PROCEDURE — 85025 COMPLETE CBC W/AUTO DIFF WBC: CPT

## 2025-02-19 PROCEDURE — 1036F TOBACCO NON-USER: CPT | Performed by: INTERNAL MEDICINE

## 2025-02-19 PROCEDURE — 3078F DIAST BP <80 MM HG: CPT | Performed by: INTERNAL MEDICINE

## 2025-02-19 PROCEDURE — 80076 HEPATIC FUNCTION PANEL: CPT

## 2025-02-19 PROCEDURE — G8420 CALC BMI NORM PARAMETERS: HCPCS | Performed by: INTERNAL MEDICINE

## 2025-02-19 PROCEDURE — 6360000002 HC RX W HCPCS: Performed by: INTERNAL MEDICINE

## 2025-02-19 PROCEDURE — 80047 BASIC METABLC PNL IONIZED CA: CPT

## 2025-02-19 PROCEDURE — 96417 CHEMO IV INFUS EACH ADDL SEQ: CPT

## 2025-02-19 PROCEDURE — G8427 DOCREV CUR MEDS BY ELIG CLIN: HCPCS | Performed by: INTERNAL MEDICINE

## 2025-02-19 PROCEDURE — 1159F MED LIST DOCD IN RCRD: CPT | Performed by: INTERNAL MEDICINE

## 2025-02-19 PROCEDURE — 2500000003 HC RX 250 WO HCPCS: Performed by: INTERNAL MEDICINE

## 2025-02-19 PROCEDURE — 99214 OFFICE O/P EST MOD 30 MIN: CPT | Performed by: INTERNAL MEDICINE

## 2025-02-19 PROCEDURE — 96413 CHEMO IV INFUSION 1 HR: CPT

## 2025-02-19 PROCEDURE — 2580000003 HC RX 258: Performed by: INTERNAL MEDICINE

## 2025-02-19 PROCEDURE — 1123F ACP DISCUSS/DSCN MKR DOCD: CPT | Performed by: INTERNAL MEDICINE

## 2025-02-19 PROCEDURE — 96375 TX/PRO/DX INJ NEW DRUG ADDON: CPT

## 2025-02-19 RX ORDER — DIPHENHYDRAMINE HYDROCHLORIDE 50 MG/ML
50 INJECTION INTRAMUSCULAR; INTRAVENOUS
Status: CANCELLED | OUTPATIENT
Start: 2025-02-19

## 2025-02-19 RX ORDER — EPINEPHRINE 1 MG/ML
0.3 INJECTION, SOLUTION INTRAMUSCULAR; SUBCUTANEOUS PRN
Status: CANCELLED | OUTPATIENT
Start: 2025-02-19

## 2025-02-19 RX ORDER — SODIUM CHLORIDE 0.9 % (FLUSH) 0.9 %
5-40 SYRINGE (ML) INJECTION PRN
Status: DISCONTINUED | OUTPATIENT
Start: 2025-02-19 | End: 2025-02-20 | Stop reason: HOSPADM

## 2025-02-19 RX ORDER — ACETAMINOPHEN 325 MG/1
650 TABLET ORAL
Status: CANCELLED | OUTPATIENT
Start: 2025-02-19

## 2025-02-19 RX ORDER — ALBUTEROL SULFATE 90 UG/1
4 INHALANT RESPIRATORY (INHALATION) PRN
Status: CANCELLED | OUTPATIENT
Start: 2025-02-19

## 2025-02-19 RX ORDER — DIPHENHYDRAMINE HYDROCHLORIDE 50 MG/ML
50 INJECTION INTRAMUSCULAR; INTRAVENOUS ONCE
Status: COMPLETED | OUTPATIENT
Start: 2025-02-19 | End: 2025-02-19

## 2025-02-19 RX ORDER — HEPARIN 100 UNIT/ML
500 SYRINGE INTRAVENOUS PRN
Status: CANCELLED | OUTPATIENT
Start: 2025-02-19

## 2025-02-19 RX ORDER — SODIUM CHLORIDE 9 MG/ML
5-250 INJECTION, SOLUTION INTRAVENOUS PRN
Status: DISCONTINUED | OUTPATIENT
Start: 2025-02-19 | End: 2025-02-20 | Stop reason: HOSPADM

## 2025-02-19 RX ORDER — SODIUM CHLORIDE 9 MG/ML
INJECTION, SOLUTION INTRAVENOUS CONTINUOUS
Status: CANCELLED | OUTPATIENT
Start: 2025-02-19

## 2025-02-19 RX ORDER — PALONOSETRON 0.05 MG/ML
0.25 INJECTION, SOLUTION INTRAVENOUS ONCE
Status: COMPLETED | OUTPATIENT
Start: 2025-02-19 | End: 2025-02-19

## 2025-02-19 RX ORDER — SODIUM CHLORIDE 9 MG/ML
25 INJECTION, SOLUTION INTRAVENOUS PRN
Status: CANCELLED | OUTPATIENT
Start: 2025-02-19

## 2025-02-19 RX ORDER — HEPARIN 100 UNIT/ML
500 SYRINGE INTRAVENOUS PRN
Status: DISCONTINUED | OUTPATIENT
Start: 2025-02-19 | End: 2025-02-20 | Stop reason: HOSPADM

## 2025-02-19 RX ORDER — HYDROCORTISONE SODIUM SUCCINATE 100 MG/2ML
100 INJECTION INTRAMUSCULAR; INTRAVENOUS
Status: CANCELLED | OUTPATIENT
Start: 2025-02-19

## 2025-02-19 RX ORDER — ONDANSETRON 2 MG/ML
8 INJECTION INTRAMUSCULAR; INTRAVENOUS
Status: CANCELLED | OUTPATIENT
Start: 2025-02-19

## 2025-02-19 RX ORDER — SODIUM CHLORIDE 0.9 % (FLUSH) 0.9 %
5-40 SYRINGE (ML) INJECTION PRN
Status: CANCELLED | OUTPATIENT
Start: 2025-02-19

## 2025-02-19 RX ADMIN — PALONOSETRON 0.25 MG: 0.05 INJECTION, SOLUTION INTRAVENOUS at 09:23

## 2025-02-19 RX ADMIN — DEXAMETHASONE SODIUM PHOSPHATE 12 MG: 4 INJECTION, SOLUTION INTRAMUSCULAR; INTRAVENOUS at 09:32

## 2025-02-19 RX ADMIN — CARBOPLATIN 205 MG: 10 INJECTION INTRAVENOUS at 11:20

## 2025-02-19 RX ADMIN — FAMOTIDINE 20 MG: 10 INJECTION, SOLUTION INTRAVENOUS at 09:27

## 2025-02-19 RX ADMIN — SODIUM CHLORIDE 20 ML/HR: 9 INJECTION, SOLUTION INTRAVENOUS at 09:19

## 2025-02-19 RX ADMIN — PACLITAXEL 78 MG: 6 INJECTION, SOLUTION INTRAVENOUS at 10:09

## 2025-02-19 RX ADMIN — HEPARIN 500 UNITS: 100 SYRINGE at 12:35

## 2025-02-19 RX ADMIN — SODIUM CHLORIDE, PRESERVATIVE FREE 30 ML: 5 INJECTION INTRAVENOUS at 08:33

## 2025-02-19 RX ADMIN — DIPHENHYDRAMINE HYDROCHLORIDE 50 MG: 50 INJECTION INTRAMUSCULAR; INTRAVENOUS at 09:25

## 2025-02-19 NOTE — PATIENT INSTRUCTIONS
Proceed with week #3 of  6 of low-dose carboplatin Taxol with XRT today 2/19/2025  CBC and chemistries reviewed  Follow-up next week with nurse practitioner and treatment for week #4  Follow-up 2 weeks with me and treatment week #5

## 2025-02-19 NOTE — PROGRESS NOTES
Patient tolerated taxol/carboplatin without any complications.  Patient observed for 20 minuets. Denies dizziness, lightheadedness, acute nausea or vomiting, headache, heart palpitations, rash/itching or increased SOB.  Discussed the importance of monitoring and reporting temperature of 100.4 or greater to our office [phone] or going directly to Emergency Dept.    Last vital signs  BP (!) 151/74   Pulse 74   Temp 98 °F (36.7 °C) (Oral)   Resp 16   Ht 1.626 m (5' 4\")   Wt 65.9 kg (145 lb 4 oz)   SpO2 96%   BMI 24.93 kg/m²     Patient instructed if they experience any of the above symptoms following today's visit, he/she is to notify the Physician or go to the Emergency Dept.    Discharge instructions given to patient, Verbalizes understanding. Ambulated off unit per self in stable condition with all belongings.

## 2025-02-19 NOTE — PLAN OF CARE
Problem: Discharge Planning  Goal: Discharge to home or other facility with appropriate resources  Outcome: Progressing     Problem: Safety - Adult  Goal: Free from fall injury  Outcome: Progressing     Problem: Chronic Conditions and Co-morbidities  Goal: Patient's chronic conditions and co-morbidity symptoms are monitored and maintained or improved  Flowsheets (Taken 2/19/2025 0912)  Care Plan - Patient's Chronic Conditions and Co-Morbidity Symptoms are Monitored and Maintained or Improved: Monitor and assess patient's chronic conditions and comorbid symptoms for stability, deterioration, or improvement  Note:   Chemotherapy Teaching     What is Chemotherapy   Drug action [x]   Method of Administration [x]   Handouts given []     Side Effects  Nausea/vomiting [x]   Diarrhea [x]   Fatigue [x]   Signs / Symptoms of infection [x]   Neutropenia [x]   Thrombocytopenia [x]   Alopecia [x]   neuropathy [x]   [place] diet &  the importance of fluids [x]       Micellaneous  Importance of nutrition [x]   Importance of oral hygiene [x]   When to call the MD [x]   Monitoring labs [x]   Use of supportive services []     Explanation of Drug Regimen / Frequency  Taxol/carboplatin     Comments  Verbalized understanding to drug,action,side effects and when to call MD    Care plan reviewed with patient.  Patient verbalizes understanding of the plan of care and contributes to goal setting.

## 2025-02-20 ENCOUNTER — OFFICE VISIT (OUTPATIENT)
Dept: RADIATION ONCOLOGY | Age: 80
End: 2025-02-20
Payer: MEDICARE

## 2025-02-20 VITALS
WEIGHT: 145.72 LBS | TEMPERATURE: 97.7 F | SYSTOLIC BLOOD PRESSURE: 129 MMHG | BODY MASS INDEX: 25 KG/M2 | HEART RATE: 78 BPM | RESPIRATION RATE: 20 BRPM | OXYGEN SATURATION: 95 % | DIASTOLIC BLOOD PRESSURE: 76 MMHG

## 2025-02-20 DIAGNOSIS — C34.11 PRIMARY MALIGNANT NEOPLASM OF RIGHT UPPER LOBE OF LUNG (HCC): Primary | ICD-10-CM

## 2025-02-20 NOTE — PROGRESS NOTES
[facility]          Radiation Oncology     [address] [address]  Phone: [phone] - Option 2  Fax:[phone]               Dr. Rolly Kelly MD MS        Dr. Julia Ross PhD MD       On Treatment Visit Note (OTV)    Date of Service: 2025  Patient ID: Yogesh Solano   : 1945  MRN: [id number]   Acct Number:        RADIATION ONCOLOGY ATTENDING:  Rolly Kelly MD MS    DIAGNOSIS:   Cancer Staging   Primary adenocarcinoma of lung (HCC)  Staging form: Lung, AJCC V9  - Clinical stage from 2024: Stage IIIB (cT2b, cN2b, cM0) - Signed by Maryellen Toney PA-C on 2025      Treatment Area: Thoracic    Current Total Dose(cGy): 2400  Current Fraction:   Final/Cumulative Rx. Dose (cGy): 6000    Patient was seen today for weekly visit.     Wt Readings from Last 3 Encounters:   25 65.9 kg (145 lb 4 oz)   25 65.9 kg (145 lb 4 oz)   25 66.4 kg (146 lb 6.2 oz)       There were no vitals taken for this visit.    Lab Results   Component Value Date    WBC 4.0 (L) 2025    HGB 10.7 (L) 2025    HCT 33.7 (L) 2025     2025       Comfort Alteration  Fatigue:Able to perform daily activities with rest periods    Pain Location: N/A  Pain Intensity (Current): 0 No Pain  Pain Treatment: N/A  Pain Relief: n/a    Emotional Alteration:   Coping: effective    Nutritional Alteration  Anorexia: Loss of appetite but able to eat normal portions of food  Nausea: No nausea noted  Vomiting: No vomiting   Dyspepsia/Heartburn: None  Dysphagia/Esophagitis: None    Skin Alteration   Skin reaction: No changes noted  Alopecia: No loss    Mucous Membrane Alteration  Mucositis XRT Related: None  Pharynx and Esophagus- Acute: No change over baseline  Voice Changes: Normal    Ventilation Alteration  Cough: Nonproductive  Hemoptysis: None  Dyspnea: Dyspnea on exertion ,

## 2025-02-25 ENCOUNTER — TELEPHONE (OUTPATIENT)
Dept: ONCOLOGY | Age: 80
End: 2025-02-25

## 2025-02-25 DIAGNOSIS — C34.90 NON-SMALL CELL LUNG CANCER METASTATIC TO MEDIASTINUM (HCC): Primary | ICD-10-CM

## 2025-02-25 DIAGNOSIS — C34.90 PRIMARY ADENOCARCINOMA OF LUNG, UNSPECIFIED LATERALITY (HCC): Primary | ICD-10-CM

## 2025-02-25 DIAGNOSIS — C78.1 NON-SMALL CELL LUNG CANCER METASTATIC TO MEDIASTINUM (HCC): Primary | ICD-10-CM

## 2025-02-25 PROCEDURE — 77427 RADIATION TX MANAGEMENT X5: CPT | Performed by: RADIOLOGY

## 2025-02-25 RX ORDER — DIPHENHYDRAMINE HYDROCHLORIDE 50 MG/ML
50 INJECTION INTRAMUSCULAR; INTRAVENOUS ONCE
OUTPATIENT
Start: 2025-03-05 | End: 2025-03-05

## 2025-02-25 RX ORDER — SODIUM CHLORIDE 9 MG/ML
5-250 INJECTION, SOLUTION INTRAVENOUS PRN
Status: CANCELLED | OUTPATIENT
Start: 2025-02-26

## 2025-02-25 RX ORDER — ALBUTEROL SULFATE 90 UG/1
4 INHALANT RESPIRATORY (INHALATION) PRN
Status: CANCELLED | OUTPATIENT
Start: 2025-02-26

## 2025-02-25 RX ORDER — EPINEPHRINE 1 MG/ML
0.3 INJECTION, SOLUTION, CONCENTRATE INTRAVENOUS PRN
Status: CANCELLED | OUTPATIENT
Start: 2025-02-26

## 2025-02-25 RX ORDER — ACETAMINOPHEN 325 MG/1
650 TABLET ORAL
Status: CANCELLED | OUTPATIENT
Start: 2025-02-26

## 2025-02-25 RX ORDER — DIPHENHYDRAMINE HYDROCHLORIDE 50 MG/ML
50 INJECTION INTRAMUSCULAR; INTRAVENOUS ONCE
Status: CANCELLED | OUTPATIENT
Start: 2025-02-26 | End: 2025-02-26

## 2025-02-25 RX ORDER — EPINEPHRINE 1 MG/ML
0.3 INJECTION, SOLUTION, CONCENTRATE INTRAVENOUS PRN
OUTPATIENT
Start: 2025-03-05

## 2025-02-25 RX ORDER — ONDANSETRON 2 MG/ML
8 INJECTION INTRAMUSCULAR; INTRAVENOUS
Status: CANCELLED | OUTPATIENT
Start: 2025-02-26

## 2025-02-25 RX ORDER — HEPARIN SODIUM (PORCINE) LOCK FLUSH IV SOLN 100 UNIT/ML 100 UNIT/ML
500 SOLUTION INTRAVENOUS PRN
OUTPATIENT
Start: 2025-03-05

## 2025-02-25 RX ORDER — HYDROCORTISONE SODIUM SUCCINATE 100 MG/2ML
100 INJECTION INTRAMUSCULAR; INTRAVENOUS
Status: CANCELLED | OUTPATIENT
Start: 2025-02-26

## 2025-02-25 RX ORDER — MEPERIDINE HYDROCHLORIDE 50 MG/ML
12.5 INJECTION INTRAMUSCULAR; INTRAVENOUS; SUBCUTANEOUS PRN
Status: CANCELLED | OUTPATIENT
Start: 2025-02-26

## 2025-02-25 RX ORDER — FAMOTIDINE 10 MG/ML
20 INJECTION, SOLUTION INTRAVENOUS
OUTPATIENT
Start: 2025-03-05

## 2025-02-25 RX ORDER — SODIUM CHLORIDE 9 MG/ML
5-250 INJECTION, SOLUTION INTRAVENOUS PRN
OUTPATIENT
Start: 2025-03-05

## 2025-02-25 RX ORDER — FAMOTIDINE 10 MG/ML
20 INJECTION, SOLUTION INTRAVENOUS ONCE
Status: CANCELLED | OUTPATIENT
Start: 2025-02-26 | End: 2025-02-26

## 2025-02-25 RX ORDER — SODIUM CHLORIDE 0.9 % (FLUSH) 0.9 %
5-40 SYRINGE (ML) INJECTION PRN
Status: CANCELLED | OUTPATIENT
Start: 2025-02-26

## 2025-02-25 RX ORDER — ONDANSETRON 2 MG/ML
8 INJECTION INTRAMUSCULAR; INTRAVENOUS
OUTPATIENT
Start: 2025-03-05

## 2025-02-25 RX ORDER — SODIUM CHLORIDE 0.9 % (FLUSH) 0.9 %
5-40 SYRINGE (ML) INJECTION PRN
OUTPATIENT
Start: 2025-03-05

## 2025-02-25 RX ORDER — MEPERIDINE HYDROCHLORIDE 50 MG/ML
12.5 INJECTION INTRAMUSCULAR; INTRAVENOUS; SUBCUTANEOUS PRN
OUTPATIENT
Start: 2025-03-05

## 2025-02-25 RX ORDER — SODIUM CHLORIDE 9 MG/ML
INJECTION, SOLUTION INTRAVENOUS CONTINUOUS
Status: CANCELLED | OUTPATIENT
Start: 2025-02-26

## 2025-02-25 RX ORDER — HYDROCORTISONE SODIUM SUCCINATE 100 MG/2ML
100 INJECTION INTRAMUSCULAR; INTRAVENOUS
OUTPATIENT
Start: 2025-03-05

## 2025-02-25 RX ORDER — DIPHENHYDRAMINE HYDROCHLORIDE 50 MG/ML
50 INJECTION INTRAMUSCULAR; INTRAVENOUS
Status: CANCELLED | OUTPATIENT
Start: 2025-02-26

## 2025-02-25 RX ORDER — ACETAMINOPHEN 325 MG/1
650 TABLET ORAL
OUTPATIENT
Start: 2025-03-05

## 2025-02-25 RX ORDER — PALONOSETRON 0.05 MG/ML
0.25 INJECTION, SOLUTION INTRAVENOUS ONCE
Status: CANCELLED | OUTPATIENT
Start: 2025-02-26 | End: 2025-02-26

## 2025-02-25 RX ORDER — DIPHENHYDRAMINE HYDROCHLORIDE 50 MG/ML
50 INJECTION INTRAMUSCULAR; INTRAVENOUS
OUTPATIENT
Start: 2025-03-05

## 2025-02-25 RX ORDER — PALONOSETRON 0.05 MG/ML
0.25 INJECTION, SOLUTION INTRAVENOUS ONCE
OUTPATIENT
Start: 2025-03-05 | End: 2025-03-05

## 2025-02-25 RX ORDER — FAMOTIDINE 10 MG/ML
20 INJECTION, SOLUTION INTRAVENOUS
Status: CANCELLED | OUTPATIENT
Start: 2025-02-26

## 2025-02-25 RX ORDER — ALBUTEROL SULFATE 90 UG/1
4 INHALANT RESPIRATORY (INHALATION) PRN
OUTPATIENT
Start: 2025-03-05

## 2025-02-25 RX ORDER — SODIUM CHLORIDE 9 MG/ML
INJECTION, SOLUTION INTRAVENOUS CONTINUOUS
OUTPATIENT
Start: 2025-03-05

## 2025-02-25 RX ORDER — FAMOTIDINE 10 MG/ML
20 INJECTION, SOLUTION INTRAVENOUS ONCE
OUTPATIENT
Start: 2025-03-05 | End: 2025-03-05

## 2025-02-25 RX ORDER — HEPARIN SODIUM (PORCINE) LOCK FLUSH IV SOLN 100 UNIT/ML 100 UNIT/ML
500 SOLUTION INTRAVENOUS PRN
Status: CANCELLED | OUTPATIENT
Start: 2025-02-26

## 2025-02-25 NOTE — TELEPHONE ENCOUNTER
[facility] Patholog is calling and hoping for a different diagnosis code for patient's EGFR, ALK and PD-L1 so that it can be approved through billing. Currently, the only code that they have is patient's current diagnosis listed in his chart, which is \"primary adencarcinoma of lung stage IIIB\" (C34.90). They were hoping that another one could be added that signifies that it is metastatic the his lymph node which is what was gathered from the progress note that they received on 1/14/25. Please advise.

## 2025-02-25 NOTE — PROGRESS NOTES
[facility] [id number], [address]  [facility] [address]  Dept: [phone]  Dept Fax: [phone] Loc: [phone]      Visit Date:2/26/2025     Yogesh Solano is a 79 y.o. male who presents today for:   Chief Complaint   Patient presents with    Follow-up     Primary adenocarcinoma of lung, unspecified laterality        HPI:   Yogesh Solano is a 79 y.o. male who follows in our office with lung adenocarcinoma.  HPI per prior note in our office:  Yogesh is a relatively healthy 76-year-old gentleman with a prior history of polyps but no family history of colon cancer. He underwent a routine screening colonoscopy on 4/18/2022. He is asymptomatic with no observation of bright red blood per rectum, melena or difficulty passing stool. The colonoscopy revealed a large 2 to 3 cm mass in the rectum at 5% 5 to 7 cm from the anus. It was, incompletely removed by cold snare polypectomy. Pathology revealed a tubular adenoma with high-grade dysplasia. MRI of the abdomen with contrast 4/20/2022 ordered by another provider showed sludge versus small stones within the gallbladder. Stable benign subcentimeter nonenhancing simple hepatic cysts. On 4/25/2022 he underwent a staging rectal ultrasound by Dr. Parks. Findings included 2 cm rectal lesion at 6 cm from the anal verge. EUS staging T1 N0 MX.      DIAGNOSIS:  -Rectal Rectum, mass biopsy: Tubular adenoma with high-grade dysplasia; cannot exclude malignancy.  Transanal reexcision with Dr. Brennan at [facility]  7/18/22 shows no residual malignancy.  Local EUS staging T1N0.  Local MRI staging-T2N0  -Patient had endoscopy December 2022 that found a more proximal rectal cancer.  Taken to the OR by Dr. Brennan at [facility] 1/30/2023 due to the mid to distal rectal tumor underwent open low anterior resection with colorectal anastomosis.  Leak test was positive therefore had to undergo a diverting loop ileostomy which was eventually reversed in June.  Path report from

## 2025-02-26 ENCOUNTER — CLINICAL DOCUMENTATION (OUTPATIENT)
Dept: CASE MANAGEMENT | Age: 80
End: 2025-02-26

## 2025-02-26 ENCOUNTER — OFFICE VISIT (OUTPATIENT)
Dept: ONCOLOGY | Age: 80
End: 2025-02-26
Payer: MEDICARE

## 2025-02-26 ENCOUNTER — HOSPITAL ENCOUNTER (OUTPATIENT)
Dept: INFUSION THERAPY | Age: 80
Discharge: HOME | End: 2025-02-26
Payer: MEDICARE

## 2025-02-26 VITALS
WEIGHT: 143.6 LBS | TEMPERATURE: 97.7 F | SYSTOLIC BLOOD PRESSURE: 137 MMHG | OXYGEN SATURATION: 95 % | HEART RATE: 88 BPM | HEIGHT: 64 IN | BODY MASS INDEX: 24.52 KG/M2 | RESPIRATION RATE: 20 BRPM | DIASTOLIC BLOOD PRESSURE: 72 MMHG

## 2025-02-26 VITALS
SYSTOLIC BLOOD PRESSURE: 145 MMHG | BODY MASS INDEX: 24.52 KG/M2 | HEART RATE: 87 BPM | WEIGHT: 143.6 LBS | RESPIRATION RATE: 20 BRPM | TEMPERATURE: 97.4 F | OXYGEN SATURATION: 95 % | DIASTOLIC BLOOD PRESSURE: 73 MMHG | HEIGHT: 64 IN

## 2025-02-26 DIAGNOSIS — C34.90 PRIMARY ADENOCARCINOMA OF LUNG, UNSPECIFIED LATERALITY (HCC): Primary | ICD-10-CM

## 2025-02-26 DIAGNOSIS — D72.819 LEUKOPENIA, UNSPECIFIED TYPE: ICD-10-CM

## 2025-02-26 DIAGNOSIS — Z51.11 ENCOUNTER FOR CHEMOTHERAPY MANAGEMENT: ICD-10-CM

## 2025-02-26 DIAGNOSIS — D64.9 ANEMIA, UNSPECIFIED TYPE: ICD-10-CM

## 2025-02-26 LAB
ALBUMIN SERPL BCG-MCNC: 3.9 G/DL (ref 3.4–4.9)
ALP SERPL-CCNC: 47 U/L (ref 40–129)
ALT SERPL W/O P-5'-P-CCNC: 19 U/L (ref 10–50)
AST SERPL-CCNC: 21 U/L (ref 10–50)
BASOPHILS ABSOLUTE: 0 THOU/MM3 (ref 0–0.1)
BASOPHILS NFR BLD AUTO: 1 % (ref 0–3)
BILIRUB CONJ SERPL-MCNC: < 0.1 MG/DL (ref 0–0.2)
BILIRUB SERPL-MCNC: 0.2 MG/DL (ref 0.3–1.2)
BUN BLDP-MCNC: 26 MG/DL (ref 8–26)
CHLORIDE BLD-SCNC: 98 MEQ/L (ref 98–109)
CREAT BLD-MCNC: 0.7 MG/DL (ref 0.5–1.2)
EOSINOPHIL NFR BLD AUTO: 2 % (ref 0–4)
EOSINOPHILS ABSOLUTE: 0.1 THOU/MM3 (ref 0–0.4)
ERYTHROCYTE [DISTWIDTH] IN BLOOD BY AUTOMATED COUNT: 15.3 % (ref 11.5–14.5)
GFR SERPL CREATININE-BSD FRML MDRD: > 90 ML/MIN/1.73M2
GLUCOSE BLD-MCNC: 104 MG/DL (ref 70–108)
HCT VFR BLD AUTO: 34.3 % (ref 42–52)
HGB BLD-MCNC: 11.2 GM/DL (ref 14–18)
IMMATURE GRANULOCYTES %: 0 %
IMMATURE GRANULOCYTES ABSOLUTE: 0.01 THOU/MM3 (ref 0–0.07)
IONIZED CALCIUM, WHOLE BLOOD: 1.39 MMOL/L (ref 1.12–1.32)
LYMPHOCYTES ABSOLUTE: 0.4 THOU/MM3 (ref 1–4.8)
LYMPHOCYTES NFR BLD AUTO: 12 % (ref 15–47)
MCH RBC QN AUTO: 30.4 PG (ref 26–33)
MCHC RBC AUTO-ENTMCNC: 32.7 GM/DL (ref 32.2–35.5)
MCV RBC AUTO: 93 FL (ref 80–94)
MONOCYTES ABSOLUTE: 0.4 THOU/MM3 (ref 0.4–1.3)
MONOCYTES NFR BLD AUTO: 12 % (ref 0–12)
NEUTROPHILS ABSOLUTE: 2.6 THOU/MM3 (ref 1.8–7.7)
NEUTROPHILS NFR BLD AUTO: 73 % (ref 43–75)
PLATELET # BLD AUTO: 191 THOU/MM3 (ref 130–400)
PMV BLD AUTO: 8.7 FL (ref 9.4–12.4)
POTASSIUM BLD-SCNC: 3.8 MEQ/L (ref 3.5–4.9)
PROT SERPL-MCNC: 6.4 G/DL (ref 6.4–8.3)
RBC # BLD AUTO: 3.68 MILL/MM3 (ref 4.7–6.1)
SODIUM BLD-SCNC: 138 MEQ/L (ref 138–146)
TOTAL CO2, WHOLE BLOOD: 35 MEQ/L (ref 23–33)
WBC # BLD AUTO: 3.6 THOU/MM3 (ref 4.8–10.8)

## 2025-02-26 PROCEDURE — 1159F MED LIST DOCD IN RCRD: CPT | Performed by: NURSE PRACTITIONER

## 2025-02-26 PROCEDURE — 96375 TX/PRO/DX INJ NEW DRUG ADDON: CPT

## 2025-02-26 PROCEDURE — 99211 OFF/OP EST MAY X REQ PHY/QHP: CPT

## 2025-02-26 PROCEDURE — 1123F ACP DISCUSS/DSCN MKR DOCD: CPT | Performed by: NURSE PRACTITIONER

## 2025-02-26 PROCEDURE — 3077F SYST BP >= 140 MM HG: CPT | Performed by: NURSE PRACTITIONER

## 2025-02-26 PROCEDURE — 96417 CHEMO IV INFUS EACH ADDL SEQ: CPT

## 2025-02-26 PROCEDURE — 2500000003 HC RX 250 WO HCPCS: Performed by: INTERNAL MEDICINE

## 2025-02-26 PROCEDURE — 80076 HEPATIC FUNCTION PANEL: CPT

## 2025-02-26 PROCEDURE — 1126F AMNT PAIN NOTED NONE PRSNT: CPT | Performed by: NURSE PRACTITIONER

## 2025-02-26 PROCEDURE — 1160F RVW MEDS BY RX/DR IN RCRD: CPT | Performed by: NURSE PRACTITIONER

## 2025-02-26 PROCEDURE — 2580000003 HC RX 258: Performed by: INTERNAL MEDICINE

## 2025-02-26 PROCEDURE — 96367 TX/PROPH/DG ADDL SEQ IV INF: CPT

## 2025-02-26 PROCEDURE — 1036F TOBACCO NON-USER: CPT | Performed by: NURSE PRACTITIONER

## 2025-02-26 PROCEDURE — 3078F DIAST BP <80 MM HG: CPT | Performed by: NURSE PRACTITIONER

## 2025-02-26 PROCEDURE — 85025 COMPLETE CBC W/AUTO DIFF WBC: CPT

## 2025-02-26 PROCEDURE — 6360000002 HC RX W HCPCS: Performed by: INTERNAL MEDICINE

## 2025-02-26 PROCEDURE — 96413 CHEMO IV INFUSION 1 HR: CPT

## 2025-02-26 PROCEDURE — 99214 OFFICE O/P EST MOD 30 MIN: CPT | Performed by: NURSE PRACTITIONER

## 2025-02-26 PROCEDURE — G8420 CALC BMI NORM PARAMETERS: HCPCS | Performed by: NURSE PRACTITIONER

## 2025-02-26 PROCEDURE — 80047 BASIC METABLC PNL IONIZED CA: CPT

## 2025-02-26 PROCEDURE — G8427 DOCREV CUR MEDS BY ELIG CLIN: HCPCS | Performed by: NURSE PRACTITIONER

## 2025-02-26 RX ORDER — SODIUM CHLORIDE 9 MG/ML
5-250 INJECTION, SOLUTION INTRAVENOUS PRN
Status: DISCONTINUED | OUTPATIENT
Start: 2025-02-26 | End: 2025-02-27 | Stop reason: HOSPADM

## 2025-02-26 RX ORDER — SODIUM CHLORIDE 9 MG/ML
25 INJECTION, SOLUTION INTRAVENOUS PRN
Status: DISCONTINUED | OUTPATIENT
Start: 2025-02-26 | End: 2025-02-27 | Stop reason: HOSPADM

## 2025-02-26 RX ORDER — ACETAMINOPHEN 325 MG/1
650 TABLET ORAL
OUTPATIENT
Start: 2025-02-26

## 2025-02-26 RX ORDER — EPINEPHRINE 1 MG/ML
0.3 INJECTION, SOLUTION INTRAMUSCULAR; SUBCUTANEOUS PRN
OUTPATIENT
Start: 2025-02-26

## 2025-02-26 RX ORDER — SODIUM CHLORIDE 9 MG/ML
25 INJECTION, SOLUTION INTRAVENOUS PRN
OUTPATIENT
Start: 2025-02-26

## 2025-02-26 RX ORDER — SODIUM CHLORIDE 9 MG/ML
INJECTION, SOLUTION INTRAVENOUS CONTINUOUS
OUTPATIENT
Start: 2025-02-26

## 2025-02-26 RX ORDER — SODIUM CHLORIDE 0.9 % (FLUSH) 0.9 %
5-40 SYRINGE (ML) INJECTION PRN
OUTPATIENT
Start: 2025-02-26

## 2025-02-26 RX ORDER — DIPHENHYDRAMINE HYDROCHLORIDE 50 MG/ML
50 INJECTION INTRAMUSCULAR; INTRAVENOUS
OUTPATIENT
Start: 2025-02-26

## 2025-02-26 RX ORDER — HYDROCORTISONE SODIUM SUCCINATE 100 MG/2ML
100 INJECTION INTRAMUSCULAR; INTRAVENOUS
OUTPATIENT
Start: 2025-02-26

## 2025-02-26 RX ORDER — HEPARIN 100 UNIT/ML
500 SYRINGE INTRAVENOUS PRN
OUTPATIENT
Start: 2025-02-26

## 2025-02-26 RX ORDER — ONDANSETRON 2 MG/ML
8 INJECTION INTRAMUSCULAR; INTRAVENOUS
OUTPATIENT
Start: 2025-02-26

## 2025-02-26 RX ORDER — SODIUM CHLORIDE 0.9 % (FLUSH) 0.9 %
5-40 SYRINGE (ML) INJECTION PRN
Status: DISCONTINUED | OUTPATIENT
Start: 2025-02-26 | End: 2025-02-27 | Stop reason: HOSPADM

## 2025-02-26 RX ORDER — HEPARIN 100 UNIT/ML
500 SYRINGE INTRAVENOUS PRN
Status: DISCONTINUED | OUTPATIENT
Start: 2025-02-26 | End: 2025-02-27 | Stop reason: HOSPADM

## 2025-02-26 RX ORDER — PALONOSETRON 0.05 MG/ML
0.25 INJECTION, SOLUTION INTRAVENOUS ONCE
Status: COMPLETED | OUTPATIENT
Start: 2025-02-26 | End: 2025-02-26

## 2025-02-26 RX ORDER — DIPHENHYDRAMINE HYDROCHLORIDE 50 MG/ML
50 INJECTION INTRAMUSCULAR; INTRAVENOUS ONCE
Status: COMPLETED | OUTPATIENT
Start: 2025-02-26 | End: 2025-02-26

## 2025-02-26 RX ORDER — ALBUTEROL SULFATE 90 UG/1
4 INHALANT RESPIRATORY (INHALATION) PRN
OUTPATIENT
Start: 2025-02-26

## 2025-02-26 RX ADMIN — SODIUM CHLORIDE 20 ML/HR: 9 INJECTION, SOLUTION INTRAVENOUS at 09:16

## 2025-02-26 RX ADMIN — DEXAMETHASONE SODIUM PHOSPHATE 12 MG: 4 INJECTION, SOLUTION INTRAMUSCULAR; INTRAVENOUS at 09:16

## 2025-02-26 RX ADMIN — PACLITAXEL 78 MG: 6 INJECTION, SOLUTION INTRAVENOUS at 10:21

## 2025-02-26 RX ADMIN — PALONOSETRON 0.25 MG: 0.05 INJECTION, SOLUTION INTRAVENOUS at 09:51

## 2025-02-26 RX ADMIN — DIPHENHYDRAMINE HYDROCHLORIDE 50 MG: 50 INJECTION INTRAMUSCULAR; INTRAVENOUS at 09:51

## 2025-02-26 RX ADMIN — SODIUM CHLORIDE, PRESERVATIVE FREE 10 ML: 5 INJECTION INTRAVENOUS at 12:24

## 2025-02-26 RX ADMIN — HEPARIN 500 UNITS: 100 SYRINGE at 12:24

## 2025-02-26 RX ADMIN — FAMOTIDINE 20 MG: 10 INJECTION, SOLUTION INTRAVENOUS at 09:51

## 2025-02-26 RX ADMIN — CARBOPLATIN 205 MG: 10 INJECTION INTRAVENOUS at 11:32

## 2025-02-26 RX ADMIN — SODIUM CHLORIDE, PRESERVATIVE FREE 20 ML: 5 INJECTION INTRAVENOUS at 08:42

## 2025-02-26 NOTE — PROGRESS NOTES
Patient tolerated Taxol and Carboplatin without any complications.    Patient kept for 20 minute observation post infusion chemotherapy. Denies dizziness, lightheadedness, acute nausea or vomiting, headache, heart palpitations, rash/itching or increased SOB.     Last vital signs:   /72   Pulse 88   Temp 97.7 °F (36.5 °C)   Resp 20   Ht 1.626 m (5' 4.02\")   Wt 65.1 kg (143 lb 9.6 oz)   SpO2 95%   BMI 24.64 kg/m²     Patient instructed if they experience any of the above symptoms following today's visit, he/she is to notify the physician immediately or go to the Emergency Department.    Patient educated on monitoring temperatures at home daily and to call physician or go to the Emergency Department for temperatures of 100.4 F or greater.    Discharge instructions given to patient. Verbalizes understanding. Ambulated off unit per self in stable condition with belongings.

## 2025-02-26 NOTE — PATIENT INSTRUCTIONS
OK for treatment today   Return to clinic as scheduled to see Dr. Correia with labs   Treatment in 1 week

## 2025-02-26 NOTE — ONCOLOGY
Chemotherapy Administration    Pre-assessment Data: Antineoplastic Agents  See toxicity flow sheet for assessment                                          [x]         Interventions:   Chemotherapy SQ injection given []   Taxol administered-VS per protocol [x]   Blood pressure meds held 12 hours prior to Rituxan/Ruxience []   Rituxan/Ruxience administered- VS and precautions per guidelines []   Emergency drugs available as appropriate [x]   Anaphylaxis assessment completed [x]   Pre-medications administered as ordered [x]   Blood return noted upon initiation of chemotherapy [x]   Blood return noted each 1-2ml of a vesicant medication if given IV push []   Navelbine, Vincristine and Velban given as a monitored wide open drip, blood return noted before during and after infusion. []   Blood return noted each 2-3ml of a non-vesicant medication if given IV push []   Patient aware of potential Immunotherapy toxicities []   Monitor for signs / symptoms of hypersensitivity reaction [x]   Chemotherapy orders (drug/dose/rate) verified by 2 Chemo certified RN’s [x]   Monitor IV site and blood return throughout the infusion of the medication [x]   Document IV site checks on the IV assessment form [x]   Document chemotherapy teaching on the Patient Education tab [x]   Document patient verbalizes understanding of medications being administered [x]   If IV infiltration, see ONS Guidelines []   Other:     Taxol, Carboplatin [x]

## 2025-02-26 NOTE — PLAN OF CARE
Problem: Discharge Planning  Goal: Discharge to home or other facility with appropriate resources  Outcome: Adequate for Discharge  Flowsheets (Taken 2/26/2025 1148)  Discharge to home or other facility with appropriate resources:   Identify barriers to discharge with patient and caregiver   Arrange for needed discharge resources and transportation as appropriate   Identify discharge learning needs (meds, wound care, etc)     Problem: Safety - Adult  Goal: Free from fall injury  Outcome: Adequate for Discharge  Flowsheets (Taken 2/26/2025 1148)  Free From Fall Injury: Instruct family/caregiver on patient safety     Problem: Chronic Conditions and Co-morbidities  Goal: Patient's chronic conditions and co-morbidity symptoms are monitored and maintained or improved  Outcome: Adequate for Discharge  Flowsheets (Taken 2/26/2025 1148)  Care Plan - Patient's Chronic Conditions and Co-Morbidity Symptoms are Monitored and Maintained or Improved:   Monitor and assess patient's chronic conditions and comorbid symptoms for stability, deterioration, or improvement   Collaborate with multidisciplinary team to address chronic and comorbid conditions and prevent exacerbation or deterioration  Note:   Chemotherapy Teaching     What is Chemotherapy   Drug action [x]   Method of Administration [x]   Handouts given []     Side Effects  Nausea/vomiting [x]   Diarrhea [x]   Fatigue [x]   Signs / Symptoms of infection [x]   Neutropenia [x]   Thrombocytopenia [x]   Alopecia [x]   neuropathy [x]   [place] diet &  the importance of fluids [x]       Micellaneous  Importance of nutrition [x]   Importance of oral hygiene [x]   When to call the MD [x]   Monitoring labs [x]   Use of supportive services []     Explanation of Drug Regimen / Frequency  Taxol and carboplatin     Comments  Verbalized understanding to drug,action,side effects and when to call MD         Problem: Infection - Adult  Goal: Absence of infection at discharge  Outcome:

## 2025-02-26 NOTE — DISCHARGE INSTRUCTIONS
OK for treatment today   Return to clinic as scheduled to see Dr. Correia with labs   Treatment in 1 week  Please contact your Oncologist if you have any questions regarding the chemotherapy Taxol/Carboplatin that you received today.      Patient instructed if experience any of the symptoms following today's chemotherapy / to notify MD immediately or go to emergency department.    * dizziness/lightheadedness  *acute nausea/vomiting - not relieved with medication  *headache - not relieved from Tylenol/pain medication  *chest pain/pressure  *rash/itching  *shortness of breath        Drink fluids - 48oz fluids daily  Call if develop fever/ chills/ signs or symptoms of infection

## 2025-02-26 NOTE — PROGRESS NOTES
Name: Yogesh Solano  : 1945  MRN: [id number]    Oncology Navigation Follow-Up Note    Contact Type:  Medical Oncology  Spouse Janis accompanied appointment  New to [facility]     Subjective: feels good- tolerating tx w/o any problems      Objective: appt with provider/review lab/tx    Oncology Plan of Care :    -review labs/tox check     -proceed C4D1 weekly Taxol/Carbo    -continue daily radiation at [place] office    - return MD apt 3/5 for lab/tx     Assistance Needed: denies any at this time    Receptive to Advanced Care Planning / Palliative Care:  deferred    Education: introduce self as his navigator: contact information card given    Notes: sandi following to assist & support as needed    Electronically signed by Mary Aragon RN on 2025 at 10:05 AM

## 2025-02-27 ENCOUNTER — OFFICE VISIT (OUTPATIENT)
Dept: RADIATION ONCOLOGY | Age: 80
End: 2025-02-27

## 2025-02-27 VITALS
BODY MASS INDEX: 24.81 KG/M2 | DIASTOLIC BLOOD PRESSURE: 80 MMHG | RESPIRATION RATE: 20 BRPM | HEART RATE: 92 BPM | WEIGHT: 144.62 LBS | TEMPERATURE: 97.5 F | OXYGEN SATURATION: 98 % | SYSTOLIC BLOOD PRESSURE: 145 MMHG

## 2025-02-27 DIAGNOSIS — C34.11 PRIMARY MALIGNANT NEOPLASM OF RIGHT UPPER LOBE OF LUNG (HCC): Primary | ICD-10-CM

## 2025-02-27 NOTE — PROGRESS NOTES
[facility]          Radiation Oncology     [address] [place] [address]  Phone: [phone] - Option 2  Fax:[phone]               Dr. Rolly Kelly MD MS        Dr. Julia Ross PhD MD       On Treatment Visit Note (OTV)    Date of Service: 2025  Patient ID: Yogesh Solano   : 1945  MRN: [id number]   Acct Number:        RADIATION ONCOLOGY ATTENDING:  Rolly Kelly MD MS    DIAGNOSIS:   Cancer Staging   Primary adenocarcinoma of lung (HCC)  Staging form: Lung, AJCC V9  - Clinical stage from 2024: Stage IIIB (cT2b, cN2b, cM0) - Signed by Maryellen Toney PA-C on 2025      Treatment Area: Thoracic    Current Total Dose(cGy): 3400  Current Fraction:   Final/Cumulative Rx. Dose (cGy): 6000    Patient was seen today for weekly visit.     Wt Readings from Last 3 Encounters:   25 65.6 kg (144 lb 10 oz)   25 65.1 kg (143 lb 9.6 oz)   25 65.1 kg (143 lb 9.6 oz)       BP (!) 145/80 (Site: Left Upper Arm, Position: Sitting)   Pulse 92   Temp 97.5 °F (36.4 °C) (Infrared)   Resp 20   Wt 65.6 kg (144 lb 10 oz)   SpO2 98%   BMI 24.81 kg/m²     Lab Results   Component Value Date    WBC 3.6 (L) 2025    HGB 11.2 (L) 2025    HCT 34.3 (L) 2025     2025       Comfort Alteration  Fatigue:Must curtail daily activities even with rest periods and early bedtime    Pain Location: N/A  Pain Intensity (Current): 0 No Pain  Pain Treatment: N/A  Pain Relief: n/a    Emotional Alteration:   Coping: effective    Nutritional Alteration  Anorexia: Loss of appetite but able to eat normal portions of food  Nausea: No nausea noted  Vomiting: No vomiting   Dyspepsia/Heartburn: None  Dysphagia/Esophagitis: None    Skin Alteration   Skin reaction: No changes noted  Alopecia: No loss    Mucous Membrane Alteration  Mucositis XRT Related: None  Pharynx and

## 2025-03-03 NOTE — PROGRESS NOTES
(chronic obstructive pulmonary disease) (HCC)     E jossie    Hyperlipidemia     Hypertension     Pneumonia         Social HX:   Social History     Socioeconomic History    Marital status:      Spouse name: Janis    Number of children: 2    Years of education: Not on file    Highest education level: Not on file   Occupational History    Not on file   Tobacco Use    Smoking status: Former     Current packs/day: 0.00     Average packs/day: 1 pack/day for 30.1 years (30.1 ttl pk-yrs)     Types: Cigarettes     Start date: 5/3/1953     Quit date: 1983     Years since quittin.7    Smokeless tobacco: Never   Vaping Use    Vaping status: Never Used   Substance and Sexual Activity    Alcohol use: No     Alcohol/week: 0.0 standard drinks of alcohol     Types: 1 - 2 Cans of beer per week    Drug use: No    Sexual activity: Not on file   Other Topics Concern    Not on file   Social History Narrative    Not on file     Social Determinants of Health     Financial Resource Strain: Low Risk  (2023)    [facility]    Overall Financial Resource Strain (CARDIA)     Difficulty of Paying Living Expenses: Not hard at all   Food Insecurity: No Food Insecurity (2023)    [facility] from [facility]    Hunger Vital Sign     Worried About Running Out of Food in the Last Year: Never true     Ran Out of Food in the Last Year: Never true   Transportation Needs: No Transportation Needs (2023)    Received from [facility], [facility]    PRAPARE - Transportation     Lack of Transportation (Medical): No     Lack of Transportation (Non-Medical): No   Physical Activity: Not on file   Stress: Not on file   Social Connections: Not on file   Intimate Partner Violence: Not on file

## 2025-03-05 ENCOUNTER — OFFICE VISIT (OUTPATIENT)
Dept: ONCOLOGY | Age: 80
End: 2025-03-05
Payer: MEDICARE

## 2025-03-05 ENCOUNTER — HOSPITAL ENCOUNTER (OUTPATIENT)
Dept: INFUSION THERAPY | Age: 80
Discharge: HOME | End: 2025-03-05
Payer: MEDICARE

## 2025-03-05 VITALS
OXYGEN SATURATION: 95 % | DIASTOLIC BLOOD PRESSURE: 70 MMHG | HEIGHT: 64 IN | RESPIRATION RATE: 18 BRPM | SYSTOLIC BLOOD PRESSURE: 134 MMHG | HEART RATE: 84 BPM | WEIGHT: 145.6 LBS | TEMPERATURE: 98 F | BODY MASS INDEX: 24.86 KG/M2

## 2025-03-05 VITALS
SYSTOLIC BLOOD PRESSURE: 118 MMHG | TEMPERATURE: 97.8 F | OXYGEN SATURATION: 96 % | WEIGHT: 145.6 LBS | RESPIRATION RATE: 18 BRPM | HEART RATE: 78 BPM | BODY MASS INDEX: 24.86 KG/M2 | DIASTOLIC BLOOD PRESSURE: 6 MMHG | HEIGHT: 64 IN

## 2025-03-05 DIAGNOSIS — C34.90 PRIMARY ADENOCARCINOMA OF LUNG, UNSPECIFIED LATERALITY (HCC): Primary | ICD-10-CM

## 2025-03-05 LAB
ALBUMIN SERPL BCG-MCNC: 3.6 G/DL (ref 3.4–4.9)
ALP SERPL-CCNC: 42 U/L (ref 40–129)
ALT SERPL W/O P-5'-P-CCNC: 22 U/L (ref 10–50)
AST SERPL-CCNC: 19 U/L (ref 10–50)
BASOPHILS ABSOLUTE: 0 THOU/MM3 (ref 0–0.1)
BASOPHILS NFR BLD AUTO: 0 % (ref 0–3)
BILIRUB CONJ SERPL-MCNC: 0.2 MG/DL (ref 0–0.2)
BILIRUB SERPL-MCNC: 0.3 MG/DL (ref 0.3–1.2)
BUN BLDP-MCNC: 25 MG/DL (ref 8–26)
CHLORIDE BLD-SCNC: 101 MEQ/L (ref 98–109)
CREAT BLD-MCNC: 0.8 MG/DL (ref 0.5–1.2)
EOSINOPHIL NFR BLD AUTO: 2 % (ref 0–4)
EOSINOPHILS ABSOLUTE: 0 THOU/MM3 (ref 0–0.4)
ERYTHROCYTE [DISTWIDTH] IN BLOOD BY AUTOMATED COUNT: 15.6 % (ref 11.5–14.5)
GFR SERPL CREATININE-BSD FRML MDRD: 89 ML/MIN/1.73M2
GLUCOSE BLD-MCNC: 99 MG/DL (ref 70–108)
HCT VFR BLD AUTO: 30.8 % (ref 42–52)
HGB BLD-MCNC: 10.2 GM/DL (ref 14–18)
IMMATURE GRANULOCYTES %: 0 %
IMMATURE GRANULOCYTES ABSOLUTE: 0.01 THOU/MM3 (ref 0–0.07)
IONIZED CALCIUM, WHOLE BLOOD: 1.26 MMOL/L (ref 1.12–1.32)
LYMPHOCYTES ABSOLUTE: 0.3 THOU/MM3 (ref 1–4.8)
LYMPHOCYTES NFR BLD AUTO: 13 % (ref 15–47)
MCH RBC QN AUTO: 31.3 PG (ref 26–33)
MCHC RBC AUTO-ENTMCNC: 33.1 GM/DL (ref 32.2–35.5)
MCV RBC AUTO: 95 FL (ref 80–94)
MONOCYTES ABSOLUTE: 0.2 THOU/MM3 (ref 0.4–1.3)
MONOCYTES NFR BLD AUTO: 8 % (ref 0–12)
NEUTROPHILS ABSOLUTE: 1.9 THOU/MM3 (ref 1.8–7.7)
NEUTROPHILS NFR BLD AUTO: 77 % (ref 43–75)
PLATELET # BLD AUTO: 80 THOU/MM3 (ref 130–400)
PMV BLD AUTO: 8.8 FL (ref 9.4–12.4)
POTASSIUM BLD-SCNC: 3.8 MEQ/L (ref 3.5–4.9)
PROT SERPL-MCNC: 5.8 G/DL (ref 6.4–8.3)
RBC # BLD AUTO: 3.26 MILL/MM3 (ref 4.7–6.1)
SODIUM BLD-SCNC: 139 MEQ/L (ref 138–146)
TOTAL CO2, WHOLE BLOOD: 31 MEQ/L (ref 23–33)
WBC # BLD AUTO: 2.5 THOU/MM3 (ref 4.8–10.8)

## 2025-03-05 PROCEDURE — 96367 TX/PROPH/DG ADDL SEQ IV INF: CPT

## 2025-03-05 PROCEDURE — 2500000003 HC RX 250 WO HCPCS: Performed by: INTERNAL MEDICINE

## 2025-03-05 PROCEDURE — 1123F ACP DISCUSS/DSCN MKR DOCD: CPT | Performed by: INTERNAL MEDICINE

## 2025-03-05 PROCEDURE — 96413 CHEMO IV INFUSION 1 HR: CPT

## 2025-03-05 PROCEDURE — G8420 CALC BMI NORM PARAMETERS: HCPCS | Performed by: INTERNAL MEDICINE

## 2025-03-05 PROCEDURE — 1126F AMNT PAIN NOTED NONE PRSNT: CPT | Performed by: INTERNAL MEDICINE

## 2025-03-05 PROCEDURE — 96375 TX/PRO/DX INJ NEW DRUG ADDON: CPT

## 2025-03-05 PROCEDURE — 3078F DIAST BP <80 MM HG: CPT | Performed by: INTERNAL MEDICINE

## 2025-03-05 PROCEDURE — 6360000002 HC RX W HCPCS: Performed by: INTERNAL MEDICINE

## 2025-03-05 PROCEDURE — 2580000003 HC RX 258: Performed by: INTERNAL MEDICINE

## 2025-03-05 PROCEDURE — 99211 OFF/OP EST MAY X REQ PHY/QHP: CPT

## 2025-03-05 PROCEDURE — 96417 CHEMO IV INFUS EACH ADDL SEQ: CPT

## 2025-03-05 PROCEDURE — 1036F TOBACCO NON-USER: CPT | Performed by: INTERNAL MEDICINE

## 2025-03-05 PROCEDURE — 80047 BASIC METABLC PNL IONIZED CA: CPT

## 2025-03-05 PROCEDURE — 99214 OFFICE O/P EST MOD 30 MIN: CPT | Performed by: INTERNAL MEDICINE

## 2025-03-05 PROCEDURE — 80076 HEPATIC FUNCTION PANEL: CPT

## 2025-03-05 PROCEDURE — G8427 DOCREV CUR MEDS BY ELIG CLIN: HCPCS | Performed by: INTERNAL MEDICINE

## 2025-03-05 PROCEDURE — 1159F MED LIST DOCD IN RCRD: CPT | Performed by: INTERNAL MEDICINE

## 2025-03-05 PROCEDURE — 3075F SYST BP GE 130 - 139MM HG: CPT | Performed by: INTERNAL MEDICINE

## 2025-03-05 PROCEDURE — 85025 COMPLETE CBC W/AUTO DIFF WBC: CPT

## 2025-03-05 RX ORDER — DIPHENHYDRAMINE HYDROCHLORIDE 50 MG/ML
50 INJECTION INTRAMUSCULAR; INTRAVENOUS ONCE
Status: COMPLETED | OUTPATIENT
Start: 2025-03-05 | End: 2025-03-05

## 2025-03-05 RX ORDER — EPINEPHRINE 1 MG/ML
0.3 INJECTION, SOLUTION INTRAMUSCULAR; SUBCUTANEOUS PRN
Status: CANCELLED | OUTPATIENT
Start: 2025-03-05

## 2025-03-05 RX ORDER — SODIUM CHLORIDE 0.9 % (FLUSH) 0.9 %
5-40 SYRINGE (ML) INJECTION PRN
Status: CANCELLED | OUTPATIENT
Start: 2025-03-05

## 2025-03-05 RX ORDER — ALBUTEROL SULFATE 90 UG/1
4 INHALANT RESPIRATORY (INHALATION) PRN
Status: CANCELLED | OUTPATIENT
Start: 2025-03-05

## 2025-03-05 RX ORDER — HEPARIN 100 UNIT/ML
500 SYRINGE INTRAVENOUS PRN
Status: CANCELLED | OUTPATIENT
Start: 2025-03-05

## 2025-03-05 RX ORDER — PALONOSETRON 0.05 MG/ML
0.25 INJECTION, SOLUTION INTRAVENOUS ONCE
Status: COMPLETED | OUTPATIENT
Start: 2025-03-05 | End: 2025-03-05

## 2025-03-05 RX ORDER — ONDANSETRON 2 MG/ML
8 INJECTION INTRAMUSCULAR; INTRAVENOUS
Status: CANCELLED | OUTPATIENT
Start: 2025-03-05

## 2025-03-05 RX ORDER — SODIUM CHLORIDE 9 MG/ML
INJECTION, SOLUTION INTRAVENOUS CONTINUOUS
Status: CANCELLED | OUTPATIENT
Start: 2025-03-05

## 2025-03-05 RX ORDER — SODIUM CHLORIDE 0.9 % (FLUSH) 0.9 %
5-40 SYRINGE (ML) INJECTION PRN
Status: DISCONTINUED | OUTPATIENT
Start: 2025-03-05 | End: 2025-03-06 | Stop reason: HOSPADM

## 2025-03-05 RX ORDER — SODIUM CHLORIDE 9 MG/ML
5-250 INJECTION, SOLUTION INTRAVENOUS PRN
Status: DISCONTINUED | OUTPATIENT
Start: 2025-03-05 | End: 2025-03-06 | Stop reason: HOSPADM

## 2025-03-05 RX ORDER — HEPARIN 100 UNIT/ML
500 SYRINGE INTRAVENOUS PRN
Status: DISCONTINUED | OUTPATIENT
Start: 2025-03-05 | End: 2025-03-06 | Stop reason: HOSPADM

## 2025-03-05 RX ORDER — ACETAMINOPHEN 325 MG/1
650 TABLET ORAL
Status: CANCELLED | OUTPATIENT
Start: 2025-03-05

## 2025-03-05 RX ORDER — DIPHENHYDRAMINE HYDROCHLORIDE 50 MG/ML
50 INJECTION INTRAMUSCULAR; INTRAVENOUS
Status: CANCELLED | OUTPATIENT
Start: 2025-03-05

## 2025-03-05 RX ORDER — SODIUM CHLORIDE 9 MG/ML
25 INJECTION, SOLUTION INTRAVENOUS PRN
Status: CANCELLED | OUTPATIENT
Start: 2025-03-05

## 2025-03-05 RX ORDER — HYDROCORTISONE SODIUM SUCCINATE 100 MG/2ML
100 INJECTION INTRAMUSCULAR; INTRAVENOUS
Status: CANCELLED | OUTPATIENT
Start: 2025-03-05

## 2025-03-05 RX ADMIN — Medication 500 UNITS: at 12:29

## 2025-03-05 RX ADMIN — DIPHENHYDRAMINE HYDROCHLORIDE 50 MG: 50 INJECTION INTRAMUSCULAR; INTRAVENOUS at 09:46

## 2025-03-05 RX ADMIN — DEXAMETHASONE SODIUM PHOSPHATE 12 MG: 4 INJECTION, SOLUTION INTRAMUSCULAR; INTRAVENOUS at 09:50

## 2025-03-05 RX ADMIN — SODIUM CHLORIDE 40 ML/HR: 9 INJECTION, SOLUTION INTRAVENOUS at 09:36

## 2025-03-05 RX ADMIN — PALONOSETRON 0.25 MG: 0.05 INJECTION, SOLUTION INTRAVENOUS at 09:38

## 2025-03-05 RX ADMIN — SODIUM CHLORIDE, PRESERVATIVE FREE 30 ML: 5 INJECTION INTRAVENOUS at 08:34

## 2025-03-05 RX ADMIN — FAMOTIDINE 20 MG: 10 INJECTION, SOLUTION INTRAVENOUS at 09:42

## 2025-03-05 RX ADMIN — PACLITAXEL 78 MG: 6 INJECTION, SOLUTION INTRAVENOUS at 10:38

## 2025-03-05 RX ADMIN — CARBOPLATIN 190 MG: 10 INJECTION INTRAVENOUS at 11:42

## 2025-03-05 RX ADMIN — SODIUM CHLORIDE, PRESERVATIVE FREE 20 ML: 5 INJECTION INTRAVENOUS at 12:29

## 2025-03-05 NOTE — PLAN OF CARE
Problem: Discharge Planning  Goal: Discharge to home or other facility with appropriate resources  Outcome: Adequate for Discharge  Flowsheets (Taken 3/5/2025 1534)  Discharge to home or other facility with appropriate resources:   Identify barriers to discharge with patient and caregiver   Identify discharge learning needs (meds, wound care, etc)     Problem: Safety - Adult  Goal: Free from fall injury  Outcome: Adequate for Discharge  Flowsheets (Taken 3/5/2025 1534)  Free From Fall Injury:   Instruct family/caregiver on patient safety   Based on caregiver fall risk screen, instruct family/caregiver to ask for assistance with transferring infant if caregiver noted to have fall risk factors     Problem: Chronic Conditions and Co-morbidities  Goal: Patient's chronic conditions and co-morbidity symptoms are monitored and maintained or improved  Outcome: Adequate for Discharge  Flowsheets (Taken 3/5/2025 1534)  Care Plan - Patient's Chronic Conditions and Co-Morbidity Symptoms are Monitored and Maintained or Improved:   Monitor and assess patient's chronic conditions and comorbid symptoms for stability, deterioration, or improvement   Collaborate with multidisciplinary team to address chronic and comorbid conditions and prevent exacerbation or deterioration  Note:   Chemotherapy Teaching     What is Chemotherapy   Drug action [x]   Method of Administration [x]   Handouts given []     Side Effects  Nausea/vomiting [x]   Diarrhea [x]   Fatigue [x]   Signs / Symptoms of infection [x]   Neutropenia [x]   Thrombocytopenia [x]   Alopecia [x]   neuropathy [x]   [place] diet &  the importance of fluids [x]       Micellaneous  Importance of nutrition [x]   Importance of oral hygiene [x]   When to call the MD [x]   Monitoring labs [x]   Use of supportive services []     Explanation of Drug Regimen / Frequency  Taxol and Carboplatin     Comments  Verbalized understanding to drug,action,side effects and when to call MD       Problem: Infection - Adult  Goal: Absence of infection at discharge  Outcome: Adequate for Discharge  Flowsheets (Taken 3/5/2025 1538)  Absence of infection at discharge:   Assess and monitor for signs and symptoms of infection   Monitor all insertion sites i.e., indwelling lines, tubes and drains  Note: Mediport site with no redness or warmth. Skin over port site intact with no signs of breakdown noted. Patient verbalizes signs/symptoms of port infection and when to notify the physician.     Care plan reviewed with patient.  Patient verbalizes understanding of the plan of care and contributes to goal setting.

## 2025-03-05 NOTE — ONCOLOGY
Chemotherapy Administration    Pre-assessment Data: Antineoplastic Agents  See toxicity flow sheet for assessment                                          [x]         Interventions:   Chemotherapy SQ injection given []   Taxol administered-VS per protocol [x]   Blood pressure meds held 12 hours prior to Rituxan/Ruxience []   Rituxan/Ruxience administered- VS and precautions per guidelines []   Emergency drugs available as appropriate [x]   Anaphylaxis assessment completed [x]   Pre-medications administered as ordered [x]   Blood return noted upon initiation of chemotherapy [x]   Blood return noted each 1-2ml of a vesicant medication if given IV push []   Navelbine, Vincristine and Velban given as a monitored wide open drip, blood return noted before during and after infusion. []   Blood return noted each 2-3ml of a non-vesicant medication if given IV push []   Patient aware of potential Immunotherapy toxicities []   Monitor for signs / symptoms of hypersensitivity reaction [x]   Chemotherapy orders (drug/dose/rate) verified by 2 Chemo certified RN’s [x]   Monitor IV site and blood return throughout the infusion of the medication [x]   Document IV site checks on the IV assessment form [x]   Document chemotherapy teaching on the Patient Education tab [x]   Document patient verbalizes understanding of medications being administered- Taxol and Carboplatin [x]   If IV infiltration, see ONS Guidelines []   Other:      []

## 2025-03-05 NOTE — PATIENT INSTRUCTIONS
Proceed with week 5 of 6 of low-dose carboplatin Taxol with radiation today 3/5  Follow-up with me 1 week for week #6, last week

## 2025-03-05 NOTE — DISCHARGE INSTRUCTIONS
Proceed with week 5 of 6 of low-dose carboplatin Taxol with radiation today 3/5  Follow-up with me 1 week for week #6, last week    Please contact your Oncologist if you have any questions regarding the Taxol and carboplatin that you received today.    You are instructed to call the office or go to the Emergency Dept. If you experience any of the following symptoms:    Chills,temperature of 100.4 or higher or any symptoms of infection.  Dizziness/lightheadedness   Acute nausea or vomiting-not relieved by medications  Headaches-not relieved by medications  New chest pain or pressure  New rash /itching  New shortness of breath      Make sure you are drinking 48 to 64 ounces of water daily-if you are unable to drink fluids let us know right away.

## 2025-03-05 NOTE — PROGRESS NOTES
Patient tolerated 1 hour Taxol and Carboplatin without any complications.  Patient observed for 20 minuets. Denies dizziness, lightheadedness, acute nausea or vomiting, headache, heart palpitations, rash/itching or increased SOB.  Discussed the importance of monitoring and reporting temperature of 100.4 or greater to our office [phone] or going directly to Emergency Dept.    Last vital signs  BP (!) 118/6   Pulse 78   Temp 97.8 °F (36.6 °C) (Oral)   Resp 18   Ht 1.626 m (5' 4\")   Wt 66 kg (145 lb 9.6 oz)   SpO2 96%   BMI 24.99 kg/m²     Patient instructed if they experience any of the above symptoms following today's visit, he/she is to notify the Physician or go to the Emergency Dept.    Discharge instructions given to patient, Verbalizes understanding. Ambulated off unit per self in stable condition with all belongings.

## 2025-03-06 ENCOUNTER — OFFICE VISIT (OUTPATIENT)
Dept: RADIATION ONCOLOGY | Age: 80
End: 2025-03-06
Payer: MEDICARE

## 2025-03-06 VITALS
DIASTOLIC BLOOD PRESSURE: 71 MMHG | BODY MASS INDEX: 24.77 KG/M2 | SYSTOLIC BLOOD PRESSURE: 148 MMHG | OXYGEN SATURATION: 95 % | WEIGHT: 144.4 LBS | TEMPERATURE: 97.5 F | HEART RATE: 84 BPM | RESPIRATION RATE: 18 BRPM

## 2025-03-06 DIAGNOSIS — C34.11 PRIMARY MALIGNANT NEOPLASM OF RIGHT UPPER LOBE OF LUNG (HCC): Primary | ICD-10-CM

## 2025-03-06 NOTE — PROGRESS NOTES
[facility]          Radiation Oncology     [address] [place] [address]  Phone: [phone] - Option 2  Fax:[phone]               Dr. Rolly Kelly MD MS        Dr. Julia Ross PhD MD       On Treatment Visit Note (OTV)    Date of Service: 3/6/2025  Patient ID: Yogesh Solano   : 1945  MRN: [id number]   Acct Number:        RADIATION ONCOLOGY ATTENDING:  Rolly Kelly MD MS    DIAGNOSIS:   Cancer Staging   Primary adenocarcinoma of lung (HCC)  Staging form: Lung, AJCC V9  - Clinical stage from 2024: Stage IIIB (cT2b, cN2b, cM0) - Signed by Maryellen Toney PA-C on 2025      Treatment Area: Thoracic    Current Total Dose(cGy): 4400  Current Fraction:   Final/Cumulative Rx. Dose (cGy): 6000    Patient was seen today for weekly visit.     Wt Readings from Last 3 Encounters:   25 65.5 kg (144 lb 6.4 oz)   25 66 kg (145 lb 9.6 oz)   25 66 kg (145 lb 9.6 oz)       BP (!) 148/71 (Site: Left Upper Arm, Position: Sitting)   Pulse 84   Temp 97.5 °F (36.4 °C) (Infrared)   Resp 18   Wt 65.5 kg (144 lb 6.4 oz)   SpO2 95%   BMI 24.77 kg/m²     Lab Results   Component Value Date    WBC 2.5 (L) 2025    HGB 10.2 (L) 2025    HCT 30.8 (L) 2025    PLT 80 (L) 2025       Comfort Alteration  Fatigue:Must curtail daily activities even with rest periods and early bedtime    Pain Location: N/A  Pain Intensity (Current): 0 No Pain  Pain Treatment: N/A  Pain Relief: n/a    Emotional Alteration:   Coping: effective    Nutritional Alteration  Anorexia: Loss of appetite but able to eat normal portions of food  Nausea: No nausea noted  Vomiting: No vomiting   Dyspepsia/Heartburn: None  Dysphagia/Esophagitis: None    Skin Alteration   Skin reaction: No changes noted  Alopecia: No loss    Mucous Membrane Alteration  Mucositis XRT Related: None  Pharynx and

## 2025-03-10 NOTE — PROGRESS NOTES
[facility] [place] [facility]  [facility]  [address]  [address]  [place] [id number]  Dept: [phone]  Loc: [phone]   Hematology/Oncology Consult (Clinic)        3/12/25    Yogesh Solano   1945     No ref. provider found   Nathan Elaine DO            DIAGNOSIS:  -Rectal Rectum, mass biopsy: Tubular adenoma with high-grade dysplasia; cannot exclude malignancy.  Transanal reexcision with Dr. Brennan at [facility]  7/18/22 shows no residual malignancy.  Local EUS staging T1N0.  Local MRI staging-T2N0  -Patient had endoscopy December 2022 that found a more proximal rectal cancer.  Taken to the OR by Dr. Brennan at [facility] 1/30/2023 due to the mid to distal rectal tumor underwent open low anterior resection with colorectal anastomosis.  Leak test was positive therefore had to undergo a diverting loop ileostomy which was eventually reversed in June.  Path report from that procedure showed a tubulovillous adenoma with high-grade dysplasia measuring 3.1 cm no evidence of any invasive adenocarcinoma in all 10 lymph nodes were benign.    -1 cm suspicious prostate lesion found on staging MRI of the pelvis.  (Urology referral).  PSA = 3.18  Being followed with surveillance by Dr. Grace of urology.  PSA 11/26/2020 4-6.0    -Adenocarcinoma right upper lung T2 N2 biopsy-proven /stage IIIa   November 2024 new approximately 3 cm right upper lung suspicious lung mass and PET scan hypermetabolic including a normal-sized pretracheal node.    PD-L1 TPS high = 85%.  EGFR-not detected ,ALK- negative.    TREATMENT:  -Colorectal Dr. Brennan surgery referral to [facility]; excision 7/18/2022 showed negative for carcinoma or dysplasia.   January 2023-found malignancy in the more proximal rectum and underwent surgical resection and temporary diverting loop ileostomy.  Status post diverting loop ileostomy and closure of same on 6/12/2023.  Hernia surgery x 2 in [place].  Much improved.  -Urology

## 2025-03-11 DIAGNOSIS — C34.90 PRIMARY ADENOCARCINOMA OF LUNG, UNSPECIFIED LATERALITY (HCC): Primary | ICD-10-CM

## 2025-03-11 DIAGNOSIS — Z51.11 ENCOUNTER FOR CHEMOTHERAPY MANAGEMENT: ICD-10-CM

## 2025-03-11 PROCEDURE — 77427 RADIATION TX MANAGEMENT X5: CPT | Performed by: RADIOLOGY

## 2025-03-12 ENCOUNTER — OFFICE VISIT (OUTPATIENT)
Dept: ONCOLOGY | Age: 80
End: 2025-03-12
Payer: MEDICARE

## 2025-03-12 ENCOUNTER — HOSPITAL ENCOUNTER (OUTPATIENT)
Dept: INFUSION THERAPY | Age: 80
Discharge: HOME | End: 2025-03-12
Payer: MEDICARE

## 2025-03-12 VITALS
TEMPERATURE: 98.1 F | RESPIRATION RATE: 18 BRPM | DIASTOLIC BLOOD PRESSURE: 71 MMHG | WEIGHT: 143.2 LBS | BODY MASS INDEX: 24.45 KG/M2 | OXYGEN SATURATION: 95 % | HEART RATE: 82 BPM | SYSTOLIC BLOOD PRESSURE: 154 MMHG | HEIGHT: 64 IN

## 2025-03-12 VITALS
RESPIRATION RATE: 16 BRPM | DIASTOLIC BLOOD PRESSURE: 73 MMHG | SYSTOLIC BLOOD PRESSURE: 142 MMHG | HEART RATE: 85 BPM | HEIGHT: 64 IN | TEMPERATURE: 97.6 F | WEIGHT: 143.2 LBS | BODY MASS INDEX: 24.45 KG/M2 | OXYGEN SATURATION: 96 %

## 2025-03-12 DIAGNOSIS — C34.90 PRIMARY ADENOCARCINOMA OF LUNG, UNSPECIFIED LATERALITY (HCC): Primary | ICD-10-CM

## 2025-03-12 DIAGNOSIS — Z51.11 ENCOUNTER FOR CHEMOTHERAPY MANAGEMENT: ICD-10-CM

## 2025-03-12 LAB
ALBUMIN SERPL BCG-MCNC: 3.8 G/DL (ref 3.4–4.9)
ALP SERPL-CCNC: 43 U/L (ref 40–129)
ALT SERPL W/O P-5'-P-CCNC: 17 U/L (ref 10–50)
AST SERPL-CCNC: 20 U/L (ref 10–50)
BASOPHILS ABSOLUTE: 0 THOU/MM3 (ref 0–0.1)
BASOPHILS NFR BLD AUTO: 1 % (ref 0–3)
BILIRUB CONJ SERPL-MCNC: < 0.1 MG/DL (ref 0–0.2)
BILIRUB SERPL-MCNC: 0.2 MG/DL (ref 0.3–1.2)
BUN BLDP-MCNC: 29 MG/DL (ref 8–26)
CHLORIDE BLD-SCNC: 102 MEQ/L (ref 98–109)
CREAT BLD-MCNC: 0.8 MG/DL (ref 0.5–1.2)
EOSINOPHIL NFR BLD AUTO: 3 % (ref 0–4)
EOSINOPHILS ABSOLUTE: 0.1 THOU/MM3 (ref 0–0.4)
ERYTHROCYTE [DISTWIDTH] IN BLOOD BY AUTOMATED COUNT: 15.7 % (ref 11.5–14.5)
GFR SERPL CREATININE-BSD FRML MDRD: 89 ML/MIN/1.73M2
GLUCOSE BLD-MCNC: 103 MG/DL (ref 70–108)
HCT VFR BLD AUTO: 30.5 % (ref 42–52)
HGB BLD-MCNC: 10.4 GM/DL (ref 14–18)
IMMATURE GRANULOCYTES %: 1 %
IMMATURE GRANULOCYTES ABSOLUTE: 0.01 THOU/MM3 (ref 0–0.07)
IONIZED CALCIUM, WHOLE BLOOD: 1.34 MMOL/L (ref 1.12–1.32)
LYMPHOCYTES ABSOLUTE: 0.3 THOU/MM3 (ref 1–4.8)
LYMPHOCYTES NFR BLD AUTO: 21 % (ref 15–47)
MCH RBC QN AUTO: 31.9 PG (ref 26–33)
MCHC RBC AUTO-ENTMCNC: 34.1 GM/DL (ref 32.2–35.5)
MCV RBC AUTO: 94 FL (ref 80–94)
MONOCYTES ABSOLUTE: 0.1 THOU/MM3 (ref 0.4–1.3)
MONOCYTES NFR BLD AUTO: 8 % (ref 0–12)
NEUTROPHILS ABSOLUTE: 1.1 THOU/MM3 (ref 1.8–7.7)
NEUTROPHILS NFR BLD AUTO: 67 % (ref 43–75)
PATHOLOGIST REVIEW: ABNORMAL
PLATELET # BLD AUTO: 93 THOU/MM3 (ref 130–400)
PMV BLD AUTO: 9.5 FL (ref 9.4–12.4)
POTASSIUM BLD-SCNC: 3.7 MEQ/L (ref 3.5–4.9)
PROT SERPL-MCNC: 6.1 G/DL (ref 6.4–8.3)
RBC # BLD AUTO: 3.26 MILL/MM3 (ref 4.7–6.1)
SODIUM BLD-SCNC: 141 MEQ/L (ref 138–146)
TOTAL CO2, WHOLE BLOOD: 31 MEQ/L (ref 23–33)
WBC # BLD AUTO: 1.6 THOU/MM3 (ref 4.8–10.8)

## 2025-03-12 PROCEDURE — 80047 BASIC METABLC PNL IONIZED CA: CPT

## 2025-03-12 PROCEDURE — 3078F DIAST BP <80 MM HG: CPT | Performed by: INTERNAL MEDICINE

## 2025-03-12 PROCEDURE — 96417 CHEMO IV INFUS EACH ADDL SEQ: CPT

## 2025-03-12 PROCEDURE — 2580000003 HC RX 258: Performed by: INTERNAL MEDICINE

## 2025-03-12 PROCEDURE — 99214 OFFICE O/P EST MOD 30 MIN: CPT | Performed by: INTERNAL MEDICINE

## 2025-03-12 PROCEDURE — 99211 OFF/OP EST MAY X REQ PHY/QHP: CPT

## 2025-03-12 PROCEDURE — 80076 HEPATIC FUNCTION PANEL: CPT

## 2025-03-12 PROCEDURE — 85025 COMPLETE CBC W/AUTO DIFF WBC: CPT

## 2025-03-12 PROCEDURE — 3077F SYST BP >= 140 MM HG: CPT | Performed by: INTERNAL MEDICINE

## 2025-03-12 PROCEDURE — 1123F ACP DISCUSS/DSCN MKR DOCD: CPT | Performed by: INTERNAL MEDICINE

## 2025-03-12 PROCEDURE — 1036F TOBACCO NON-USER: CPT | Performed by: INTERNAL MEDICINE

## 2025-03-12 PROCEDURE — 2500000003 HC RX 250 WO HCPCS: Performed by: INTERNAL MEDICINE

## 2025-03-12 PROCEDURE — 96375 TX/PRO/DX INJ NEW DRUG ADDON: CPT

## 2025-03-12 PROCEDURE — 1159F MED LIST DOCD IN RCRD: CPT | Performed by: INTERNAL MEDICINE

## 2025-03-12 PROCEDURE — G8427 DOCREV CUR MEDS BY ELIG CLIN: HCPCS | Performed by: INTERNAL MEDICINE

## 2025-03-12 PROCEDURE — G8420 CALC BMI NORM PARAMETERS: HCPCS | Performed by: INTERNAL MEDICINE

## 2025-03-12 PROCEDURE — 6360000002 HC RX W HCPCS: Performed by: INTERNAL MEDICINE

## 2025-03-12 PROCEDURE — 96413 CHEMO IV INFUSION 1 HR: CPT

## 2025-03-12 PROCEDURE — 96367 TX/PROPH/DG ADDL SEQ IV INF: CPT

## 2025-03-12 RX ORDER — PALONOSETRON 0.05 MG/ML
0.25 INJECTION, SOLUTION INTRAVENOUS ONCE
Status: COMPLETED | OUTPATIENT
Start: 2025-03-12 | End: 2025-03-12

## 2025-03-12 RX ORDER — DIPHENHYDRAMINE HYDROCHLORIDE 50 MG/ML
50 INJECTION INTRAMUSCULAR; INTRAVENOUS
Status: CANCELLED | OUTPATIENT
Start: 2025-03-12

## 2025-03-12 RX ORDER — ONDANSETRON 2 MG/ML
8 INJECTION INTRAMUSCULAR; INTRAVENOUS
Status: CANCELLED | OUTPATIENT
Start: 2025-03-12

## 2025-03-12 RX ORDER — SODIUM CHLORIDE 9 MG/ML
5-250 INJECTION, SOLUTION INTRAVENOUS PRN
Status: CANCELLED | OUTPATIENT
Start: 2025-03-12

## 2025-03-12 RX ORDER — HYDROCORTISONE SODIUM SUCCINATE 100 MG/2ML
100 INJECTION INTRAMUSCULAR; INTRAVENOUS
Status: CANCELLED | OUTPATIENT
Start: 2025-03-12

## 2025-03-12 RX ORDER — SODIUM CHLORIDE 9 MG/ML
5-250 INJECTION, SOLUTION INTRAVENOUS PRN
Status: DISCONTINUED | OUTPATIENT
Start: 2025-03-12 | End: 2025-03-13 | Stop reason: HOSPADM

## 2025-03-12 RX ORDER — DIPHENHYDRAMINE HYDROCHLORIDE 50 MG/ML
50 INJECTION, SOLUTION INTRAMUSCULAR; INTRAVENOUS ONCE
Status: COMPLETED | OUTPATIENT
Start: 2025-03-12 | End: 2025-03-12

## 2025-03-12 RX ORDER — ACETAMINOPHEN 325 MG/1
650 TABLET ORAL
Status: CANCELLED | OUTPATIENT
Start: 2025-03-12

## 2025-03-12 RX ORDER — HEPARIN SODIUM (PORCINE) LOCK FLUSH IV SOLN 100 UNIT/ML 100 UNIT/ML
500 SOLUTION INTRAVENOUS PRN
Status: CANCELLED | OUTPATIENT
Start: 2025-03-12

## 2025-03-12 RX ORDER — HEPARIN 100 UNIT/ML
500 SYRINGE INTRAVENOUS PRN
Status: CANCELLED | OUTPATIENT
Start: 2025-03-12

## 2025-03-12 RX ORDER — HEPARIN 100 UNIT/ML
500 SYRINGE INTRAVENOUS PRN
Status: DISCONTINUED | OUTPATIENT
Start: 2025-03-12 | End: 2025-03-13 | Stop reason: HOSPADM

## 2025-03-12 RX ORDER — DIPHENHYDRAMINE HYDROCHLORIDE 50 MG/ML
50 INJECTION, SOLUTION INTRAMUSCULAR; INTRAVENOUS
Status: CANCELLED | OUTPATIENT
Start: 2025-03-12

## 2025-03-12 RX ORDER — SODIUM CHLORIDE 0.9 % (FLUSH) 0.9 %
5-40 SYRINGE (ML) INJECTION PRN
Status: DISCONTINUED | OUTPATIENT
Start: 2025-03-12 | End: 2025-03-13 | Stop reason: HOSPADM

## 2025-03-12 RX ORDER — DIPHENHYDRAMINE HYDROCHLORIDE 50 MG/ML
50 INJECTION INTRAMUSCULAR; INTRAVENOUS ONCE
Status: CANCELLED | OUTPATIENT
Start: 2025-03-12 | End: 2025-03-12

## 2025-03-12 RX ORDER — ALBUTEROL SULFATE 90 UG/1
4 INHALANT RESPIRATORY (INHALATION) PRN
Status: CANCELLED | OUTPATIENT
Start: 2025-03-12

## 2025-03-12 RX ORDER — FAMOTIDINE 10 MG/ML
20 INJECTION, SOLUTION INTRAVENOUS ONCE
Status: CANCELLED | OUTPATIENT
Start: 2025-03-12 | End: 2025-03-12

## 2025-03-12 RX ORDER — SODIUM CHLORIDE 9 MG/ML
25 INJECTION, SOLUTION INTRAVENOUS PRN
Status: CANCELLED | OUTPATIENT
Start: 2025-03-12

## 2025-03-12 RX ORDER — EPINEPHRINE 1 MG/ML
0.3 INJECTION, SOLUTION, CONCENTRATE INTRAVENOUS PRN
Status: CANCELLED | OUTPATIENT
Start: 2025-03-12

## 2025-03-12 RX ORDER — SODIUM CHLORIDE 0.9 % (FLUSH) 0.9 %
5-40 SYRINGE (ML) INJECTION PRN
Status: CANCELLED | OUTPATIENT
Start: 2025-03-12

## 2025-03-12 RX ORDER — SODIUM CHLORIDE 9 MG/ML
INJECTION, SOLUTION INTRAVENOUS CONTINUOUS
Status: CANCELLED | OUTPATIENT
Start: 2025-03-12

## 2025-03-12 RX ORDER — PALONOSETRON 0.05 MG/ML
0.25 INJECTION, SOLUTION INTRAVENOUS ONCE
Status: CANCELLED | OUTPATIENT
Start: 2025-03-12 | End: 2025-03-12

## 2025-03-12 RX ORDER — FAMOTIDINE 10 MG/ML
20 INJECTION, SOLUTION INTRAVENOUS
Status: CANCELLED | OUTPATIENT
Start: 2025-03-12

## 2025-03-12 RX ORDER — EPINEPHRINE 1 MG/ML
0.3 INJECTION, SOLUTION INTRAMUSCULAR; SUBCUTANEOUS PRN
Status: CANCELLED | OUTPATIENT
Start: 2025-03-12

## 2025-03-12 RX ORDER — MEPERIDINE HYDROCHLORIDE 50 MG/ML
12.5 INJECTION INTRAMUSCULAR; INTRAVENOUS; SUBCUTANEOUS PRN
Status: CANCELLED | OUTPATIENT
Start: 2025-03-12

## 2025-03-12 RX ADMIN — SODIUM CHLORIDE, PRESERVATIVE FREE 10 ML: 5 INJECTION INTRAVENOUS at 12:25

## 2025-03-12 RX ADMIN — Medication 500 UNITS: at 12:25

## 2025-03-12 RX ADMIN — PACLITAXEL 78 MG: 6 INJECTION, SOLUTION INTRAVENOUS at 10:49

## 2025-03-12 RX ADMIN — SODIUM CHLORIDE 50 ML/HR: 9 INJECTION, SOLUTION INTRAVENOUS at 09:55

## 2025-03-12 RX ADMIN — DIPHENHYDRAMINE HYDROCHLORIDE 50 MG: 50 INJECTION INTRAMUSCULAR; INTRAVENOUS at 09:58

## 2025-03-12 RX ADMIN — SODIUM CHLORIDE, PRESERVATIVE FREE 30 ML: 5 INJECTION INTRAVENOUS at 08:42

## 2025-03-12 RX ADMIN — CARBOPLATIN 185 MG: 10 INJECTION INTRAVENOUS at 11:49

## 2025-03-12 RX ADMIN — DEXAMETHASONE SODIUM PHOSPHATE 12 MG: 4 INJECTION, SOLUTION INTRAMUSCULAR; INTRAVENOUS at 10:14

## 2025-03-12 RX ADMIN — FAMOTIDINE 20 MG: 10 INJECTION, SOLUTION INTRAVENOUS at 10:03

## 2025-03-12 RX ADMIN — PALONOSETRON 0.25 MG: 0.05 INJECTION, SOLUTION INTRAVENOUS at 09:57

## 2025-03-12 NOTE — PATIENT INSTRUCTIONS
-Next Tuesday 3/18 CBC ordered.  Please have patient wait in the waiting room and notify me so I can review labs with him before he leaves    Proceed with his last week #6 of low-dose carboplatin and Taxol with radiation radiation will be completed 3/18    On 4 /9 ordered labs and chest CT with contrast    Please follow-up with me 1 week later./16/25  Reviewed neutropenic precautions.

## 2025-03-12 NOTE — PROGRESS NOTES
Patient assessed for the following post chemotherapy:    Dizziness   No  Lightheadedness  No      Acute nausea/vomiting No  Headache   No  Chest pain/pressure  No  Rash/itching   No  Shortness of breath  No    Patient kept for 20 minutes observation post infusion chemotherapy.    Patient tolerated chemotherapy treatment Taxol/Carboplatin without any complications.    Last vital signs:   BP (!) 151/76   Pulse 83   Temp 98.2 °F (36.8 °C) (Oral)   Resp 16   Ht 1.626 m (5' 4\")   Wt 65 kg (143 lb 3.2 oz)   SpO2 94%   BMI 24.58 kg/m²     Physician's Instructions:  -Next Tuesday 3/18 CBC ordered.  Please have patient wait in the waiting room and notify me so I can review labs with him before he leaves     Proceed with his last week #6 of low-dose carboplatin and Taxol with radiation radiation will be completed 3/18     On 4 /9 ordered labs and chest CT with contrast     Please follow-up with me 1 week later./16/25  Reviewed neutropenic precautions.    Patient instructed if experience any of the above symptoms following today's infusion, he is to notify MD immediately or go to the emergency department.    Discharge instructions given to patient. Verbalizes understanding. Ambulated off unit per self, accompanied by spouse, with belongings.

## 2025-03-12 NOTE — DISCHARGE INSTRUCTIONS
Please contact your Oncologist if you have any questions regarding the Taxol/Carboplatin that you received today.    You are instructed to call the office or go to the Emergency Dept. If you experience any of the following symptoms:    Chills,temperature of 100.4 or higher or any symptoms of infection.  Dizziness/lightheadedness   Acute nausea or vomiting-not relieved by medications  Headaches-not relieved by medications  New chest pain or pressure  New rash /itching  New shortness of breath      Make sure you are drinking 48 to 64 ounces of water daily-if you are unable to drink fluids let us know right away.     Physician's Instructions:  Instructions  -Next Tuesday 3/18 CBC ordered.  Please have patient wait in the waiting room and notify me so I can review labs with him before he leaves     Proceed with his last week #6 of low-dose carboplatin and Taxol with radiation radiation will be completed 3/18     On 4 /9 ordered labs and chest CT with contrast     Please follow-up with me 1 week later.  Reviewed neutropenic precautions.

## 2025-03-12 NOTE — PLAN OF CARE
Problem: Discharge Planning  Goal: Discharge to home or other facility with appropriate resources  Outcome: Adequate for Discharge  Flowsheets (Taken 3/12/2025 0907)  Discharge to home or other facility with appropriate resources: Identify barriers to discharge with patient and caregiver     Problem: Safety - Adult  Goal: Free from fall injury  Outcome: Adequate for Discharge  Flowsheets (Taken 3/12/2025 0907)  Free From Fall Injury: Instruct family/caregiver on patient safety     Problem: Chronic Conditions and Co-morbidities  Goal: Patient's chronic conditions and co-morbidity symptoms are monitored and maintained or improved  Outcome: Adequate for Discharge  Flowsheets (Taken 3/12/2025 0907)  Care Plan - Patient's Chronic Conditions and Co-Morbidity Symptoms are Monitored and Maintained or Improved: Monitor and assess patient's chronic conditions and comorbid symptoms for stability, deterioration, or improvement  Note:   Chemotherapy Teaching     What is Chemotherapy   Drug action [x]   Method of Administration [x]   Handouts given []     Side Effects  Nausea/vomiting [x]   Diarrhea [x]   Fatigue [x]   Signs / Symptoms of infection [x]   Neutropenia [x]   Thrombocytopenia [x]   Alopecia [x]   neuropathy [x]   [place] diet &  the importance of fluids [x]       Micellaneous  Importance of nutrition [x]   Importance of oral hygiene [x]   When to call the MD [x]   Monitoring labs [x]   Use of supportive services []     Explanation of Drug Regimen / Frequency  Taxol/Carboplatin     Comments  Verbalized understanding to drug,action,side effects and when to call MD         Problem: Infection - Adult  Goal: Absence of infection at discharge  Outcome: Adequate for Discharge  Flowsheets (Taken 3/12/2025 0907)  Absence of infection at discharge: Monitor all insertion sites i.e., indwelling lines, tubes and drains  Note: Mediport site with no redness or warmth. Skin over port site intact with no signs of breakdown noted.

## 2025-03-13 ENCOUNTER — OFFICE VISIT (OUTPATIENT)
Dept: RADIATION ONCOLOGY | Age: 80
End: 2025-03-13

## 2025-03-13 VITALS
OXYGEN SATURATION: 95 % | DIASTOLIC BLOOD PRESSURE: 78 MMHG | BODY MASS INDEX: 24.62 KG/M2 | TEMPERATURE: 97.7 F | WEIGHT: 143.52 LBS | HEART RATE: 97 BPM | SYSTOLIC BLOOD PRESSURE: 124 MMHG | RESPIRATION RATE: 22 BRPM

## 2025-03-13 DIAGNOSIS — C34.11 PRIMARY MALIGNANT NEOPLASM OF RIGHT UPPER LOBE OF LUNG (HCC): Primary | ICD-10-CM

## 2025-03-13 NOTE — PROGRESS NOTES
[facility]          Radiation Oncology     [address] [place] [address]  Phone: [phone] - Option 2  Fax:[phone]               Dr. Rolly Kelly MD MS        Dr. Julia Ross PhD MD       On Treatment Visit Note (OTV)    Date of Service: 3/13/2025  Patient ID: Yogesh Solano   : 1945  MRN: [id number]   Acct Number:        RADIATION ONCOLOGY ATTENDING:  Rolly Kelly MD MS    DIAGNOSIS:   Cancer Staging   Primary adenocarcinoma of lung (HCC)  Staging form: Lung, AJCC V9  - Clinical stage from 2024: Stage IIIB (cT2b, cN2b, cM0) - Signed by Maryellen Toney PA-C on 2025      Treatment Area: Thoracic    Current Total Dose(cGy): 5400  Current Fraction:   Final/Cumulative Rx. Dose (cGy): 6000    Patient was seen today for weekly visit.     Wt Readings from Last 3 Encounters:   25 65.1 kg (143 lb 8.3 oz)   25 65 kg (143 lb 3.2 oz)   25 65 kg (143 lb 3.2 oz)       /78 (BP Site: Left Upper Arm, Patient Position: Sitting)   Pulse 97   Temp 97.7 °F (36.5 °C) (Infrared)   Resp 22   Wt 65.1 kg (143 lb 8.3 oz)   SpO2 95%   BMI 24.62 kg/m²     Lab Results   Component Value Date    WBC 1.6 (L) 2025    HGB 10.4 (L) 2025    HCT 30.5 (L) 2025    PLT 93 (L) 2025       Comfort Alteration  Fatigue:Must curtail daily activities even with rest periods and early bedtime    Pain Location: N/A  Pain Intensity (Current): 0 No Pain  Pain Treatment: N/A  Pain Relief: n/a    Emotional Alteration:   Coping: effective    Nutritional Alteration  Anorexia: Loss of appetite but able to eat normal portions of food  Nausea: No nausea noted  Vomiting: No vomiting   Dyspepsia/Heartburn: None  Dysphagia/Esophagitis: None    Skin Alteration   Skin reaction: Faint or dull erythema; follicular reaction- wife applying lotion   Alopecia: No loss    Mucous Membrane

## 2025-03-18 ENCOUNTER — CLINICAL DOCUMENTATION (OUTPATIENT)
Dept: RADIATION ONCOLOGY | Age: 80
End: 2025-03-18

## 2025-03-18 ENCOUNTER — HOSPITAL ENCOUNTER (OUTPATIENT)
Dept: INFUSION THERAPY | Age: 80
Discharge: HOME | End: 2025-03-18
Payer: MEDICARE

## 2025-03-18 ENCOUNTER — CLINICAL DOCUMENTATION (OUTPATIENT)
Dept: ONCOLOGY | Age: 80
End: 2025-03-18

## 2025-03-18 VITALS
DIASTOLIC BLOOD PRESSURE: 56 MMHG | RESPIRATION RATE: 16 BRPM | TEMPERATURE: 98.7 F | HEART RATE: 88 BPM | SYSTOLIC BLOOD PRESSURE: 124 MMHG | OXYGEN SATURATION: 94 %

## 2025-03-18 DIAGNOSIS — Z51.11 ENCOUNTER FOR CHEMOTHERAPY MANAGEMENT: ICD-10-CM

## 2025-03-18 DIAGNOSIS — C34.90 PRIMARY ADENOCARCINOMA OF LUNG, UNSPECIFIED LATERALITY (HCC): Primary | ICD-10-CM

## 2025-03-18 LAB
ALBUMIN SERPL BCG-MCNC: 3.8 G/DL (ref 3.4–4.9)
ALP SERPL-CCNC: 46 U/L (ref 40–129)
ALT SERPL W/O P-5'-P-CCNC: 22 U/L (ref 10–50)
AST SERPL-CCNC: 21 U/L (ref 10–50)
BILIRUB CONJ SERPL-MCNC: < 0.1 MG/DL (ref 0–0.2)
BILIRUB SERPL-MCNC: 0.2 MG/DL (ref 0.3–1.2)
BUN BLDP-MCNC: 28 MG/DL (ref 8–26)
CHLORIDE BLD-SCNC: 102 MEQ/L (ref 98–109)
CREAT BLD-MCNC: 0.7 MG/DL (ref 0.5–1.2)
GFR SERPL CREATININE-BSD FRML MDRD: > 90 ML/MIN/1.73M2
GLUCOSE BLD-MCNC: 104 MG/DL (ref 70–108)
IONIZED CALCIUM, WHOLE BLOOD: 1.29 MMOL/L (ref 1.12–1.32)
POTASSIUM BLD-SCNC: 3.7 MEQ/L (ref 3.5–4.9)
PROT SERPL-MCNC: 6.2 G/DL (ref 6.4–8.3)
SCAN OF BLOOD SMEAR: NORMAL
SODIUM BLD-SCNC: 141 MEQ/L (ref 138–146)
TOTAL CO2, WHOLE BLOOD: 31 MEQ/L (ref 23–33)

## 2025-03-18 PROCEDURE — 6360000002 HC RX W HCPCS: Performed by: INTERNAL MEDICINE

## 2025-03-18 PROCEDURE — 36591 DRAW BLOOD OFF VENOUS DEVICE: CPT

## 2025-03-18 PROCEDURE — 80076 HEPATIC FUNCTION PANEL: CPT

## 2025-03-18 PROCEDURE — 80047 BASIC METABLC PNL IONIZED CA: CPT

## 2025-03-18 PROCEDURE — 2500000003 HC RX 250 WO HCPCS: Performed by: INTERNAL MEDICINE

## 2025-03-18 PROCEDURE — 85025 COMPLETE CBC W/AUTO DIFF WBC: CPT

## 2025-03-18 RX ORDER — HYDROCORTISONE SODIUM SUCCINATE 100 MG/2ML
100 INJECTION INTRAMUSCULAR; INTRAVENOUS
OUTPATIENT
Start: 2025-03-18

## 2025-03-18 RX ORDER — EPINEPHRINE 1 MG/ML
0.3 INJECTION, SOLUTION INTRAMUSCULAR; SUBCUTANEOUS PRN
OUTPATIENT
Start: 2025-03-18

## 2025-03-18 RX ORDER — SODIUM CHLORIDE 9 MG/ML
25 INJECTION, SOLUTION INTRAVENOUS PRN
OUTPATIENT
Start: 2025-03-18

## 2025-03-18 RX ORDER — SODIUM CHLORIDE 0.9 % (FLUSH) 0.9 %
5-40 SYRINGE (ML) INJECTION PRN
Status: DISCONTINUED | OUTPATIENT
Start: 2025-03-18 | End: 2025-03-19 | Stop reason: HOSPADM

## 2025-03-18 RX ORDER — SODIUM CHLORIDE 0.9 % (FLUSH) 0.9 %
5-40 SYRINGE (ML) INJECTION PRN
OUTPATIENT
Start: 2025-03-18

## 2025-03-18 RX ORDER — SODIUM CHLORIDE 9 MG/ML
INJECTION, SOLUTION INTRAVENOUS CONTINUOUS
OUTPATIENT
Start: 2025-03-18

## 2025-03-18 RX ORDER — HEPARIN 100 UNIT/ML
500 SYRINGE INTRAVENOUS PRN
Status: DISCONTINUED | OUTPATIENT
Start: 2025-03-18 | End: 2025-03-19 | Stop reason: HOSPADM

## 2025-03-18 RX ORDER — ONDANSETRON 2 MG/ML
8 INJECTION INTRAMUSCULAR; INTRAVENOUS
OUTPATIENT
Start: 2025-03-18

## 2025-03-18 RX ORDER — DIPHENHYDRAMINE HYDROCHLORIDE 50 MG/ML
50 INJECTION, SOLUTION INTRAMUSCULAR; INTRAVENOUS
OUTPATIENT
Start: 2025-03-18

## 2025-03-18 RX ORDER — HEPARIN 100 UNIT/ML
500 SYRINGE INTRAVENOUS PRN
OUTPATIENT
Start: 2025-03-18

## 2025-03-18 RX ORDER — ACETAMINOPHEN 325 MG/1
650 TABLET ORAL
OUTPATIENT
Start: 2025-03-18

## 2025-03-18 RX ORDER — ALBUTEROL SULFATE 90 UG/1
4 INHALANT RESPIRATORY (INHALATION) PRN
OUTPATIENT
Start: 2025-03-18

## 2025-03-18 RX ADMIN — HEPARIN 500 UNITS: 100 SYRINGE at 10:19

## 2025-03-18 RX ADMIN — SODIUM CHLORIDE, PRESERVATIVE FREE 30 ML: 5 INJECTION INTRAVENOUS at 09:47

## 2025-03-18 NOTE — PLAN OF CARE
Problem: Discharge Planning  Goal: Discharge to home or other facility with appropriate resources  Outcome: Adequate for Discharge  Flowsheets (Taken 3/18/2025 1022)  Discharge to home or other facility with appropriate resources:   Identify discharge learning needs (meds, wound care, etc)   Identify barriers to discharge with patient and caregiver  Note: Verbalize understanding of discharge instructions, follow up appointments, and when to call Physician.Discuss understanding of discharge instructions, follow up appointments and when to call Physician.        Problem: Safety - Adult  Goal: Free from fall injury  Outcome: Adequate for Discharge  Flowsheets (Taken 3/18/2025 1022)  Free From Fall Injury:   Based on caregiver fall risk screen, instruct family/caregiver to ask for assistance with transferring infant if caregiver noted to have fall risk factors   Instruct family/caregiver on patient safety  Note: Free from falls while in [facility] On[facility].Discussed the need to use the call light for assistance when getting up to ambulate.        Problem: Chronic Conditions and Co-morbidities  Goal: Patient's chronic conditions and co-morbidity symptoms are monitored and maintained or improved  Outcome: Adequate for Discharge  Flowsheets (Taken 3/18/2025 1022)  Care Plan - Patient's Chronic Conditions and Co-Morbidity Symptoms are Monitored and Maintained or Improved:   Monitor and assess patient's chronic conditions and comorbid symptoms for stability, deterioration, or improvement   Collaborate with multidisciplinary team to address chronic and comorbid conditions and prevent exacerbation or deterioration  Note: Patient verbalizes understanding to verbal information given on lab draw on mediport,action and possible side effects. Aware to call MD if develop complications.      Care plan reviewed with patient. Patient verbalizes understanding of the plan of care and contributes to goal setting.

## 2025-03-18 NOTE — PROGRESS NOTES
[facility] [place] [facility]  [facility]  [address]  [address]  [place] [id number]  Dept: [phone]  Loc: [phone]   Hematology/Oncology Consult (Clinic)        3/12/25    Yogesh Solano   1945     No ref. provider found   Nathan Elaine DO            DIAGNOSIS:  -Rectal Rectum, mass biopsy: Tubular adenoma with high-grade dysplasia; cannot exclude malignancy.  Transanal reexcision with Dr. Brennan at [facility]  7/18/22 shows no residual malignancy.  Local EUS staging T1N0.  Local MRI staging-T2N0  -Patient had endoscopy December 2022 that found a more proximal rectal cancer.  Taken to the OR by Dr. Brennan at [facility] 1/30/2023 due to the mid to distal rectal tumor underwent open low anterior resection with colorectal anastomosis.  Leak test was positive therefore had to undergo a diverting loop ileostomy which was eventually reversed in June.  Path report from that procedure showed a tubulovillous adenoma with high-grade dysplasia measuring 3.1 cm no evidence of any invasive adenocarcinoma in all 10 lymph nodes were benign.    -1 cm suspicious prostate lesion found on staging MRI of the pelvis.  (Urology referral).  PSA = 3.18  Being followed with surveillance by Dr. Grace of urology.  PSA 11/26/2020 4-6.0    -Adenocarcinoma right upper lung T2 N2 biopsy-proven /stage IIIa   November 2024 new approximately 3 cm right upper lung suspicious lung mass and PET scan hypermetabolic including a normal-sized pretracheal node.    PD-L1 TPS high = 85%.  EGFR-not detected ,ALK- negative.    TREATMENT:  -Colorectal Dr. Brennan surgery referral to [facility]; excision 7/18/2022 showed negative for carcinoma or dysplasia.   January 2023-found malignancy in the more proximal rectum and underwent surgical resection and temporary diverting loop ileostomy.  Status post diverting loop ileostomy and closure of same on 6/12/2023.  Hernia surgery x 2 in [place].  Much improved.  -Urology

## 2025-03-18 NOTE — PROGRESS NOTES
Patient tolerated lab draw from [facility] without any complications. Discharge instructions given to patient-verbalizes understanding. Ambulated off unit per self with belongings.

## 2025-03-19 LAB
BASOPHILS ABSOLUTE: 0 THOU/MM3 (ref 0–0.1)
BASOPHILS NFR BLD AUTO: 0 %
DEPRECATED RDW RBC AUTO: 52.1 FL (ref 35–45)
EOSINOPHIL NFR BLD AUTO: 1.3 %
EOSINOPHILS ABSOLUTE: 0 THOU/MM3 (ref 0–0.4)
ERYTHROCYTE [DISTWIDTH] IN BLOOD BY AUTOMATED COUNT: 16.2 % (ref 11.5–14.5)
HCT VFR BLD AUTO: 28.7 % (ref 42–52)
HGB BLD-MCNC: 9.7 GM/DL (ref 14–18)
IMM GRANULOCYTES # BLD AUTO: 0.01 THOU/MM3 (ref 0–0.07)
IMM GRANULOCYTES NFR BLD AUTO: 1.3 %
LYMPHOCYTES ABSOLUTE: 0.3 THOU/MM3 (ref 1–4.8)
LYMPHOCYTES NFR BLD AUTO: 31.6 %
MCH RBC QN AUTO: 31.3 PG (ref 26–33)
MCHC RBC AUTO-ENTMCNC: 33.8 GM/DL (ref 32.2–35.5)
MCV RBC AUTO: 92.6 FL (ref 80–94)
MONOCYTES ABSOLUTE: 0.1 THOU/MM3 (ref 0.4–1.3)
MONOCYTES NFR BLD AUTO: 16.5 %
NEUTROPHILS ABSOLUTE: 0.4 THOU/MM3 (ref 1.8–7.7)
NEUTROPHILS NFR BLD AUTO: 49.3 %
NRBC BLD AUTO-RTO: 0 /100 WBC
PATHOLOGIST REVIEW: ABNORMAL
PLATELET # BLD AUTO: 148 THOU/MM3 (ref 130–400)
PMV BLD AUTO: 9.6 FL (ref 9.4–12.4)
RBC # BLD AUTO: 3.1 MILL/MM3 (ref 4.7–6.1)
WBC # BLD AUTO: 0.8 THOU/MM3 (ref 4.8–10.8)

## 2025-03-19 NOTE — PROGRESS NOTES
[facility]           Radiation Oncology     [address] [place] [address]  Phone: [phone] - Option 2  Fax:[phone]             END OF TREATMENT SUMMARY    Date of Service: 3/18/2025  Patient ID: Yogesh Solano   : 1945  MRN: [id number]   Acct Number:          DIAGNOSIS:    Cancer Staging   Primary adenocarcinoma of lung (HCC)  Staging form: Lung, AJCC V9  - Clinical stage from 2024: Stage IIIB (cT2b, cN2b, cM0) - Signed by Maryellen Toney PA-C on 2025      HISTORY OF PRESENT ILLNESS:  Oncology History Overview Note   HISTORY:    25 According to Dr. Correia' note,  \"Rectum, mass biopsy: Tubular adenoma with high-grade dysplasia; cannot exclude malignancy.  Transanal reexcision with Dr. Brennan at [facility]  22 shows no residual malignancy.  Local EUS staging T1N0.  Local MRI staging-T2N0  -Patient had endoscopy 2022 that found a more proximal rectal cancer.  Taken to the OR by Dr. Brennan at [facility] 2023 due to the mid to distal rectal tumor underwent open low anterior resection with colorectal anastomosis.  Leak test was positive therefore had to undergo a diverting loop ileostomy which was eventually reversed in .  Path report from that procedure showed a tubulovillous adenoma with high-grade dysplasia measuring 3.1 cm no evidence of any invasive adenocarcinoma in all 10 lymph nodes were benign.     -1 cm suspicious prostate lesion found on staging MRI of the pelvis.  (Urology referral).  PSA = 3.18  Being followed with surveillance by Dr. Grace of urology.  PSA 2020 4-6.0     -Adenocarcinoma right upper lung T2 N2 biopsy-proven /stage IIIa   2024 new approximately 3 cm right upper lung suspicious lung mass and PET scan hypermetabolic including a normal-sized pretracheal node.       TREATMENT:  -Colorectal Dr. Brennan surgery referral to [facility]; excision

## 2025-04-09 ENCOUNTER — HOSPITAL ENCOUNTER (OUTPATIENT)
Dept: CT IMAGING | Age: 80
Discharge: HOME | End: 2025-04-09
Attending: INTERNAL MEDICINE
Payer: MEDICARE

## 2025-04-09 DIAGNOSIS — C34.90 PRIMARY ADENOCARCINOMA OF LUNG, UNSPECIFIED LATERALITY (HCC): ICD-10-CM

## 2025-04-09 PROCEDURE — 6360000004 HC RX CONTRAST MEDICATION: Performed by: INTERNAL MEDICINE

## 2025-04-09 PROCEDURE — 71260 CT THORAX DX C+: CPT

## 2025-04-09 RX ORDER — IOPAMIDOL 755 MG/ML
80 INJECTION, SOLUTION INTRAVASCULAR
Status: COMPLETED | OUTPATIENT
Start: 2025-04-09 | End: 2025-04-09

## 2025-04-09 RX ADMIN — IOPAMIDOL 80 ML: 755 INJECTION, SOLUTION INTRAVENOUS at 09:07

## 2025-04-14 NOTE — PROGRESS NOTES
mucicarmine is also positive.  P63, p40 and CK5/6 are negative.B-E. Microscopy     performed.  Specimen:                        A. Transbronchial - Biopsy Right Upper Lobe, B. Cell Block - Pulmonary lymph node, station  4R,  C. Cell Block - Pulmonary lymph node, station 7, D. Cell Block - FNA biopsy Right Upper  Lobe,  E. Cell Block - Bronchoalveolar lavage, mini; Right Upper Lobe; Refer to   Gross Description:                 A.    Labeled with patient's name, identifier and transbronchial biopsy right upper lobe.     Fragments of     brown-tan tissue ranging from 0.2 to 0.4 cm in greatest dimension with     blood.  The entire     specimen is submitted in one cassette.     B.    Labeled with patient's name, identifier and pulmonary lymph node station 4R cell     block.  Scant     fragments of gray-tan tissue measuring 0.2 cm in aggregate.  The entire     specimen is submitted in     one cassette.     C.    Labeled with patient's name, identifier and pulmonary lymph node station 7 cell  block.      Aggregate     of brown-tan tissue measuring 1 cm in greatest dimension.  The entire     specimen is submitted in     one cassette.     D.    Labeled with patient's name, identifier and right upper lobe FNA cell block.     Aggregate of brown-    tan tissue measuring 1.2 cm in greatest dimension.  The entire     specimen is submitted in one     cassette.     E.    Labeled with patient's name, identifier and mini bronchoalveolar lavage right upper     lobe is     aggregate of brown-tan tissue measuring 1 cm in greatest dimension.  The  entire     specimen is     submitted in one cassette.     yc-d1/3/25@1457 rkm-t01/03/25@1518  Signed __________(signature on file)___________ Jose Schultz 01/07/25 1526                    ????  Need interpretation from the pathologist.  This is consistent with a metastatic colorectal cancer.  Given the patient's history.      Path report by [facility] lab in [place]

## 2025-04-16 ENCOUNTER — HOSPITAL ENCOUNTER (OUTPATIENT)
Dept: INFUSION THERAPY | Age: 80
Discharge: HOME | End: 2025-04-16
Payer: MEDICARE

## 2025-04-16 ENCOUNTER — OFFICE VISIT (OUTPATIENT)
Dept: ONCOLOGY | Age: 80
End: 2025-04-16
Payer: MEDICARE

## 2025-04-16 VITALS
BODY MASS INDEX: 24.75 KG/M2 | HEART RATE: 94 BPM | WEIGHT: 145 LBS | SYSTOLIC BLOOD PRESSURE: 173 MMHG | OXYGEN SATURATION: 94 % | HEIGHT: 64 IN | DIASTOLIC BLOOD PRESSURE: 79 MMHG | RESPIRATION RATE: 16 BRPM | TEMPERATURE: 98.3 F

## 2025-04-16 VITALS
TEMPERATURE: 98.3 F | DIASTOLIC BLOOD PRESSURE: 79 MMHG | SYSTOLIC BLOOD PRESSURE: 173 MMHG | BODY MASS INDEX: 24.75 KG/M2 | WEIGHT: 145 LBS | OXYGEN SATURATION: 94 % | HEART RATE: 94 BPM | RESPIRATION RATE: 16 BRPM | HEIGHT: 64 IN

## 2025-04-16 DIAGNOSIS — C34.90 PRIMARY ADENOCARCINOMA OF LUNG, UNSPECIFIED LATERALITY (HCC): Primary | ICD-10-CM

## 2025-04-16 LAB
ALBUMIN SERPL BCG-MCNC: 3.9 G/DL (ref 3.4–4.9)
ALP SERPL-CCNC: 57 U/L (ref 40–129)
ALT SERPL W/O P-5'-P-CCNC: 23 U/L (ref 10–50)
AST SERPL-CCNC: 23 U/L (ref 10–50)
BASOPHILS ABSOLUTE: 0 THOU/MM3 (ref 0–0.1)
BASOPHILS NFR BLD AUTO: 0 % (ref 0–3)
BILIRUB CONJ SERPL-MCNC: < 0.1 MG/DL (ref 0–0.2)
BILIRUB SERPL-MCNC: < 0.2 MG/DL (ref 0.3–1.2)
BUN BLDP-MCNC: 25 MG/DL (ref 8–26)
CHLORIDE BLD-SCNC: 103 MEQ/L (ref 98–109)
CREAT BLD-MCNC: 0.8 MG/DL (ref 0.5–1.2)
EOSINOPHIL NFR BLD AUTO: 4 % (ref 0–4)
EOSINOPHILS ABSOLUTE: 0.2 THOU/MM3 (ref 0–0.4)
ERYTHROCYTE [DISTWIDTH] IN BLOOD BY AUTOMATED COUNT: 18.7 % (ref 11.5–14.5)
GFR SERPL CREATININE-BSD FRML MDRD: 89 ML/MIN/1.73M2
GLUCOSE BLD-MCNC: 101 MG/DL (ref 70–108)
HCT VFR BLD AUTO: 35.4 % (ref 42–52)
HGB BLD-MCNC: 11.5 GM/DL (ref 14–18)
IMMATURE GRANULOCYTES %: 0 %
IMMATURE GRANULOCYTES ABSOLUTE: 0.02 THOU/MM3 (ref 0–0.07)
IONIZED CALCIUM, WHOLE BLOOD: 1.24 MMOL/L (ref 1.12–1.32)
LYMPHOCYTES ABSOLUTE: 1.1 THOU/MM3 (ref 1–4.8)
LYMPHOCYTES NFR BLD AUTO: 17 % (ref 15–47)
MCH RBC QN AUTO: 31.9 PG (ref 26–33)
MCHC RBC AUTO-ENTMCNC: 32.5 GM/DL (ref 32.2–35.5)
MCV RBC AUTO: 98 FL (ref 80–94)
MONOCYTES ABSOLUTE: 0.9 THOU/MM3 (ref 0.4–1.3)
MONOCYTES NFR BLD AUTO: 14 % (ref 0–12)
NEUTROPHILS ABSOLUTE: 4.3 THOU/MM3 (ref 1.8–7.7)
NEUTROPHILS NFR BLD AUTO: 65 % (ref 43–75)
PLATELET # BLD AUTO: 201 THOU/MM3 (ref 130–400)
PMV BLD AUTO: 8.8 FL (ref 9.4–12.4)
POTASSIUM BLD-SCNC: 3.7 MEQ/L (ref 3.5–4.9)
PROT SERPL-MCNC: 6.6 G/DL (ref 6.4–8.3)
RBC # BLD AUTO: 3.61 MILL/MM3 (ref 4.7–6.1)
SODIUM BLD-SCNC: 145 MEQ/L (ref 138–146)
TOTAL CO2, WHOLE BLOOD: 31 MEQ/L (ref 23–33)
WBC # BLD AUTO: 6.5 THOU/MM3 (ref 4.8–10.8)

## 2025-04-16 PROCEDURE — 1159F MED LIST DOCD IN RCRD: CPT | Performed by: INTERNAL MEDICINE

## 2025-04-16 PROCEDURE — 6360000002 HC RX W HCPCS: Performed by: INTERNAL MEDICINE

## 2025-04-16 PROCEDURE — 36591 DRAW BLOOD OFF VENOUS DEVICE: CPT

## 2025-04-16 PROCEDURE — G8427 DOCREV CUR MEDS BY ELIG CLIN: HCPCS | Performed by: INTERNAL MEDICINE

## 2025-04-16 PROCEDURE — 80076 HEPATIC FUNCTION PANEL: CPT

## 2025-04-16 PROCEDURE — 3077F SYST BP >= 140 MM HG: CPT | Performed by: INTERNAL MEDICINE

## 2025-04-16 PROCEDURE — 99211 OFF/OP EST MAY X REQ PHY/QHP: CPT

## 2025-04-16 PROCEDURE — 3078F DIAST BP <80 MM HG: CPT | Performed by: INTERNAL MEDICINE

## 2025-04-16 PROCEDURE — 85025 COMPLETE CBC W/AUTO DIFF WBC: CPT

## 2025-04-16 PROCEDURE — 1123F ACP DISCUSS/DSCN MKR DOCD: CPT | Performed by: INTERNAL MEDICINE

## 2025-04-16 PROCEDURE — 99213 OFFICE O/P EST LOW 20 MIN: CPT | Performed by: INTERNAL MEDICINE

## 2025-04-16 PROCEDURE — 80047 BASIC METABLC PNL IONIZED CA: CPT

## 2025-04-16 PROCEDURE — 1036F TOBACCO NON-USER: CPT | Performed by: INTERNAL MEDICINE

## 2025-04-16 PROCEDURE — 2500000003 HC RX 250 WO HCPCS: Performed by: INTERNAL MEDICINE

## 2025-04-16 PROCEDURE — G8420 CALC BMI NORM PARAMETERS: HCPCS | Performed by: INTERNAL MEDICINE

## 2025-04-16 RX ORDER — HEPARIN 100 UNIT/ML
500 SYRINGE INTRAVENOUS PRN
Status: DISCONTINUED | OUTPATIENT
Start: 2025-04-16 | End: 2025-04-17 | Stop reason: HOSPADM

## 2025-04-16 RX ORDER — ACETAMINOPHEN 325 MG/1
650 TABLET ORAL
Status: CANCELLED | OUTPATIENT
Start: 2025-04-16

## 2025-04-16 RX ORDER — SODIUM CHLORIDE 9 MG/ML
5-250 INJECTION, SOLUTION INTRAVENOUS PRN
OUTPATIENT
Start: 2025-04-23

## 2025-04-16 RX ORDER — SODIUM CHLORIDE 9 MG/ML
INJECTION, SOLUTION INTRAVENOUS CONTINUOUS
OUTPATIENT
Start: 2025-04-23

## 2025-04-16 RX ORDER — EPINEPHRINE 1 MG/ML
0.3 INJECTION, SOLUTION, CONCENTRATE INTRAVENOUS PRN
OUTPATIENT
Start: 2025-04-23

## 2025-04-16 RX ORDER — HYDROCORTISONE SODIUM SUCCINATE 100 MG/2ML
100 INJECTION INTRAMUSCULAR; INTRAVENOUS
Status: CANCELLED | OUTPATIENT
Start: 2025-04-16

## 2025-04-16 RX ORDER — MEPERIDINE HYDROCHLORIDE 50 MG/ML
12.5 INJECTION INTRAMUSCULAR; INTRAVENOUS; SUBCUTANEOUS PRN
OUTPATIENT
Start: 2025-04-23

## 2025-04-16 RX ORDER — FAMOTIDINE 10 MG/ML
20 INJECTION, SOLUTION INTRAVENOUS
OUTPATIENT
Start: 2025-04-23

## 2025-04-16 RX ORDER — ONDANSETRON 2 MG/ML
8 INJECTION INTRAMUSCULAR; INTRAVENOUS
OUTPATIENT
Start: 2025-04-23

## 2025-04-16 RX ORDER — DIPHENHYDRAMINE HYDROCHLORIDE 50 MG/ML
50 INJECTION, SOLUTION INTRAMUSCULAR; INTRAVENOUS
Status: CANCELLED | OUTPATIENT
Start: 2025-04-16

## 2025-04-16 RX ORDER — SODIUM CHLORIDE 9 MG/ML
25 INJECTION, SOLUTION INTRAVENOUS PRN
Status: CANCELLED | OUTPATIENT
Start: 2025-04-16

## 2025-04-16 RX ORDER — SODIUM CHLORIDE 0.9 % (FLUSH) 0.9 %
5-40 SYRINGE (ML) INJECTION PRN
Status: DISCONTINUED | OUTPATIENT
Start: 2025-04-16 | End: 2025-04-17 | Stop reason: HOSPADM

## 2025-04-16 RX ORDER — DIPHENHYDRAMINE HYDROCHLORIDE 50 MG/ML
50 INJECTION, SOLUTION INTRAMUSCULAR; INTRAVENOUS
OUTPATIENT
Start: 2025-04-23

## 2025-04-16 RX ORDER — SODIUM CHLORIDE 0.9 % (FLUSH) 0.9 %
5-40 SYRINGE (ML) INJECTION PRN
Status: CANCELLED | OUTPATIENT
Start: 2025-04-16

## 2025-04-16 RX ORDER — ONDANSETRON 2 MG/ML
8 INJECTION INTRAMUSCULAR; INTRAVENOUS
Status: CANCELLED | OUTPATIENT
Start: 2025-04-16

## 2025-04-16 RX ORDER — ACETAMINOPHEN 325 MG/1
650 TABLET ORAL
OUTPATIENT
Start: 2025-04-23

## 2025-04-16 RX ORDER — HEPARIN SODIUM (PORCINE) LOCK FLUSH IV SOLN 100 UNIT/ML 100 UNIT/ML
500 SOLUTION INTRAVENOUS PRN
OUTPATIENT
Start: 2025-04-23

## 2025-04-16 RX ORDER — HEPARIN 100 UNIT/ML
500 SYRINGE INTRAVENOUS PRN
Status: CANCELLED | OUTPATIENT
Start: 2025-04-16

## 2025-04-16 RX ORDER — ALBUTEROL SULFATE 90 UG/1
4 INHALANT RESPIRATORY (INHALATION) PRN
Status: CANCELLED | OUTPATIENT
Start: 2025-04-16

## 2025-04-16 RX ORDER — SODIUM CHLORIDE 9 MG/ML
INJECTION, SOLUTION INTRAVENOUS CONTINUOUS
Status: CANCELLED | OUTPATIENT
Start: 2025-04-16

## 2025-04-16 RX ORDER — HYDROCORTISONE SODIUM SUCCINATE 100 MG/2ML
100 INJECTION INTRAMUSCULAR; INTRAVENOUS
OUTPATIENT
Start: 2025-04-23

## 2025-04-16 RX ORDER — ALBUTEROL SULFATE 90 UG/1
4 INHALANT RESPIRATORY (INHALATION) PRN
OUTPATIENT
Start: 2025-04-23

## 2025-04-16 RX ORDER — SODIUM CHLORIDE 0.9 % (FLUSH) 0.9 %
5-40 SYRINGE (ML) INJECTION PRN
OUTPATIENT
Start: 2025-04-23

## 2025-04-16 RX ORDER — EPINEPHRINE 1 MG/ML
0.3 INJECTION, SOLUTION INTRAMUSCULAR; SUBCUTANEOUS PRN
Status: CANCELLED | OUTPATIENT
Start: 2025-04-16

## 2025-04-16 RX ADMIN — SODIUM CHLORIDE, PRESERVATIVE FREE 30 ML: 5 INJECTION INTRAVENOUS at 08:17

## 2025-04-16 RX ADMIN — Medication 500 UNITS: at 08:48

## 2025-04-16 NOTE — PATIENT INSTRUCTIONS
Start Durvalumab monthly x 12 on 4/23/2025.  Plan entered.  Does not need to see provider that day    Next follow-up will be with me for a month  2 on May 21

## 2025-04-16 NOTE — PLAN OF CARE
Problem: Discharge Planning  Goal: Discharge to home or other facility with appropriate resources  Outcome: Progressing     Problem: Safety - Adult  Goal: Free from fall injury  Outcome: Progressing     Problem: Chronic Conditions and Co-morbidities  Goal: Patient's chronic conditions and co-morbidity symptoms are monitored and maintained or improved  Outcome: Progressing  Flowsheets (Taken 4/16/2025 0782)  Care Plan - Patient's Chronic Conditions and Co-Morbidity Symptoms are Monitored and Maintained or Improved: Monitor and assess patient's chronic conditions and comorbid symptoms for stability, deterioration, or improvement  Note: Discussed indications for labs   Care plan reviewed with patient.  Patient verbalizes understanding of the plan of care and contributes to goal setting.

## 2025-04-16 NOTE — PROGRESS NOTES
Patient tolerated labs via mediport without any complications.  Patient observed for 20 minuets. Denies dizziness, lightheadedness, acute nausea or vomiting, headache, heart palpitations, rash/itching or increased SOB.  Discussed the importance of monitoring and reporting temperature of 100.4 or greater to our office [phone] or going directly to Emergency Dept.    Last vital signs  BP (!) 173/79   Pulse 94   Temp 98.3 °F (36.8 °C) (Oral)   Resp 16   Ht 1.626 m (5' 4\")   Wt 65.8 kg (145 lb)   SpO2 94%   BMI 24.89 kg/m²     Patient instructed if they experience any of the above symptoms following today's visit, he/she is to notify the Physician or go to the Emergency Dept.    Discharge instructions given to patient, Verbalizes understanding. Ambulated off unit per self in stable condition with all belongings.

## 2025-04-21 ENCOUNTER — OFFICE VISIT (OUTPATIENT)
Dept: RADIATION ONCOLOGY | Age: 80
End: 2025-04-21

## 2025-04-21 VITALS
SYSTOLIC BLOOD PRESSURE: 153 MMHG | OXYGEN SATURATION: 92 % | RESPIRATION RATE: 20 BRPM | WEIGHT: 145.2 LBS | DIASTOLIC BLOOD PRESSURE: 74 MMHG | HEART RATE: 86 BPM | TEMPERATURE: 97.7 F | BODY MASS INDEX: 24.91 KG/M2

## 2025-04-21 DIAGNOSIS — C34.11 PRIMARY MALIGNANT NEOPLASM OF RIGHT UPPER LOBE OF LUNG (HCC): Primary | ICD-10-CM

## 2025-04-21 PROCEDURE — 99024 POSTOP FOLLOW-UP VISIT: CPT

## 2025-04-21 ASSESSMENT — ENCOUNTER SYMPTOMS
COUGH: 0
VOMITING: 0
SHORTNESS OF BREATH: 1
NAUSEA: 0
BACK PAIN: 0
WHEEZING: 0
SORE THROAT: 0
ABDOMINAL PAIN: 0
TROUBLE SWALLOWING: 0
CHEST TIGHTNESS: 0
BLOOD IN STOOL: 0
APNEA: 0

## 2025-04-21 NOTE — PROGRESS NOTES
[facility]           Radiation Oncology     [address] [place] [address]  Phone: [phone] - Option 2  Fax:[phone]             FOLLOW UP NOTE    Date of Service: 2025  Patient ID: Yogesh Solano   : 1945  MRN: [id number]   Acct Number:        DATE OF SERVICE: 2025   LOCATION: [facility]  PROVIDER: Maryellen Toney PA-C    FOLLOW UP PHYSICIANS: Dr. Chuck Correia ([facility])    ASSESSMENT AND PLAN:   Cancer Staging   Primary adenocarcinoma of lung (HCC)  Staging form: Lung, AJCC V9  - Clinical stage from 2024: Stage IIIB (cT2b, cN2b, cM0) - Signed by Maryellen Toney PA-C on 2025      - Yogesh Solano is a 79 y.o. male who presents today with his wife for regularly-scheduled follow-up for his non-small cell lung cancer. He completed definitive radiation to his right lung primary and lymph nodes on 3/18/25, and is about to start immunotherapy  - The patient appears to be doing well, healing well from radiation. Shortness of breath and rash of right back have improved since treatments ended. Fatigue is persistent. He denies chest pain, dysphagia, unintentional weight loss, changes/blood in urination or bowel movements, pain otherwise  - Recent CT chest completed on 25 resulted as: partial treatment response  - Continue to follow with medical oncology, GI, and all other medical providers. Continue lung, prostate, and rectal cancer monitoring as per medical oncology  - I advised the patient to call with any new/worsening symptoms that may arise before their return visit; the patient voiced understanding and agreement     The patient will return to clinic in 6 months or sooner if clinically indicated. They have our clinic number to call with any questions or concerns if needed. Thank you for allowing us to be a part of their care.    HPI:  Oncology History Overview Note   HISTORY:    25

## 2025-04-23 ENCOUNTER — CLINICAL DOCUMENTATION (OUTPATIENT)
Dept: INFUSION THERAPY | Age: 80
End: 2025-04-23

## 2025-04-23 ENCOUNTER — HOSPITAL ENCOUNTER (OUTPATIENT)
Dept: INFUSION THERAPY | Age: 80
Discharge: HOME | End: 2025-04-23
Payer: MEDICARE

## 2025-04-23 VITALS
OXYGEN SATURATION: 97 % | BODY MASS INDEX: 24.96 KG/M2 | DIASTOLIC BLOOD PRESSURE: 71 MMHG | TEMPERATURE: 97.7 F | WEIGHT: 146.2 LBS | SYSTOLIC BLOOD PRESSURE: 142 MMHG | RESPIRATION RATE: 18 BRPM | HEART RATE: 85 BPM | HEIGHT: 64 IN

## 2025-04-23 DIAGNOSIS — C34.90 PRIMARY ADENOCARCINOMA OF LUNG, UNSPECIFIED LATERALITY (HCC): Primary | ICD-10-CM

## 2025-04-23 LAB
ALBUMIN SERPL BCG-MCNC: 3.8 G/DL (ref 3.4–4.9)
ALP SERPL-CCNC: 56 U/L (ref 40–129)
ALT SERPL W/O P-5'-P-CCNC: 21 U/L (ref 10–50)
AST SERPL-CCNC: 23 U/L (ref 10–50)
BASOPHILS ABSOLUTE: 0 THOU/MM3 (ref 0–0.1)
BASOPHILS NFR BLD AUTO: 0 % (ref 0–3)
BILIRUB CONJ SERPL-MCNC: < 0.1 MG/DL (ref 0–0.2)
BILIRUB SERPL-MCNC: 0.2 MG/DL (ref 0.3–1.2)
BUN BLDP-MCNC: 22 MG/DL (ref 8–26)
CHLORIDE BLD-SCNC: 101 MEQ/L (ref 98–109)
CREAT BLD-MCNC: 0.8 MG/DL (ref 0.5–1.2)
EOSINOPHIL NFR BLD AUTO: 4 % (ref 0–4)
EOSINOPHILS ABSOLUTE: 0.3 THOU/MM3 (ref 0–0.4)
ERYTHROCYTE [DISTWIDTH] IN BLOOD BY AUTOMATED COUNT: 17.9 % (ref 11.5–14.5)
GFR SERPL CREATININE-BSD FRML MDRD: 89 ML/MIN/1.73M2
GLUCOSE BLD-MCNC: 96 MG/DL (ref 70–108)
HBV CORE IGG+IGM SERPL QL IA: NONREACTIVE
HBV SURFACE AB TITR SER: < 3.5 MIU/ML
HBV SURFACE AG SERPL QL IA: NONREACTIVE
HCT VFR BLD AUTO: 35.2 % (ref 42–52)
HGB BLD-MCNC: 11.4 GM/DL (ref 14–18)
IMMATURE GRANULOCYTES %: 0 %
IMMATURE GRANULOCYTES ABSOLUTE: 0.03 THOU/MM3 (ref 0–0.07)
IONIZED CALCIUM, WHOLE BLOOD: 1.36 MMOL/L (ref 1.12–1.32)
LYMPHOCYTES ABSOLUTE: 0.9 THOU/MM3 (ref 1–4.8)
LYMPHOCYTES NFR BLD AUTO: 13 % (ref 15–47)
MCH RBC QN AUTO: 31.9 PG (ref 26–33)
MCHC RBC AUTO-ENTMCNC: 32.4 GM/DL (ref 32.2–35.5)
MCV RBC AUTO: 99 FL (ref 80–94)
MONOCYTES ABSOLUTE: 0.8 THOU/MM3 (ref 0.4–1.3)
MONOCYTES NFR BLD AUTO: 12 % (ref 0–12)
NEUTROPHILS ABSOLUTE: 5.2 THOU/MM3 (ref 1.8–7.7)
NEUTROPHILS NFR BLD AUTO: 71 % (ref 43–75)
PLATELET # BLD AUTO: 220 THOU/MM3 (ref 130–400)
PMV BLD AUTO: 8.8 FL (ref 9.4–12.4)
POTASSIUM BLD-SCNC: 4 MEQ/L (ref 3.5–4.9)
PROT SERPL-MCNC: 6.4 G/DL (ref 6.4–8.3)
RBC # BLD AUTO: 3.57 MILL/MM3 (ref 4.7–6.1)
SODIUM BLD-SCNC: 141 MEQ/L (ref 138–146)
TOTAL CO2, WHOLE BLOOD: 34 MEQ/L (ref 23–33)
TSH SERPL DL<=0.05 MIU/L-ACNC: 1.87 UIU/ML (ref 0.27–4.2)
WBC # BLD AUTO: 7.2 THOU/MM3 (ref 4.8–10.8)

## 2025-04-23 PROCEDURE — 86704 HEP B CORE ANTIBODY TOTAL: CPT

## 2025-04-23 PROCEDURE — 84443 ASSAY THYROID STIM HORMONE: CPT

## 2025-04-23 PROCEDURE — 80047 BASIC METABLC PNL IONIZED CA: CPT

## 2025-04-23 PROCEDURE — 80076 HEPATIC FUNCTION PANEL: CPT

## 2025-04-23 PROCEDURE — 86706 HEP B SURFACE ANTIBODY: CPT

## 2025-04-23 PROCEDURE — 6360000002 HC RX W HCPCS: Performed by: INTERNAL MEDICINE

## 2025-04-23 PROCEDURE — 87340 HEPATITIS B SURFACE AG IA: CPT

## 2025-04-23 PROCEDURE — 85025 COMPLETE CBC W/AUTO DIFF WBC: CPT

## 2025-04-23 PROCEDURE — 96413 CHEMO IV INFUSION 1 HR: CPT

## 2025-04-23 PROCEDURE — 2580000003 HC RX 258: Performed by: INTERNAL MEDICINE

## 2025-04-23 PROCEDURE — 2500000003 HC RX 250 WO HCPCS: Performed by: INTERNAL MEDICINE

## 2025-04-23 RX ORDER — DIPHENHYDRAMINE HYDROCHLORIDE 50 MG/ML
50 INJECTION, SOLUTION INTRAMUSCULAR; INTRAVENOUS
OUTPATIENT
Start: 2025-04-23

## 2025-04-23 RX ORDER — HEPARIN 100 UNIT/ML
500 SYRINGE INTRAVENOUS PRN
Status: DISCONTINUED | OUTPATIENT
Start: 2025-04-23 | End: 2025-04-24 | Stop reason: HOSPADM

## 2025-04-23 RX ORDER — HYDROCORTISONE SODIUM SUCCINATE 100 MG/2ML
100 INJECTION INTRAMUSCULAR; INTRAVENOUS
OUTPATIENT
Start: 2025-04-23

## 2025-04-23 RX ORDER — ACETAMINOPHEN 325 MG/1
650 TABLET ORAL
OUTPATIENT
Start: 2025-04-23

## 2025-04-23 RX ORDER — HEPARIN 100 UNIT/ML
500 SYRINGE INTRAVENOUS PRN
OUTPATIENT
Start: 2025-04-23

## 2025-04-23 RX ORDER — SODIUM CHLORIDE 0.9 % (FLUSH) 0.9 %
5-40 SYRINGE (ML) INJECTION PRN
OUTPATIENT
Start: 2025-04-23

## 2025-04-23 RX ORDER — SODIUM CHLORIDE 9 MG/ML
INJECTION, SOLUTION INTRAVENOUS CONTINUOUS
OUTPATIENT
Start: 2025-04-23

## 2025-04-23 RX ORDER — ONDANSETRON 2 MG/ML
8 INJECTION INTRAMUSCULAR; INTRAVENOUS
OUTPATIENT
Start: 2025-04-23

## 2025-04-23 RX ORDER — ALBUTEROL SULFATE 90 UG/1
4 INHALANT RESPIRATORY (INHALATION) PRN
OUTPATIENT
Start: 2025-04-23

## 2025-04-23 RX ORDER — SODIUM CHLORIDE 0.9 % (FLUSH) 0.9 %
5-40 SYRINGE (ML) INJECTION PRN
Status: DISCONTINUED | OUTPATIENT
Start: 2025-04-23 | End: 2025-04-24 | Stop reason: HOSPADM

## 2025-04-23 RX ORDER — EPINEPHRINE 1 MG/ML
0.3 INJECTION, SOLUTION INTRAMUSCULAR; SUBCUTANEOUS PRN
OUTPATIENT
Start: 2025-04-23

## 2025-04-23 RX ORDER — SODIUM CHLORIDE 9 MG/ML
5-250 INJECTION, SOLUTION INTRAVENOUS PRN
Status: DISCONTINUED | OUTPATIENT
Start: 2025-04-23 | End: 2025-04-24 | Stop reason: HOSPADM

## 2025-04-23 RX ORDER — SODIUM CHLORIDE 9 MG/ML
25 INJECTION, SOLUTION INTRAVENOUS PRN
OUTPATIENT
Start: 2025-04-23

## 2025-04-23 RX ADMIN — SODIUM CHLORIDE, PRESERVATIVE FREE 30 ML: 5 INJECTION INTRAVENOUS at 09:26

## 2025-04-23 RX ADMIN — Medication 500 UNITS: at 12:29

## 2025-04-23 RX ADMIN — SODIUM CHLORIDE 1500 MG: 9 INJECTION, SOLUTION INTRAVENOUS at 10:44

## 2025-04-23 RX ADMIN — SODIUM CHLORIDE 20 ML/HR: 0.9 INJECTION, SOLUTION INTRAVENOUS at 09:48

## 2025-04-23 RX ADMIN — SODIUM CHLORIDE, PRESERVATIVE FREE 10 ML: 5 INJECTION INTRAVENOUS at 12:28

## 2025-04-23 NOTE — ONCOLOGY
Chemotherapy Administration    Pre-assessment Data: Antineoplastic Agents  See toxicity flow sheet for assessment                                          [x]         Interventions:   Chemotherapy SQ injection given []   Taxol administered-VS per protocol []   Blood pressure meds held 12 hours prior to Rituxan/Ruxience []   Rituxan/Ruxience administered- VS and precautions per guidelines []   Emergency drugs available as appropriate [x]   Anaphylaxis assessment completed [x]   Pre-medications administered as ordered []   Blood return noted upon initiation of chemotherapy [x]   Blood return noted each 1-2ml of a vesicant medication if given IV push []   Navelbine, Vincristine and Velban given as a monitored wide open drip, blood return noted before during and after infusion. []   Blood return noted each 2-3ml of a non-vesicant medication if given IV push []   Patient aware of potential Immunotherapy toxicities [x]   Monitor for signs / symptoms of hypersensitivity reaction [x]   Chemotherapy orders (drug/dose/rate) verified by 2 Chemo certified RN’s [x]   Monitor IV site and blood return throughout the infusion of the medication [x]   Document IV site checks on the IV assessment form [x]   Document chemotherapy teaching on the Patient Education tab [x]   Document patient verbalizes understanding of medications being administered [x]   If IV infiltration, see ONS Guidelines []   Other:     Imfinzi []

## 2025-04-23 NOTE — PLAN OF CARE
Problem: Discharge Planning  Goal: Discharge to home or other facility with appropriate resources  Outcome: Adequate for Discharge  Flowsheets (Taken 4/23/2025 1601)  Discharge to home or other facility with appropriate resources: Identify barriers to discharge with patient and caregiver     Problem: Safety - Adult  Goal: Free from fall injury  Outcome: Adequate for Discharge  Flowsheets (Taken 4/23/2025 1601)  Free From Fall Injury: Instruct family/caregiver on patient safety     Problem: Chronic Conditions and Co-morbidities  Goal: Patient's chronic conditions and co-morbidity symptoms are monitored and maintained or improved  Outcome: Adequate for Discharge  Flowsheets (Taken 4/23/2025 1601)  Care Plan - Patient's Chronic Conditions and Co-Morbidity Symptoms are Monitored and Maintained or Improved: Monitor and assess patient's chronic conditions and comorbid symptoms for stability, deterioration, or improvement  Note:   Chemotherapy Teaching     What is Chemotherapy   Drug action [x]   Method of Administration [x]   Handouts given []     Side Effects  Nausea/vomiting [x]   Diarrhea [x]   Fatigue [x]   Signs / Symptoms of infection [x]   Neutropenia [x]   Thrombocytopenia [x]   Alopecia [x]   neuropathy [x]   [place] diet &  the importance of fluids [x]       Micellaneous  Importance of nutrition [x]   Importance of oral hygiene [x]   When to call the MD [x]   Monitoring labs [x]   Use of supportive services []     Explanation of Drug Regimen / Frequency  Imfinzi     Comments  Verbalized understanding to drug,action,side effects and when to call MD         Problem: Infection - Adult  Goal: Absence of infection at discharge  Outcome: Adequate for Discharge  Flowsheets (Taken 4/23/2025 1601)  Absence of infection at discharge: Assess and monitor for signs and symptoms of infection  Note: Mediport site with no redness or warmth. Skin over port site intact with no signs of breakdown noted. Patient verbalizes

## 2025-04-23 NOTE — PROGRESS NOTES
New chemotherapy validation note:    Diagnosis for chemotherapy: NSCLS T2a N2 / Stage III disease     Regimen ordered: durvalumab 1500 mg q 4 weeks consolidation x 12 months       Reference or literature used for validation: NCCN       Date literature or guideline last updated 2/2025     Deviation from literature or guideline used: N/A    Summary of any verbal or telephone information obtained: N/A

## 2025-04-23 NOTE — PROGRESS NOTES
Patient tolerated Imfinzi without any complications.  Patient kept for 20 minutes observation post Imfinzi infusion. Denies dizziness, lightheadedness, acute nausea or vomiting, headache, heart palpitations, rash/itching or increased SOB.    Last vital signs  BP (!) 142/71   Pulse 85   Temp 97.7 °F (36.5 °C) (Oral)   Resp 18   Ht 1.626 m (5' 4\")   Wt 66.3 kg (146 lb 3.2 oz)   SpO2 97%   BMI 25.10 kg/m²     Patient instructed if they experience any of the above symptoms following today's visit, he/she is to notify the Physician or go to the Emergency Dept.    Discharge instructions given to patient, Verbalizes understanding. Ambulated off unit per self in stable condition with all belongings.

## 2025-04-23 NOTE — DISCHARGE INSTRUCTIONS
Please contact your Oncologist if you have any questions regarding the chemotherapy Imfinzi that you received today.      Patient instructed if experience any of the symptoms following today's chemotherapy / to notify MD immediately or go to emergency department.    * dizziness/lightheadedness  *acute nausea/vomiting - not relieved with medication  *headache - not relieved from Tylenol/pain medication  *chest pain/pressure  *rash/itching  *shortness of breath  *temperature of 100.4 or greater        Drink fluids - 48oz fluids daily  Call our office if you develop fever of 100.4 or greater/ chills/ signs or symptoms of infection

## 2025-05-02 ENCOUNTER — OFFICE VISIT (OUTPATIENT)
Dept: PULMONOLOGY | Age: 80
End: 2025-05-02
Payer: MEDICARE

## 2025-05-02 VITALS
TEMPERATURE: 98.2 F | OXYGEN SATURATION: 95 % | BODY MASS INDEX: 24.65 KG/M2 | HEIGHT: 64 IN | SYSTOLIC BLOOD PRESSURE: 112 MMHG | HEART RATE: 86 BPM | WEIGHT: 144.4 LBS | DIASTOLIC BLOOD PRESSURE: 70 MMHG

## 2025-05-02 DIAGNOSIS — J44.9 STAGE 3 SEVERE COPD BY GOLD CLASSIFICATION (HCC): Primary | ICD-10-CM

## 2025-05-02 PROCEDURE — 1159F MED LIST DOCD IN RCRD: CPT | Performed by: NURSE PRACTITIONER

## 2025-05-02 PROCEDURE — 1160F RVW MEDS BY RX/DR IN RCRD: CPT | Performed by: NURSE PRACTITIONER

## 2025-05-02 PROCEDURE — G8427 DOCREV CUR MEDS BY ELIG CLIN: HCPCS | Performed by: NURSE PRACTITIONER

## 2025-05-02 PROCEDURE — 3074F SYST BP LT 130 MM HG: CPT | Performed by: NURSE PRACTITIONER

## 2025-05-02 PROCEDURE — 99214 OFFICE O/P EST MOD 30 MIN: CPT | Performed by: NURSE PRACTITIONER

## 2025-05-02 PROCEDURE — 1123F ACP DISCUSS/DSCN MKR DOCD: CPT | Performed by: NURSE PRACTITIONER

## 2025-05-02 PROCEDURE — G8420 CALC BMI NORM PARAMETERS: HCPCS | Performed by: NURSE PRACTITIONER

## 2025-05-02 PROCEDURE — 3023F SPIROM DOC REV: CPT | Performed by: NURSE PRACTITIONER

## 2025-05-02 PROCEDURE — 1036F TOBACCO NON-USER: CPT | Performed by: NURSE PRACTITIONER

## 2025-05-02 PROCEDURE — 3078F DIAST BP <80 MM HG: CPT | Performed by: NURSE PRACTITIONER

## 2025-05-02 RX ORDER — IPRATROPIUM BROMIDE AND ALBUTEROL SULFATE 2.5; .5 MG/3ML; MG/3ML
1 SOLUTION RESPIRATORY (INHALATION) EVERY 4 HOURS
Qty: 360 ML | Refills: 11 | Status: SHIPPED | OUTPATIENT
Start: 2025-05-02

## 2025-05-02 NOTE — PROGRESS NOTES
[facility]     Patient: LANE MARCH, 79 y.o.   : 1945    Pt of Dr. Carter     Subjective     Chief Complaint   Patient presents with    Follow-up     3mo Nodule of Upper Lobe of Right Lung f/u w/CXR 25 and CT Chest 25.  Here to discuss results.  Notes he has been having increased issues.  Is having issues getting his medications for his breathing.  Is accompanied by his wife.        Interval history  He was recently evaluated by Dr. Chuck Correia MD on 2024.  Patient underwent CT scan of chest, CT scan of abdomen,pelvis and MRI scan of the brain as a part of follow-up of his colon cancer.  He was noted to have a New spiculated right upper lobe mass measures 30 x 50 mm.  He was sent to Dr Emma MD clinic for further evaluation for biopsy    Bronchoscopy with right upper lobe transbronchial biopsy and the EBUS from 4R and station 7 completed completed at [facility] by Dr Otto Colunga,    Following with [facility] radiation/ oncology department for Primary Malignant Neoplasm of Right upper Lobe Of Lung   Staging form: Lung, AJCC V9  - Clinical stage from 2024: Stage IIIB (cT2b, cN2b, cM0) - Signed by Maryellen Toney PA-C on 2025  History of Present Illness  The patient is an 79-year-old male who presents for evaluation of COPD and lung cancer.    He has been diagnosed with cancer, which has not metastasized. His treatment regimen includes radiation, chemotherapy, and immunotherapy, the latter of which was initiated 2 weeks ago. He reports persistent fatigue and shortness of breath, which he attributes to his medication.   He is currently using albuterol nebulizer solution, reports frustration with [facility] pharmacy with not dispensing appropriate amounts to last one month , also using  formoterol, which he was without for 2 weeks before receiving his last refill.   He felt better when he was able to take Duoneb   He also

## 2025-05-20 NOTE — PROGRESS NOTES
sure he has an annual follow-up.  Patient believes his next follow-up is January 2025     Other: Urology referral because of the suspicious lesion in the prostate found on the MRI of the pelvis done for rectal staging on 5/19.  Defer management to urology.    4) Symptom Management: Chronic shortness of breath and fatigue.      5) Supportive care provided.       Level of care is appropriate.         6) Treatment goal:      Treatment plan:    Concurrent chemoradiation as above.  Completed.    Start durvalumab monthly x 12  on 4/23/2025    7) Medications reviewed.   Prescriptions today: None            No orders of the defined types were placed in this encounter.       [place]ARRS:  Controlled Substance Monitoring:    Acute and Chronic Pain Monitoring:        No data to display                   8) Research Options:   Not applicable      9) Other:         10) Follow Up:  - Durvalumab month #2 due 5/21/2025  - Follow-up 1 month with nurse practitioner and month 3 of Durvalumab  - Follow-up 2 months with me 4 months #4 Jillian      ,    JANICE ZHAO MD

## 2025-05-21 ENCOUNTER — HOSPITAL ENCOUNTER (OUTPATIENT)
Dept: INFUSION THERAPY | Age: 80
Discharge: HOME | End: 2025-05-21
Payer: MEDICARE

## 2025-05-21 ENCOUNTER — OFFICE VISIT (OUTPATIENT)
Dept: ONCOLOGY | Age: 80
End: 2025-05-21
Payer: MEDICARE

## 2025-05-21 VITALS
OXYGEN SATURATION: 93 % | RESPIRATION RATE: 18 BRPM | HEART RATE: 72 BPM | TEMPERATURE: 98 F | HEIGHT: 64 IN | BODY MASS INDEX: 24.55 KG/M2 | WEIGHT: 143.8 LBS | SYSTOLIC BLOOD PRESSURE: 128 MMHG | DIASTOLIC BLOOD PRESSURE: 74 MMHG

## 2025-05-21 VITALS
SYSTOLIC BLOOD PRESSURE: 142 MMHG | TEMPERATURE: 97.8 F | WEIGHT: 143.8 LBS | BODY MASS INDEX: 24.55 KG/M2 | DIASTOLIC BLOOD PRESSURE: 72 MMHG | HEIGHT: 64 IN | OXYGEN SATURATION: 94 % | HEART RATE: 73 BPM | RESPIRATION RATE: 18 BRPM

## 2025-05-21 DIAGNOSIS — Z51.11 ENCOUNTER FOR CHEMOTHERAPY MANAGEMENT: ICD-10-CM

## 2025-05-21 DIAGNOSIS — C34.90 PRIMARY ADENOCARCINOMA OF LUNG, UNSPECIFIED LATERALITY (HCC): Primary | ICD-10-CM

## 2025-05-21 LAB
ALBUMIN SERPL BCG-MCNC: 3.8 G/DL (ref 3.4–4.9)
ALP SERPL-CCNC: 46 U/L (ref 40–129)
ALT SERPL W/O P-5'-P-CCNC: 20 U/L (ref 10–50)
AST SERPL-CCNC: 36 U/L (ref 10–50)
BASOPHILS ABSOLUTE: 0 THOU/MM3 (ref 0–0.1)
BASOPHILS NFR BLD AUTO: 0 % (ref 0–3)
BILIRUB CONJ SERPL-MCNC: < 0.1 MG/DL (ref 0–0.2)
BILIRUB SERPL-MCNC: 0.3 MG/DL (ref 0.3–1.2)
BUN BLDP-MCNC: 22 MG/DL (ref 8–26)
CHLORIDE BLD-SCNC: 101 MEQ/L (ref 98–109)
CREAT BLD-MCNC: 0.7 MG/DL (ref 0.5–1.2)
EOSINOPHIL NFR BLD AUTO: 3 % (ref 0–4)
EOSINOPHILS ABSOLUTE: 0.2 THOU/MM3 (ref 0–0.4)
ERYTHROCYTE [DISTWIDTH] IN BLOOD BY AUTOMATED COUNT: 14.5 % (ref 11.5–14.5)
GFR SERPL CREATININE-BSD FRML MDRD: > 90 ML/MIN/1.73M2
GLUCOSE BLD-MCNC: 104 MG/DL (ref 70–108)
HCT VFR BLD AUTO: 35.5 % (ref 42–52)
HGB BLD-MCNC: 11.6 GM/DL (ref 14–18)
IMMATURE GRANULOCYTES %: 0 %
IMMATURE GRANULOCYTES ABSOLUTE: 0.01 THOU/MM3 (ref 0–0.07)
IONIZED CALCIUM, WHOLE BLOOD: 1.31 MMOL/L (ref 1.12–1.32)
LYMPHOCYTES ABSOLUTE: 0.7 THOU/MM3 (ref 1–4.8)
LYMPHOCYTES NFR BLD AUTO: 12 % (ref 15–47)
MCH RBC QN AUTO: 32.1 PG (ref 26–33)
MCHC RBC AUTO-ENTMCNC: 32.7 GM/DL (ref 32.2–35.5)
MCV RBC AUTO: 98 FL (ref 80–94)
MONOCYTES ABSOLUTE: 0.7 THOU/MM3 (ref 0.4–1.3)
MONOCYTES NFR BLD AUTO: 11 % (ref 0–12)
NEUTROPHILS ABSOLUTE: 4.7 THOU/MM3 (ref 1.8–7.7)
NEUTROPHILS NFR BLD AUTO: 75 % (ref 43–75)
PLATELET # BLD AUTO: 168 THOU/MM3 (ref 130–400)
PMV BLD AUTO: 8.5 FL (ref 9.4–12.4)
POTASSIUM BLD-SCNC: 3.4 MEQ/L (ref 3.5–4.9)
PROT SERPL-MCNC: 6.3 G/DL (ref 6.4–8.3)
RBC # BLD AUTO: 3.61 MILL/MM3 (ref 4.7–6.1)
SODIUM BLD-SCNC: 141 MEQ/L (ref 138–146)
TOTAL CO2, WHOLE BLOOD: 33 MEQ/L (ref 23–33)
WBC # BLD AUTO: 6.3 THOU/MM3 (ref 4.8–10.8)

## 2025-05-21 PROCEDURE — G8420 CALC BMI NORM PARAMETERS: HCPCS | Performed by: INTERNAL MEDICINE

## 2025-05-21 PROCEDURE — 96413 CHEMO IV INFUSION 1 HR: CPT

## 2025-05-21 PROCEDURE — 2500000003 HC RX 250 WO HCPCS: Performed by: INTERNAL MEDICINE

## 2025-05-21 PROCEDURE — 3078F DIAST BP <80 MM HG: CPT | Performed by: INTERNAL MEDICINE

## 2025-05-21 PROCEDURE — G8428 CUR MEDS NOT DOCUMENT: HCPCS | Performed by: INTERNAL MEDICINE

## 2025-05-21 PROCEDURE — 1123F ACP DISCUSS/DSCN MKR DOCD: CPT | Performed by: INTERNAL MEDICINE

## 2025-05-21 PROCEDURE — 99213 OFFICE O/P EST LOW 20 MIN: CPT | Performed by: INTERNAL MEDICINE

## 2025-05-21 PROCEDURE — 2580000003 HC RX 258: Performed by: INTERNAL MEDICINE

## 2025-05-21 PROCEDURE — 85025 COMPLETE CBC W/AUTO DIFF WBC: CPT

## 2025-05-21 PROCEDURE — 1036F TOBACCO NON-USER: CPT | Performed by: INTERNAL MEDICINE

## 2025-05-21 PROCEDURE — 1159F MED LIST DOCD IN RCRD: CPT | Performed by: INTERNAL MEDICINE

## 2025-05-21 PROCEDURE — 6360000002 HC RX W HCPCS: Performed by: INTERNAL MEDICINE

## 2025-05-21 PROCEDURE — 80047 BASIC METABLC PNL IONIZED CA: CPT

## 2025-05-21 PROCEDURE — 99211 OFF/OP EST MAY X REQ PHY/QHP: CPT

## 2025-05-21 PROCEDURE — 3074F SYST BP LT 130 MM HG: CPT | Performed by: INTERNAL MEDICINE

## 2025-05-21 PROCEDURE — 80076 HEPATIC FUNCTION PANEL: CPT

## 2025-05-21 RX ORDER — FAMOTIDINE 10 MG/ML
20 INJECTION, SOLUTION INTRAVENOUS
Status: CANCELLED | OUTPATIENT
Start: 2025-05-21

## 2025-05-21 RX ORDER — SODIUM CHLORIDE 9 MG/ML
5-250 INJECTION, SOLUTION INTRAVENOUS PRN
Status: CANCELLED | OUTPATIENT
Start: 2025-05-21

## 2025-05-21 RX ORDER — EPINEPHRINE 1 MG/ML
0.3 INJECTION, SOLUTION, CONCENTRATE INTRAVENOUS PRN
Status: CANCELLED | OUTPATIENT
Start: 2025-05-21

## 2025-05-21 RX ORDER — SODIUM CHLORIDE 9 MG/ML
25 INJECTION, SOLUTION INTRAVENOUS PRN
OUTPATIENT
Start: 2025-05-21

## 2025-05-21 RX ORDER — SODIUM CHLORIDE 0.9 % (FLUSH) 0.9 %
5-40 SYRINGE (ML) INJECTION PRN
OUTPATIENT
Start: 2025-05-21

## 2025-05-21 RX ORDER — HYDROCORTISONE SODIUM SUCCINATE 100 MG/2ML
100 INJECTION INTRAMUSCULAR; INTRAVENOUS
Status: CANCELLED | OUTPATIENT
Start: 2025-05-21

## 2025-05-21 RX ORDER — HYDROCORTISONE SODIUM SUCCINATE 100 MG/2ML
100 INJECTION INTRAMUSCULAR; INTRAVENOUS
OUTPATIENT
Start: 2025-05-21

## 2025-05-21 RX ORDER — ONDANSETRON 2 MG/ML
8 INJECTION INTRAMUSCULAR; INTRAVENOUS
OUTPATIENT
Start: 2025-05-21

## 2025-05-21 RX ORDER — HEPARIN SODIUM (PORCINE) LOCK FLUSH IV SOLN 100 UNIT/ML 100 UNIT/ML
500 SOLUTION INTRAVENOUS PRN
Status: CANCELLED | OUTPATIENT
Start: 2025-05-21

## 2025-05-21 RX ORDER — EPINEPHRINE 1 MG/ML
0.3 INJECTION, SOLUTION INTRAMUSCULAR; SUBCUTANEOUS PRN
OUTPATIENT
Start: 2025-05-21

## 2025-05-21 RX ORDER — SODIUM CHLORIDE 0.9 % (FLUSH) 0.9 %
5-40 SYRINGE (ML) INJECTION PRN
Status: DISCONTINUED | OUTPATIENT
Start: 2025-05-21 | End: 2025-05-22 | Stop reason: HOSPADM

## 2025-05-21 RX ORDER — SODIUM CHLORIDE 0.9 % (FLUSH) 0.9 %
5-40 SYRINGE (ML) INJECTION PRN
Status: CANCELLED | OUTPATIENT
Start: 2025-05-21

## 2025-05-21 RX ORDER — ACETAMINOPHEN 325 MG/1
650 TABLET ORAL
Status: CANCELLED | OUTPATIENT
Start: 2025-05-21

## 2025-05-21 RX ORDER — DIPHENHYDRAMINE HYDROCHLORIDE 50 MG/ML
50 INJECTION, SOLUTION INTRAMUSCULAR; INTRAVENOUS
OUTPATIENT
Start: 2025-05-21

## 2025-05-21 RX ORDER — ONDANSETRON 2 MG/ML
8 INJECTION INTRAMUSCULAR; INTRAVENOUS
Status: CANCELLED | OUTPATIENT
Start: 2025-05-21

## 2025-05-21 RX ORDER — HEPARIN 100 UNIT/ML
500 SYRINGE INTRAVENOUS PRN
Status: DISCONTINUED | OUTPATIENT
Start: 2025-05-21 | End: 2025-05-22 | Stop reason: HOSPADM

## 2025-05-21 RX ORDER — HEPARIN 100 UNIT/ML
500 SYRINGE INTRAVENOUS PRN
OUTPATIENT
Start: 2025-05-21

## 2025-05-21 RX ORDER — SODIUM CHLORIDE 9 MG/ML
5-250 INJECTION, SOLUTION INTRAVENOUS PRN
Status: DISCONTINUED | OUTPATIENT
Start: 2025-05-21 | End: 2025-05-22 | Stop reason: HOSPADM

## 2025-05-21 RX ORDER — ACETAMINOPHEN 325 MG/1
650 TABLET ORAL
OUTPATIENT
Start: 2025-05-21

## 2025-05-21 RX ORDER — ALBUTEROL SULFATE 90 UG/1
4 INHALANT RESPIRATORY (INHALATION) PRN
Status: CANCELLED | OUTPATIENT
Start: 2025-05-21

## 2025-05-21 RX ORDER — SODIUM CHLORIDE 9 MG/ML
INJECTION, SOLUTION INTRAVENOUS CONTINUOUS
OUTPATIENT
Start: 2025-05-21

## 2025-05-21 RX ORDER — SODIUM CHLORIDE 9 MG/ML
INJECTION, SOLUTION INTRAVENOUS CONTINUOUS
Status: CANCELLED | OUTPATIENT
Start: 2025-05-21

## 2025-05-21 RX ORDER — ALBUTEROL SULFATE 90 UG/1
4 INHALANT RESPIRATORY (INHALATION) PRN
OUTPATIENT
Start: 2025-05-21

## 2025-05-21 RX ORDER — DIPHENHYDRAMINE HYDROCHLORIDE 50 MG/ML
50 INJECTION, SOLUTION INTRAMUSCULAR; INTRAVENOUS
Status: CANCELLED | OUTPATIENT
Start: 2025-05-21

## 2025-05-21 RX ORDER — SODIUM CHLORIDE 9 MG/ML
25 INJECTION, SOLUTION INTRAVENOUS PRN
Status: DISCONTINUED | OUTPATIENT
Start: 2025-05-21 | End: 2025-05-22 | Stop reason: HOSPADM

## 2025-05-21 RX ORDER — MEPERIDINE HYDROCHLORIDE 50 MG/ML
12.5 INJECTION INTRAMUSCULAR; INTRAVENOUS; SUBCUTANEOUS PRN
Status: CANCELLED | OUTPATIENT
Start: 2025-05-21

## 2025-05-21 RX ADMIN — DURVALUMAB 1500 MG: 500 INJECTION, SOLUTION INTRAVENOUS at 09:17

## 2025-05-21 RX ADMIN — SODIUM CHLORIDE 20 ML/HR: 9 INJECTION, SOLUTION INTRAVENOUS at 08:49

## 2025-05-21 RX ADMIN — SODIUM CHLORIDE, PRESERVATIVE FREE 20 ML: 5 INJECTION INTRAVENOUS at 08:15

## 2025-05-21 RX ADMIN — SODIUM CHLORIDE, PRESERVATIVE FREE 10 ML: 5 INJECTION INTRAVENOUS at 10:18

## 2025-05-21 RX ADMIN — HEPARIN 500 UNITS: 100 SYRINGE at 10:18

## 2025-05-21 NOTE — DISCHARGE INSTRUCTIONS
Please contact your Oncologist if you have any questions regarding the imfinzi that you received today.      Please call if you experience any of the the following symptoms after today's therapy / notify MD immediately or go to the Emergency Department.    *dizziness/lightheadedness  *acute nausea/vomiting - not relieved with medication  *headache - not relieved from Tylenol/pain medication  *chest pain/pressure  *rash/itching  *shortness of breath    Drink fluids - 48-64 ounces of fluids daily.    Please call or go to the Emergency Department if you develop a fever of 100.4 F or greater, chills and/or signs or symptoms of an infection or you are unable to drink fluids.                 None

## 2025-05-21 NOTE — ONCOLOGY
Chemotherapy Administration    Pre-assessment Data: Antineoplastic Agents  See toxicity flow sheet for assessment                                          [x]         Interventions:   Chemotherapy SQ injection given []   Taxol administered-VS per protocol []   Blood pressure meds held 12 hours prior to Rituxan/Ruxience []   Rituxan/Ruxience administered- VS and precautions per guidelines []   Emergency drugs available as appropriate [x]   Anaphylaxis assessment completed [x]   Pre-medications administered as ordered [x]   Blood return noted upon initiation of chemotherapy [x]   Blood return noted each 1-2ml of a vesicant medication if given IV push []   Navelbine, Vincristine and Velban given as a monitored wide open drip, blood return noted before during and after infusion. []   Blood return noted each 2-3ml of a non-vesicant medication if given IV push []   Patient aware of potential Immunotherapy toxicities []   Monitor for signs / symptoms of hypersensitivity reaction [x]   Chemotherapy orders (drug/dose/rate) verified by 2 Chemo certified RN’s [x]   Monitor IV site and blood return throughout the infusion of the medication [x]   Document IV site checks on the IV assessment form [x]   Document chemotherapy teaching on the Patient Education tab [x]   Document patient verbalizes understanding of medications being administered [x]   If IV infiltration, see ONS Guidelines []   Other:     Imfinzi [x]

## 2025-05-21 NOTE — PATIENT INSTRUCTIONS
- Durvalumab month #2 due 5/21/2025  - Follow-up 1 month with nurse practitioner and month 3 of Durvalumab  - Follow-up 2 months with me 4 months #4 Durvalumab

## 2025-05-21 NOTE — PROGRESS NOTES
Patient tolerated Imfinzi without any complications.    Patient kept for 20 minute observation post infusion chemotherapy. Denies dizziness, lightheadedness, acute nausea or vomiting, headache, heart palpitations, rash/itching or increased SOB.     Last vital signs:   BP (!) 142/72   Pulse 73   Temp 97.8 °F (36.6 °C) (Oral)   Resp 18   Ht 1.626 m (5' 4\")   Wt 65.2 kg (143 lb 12.8 oz)   SpO2 94%   BMI 24.68 kg/m²     Patient instructed if they experience any of the above symptoms following today's visit, he/she is to notify the physician immediately or go to the Emergency Department.    Patient educated on monitoring temperatures at home daily and to call physician or go to the Emergency Department for temperatures of 100.4 F or greater.    Discharge instructions given to patient. Verbalizes understanding. Ambulated off unit per self in stable condition with belongings.

## 2025-05-21 NOTE — PLAN OF CARE
Problem: Discharge Planning  Goal: Discharge to home or other facility with appropriate resources  Outcome: Adequate for Discharge  Flowsheets (Taken 5/21/2025 0823)  Discharge to home or other facility with appropriate resources:   Identify barriers to discharge with patient and caregiver   Identify discharge learning needs (meds, wound care, etc)     Problem: Safety - Adult  Goal: Free from fall injury  Outcome: Adequate for Discharge  Flowsheets (Taken 5/21/2025 0823)  Free From Fall Injury: Instruct family/caregiver on patient safety     Problem: Chronic Conditions and Co-morbidities  Goal: Patient's chronic conditions and co-morbidity symptoms are monitored and maintained or improved  Outcome: Adequate for Discharge  Flowsheets (Taken 5/21/2025 0823)  Care Plan - Patient's Chronic Conditions and Co-Morbidity Symptoms are Monitored and Maintained or Improved:   Monitor and assess patient's chronic conditions and comorbid symptoms for stability, deterioration, or improvement   Collaborate with multidisciplinary team to address chronic and comorbid conditions and prevent exacerbation or deterioration     Problem: Infection - Adult  Goal: Absence of infection at discharge  Outcome: Adequate for Discharge  Flowsheets (Taken 5/21/2025 0823)  Absence of infection at discharge:   Assess and monitor for signs and symptoms of infection   Monitor lab/diagnostic results  Note: Mediport site with no redness or warmth. Skin over port site intact with no signs of breakdown noted. Patient verbalizes signs/symptoms of port infection and when to notify the physician.    Care plan reviewed with patient.  Patient  verbalize understanding of the plan of care and contribute to goal setting.

## 2025-06-16 DIAGNOSIS — Z92.241 STEROID-DEPENDENT COPD (HCC): ICD-10-CM

## 2025-06-16 DIAGNOSIS — J44.9 STEROID-DEPENDENT COPD (HCC): ICD-10-CM

## 2025-06-17 DIAGNOSIS — C34.90 PRIMARY ADENOCARCINOMA OF LUNG, UNSPECIFIED LATERALITY (HCC): Primary | ICD-10-CM

## 2025-06-18 RX ORDER — ALBUTEROL SULFATE 0.83 MG/ML
SOLUTION RESPIRATORY (INHALATION)
Qty: 30 EACH | Refills: 11 | Status: SHIPPED | OUTPATIENT
Start: 2025-06-18

## 2025-06-18 RX ORDER — BUDESONIDE 0.5 MG/2ML
INHALANT ORAL
Qty: 60 EACH | Refills: 11 | Status: SHIPPED | OUTPATIENT
Start: 2025-06-18

## 2025-06-18 NOTE — TELEPHONE ENCOUNTER
Received refill request for Pulmicort & Albuterol Nebulizer Solution.   Medication was last ordered by Yuli.   Medication was last ordered on 08/14/24 with 11 / 5 refills.     Patient was last seen in the office 05/02/2025.   Does patient have a scheduled follow up?: yes - 09/05/2025    Medication needs to be sent to [facility].     Thank you, please advise!    Patient's Allergies:  Allergies   Allergen Reactions    Cefpodoxime Diarrhea    Flonase [Fluticasone Propionate]      Pt shakes and feels anxious    Mometasone     Mometasone Furoate      Shakes,feels anxious    Spiriva Handihaler [Tiotropium Bromide Monohydrate]     Umeclidinium Bromide

## 2025-06-20 NOTE — PROGRESS NOTES
[facility] [place] [id number] [facility] [address], [address]  [facility] [address]  Dept: [phone]  Dept Fax: [phone] Loc: [phone]      Visit Date:6/23/2025     Yogesh Solano is a 79 y.o. male who presents today for:   Chief Complaint   Patient presents with    Follow-up     Primary adenocarcinoma of lung, unspecified laterality (HCC)        HPI:   Yogesh Solano is a 79 y.o. male who follows in our office with lung adenocarcinoma.  HPI per prior note in our office:  Yogesh is a relatively healthy 76-year-old gentleman with a prior history of polyps but no family history of colon cancer. He underwent a routine screening colonoscopy on 4/18/2022. He is asymptomatic with no observation of bright red blood per rectum, melena or difficulty passing stool. The colonoscopy revealed a large 2 to 3 cm mass in the rectum at 5% 5 to 7 cm from the anus. It was, incompletely removed by cold snare polypectomy. Pathology revealed a tubular adenoma with high-grade dysplasia. MRI of the abdomen with contrast 4/20/2022 ordered by another provider showed sludge versus small stones within the gallbladder. Stable benign subcentimeter nonenhancing simple hepatic cysts. On 4/25/2022 he underwent a staging rectal ultrasound by Dr. Parks. Findings included 2 cm rectal lesion at 6 cm from the anal verge. EUS staging T1 N0 MX.      DIAGNOSIS:  -Rectal Rectum, mass biopsy: Tubular adenoma with high-grade dysplasia; cannot exclude malignancy.  Transanal reexcision with Dr. Brennan at [facility]  7/18/22 shows no residual malignancy.  Local EUS staging T1N0.  Local MRI staging-T2N0  -Patient had endoscopy December 2022 that found a more proximal rectal cancer.  Taken to the OR by Dr. Brennan at [facility] 1/30/2023 due to the mid to distal rectal tumor underwent open low anterior resection with colorectal anastomosis.  Leak test was positive therefore had to undergo a diverting loop ileostomy which was eventually reversed in June.  Path

## 2025-06-23 ENCOUNTER — HOSPITAL ENCOUNTER (OUTPATIENT)
Dept: INFUSION THERAPY | Age: 80
Discharge: HOME | End: 2025-06-23
Payer: MEDICARE

## 2025-06-23 ENCOUNTER — OFFICE VISIT (OUTPATIENT)
Dept: ONCOLOGY | Age: 80
End: 2025-06-23
Payer: MEDICARE

## 2025-06-23 VITALS
TEMPERATURE: 97.8 F | DIASTOLIC BLOOD PRESSURE: 79 MMHG | OXYGEN SATURATION: 94 % | RESPIRATION RATE: 18 BRPM | HEIGHT: 64 IN | BODY MASS INDEX: 24.69 KG/M2 | HEART RATE: 89 BPM | SYSTOLIC BLOOD PRESSURE: 187 MMHG | WEIGHT: 144.6 LBS

## 2025-06-23 VITALS
BODY MASS INDEX: 24.69 KG/M2 | HEART RATE: 94 BPM | SYSTOLIC BLOOD PRESSURE: 179 MMHG | TEMPERATURE: 98.3 F | OXYGEN SATURATION: 92 % | RESPIRATION RATE: 18 BRPM | DIASTOLIC BLOOD PRESSURE: 87 MMHG | WEIGHT: 144.6 LBS | HEIGHT: 64 IN

## 2025-06-23 DIAGNOSIS — D64.9 ANEMIA, UNSPECIFIED TYPE: Primary | ICD-10-CM

## 2025-06-23 DIAGNOSIS — Z51.11 ENCOUNTER FOR CHEMOTHERAPY MANAGEMENT: ICD-10-CM

## 2025-06-23 DIAGNOSIS — E87.6 HYPOKALEMIA: ICD-10-CM

## 2025-06-23 DIAGNOSIS — D64.9 ANEMIA, UNSPECIFIED TYPE: ICD-10-CM

## 2025-06-23 DIAGNOSIS — C34.90 PRIMARY ADENOCARCINOMA OF LUNG, UNSPECIFIED LATERALITY (HCC): ICD-10-CM

## 2025-06-23 DIAGNOSIS — C34.90 PRIMARY ADENOCARCINOMA OF LUNG, UNSPECIFIED LATERALITY (HCC): Primary | ICD-10-CM

## 2025-06-23 DIAGNOSIS — I10 HYPERTENSION, UNSPECIFIED TYPE: ICD-10-CM

## 2025-06-23 LAB
ALBUMIN SERPL BCG-MCNC: 3.6 G/DL (ref 3.4–4.9)
ALP SERPL-CCNC: 48 U/L (ref 40–129)
ALT SERPL W/O P-5'-P-CCNC: 14 U/L (ref 10–50)
AST SERPL-CCNC: 18 U/L (ref 10–50)
BASOPHILS ABSOLUTE: 0 THOU/MM3 (ref 0–0.1)
BASOPHILS NFR BLD AUTO: 0 % (ref 0–3)
BILIRUB CONJ SERPL-MCNC: < 0.1 MG/DL (ref 0–0.2)
BILIRUB SERPL-MCNC: 0.2 MG/DL (ref 0.3–1.2)
BUN BLDP-MCNC: 23 MG/DL (ref 8–26)
CHLORIDE BLD-SCNC: 104 MEQ/L (ref 98–109)
CREAT BLD-MCNC: 0.7 MG/DL (ref 0.5–1.2)
EOSINOPHIL NFR BLD AUTO: 2 % (ref 0–4)
EOSINOPHILS ABSOLUTE: 0.1 THOU/MM3 (ref 0–0.4)
ERYTHROCYTE [DISTWIDTH] IN BLOOD BY AUTOMATED COUNT: 13.2 % (ref 11.5–14.5)
FERRITIN SERPL IA-MCNC: 56 NG/ML (ref 30–400)
FOLATE SERPL-MCNC: > 20 NG/ML (ref 4.6–34.8)
GFR SERPL CREATININE-BSD FRML MDRD: > 90 ML/MIN/1.73M2
GLUCOSE BLD-MCNC: 94 MG/DL (ref 70–108)
HCT VFR BLD AUTO: 36.9 % (ref 42–52)
HGB BLD-MCNC: 12 GM/DL (ref 14–18)
IMMATURE GRANULOCYTES %: 0 %
IMMATURE GRANULOCYTES ABSOLUTE: 0.01 THOU/MM3 (ref 0–0.07)
IONIZED CALCIUM, WHOLE BLOOD: 1.21 MMOL/L (ref 1.12–1.32)
IRON SATN MFR SERPL: 20 % (ref 20–50)
IRON SERPL-MCNC: 49 UG/DL (ref 61–157)
LYMPHOCYTES ABSOLUTE: 0.8 THOU/MM3 (ref 1–4.8)
LYMPHOCYTES NFR BLD AUTO: 14 % (ref 15–47)
MCH RBC QN AUTO: 31.3 PG (ref 26–33)
MCHC RBC AUTO-ENTMCNC: 32.5 GM/DL (ref 32.2–35.5)
MCV RBC AUTO: 96 FL (ref 80–94)
MONOCYTES ABSOLUTE: 0.7 THOU/MM3 (ref 0.4–1.3)
MONOCYTES NFR BLD AUTO: 12 % (ref 0–12)
NEUTROPHILS ABSOLUTE: 4.3 THOU/MM3 (ref 1.8–7.7)
NEUTROPHILS NFR BLD AUTO: 72 % (ref 43–75)
PLATELET # BLD AUTO: 167 THOU/MM3 (ref 130–400)
PMV BLD AUTO: 8.8 FL (ref 9.4–12.4)
POTASSIUM BLD-SCNC: 3.1 MEQ/L (ref 3.5–4.9)
PROT SERPL-MCNC: 6.2 G/DL (ref 6.4–8.3)
RBC # BLD AUTO: 3.84 MILL/MM3 (ref 4.7–6.1)
SODIUM BLD-SCNC: 143 MEQ/L (ref 138–146)
TIBC SERPL-MCNC: 249 UG/DL (ref 171–450)
TOTAL CO2, WHOLE BLOOD: 30 MEQ/L (ref 23–33)
TSH SERPL DL<=0.05 MIU/L-ACNC: 1.61 UIU/ML (ref 0.27–4.2)
VIT B12 SERPL-MCNC: 339 PG/ML (ref 232–1245)
WBC # BLD AUTO: 6 THOU/MM3 (ref 4.8–10.8)

## 2025-06-23 PROCEDURE — 85025 COMPLETE CBC W/AUTO DIFF WBC: CPT

## 2025-06-23 PROCEDURE — 80047 BASIC METABLC PNL IONIZED CA: CPT

## 2025-06-23 PROCEDURE — 83540 ASSAY OF IRON: CPT

## 2025-06-23 PROCEDURE — 1125F AMNT PAIN NOTED PAIN PRSNT: CPT | Performed by: NURSE PRACTITIONER

## 2025-06-23 PROCEDURE — G8420 CALC BMI NORM PARAMETERS: HCPCS | Performed by: NURSE PRACTITIONER

## 2025-06-23 PROCEDURE — 1123F ACP DISCUSS/DSCN MKR DOCD: CPT | Performed by: NURSE PRACTITIONER

## 2025-06-23 PROCEDURE — 82746 ASSAY OF FOLIC ACID SERUM: CPT

## 2025-06-23 PROCEDURE — 6360000002 HC RX W HCPCS: Performed by: INTERNAL MEDICINE

## 2025-06-23 PROCEDURE — 96413 CHEMO IV INFUSION 1 HR: CPT

## 2025-06-23 PROCEDURE — 80076 HEPATIC FUNCTION PANEL: CPT

## 2025-06-23 PROCEDURE — 2580000003 HC RX 258: Performed by: INTERNAL MEDICINE

## 2025-06-23 PROCEDURE — 99214 OFFICE O/P EST MOD 30 MIN: CPT | Performed by: NURSE PRACTITIONER

## 2025-06-23 PROCEDURE — 1159F MED LIST DOCD IN RCRD: CPT | Performed by: NURSE PRACTITIONER

## 2025-06-23 PROCEDURE — 1160F RVW MEDS BY RX/DR IN RCRD: CPT | Performed by: NURSE PRACTITIONER

## 2025-06-23 PROCEDURE — 82607 VITAMIN B-12: CPT

## 2025-06-23 PROCEDURE — 3079F DIAST BP 80-89 MM HG: CPT | Performed by: NURSE PRACTITIONER

## 2025-06-23 PROCEDURE — 2500000003 HC RX 250 WO HCPCS: Performed by: INTERNAL MEDICINE

## 2025-06-23 PROCEDURE — 6370000000 HC RX 637 (ALT 250 FOR IP): Performed by: NURSE PRACTITIONER

## 2025-06-23 PROCEDURE — G8427 DOCREV CUR MEDS BY ELIG CLIN: HCPCS | Performed by: NURSE PRACTITIONER

## 2025-06-23 PROCEDURE — 83550 IRON BINDING TEST: CPT

## 2025-06-23 PROCEDURE — 1036F TOBACCO NON-USER: CPT | Performed by: NURSE PRACTITIONER

## 2025-06-23 PROCEDURE — 3077F SYST BP >= 140 MM HG: CPT | Performed by: NURSE PRACTITIONER

## 2025-06-23 PROCEDURE — 82728 ASSAY OF FERRITIN: CPT

## 2025-06-23 PROCEDURE — 84443 ASSAY THYROID STIM HORMONE: CPT

## 2025-06-23 PROCEDURE — 99211 OFF/OP EST MAY X REQ PHY/QHP: CPT

## 2025-06-23 RX ORDER — EPINEPHRINE 1 MG/ML
0.3 INJECTION, SOLUTION INTRAMUSCULAR; SUBCUTANEOUS PRN
Status: CANCELLED | OUTPATIENT
Start: 2025-06-23

## 2025-06-23 RX ORDER — POTASSIUM CHLORIDE 1500 MG/1
40 TABLET, EXTENDED RELEASE ORAL DAILY
Qty: 30 TABLET | Refills: 0 | Status: SHIPPED | OUTPATIENT
Start: 2025-06-23

## 2025-06-23 RX ORDER — ONDANSETRON 2 MG/ML
8 INJECTION INTRAMUSCULAR; INTRAVENOUS
Status: CANCELLED | OUTPATIENT
Start: 2025-06-23

## 2025-06-23 RX ORDER — ALBUTEROL SULFATE 90 UG/1
4 INHALANT RESPIRATORY (INHALATION) PRN
Status: CANCELLED | OUTPATIENT
Start: 2025-06-23

## 2025-06-23 RX ORDER — EPINEPHRINE 1 MG/ML
0.3 INJECTION, SOLUTION, CONCENTRATE INTRAVENOUS PRN
Status: CANCELLED | OUTPATIENT
Start: 2025-06-23

## 2025-06-23 RX ORDER — DIPHENHYDRAMINE HYDROCHLORIDE 50 MG/ML
50 INJECTION, SOLUTION INTRAMUSCULAR; INTRAVENOUS
Status: CANCELLED | OUTPATIENT
Start: 2025-06-23

## 2025-06-23 RX ORDER — SODIUM CHLORIDE 0.9 % (FLUSH) 0.9 %
5-40 SYRINGE (ML) INJECTION PRN
Status: CANCELLED | OUTPATIENT
Start: 2025-06-23

## 2025-06-23 RX ORDER — HYDROCORTISONE SODIUM SUCCINATE 100 MG/2ML
100 INJECTION INTRAMUSCULAR; INTRAVENOUS
Status: CANCELLED | OUTPATIENT
Start: 2025-06-23

## 2025-06-23 RX ORDER — ACETAMINOPHEN 325 MG/1
650 TABLET ORAL
Status: CANCELLED | OUTPATIENT
Start: 2025-06-23

## 2025-06-23 RX ORDER — HEPARIN 100 UNIT/ML
500 SYRINGE INTRAVENOUS PRN
Status: DISCONTINUED | OUTPATIENT
Start: 2025-06-23 | End: 2025-06-24 | Stop reason: HOSPADM

## 2025-06-23 RX ORDER — HEPARIN SODIUM (PORCINE) LOCK FLUSH IV SOLN 100 UNIT/ML 100 UNIT/ML
500 SOLUTION INTRAVENOUS PRN
Status: CANCELLED | OUTPATIENT
Start: 2025-06-23

## 2025-06-23 RX ORDER — SODIUM CHLORIDE 9 MG/ML
25 INJECTION, SOLUTION INTRAVENOUS PRN
Status: CANCELLED | OUTPATIENT
Start: 2025-06-23

## 2025-06-23 RX ORDER — SODIUM CHLORIDE 9 MG/ML
5-250 INJECTION, SOLUTION INTRAVENOUS PRN
Status: CANCELLED | OUTPATIENT
Start: 2025-06-23

## 2025-06-23 RX ORDER — SODIUM CHLORIDE 9 MG/ML
INJECTION, SOLUTION INTRAVENOUS CONTINUOUS
Status: CANCELLED | OUTPATIENT
Start: 2025-06-23

## 2025-06-23 RX ORDER — SODIUM CHLORIDE 0.9 % (FLUSH) 0.9 %
5-40 SYRINGE (ML) INJECTION PRN
Status: DISCONTINUED | OUTPATIENT
Start: 2025-06-23 | End: 2025-06-24 | Stop reason: HOSPADM

## 2025-06-23 RX ORDER — FAMOTIDINE 10 MG/ML
20 INJECTION, SOLUTION INTRAVENOUS
Status: CANCELLED | OUTPATIENT
Start: 2025-06-23

## 2025-06-23 RX ORDER — HEPARIN 100 UNIT/ML
500 SYRINGE INTRAVENOUS PRN
Status: CANCELLED | OUTPATIENT
Start: 2025-06-23

## 2025-06-23 RX ORDER — SODIUM CHLORIDE 9 MG/ML
5-250 INJECTION, SOLUTION INTRAVENOUS PRN
Status: DISCONTINUED | OUTPATIENT
Start: 2025-06-23 | End: 2025-06-24 | Stop reason: HOSPADM

## 2025-06-23 RX ORDER — POTASSIUM CHLORIDE 1500 MG/1
40 TABLET, EXTENDED RELEASE ORAL ONCE
Status: COMPLETED | OUTPATIENT
Start: 2025-06-23 | End: 2025-06-23

## 2025-06-23 RX ORDER — FERROUS SULFATE 325(65) MG
325 TABLET ORAL
Qty: 30 TABLET | Refills: 1 | Status: SHIPPED | OUTPATIENT
Start: 2025-06-23

## 2025-06-23 RX ORDER — MEPERIDINE HYDROCHLORIDE 50 MG/ML
12.5 INJECTION INTRAMUSCULAR; INTRAVENOUS; SUBCUTANEOUS PRN
Status: CANCELLED | OUTPATIENT
Start: 2025-06-23

## 2025-06-23 RX ADMIN — SODIUM CHLORIDE, PRESERVATIVE FREE 20 ML: 5 INJECTION INTRAVENOUS at 10:47

## 2025-06-23 RX ADMIN — SODIUM CHLORIDE 25 ML/HR: 0.9 INJECTION, SOLUTION INTRAVENOUS at 09:25

## 2025-06-23 RX ADMIN — DURVALUMAB 1500 MG: 500 INJECTION, SOLUTION INTRAVENOUS at 09:41

## 2025-06-23 RX ADMIN — SODIUM CHLORIDE, PRESERVATIVE FREE 30 ML: 5 INJECTION INTRAVENOUS at 08:35

## 2025-06-23 RX ADMIN — POTASSIUM CHLORIDE 40 MEQ: 1500 TABLET, EXTENDED RELEASE ORAL at 09:23

## 2025-06-23 RX ADMIN — HEPARIN 500 UNITS: 100 SYRINGE at 10:47

## 2025-06-23 NOTE — ONCOLOGY
Chemotherapy Administration    Pre-assessment Data: Antineoplastic Agents  See toxicity flow sheet for assessment                                          [x]         Interventions:   Chemotherapy SQ injection given []   Taxol administered-VS per protocol []   Blood pressure meds held 12 hours prior to Rituxan/Ruxience []   Rituxan/Ruxience administered- VS and precautions per guidelines []   Emergency drugs available as appropriate [x]   Anaphylaxis assessment completed [x]   Pre-medications administered as ordered [x]   Blood return noted upon initiation of chemotherapy [x]   Blood return noted each 1-2ml of a vesicant medication if given IV push []   Navelbine, Vincristine and Velban given as a monitored wide open drip, blood return noted before during and after infusion. []   Blood return noted each 2-3ml of a non-vesicant medication if given IV push []   Patient aware of potential Immunotherapy toxicities []   Monitor for signs / symptoms of hypersensitivity reaction [x]   Chemotherapy orders (drug/dose/rate) verified by 2 Chemo certified RN’s [x]   Monitor IV site and blood return throughout the infusion of the medication [x]   Document IV site checks on the IV assessment form [x]   Document chemotherapy teaching on the Patient Education tab [x]   Document patient verbalizes understanding of medications being administered [x]   If IV infiltration, see ONS Guidelines [x]   Other:     Imfinzi []

## 2025-06-23 NOTE — PROGRESS NOTES
Patient tolerated Imfinzi without any complications.  Denies dizziness, lightheadedness, acute nausea or vomiting, headache, heart palpitations, rash/itching or increased SOB.    Last vital signs  BP (!) 187/79   Pulse 89   Temp 97.8 °F (36.6 °C) (Oral)   Resp 18   Ht 1.626 m (5' 4.02\")   Wt 65.6 kg (144 lb 9.6 oz)   SpO2 94%   BMI 24.81 kg/m²     Patient instructed if they experience any of the above symptoms following today's visit, he/she is to notify the Physician or go to the Emergency Dept.    Discharge instructions given to patient, Verbalizes understanding. Ambulated off unit per self in stable condition with all belongings.

## 2025-06-23 NOTE — PLAN OF CARE
Problem: Discharge Planning  Goal: Discharge to home or other facility with appropriate resources  Outcome: Adequate for Discharge  Flowsheets (Taken 6/23/2025 0958)  Discharge to home or other facility with appropriate resources:   Identify barriers to discharge with patient and caregiver   Identify discharge learning needs (meds, wound care, etc)     Problem: Safety - Adult  Goal: Free from fall injury  Outcome: Adequate for Discharge  Flowsheets (Taken 6/23/2025 0958)  Free From Fall Injury: Instruct family/caregiver on patient safety     Problem: Chronic Conditions and Co-morbidities  Goal: Patient's chronic conditions and co-morbidity symptoms are monitored and maintained or improved  Outcome: Adequate for Discharge  Flowsheets (Taken 6/23/2025 0958)  Care Plan - Patient's Chronic Conditions and Co-Morbidity Symptoms are Monitored and Maintained or Improved: Monitor and assess patient's chronic conditions and comorbid symptoms for stability, deterioration, or improvement  Note:   Chemotherapy Teaching     What is Chemotherapy   Drug action [x]   Method of Administration [x]   Handouts given []     Side Effects  Nausea/vomiting [x]   Diarrhea [x]   Fatigue [x]   Signs / Symptoms of infection [x]   Neutropenia [x]   Thrombocytopenia [x]   Alopecia [x]   neuropathy [x]   [place] diet &  the importance of fluids [x]       Micellaneous  Importance of nutrition [x]   Importance of oral hygiene [x]   When to call the MD [x]   Monitoring labs [x]   Use of supportive services []     Explanation of Drug Regimen / Frequency  Imfinzi     Comments  Verbalized understanding to drug,action,side effects and when to call MD         Problem: Infection - Adult  Goal: Absence of infection at discharge  Outcome: Adequate for Discharge  Flowsheets (Taken 6/23/2025 0958)  Absence of infection at discharge: Assess and monitor for signs and symptoms of infection  Note: Mediport site with no redness or warmth. Skin over port site intact

## 2025-06-23 NOTE — PATIENT INSTRUCTIONS
OK for treatment today   Return to clinic as scheduled to see Dr. Correia with labs   Treatment in 1 month   Start taking potassium tablets.     Return to clinic in 1 week for nurse eval + labs to recheck K level.    No

## 2025-06-23 NOTE — DISCHARGE INSTRUCTIONS
Please contact your Oncologist if you have any questions regarding the Imfinzi that you received today.      Please call if you experience any of the the following symptoms after today's therapy / notify MD immediately or go to the Emergency Department.    *dizziness/lightheadedness  *acute nausea/vomiting - not relieved with medication  *headache - not relieved from Tylenol/pain medication  *chest pain/pressure  *rash/itching  *shortness of breath    Drink fluids - 48-64 ounces of fluids daily.    Please call or go to the Emergency Department if you develop a fever of 100.4 F or greater, chills and/or signs or symptoms of an infection or you are unable to drink fluids.

## 2025-07-01 ENCOUNTER — HOSPITAL ENCOUNTER (OUTPATIENT)
Dept: INFUSION THERAPY | Age: 80
Discharge: HOME | End: 2025-07-01
Payer: MEDICARE

## 2025-07-01 VITALS
TEMPERATURE: 98.1 F | BODY MASS INDEX: 24.24 KG/M2 | OXYGEN SATURATION: 93 % | HEART RATE: 87 BPM | RESPIRATION RATE: 18 BRPM | DIASTOLIC BLOOD PRESSURE: 71 MMHG | WEIGHT: 142 LBS | HEIGHT: 64 IN | SYSTOLIC BLOOD PRESSURE: 138 MMHG

## 2025-07-01 DIAGNOSIS — Z51.11 ENCOUNTER FOR CHEMOTHERAPY MANAGEMENT: ICD-10-CM

## 2025-07-01 DIAGNOSIS — C34.90 PRIMARY ADENOCARCINOMA OF LUNG, UNSPECIFIED LATERALITY (HCC): Primary | ICD-10-CM

## 2025-07-01 LAB
ALBUMIN SERPL BCG-MCNC: 3.8 G/DL (ref 3.4–4.9)
ALP SERPL-CCNC: 52 U/L (ref 40–129)
ALT SERPL W/O P-5'-P-CCNC: 15 U/L (ref 10–50)
AST SERPL-CCNC: 17 U/L (ref 10–50)
BASOPHILS ABSOLUTE: 0 THOU/MM3 (ref 0–0.1)
BASOPHILS NFR BLD AUTO: 0 % (ref 0–3)
BILIRUB CONJ SERPL-MCNC: < 0.1 MG/DL (ref 0–0.2)
BILIRUB SERPL-MCNC: 0.2 MG/DL (ref 0.3–1.2)
BUN BLDP-MCNC: 27 MG/DL (ref 8–26)
CHLORIDE BLD-SCNC: 105 MEQ/L (ref 98–109)
CREAT BLD-MCNC: 0.8 MG/DL (ref 0.5–1.2)
EOSINOPHIL NFR BLD AUTO: 3 % (ref 0–4)
EOSINOPHILS ABSOLUTE: 0.2 THOU/MM3 (ref 0–0.4)
ERYTHROCYTE [DISTWIDTH] IN BLOOD BY AUTOMATED COUNT: 13.2 % (ref 11.5–14.5)
GLUCOSE BLD-MCNC: 97 MG/DL (ref 70–108)
HCT VFR BLD AUTO: 38.9 % (ref 42–52)
HGB BLD-MCNC: 12.3 GM/DL (ref 14–18)
IMMATURE GRANULOCYTES %: 0 %
IMMATURE GRANULOCYTES ABSOLUTE: 0.01 THOU/MM3 (ref 0–0.07)
IONIZED CALCIUM, WHOLE BLOOD: 1.26 MMOL/L (ref 1.12–1.32)
LYMPHOCYTES ABSOLUTE: 0.7 THOU/MM3 (ref 1–4.8)
LYMPHOCYTES NFR BLD AUTO: 11 % (ref 15–47)
MCH RBC QN AUTO: 30.7 PG (ref 26–33)
MCHC RBC AUTO-ENTMCNC: 31.6 GM/DL (ref 32.2–35.5)
MCV RBC AUTO: 97 FL (ref 80–94)
MONOCYTES ABSOLUTE: 0.7 THOU/MM3 (ref 0.4–1.3)
MONOCYTES NFR BLD AUTO: 11 % (ref 0–12)
NEUTROPHILS ABSOLUTE: 4.8 THOU/MM3 (ref 1.8–7.7)
NEUTROPHILS NFR BLD AUTO: 75 % (ref 43–75)
PLATELET # BLD AUTO: 198 THOU/MM3 (ref 130–400)
PMV BLD AUTO: 8.6 FL (ref 9.4–12.4)
POTASSIUM BLD-SCNC: 3.6 MEQ/L (ref 3.5–4.9)
PROT SERPL-MCNC: 6.7 G/DL (ref 6.4–8.3)
RBC # BLD AUTO: 4.01 MILL/MM3 (ref 4.7–6.1)
SODIUM BLD-SCNC: 142 MEQ/L (ref 138–146)
TOTAL CO2, WHOLE BLOOD: 30 MEQ/L (ref 23–33)
WBC # BLD AUTO: 6.5 THOU/MM3 (ref 4.8–10.8)

## 2025-07-01 PROCEDURE — 80047 BASIC METABLC PNL IONIZED CA: CPT

## 2025-07-01 PROCEDURE — 85025 COMPLETE CBC W/AUTO DIFF WBC: CPT

## 2025-07-01 PROCEDURE — 36591 DRAW BLOOD OFF VENOUS DEVICE: CPT

## 2025-07-01 PROCEDURE — 2500000003 HC RX 250 WO HCPCS: Performed by: INTERNAL MEDICINE

## 2025-07-01 PROCEDURE — 80076 HEPATIC FUNCTION PANEL: CPT

## 2025-07-01 PROCEDURE — 6360000002 HC RX W HCPCS: Performed by: INTERNAL MEDICINE

## 2025-07-01 RX ORDER — SODIUM CHLORIDE 0.9 % (FLUSH) 0.9 %
5-40 SYRINGE (ML) INJECTION PRN
Status: DISCONTINUED | OUTPATIENT
Start: 2025-07-01 | End: 2025-07-02 | Stop reason: HOSPADM

## 2025-07-01 RX ORDER — HYDROCORTISONE SODIUM SUCCINATE 100 MG/2ML
100 INJECTION INTRAMUSCULAR; INTRAVENOUS
OUTPATIENT
Start: 2025-07-01

## 2025-07-01 RX ORDER — ONDANSETRON 2 MG/ML
8 INJECTION INTRAMUSCULAR; INTRAVENOUS
OUTPATIENT
Start: 2025-07-01

## 2025-07-01 RX ORDER — EPINEPHRINE 1 MG/ML
0.3 INJECTION, SOLUTION INTRAMUSCULAR; SUBCUTANEOUS PRN
OUTPATIENT
Start: 2025-07-01

## 2025-07-01 RX ORDER — ALBUTEROL SULFATE 90 UG/1
4 INHALANT RESPIRATORY (INHALATION) PRN
OUTPATIENT
Start: 2025-07-01

## 2025-07-01 RX ORDER — HEPARIN 100 UNIT/ML
500 SYRINGE INTRAVENOUS PRN
Status: DISCONTINUED | OUTPATIENT
Start: 2025-07-01 | End: 2025-07-02 | Stop reason: HOSPADM

## 2025-07-01 RX ORDER — DIPHENHYDRAMINE HYDROCHLORIDE 50 MG/ML
50 INJECTION, SOLUTION INTRAMUSCULAR; INTRAVENOUS
OUTPATIENT
Start: 2025-07-01

## 2025-07-01 RX ORDER — SODIUM CHLORIDE 9 MG/ML
25 INJECTION, SOLUTION INTRAVENOUS PRN
OUTPATIENT
Start: 2025-07-01

## 2025-07-01 RX ORDER — ACETAMINOPHEN 325 MG/1
650 TABLET ORAL
OUTPATIENT
Start: 2025-07-01

## 2025-07-01 RX ORDER — SODIUM CHLORIDE 9 MG/ML
INJECTION, SOLUTION INTRAVENOUS CONTINUOUS
OUTPATIENT
Start: 2025-07-01

## 2025-07-01 RX ORDER — SODIUM CHLORIDE 0.9 % (FLUSH) 0.9 %
5-40 SYRINGE (ML) INJECTION PRN
OUTPATIENT
Start: 2025-07-01

## 2025-07-01 RX ORDER — HEPARIN 100 UNIT/ML
500 SYRINGE INTRAVENOUS PRN
OUTPATIENT
Start: 2025-07-01

## 2025-07-01 RX ADMIN — SODIUM CHLORIDE, PRESERVATIVE FREE 30 ML: 5 INJECTION INTRAVENOUS at 10:02

## 2025-07-01 RX ADMIN — Medication 500 UNITS: at 10:20

## 2025-07-01 NOTE — PROGRESS NOTES
Patient tolerated line/lab without any complications.    Last vital signs:   /71   Pulse 87   Temp 98.1 °F (36.7 °C) (Oral)   Resp 18   Ht 1.626 m (5' 4.02\")   Wt 64.4 kg (142 lb)   SpO2 93%   BMI 24.36 kg/m²     Physician's Instructions:  Reduce potassium to 1 tablet per day from 2 tablets (original prescription dose).  Okay to use cream prescribed by dermatology per Dr. Good.    Patient instructed if experience any symptoms following today's line/lab, he is to notify MD immediately or go to the emergency department.    Discharge instructions given to patient. Verbalizes understanding. Ambulated off unit per self, accompanied by spouse, with belongings and home oxygen.

## 2025-07-01 NOTE — DISCHARGE INSTRUCTIONS
Physician's Instructions:   Resume taking one potassium tablet as prior to dose increase with potassium 3.1.  Okay to use cream prescribed by dermatology.

## 2025-07-01 NOTE — PLAN OF CARE
Problem: Discharge Planning  Goal: Discharge to home or other facility with appropriate resources  Outcome: Adequate for Discharge  Flowsheets (Taken 7/1/2025 1026)  Discharge to home or other facility with appropriate resources: Identify barriers to discharge with patient and caregiver     Problem: Safety - Adult  Goal: Free from fall injury  Outcome: Adequate for Discharge  Flowsheets (Taken 7/1/2025 1026)  Free From Fall Injury: Instruct family/caregiver on patient safety     Problem: Chronic Conditions and Co-morbidities  Goal: Patient's chronic conditions and co-morbidity symptoms are monitored and maintained or improved  Outcome: Adequate for Discharge  Flowsheets (Taken 7/1/2025 1026)  Care Plan - Patient's Chronic Conditions and Co-Morbidity Symptoms are Monitored and Maintained or Improved: Monitor and assess patient's chronic conditions and comorbid symptoms for stability, deterioration, or improvement     Problem: Infection - Adult  Goal: Absence of infection at discharge  Outcome: Adequate for Discharge  Flowsheets (Taken 7/1/2025 1026)  Absence of infection at discharge: Monitor all insertion sites i.e., indwelling lines, tubes and drains  Note: Mediport site with no redness or warmth. Skin over port site intact with no signs of breakdown noted. Patient verbalizes signs/symptoms of port infection and when to notify the physician.

## 2025-07-09 NOTE — TELEPHONE ENCOUNTER
Dr. Correia sent a referral to our office for this patient. He originally saw you but has been following with Yuli for his COPD. Dr Correia has  found a new 22w15qw mass on his lung and would like him seen as soon as possible in our office. Where would you like me to put him on your schedule? Please advise, thank you.   no

## 2025-07-15 DIAGNOSIS — C34.90 PRIMARY ADENOCARCINOMA OF LUNG, UNSPECIFIED LATERALITY (HCC): Primary | ICD-10-CM

## 2025-07-15 RX ORDER — HYDROCORTISONE SODIUM SUCCINATE 100 MG/2ML
100 INJECTION INTRAMUSCULAR; INTRAVENOUS
OUTPATIENT
Start: 2025-07-22

## 2025-07-15 RX ORDER — SODIUM CHLORIDE 0.9 % (FLUSH) 0.9 %
5-40 SYRINGE (ML) INJECTION PRN
OUTPATIENT
Start: 2025-07-22

## 2025-07-15 RX ORDER — DIPHENHYDRAMINE HYDROCHLORIDE 50 MG/ML
50 INJECTION, SOLUTION INTRAMUSCULAR; INTRAVENOUS
OUTPATIENT
Start: 2025-07-22

## 2025-07-15 RX ORDER — EPINEPHRINE 1 MG/ML
0.3 INJECTION, SOLUTION, CONCENTRATE INTRAVENOUS PRN
OUTPATIENT
Start: 2025-07-22

## 2025-07-15 RX ORDER — HEPARIN SODIUM (PORCINE) LOCK FLUSH IV SOLN 100 UNIT/ML 100 UNIT/ML
500 SOLUTION INTRAVENOUS PRN
OUTPATIENT
Start: 2025-07-22

## 2025-07-15 RX ORDER — SODIUM CHLORIDE 9 MG/ML
5-250 INJECTION, SOLUTION INTRAVENOUS PRN
OUTPATIENT
Start: 2025-07-22

## 2025-07-15 RX ORDER — ALBUTEROL SULFATE 90 UG/1
4 INHALANT RESPIRATORY (INHALATION) PRN
OUTPATIENT
Start: 2025-07-22

## 2025-07-15 RX ORDER — ONDANSETRON 2 MG/ML
8 INJECTION INTRAMUSCULAR; INTRAVENOUS
OUTPATIENT
Start: 2025-07-22

## 2025-07-15 RX ORDER — ACETAMINOPHEN 325 MG/1
650 TABLET ORAL
OUTPATIENT
Start: 2025-07-22

## 2025-07-15 RX ORDER — SODIUM CHLORIDE 9 MG/ML
INJECTION, SOLUTION INTRAVENOUS CONTINUOUS
OUTPATIENT
Start: 2025-07-22

## 2025-07-15 RX ORDER — FAMOTIDINE 10 MG/ML
20 INJECTION, SOLUTION INTRAVENOUS
OUTPATIENT
Start: 2025-07-22

## 2025-07-15 RX ORDER — MEPERIDINE HYDROCHLORIDE 50 MG/ML
12.5 INJECTION INTRAMUSCULAR; INTRAVENOUS; SUBCUTANEOUS PRN
OUTPATIENT
Start: 2025-07-22

## 2025-07-21 NOTE — PROGRESS NOTES
[facility] [place] [facility]  [facility]  [address]  [address]  [place] [id number]  Dept: [phone]  Loc: [phone]   Hematology/Oncology Consult (Clinic)        5/21/25    Yogesh Solano   1945     No ref. provider found   Nathan lEaine DO            DIAGNOSIS:  -Rectal Rectum, mass biopsy: Tubular adenoma with high-grade dysplasia; cannot exclude malignancy.  Transanal reexcision with Dr. Brennan at [facility]  7/18/22 shows no residual malignancy.  Local EUS staging T1N0.  Local MRI staging-T2N0  -Patient had endoscopy December 2022 that found a more proximal rectal cancer.  Taken to the OR by Dr. Brennan at [facility] 1/30/2023 due to the mid to distal rectal tumor underwent open low anterior resection with colorectal anastomosis.  Leak test was positive therefore had to undergo a diverting loop ileostomy which was eventually reversed in June.  Path report from that procedure showed a tubulovillous adenoma with high-grade dysplasia measuring 3.1 cm no evidence of any invasive adenocarcinoma in all 10 lymph nodes were benign.    -1 cm suspicious prostate lesion found on staging MRI of the pelvis.  (Urology referral).  PSA = 3.18  Being followed with surveillance by Dr. Grace of urology.  PSA 11/26/2020 4-6.0    -Adenocarcinoma right upper lung T2 N2 biopsy-proven /stage IIIa   November 2024 new approximately 3 cm right upper lung suspicious lung mass and PET scan hypermetabolic including a normal-sized pretracheal node.    PD-L1 TPS high = 85%.  EGFR-not detected ,ALK- negative.    TREATMENT:  -Colorectal Dr. Brennan surgery referral to [facility]; excision 7/18/2022 showed negative for carcinoma or dysplasia.   January 2023-found malignancy in the more proximal rectum and underwent surgical resection and temporary diverting loop ileostomy.  Status post diverting loop ileostomy and closure of same on 6/12/2023.  Hernia surgery x 2 in [place].  Much improved.  -Urology

## 2025-07-22 ENCOUNTER — HOSPITAL ENCOUNTER (OUTPATIENT)
Dept: INFUSION THERAPY | Age: 80
Discharge: HOME | End: 2025-07-22
Payer: MEDICARE

## 2025-07-22 ENCOUNTER — OFFICE VISIT (OUTPATIENT)
Dept: ONCOLOGY | Age: 80
End: 2025-07-22
Payer: MEDICARE

## 2025-07-22 VITALS
WEIGHT: 142.8 LBS | SYSTOLIC BLOOD PRESSURE: 178 MMHG | HEART RATE: 99 BPM | TEMPERATURE: 98 F | BODY MASS INDEX: 24.5 KG/M2 | OXYGEN SATURATION: 92 % | DIASTOLIC BLOOD PRESSURE: 84 MMHG | RESPIRATION RATE: 20 BRPM

## 2025-07-22 VITALS
SYSTOLIC BLOOD PRESSURE: 140 MMHG | DIASTOLIC BLOOD PRESSURE: 79 MMHG | WEIGHT: 142.8 LBS | HEART RATE: 80 BPM | RESPIRATION RATE: 18 BRPM | TEMPERATURE: 98.4 F | BODY MASS INDEX: 24.5 KG/M2 | OXYGEN SATURATION: 92 %

## 2025-07-22 DIAGNOSIS — R53.82 CHRONIC FATIGUE: ICD-10-CM

## 2025-07-22 DIAGNOSIS — D64.9 ANEMIA, UNSPECIFIED TYPE: ICD-10-CM

## 2025-07-22 DIAGNOSIS — C34.90 PRIMARY ADENOCARCINOMA OF LUNG, UNSPECIFIED LATERALITY (HCC): Primary | ICD-10-CM

## 2025-07-22 LAB
ALBUMIN SERPL BCG-MCNC: 3.7 G/DL (ref 3.4–4.9)
ALP SERPL-CCNC: 55 U/L (ref 40–129)
ALT SERPL W/O P-5'-P-CCNC: 17 U/L (ref 10–50)
AST SERPL-CCNC: 18 U/L (ref 10–50)
BASOPHILS ABSOLUTE: 0 THOU/MM3 (ref 0–0.1)
BASOPHILS NFR BLD AUTO: 0 % (ref 0–3)
BILIRUB CONJ SERPL-MCNC: < 0.1 MG/DL (ref 0–0.2)
BILIRUB SERPL-MCNC: 0.3 MG/DL (ref 0.3–1.2)
BUN BLDP-MCNC: 32 MG/DL (ref 8–26)
CHLORIDE BLD-SCNC: 103 MEQ/L (ref 98–109)
CREAT BLD-MCNC: 0.8 MG/DL (ref 0.5–1.2)
EOSINOPHIL NFR BLD AUTO: 1 % (ref 0–4)
EOSINOPHILS ABSOLUTE: 0.1 THOU/MM3 (ref 0–0.4)
ERYTHROCYTE [DISTWIDTH] IN BLOOD BY AUTOMATED COUNT: 13.6 % (ref 11.5–14.5)
FERRITIN SERPL IA-MCNC: 42 NG/ML (ref 30–400)
GFR SERPL CREATININE-BSD FRML MDRD: 89 ML/MIN/1.73M2
GLUCOSE BLD-MCNC: 106 MG/DL (ref 70–108)
HCT VFR BLD AUTO: 37.1 % (ref 42–52)
HGB BLD-MCNC: 11.8 GM/DL (ref 14–18)
IMMATURE GRANULOCYTES %: 0 %
IMMATURE GRANULOCYTES ABSOLUTE: 0.01 THOU/MM3 (ref 0–0.07)
IONIZED CALCIUM, WHOLE BLOOD: 1.28 MMOL/L (ref 1.12–1.32)
IRON SATN MFR SERPL: 28 % (ref 20–50)
IRON SERPL-MCNC: 73 UG/DL (ref 61–157)
LYMPHOCYTES ABSOLUTE: 0.7 THOU/MM3 (ref 1–4.8)
LYMPHOCYTES NFR BLD AUTO: 9 % (ref 15–47)
MCH RBC QN AUTO: 29.9 PG (ref 26–33)
MCHC RBC AUTO-ENTMCNC: 31.8 GM/DL (ref 32.2–35.5)
MCV RBC AUTO: 94 FL (ref 80–94)
MONOCYTES ABSOLUTE: 0.9 THOU/MM3 (ref 0.4–1.3)
MONOCYTES NFR BLD AUTO: 10 % (ref 0–12)
NEUTROPHILS ABSOLUTE: 6.7 THOU/MM3 (ref 1.8–7.7)
NEUTROPHILS NFR BLD AUTO: 80 % (ref 43–75)
PLATELET # BLD AUTO: 190 THOU/MM3 (ref 130–400)
PMV BLD AUTO: 8.8 FL (ref 9.4–12.4)
POTASSIUM BLD-SCNC: 3.1 MEQ/L (ref 3.5–4.9)
PROT SERPL-MCNC: 6.5 G/DL (ref 6.4–8.3)
RBC # BLD AUTO: 3.95 MILL/MM3 (ref 4.7–6.1)
SODIUM BLD-SCNC: 138 MEQ/L (ref 138–146)
TIBC SERPL-MCNC: 264 UG/DL (ref 171–450)
TOTAL CO2, WHOLE BLOOD: 29 MEQ/L (ref 23–33)
WBC # BLD AUTO: 8.4 THOU/MM3 (ref 4.8–10.8)

## 2025-07-22 PROCEDURE — 2580000003 HC RX 258: Performed by: INTERNAL MEDICINE

## 2025-07-22 PROCEDURE — 3077F SYST BP >= 140 MM HG: CPT | Performed by: INTERNAL MEDICINE

## 2025-07-22 PROCEDURE — 85025 COMPLETE CBC W/AUTO DIFF WBC: CPT

## 2025-07-22 PROCEDURE — 83540 ASSAY OF IRON: CPT

## 2025-07-22 PROCEDURE — 96413 CHEMO IV INFUSION 1 HR: CPT

## 2025-07-22 PROCEDURE — 82728 ASSAY OF FERRITIN: CPT

## 2025-07-22 PROCEDURE — 2500000003 HC RX 250 WO HCPCS: Performed by: INTERNAL MEDICINE

## 2025-07-22 PROCEDURE — 3078F DIAST BP <80 MM HG: CPT | Performed by: INTERNAL MEDICINE

## 2025-07-22 PROCEDURE — 83550 IRON BINDING TEST: CPT

## 2025-07-22 PROCEDURE — 99211 OFF/OP EST MAY X REQ PHY/QHP: CPT

## 2025-07-22 PROCEDURE — 80076 HEPATIC FUNCTION PANEL: CPT

## 2025-07-22 PROCEDURE — 1123F ACP DISCUSS/DSCN MKR DOCD: CPT | Performed by: INTERNAL MEDICINE

## 2025-07-22 PROCEDURE — 99214 OFFICE O/P EST MOD 30 MIN: CPT | Performed by: INTERNAL MEDICINE

## 2025-07-22 PROCEDURE — 1159F MED LIST DOCD IN RCRD: CPT | Performed by: INTERNAL MEDICINE

## 2025-07-22 PROCEDURE — G8427 DOCREV CUR MEDS BY ELIG CLIN: HCPCS | Performed by: INTERNAL MEDICINE

## 2025-07-22 PROCEDURE — 1036F TOBACCO NON-USER: CPT | Performed by: INTERNAL MEDICINE

## 2025-07-22 PROCEDURE — G8420 CALC BMI NORM PARAMETERS: HCPCS | Performed by: INTERNAL MEDICINE

## 2025-07-22 PROCEDURE — 80047 BASIC METABLC PNL IONIZED CA: CPT

## 2025-07-22 PROCEDURE — 6360000002 HC RX W HCPCS: Performed by: INTERNAL MEDICINE

## 2025-07-22 RX ORDER — HEPARIN 100 UNIT/ML
500 SYRINGE INTRAVENOUS PRN
Status: DISCONTINUED | OUTPATIENT
Start: 2025-07-22 | End: 2025-07-23 | Stop reason: HOSPADM

## 2025-07-22 RX ORDER — SODIUM CHLORIDE 9 MG/ML
25 INJECTION, SOLUTION INTRAVENOUS PRN
OUTPATIENT
Start: 2025-07-22

## 2025-07-22 RX ORDER — POTASSIUM CHLORIDE 1500 MG/1
40 TABLET, EXTENDED RELEASE ORAL DAILY
Qty: 60 TABLET | Refills: 1 | Status: SHIPPED | OUTPATIENT
Start: 2025-07-22

## 2025-07-22 RX ORDER — EPINEPHRINE 1 MG/ML
0.3 INJECTION, SOLUTION INTRAMUSCULAR; SUBCUTANEOUS PRN
OUTPATIENT
Start: 2025-07-22

## 2025-07-22 RX ORDER — HYDROCORTISONE SODIUM SUCCINATE 100 MG/2ML
100 INJECTION INTRAMUSCULAR; INTRAVENOUS
OUTPATIENT
Start: 2025-07-22

## 2025-07-22 RX ORDER — SODIUM CHLORIDE 0.9 % (FLUSH) 0.9 %
5-40 SYRINGE (ML) INJECTION PRN
Status: DISCONTINUED | OUTPATIENT
Start: 2025-07-22 | End: 2025-07-23 | Stop reason: HOSPADM

## 2025-07-22 RX ORDER — SODIUM CHLORIDE 9 MG/ML
INJECTION, SOLUTION INTRAVENOUS CONTINUOUS
OUTPATIENT
Start: 2025-07-22

## 2025-07-22 RX ORDER — SODIUM CHLORIDE 0.9 % (FLUSH) 0.9 %
5-40 SYRINGE (ML) INJECTION PRN
OUTPATIENT
Start: 2025-07-22

## 2025-07-22 RX ORDER — ACETAMINOPHEN 325 MG/1
650 TABLET ORAL
OUTPATIENT
Start: 2025-07-22

## 2025-07-22 RX ORDER — DIPHENHYDRAMINE HYDROCHLORIDE 50 MG/ML
50 INJECTION, SOLUTION INTRAMUSCULAR; INTRAVENOUS
OUTPATIENT
Start: 2025-07-22

## 2025-07-22 RX ORDER — ALBUTEROL SULFATE 90 UG/1
4 INHALANT RESPIRATORY (INHALATION) PRN
OUTPATIENT
Start: 2025-07-22

## 2025-07-22 RX ORDER — SODIUM CHLORIDE 9 MG/ML
5-250 INJECTION, SOLUTION INTRAVENOUS PRN
Status: DISCONTINUED | OUTPATIENT
Start: 2025-07-22 | End: 2025-07-23 | Stop reason: HOSPADM

## 2025-07-22 RX ORDER — ONDANSETRON 2 MG/ML
8 INJECTION INTRAMUSCULAR; INTRAVENOUS
OUTPATIENT
Start: 2025-07-22

## 2025-07-22 RX ORDER — HEPARIN 100 UNIT/ML
500 SYRINGE INTRAVENOUS PRN
OUTPATIENT
Start: 2025-07-22

## 2025-07-22 RX ADMIN — DURVALUMAB 1500 MG: 500 INJECTION, SOLUTION INTRAVENOUS at 09:42

## 2025-07-22 RX ADMIN — SODIUM CHLORIDE 25 ML/HR: 0.9 INJECTION, SOLUTION INTRAVENOUS at 09:40

## 2025-07-22 RX ADMIN — SODIUM CHLORIDE, PRESERVATIVE FREE 30 ML: 5 INJECTION INTRAVENOUS at 08:20

## 2025-07-22 RX ADMIN — HEPARIN 500 UNITS: 100 SYRINGE at 10:56

## 2025-07-22 RX ADMIN — SODIUM CHLORIDE, PRESERVATIVE FREE 20 ML: 5 INJECTION INTRAVENOUS at 10:56

## 2025-07-22 NOTE — PATIENT INSTRUCTIONS
- E prescription for potassium.  Instructed to take 2 daily until her recheck in 1 month  -Proceed with Durvalumab month #4 today  -Follow-up 1 month with nurse practitioner for next Durvalumab No. 5  -Please schedule follow-up with me in 2 months for Durvalumab No. 6  -I have ordered his his next CAT scan of the chest to be done at a 6-month interval ordered to be done the week of October 7  -TSH CBC CMP ordered for 4 weeks 8/19/2025

## 2025-07-22 NOTE — ONCOLOGY
Chemotherapy Administration    Pre-assessment Data: Antineoplastic Agents  See toxicity flow sheet for assessment                                          [x]         Interventions:   Chemotherapy SQ injection given []   Taxol administered-VS per protocol []   Blood pressure meds held 12 hours prior to Rituxan/Ruxience []   Rituxan/Ruxience administered- VS and precautions per guidelines []   Emergency drugs available as appropriate [x]   Anaphylaxis assessment completed [x]   Pre-medications administered as ordered [x]   Blood return noted upon initiation of chemotherapy [x]   Blood return noted each 1-2ml of a vesicant medication if given IV push []   Navelbine, Vincristine and Velban given as a monitored wide open drip, blood return noted before during and after infusion. []   Blood return noted each 2-3ml of a non-vesicant medication if given IV push []   Patient aware of potential Immunotherapy toxicities []   Monitor for signs / symptoms of hypersensitivity reaction [x]   Chemotherapy orders (drug/dose/rate) verified by 2 Chemo certified RN’s [x]   Monitor IV site and blood return throughout the infusion of the medication [x]   Document IV site checks on the IV assessment form [x]   Document chemotherapy teaching on the Patient Education tab [x]   Document patient verbalizes understanding of medications being administered [x]   If IV infiltration, see ONS Guidelines []   Other: Imfinzi     []

## 2025-07-22 NOTE — DISCHARGE INSTRUCTIONS
Provider Instructions:  - E prescription for potassium.  Instructed to take 2 daily until her recheck in 1 month  -Proceed with Durvalumab month #4 today  -Follow-up 1 month with nurse practitioner for next Durvalumab No. 5  -Please schedule follow-up with me in 2 months for Durvalumab No. 6  -I have ordered his his next CAT scan of the chest to be done at a 6-month interval ordered to be done the week of October 7  -TSH CBC CMP ordered for 4 weeks    Please contact your Oncologist if you have any questions regarding the immunotherapy Imfinzi that you received today.      Patient instructed if experience any of the symptoms following today's immunotherapy / to notify MD immediately or go to emergency department.    * dizziness/lightheadedness  *acute nausea/vomiting - not relieved with medication  *headache - not relieved from Tylenol/pain medication  *chest pain/pressure  *rash/itching  *shortness of breath  *temperature of 100.4 or greater        Drink fluids - 48oz fluids daily  Call our office if you develop fever of 100.4 or greater/ chills/ signs or symptoms of infection

## 2025-07-22 NOTE — PLAN OF CARE
Problem: Discharge Planning  Goal: Discharge to home or other facility with appropriate resources  Outcome: Adequate for Discharge  Flowsheets (Taken 7/22/2025 0829)  Discharge to home or other facility with appropriate resources:   Identify barriers to discharge with patient and caregiver   Identify discharge learning needs (meds, wound care, etc)     Problem: Safety - Adult  Goal: Free from fall injury  Outcome: Adequate for Discharge  Flowsheets (Taken 7/22/2025 0829)  Free From Fall Injury: Instruct family/caregiver on patient safety     Problem: Chronic Conditions and Co-morbidities  Goal: Patient's chronic conditions and co-morbidity symptoms are monitored and maintained or improved  Outcome: Adequate for Discharge  Flowsheets (Taken 7/22/2025 0829)  Care Plan - Patient's Chronic Conditions and Co-Morbidity Symptoms are Monitored and Maintained or Improved: Monitor and assess patient's chronic conditions and comorbid symptoms for stability, deterioration, or improvement  Note:   Chemotherapy Teaching     What is Chemotherapy   Drug action [x]   Method of Administration [x]   Handouts given []     Side Effects  Nausea/vomiting [x]   Diarrhea [x]   Fatigue [x]   Signs / Symptoms of infection [x]   Neutropenia [x]   Thrombocytopenia [x]   Alopecia [x]   neuropathy [x]   Avon diet &  the importance of fluids [x]       Micellaneous  Importance of nutrition [x]   Importance of oral hygiene [x]   When to call the MD [x]   Monitoring labs [x]   Use of supportive services []     Explanation of Drug Regimen / Frequency  Imfinzi     Comments  Verbalized understanding to drug,action,side effects and when to call MD         Problem: Infection - Adult  Goal: Absence of infection at discharge  Outcome: Adequate for Discharge  Flowsheets (Taken 7/22/2025 0829)  Absence of infection at discharge: Assess and monitor for signs and symptoms of infection  Note: Mediport site with no redness or warmth. Skin over port site intact

## 2025-07-22 NOTE — PROGRESS NOTES
Patient tolerated Imfinzi without any complications.    Denies dizziness, lightheadedness, acute nausea or vomiting, headache, heart palpitations, rash/itching or increased SOB.    Last vital signs  BP (!) 178/84   Pulse 99   Temp 98 °F (36.7 °C) (Oral)   Resp 20   Wt 64.8 kg (142 lb 12.8 oz)   SpO2 92%   BMI 24.50 kg/m²     Patient instructed if they experience any of the above symptoms following today's visit, he/she is to notify the Physician or go to the Emergency Dept.    Patient educated on monitoring temperatures at home daily and to call physician or go to the Emergency Department for temperatures of 100.4 or greater.     Discharge instructions given to patient, Verbalizes understanding. Ambulated off unit per self in stable condition with all belongings.

## 2025-08-01 ENCOUNTER — OFFICE VISIT (OUTPATIENT)
Dept: PULMONOLOGY | Age: 80
End: 2025-08-01
Payer: MEDICARE

## 2025-08-01 VITALS
OXYGEN SATURATION: 93 % | SYSTOLIC BLOOD PRESSURE: 140 MMHG | WEIGHT: 144 LBS | TEMPERATURE: 97.3 F | HEART RATE: 81 BPM | BODY MASS INDEX: 24.59 KG/M2 | HEIGHT: 64 IN | DIASTOLIC BLOOD PRESSURE: 82 MMHG

## 2025-08-01 DIAGNOSIS — Z99.81 CHRONIC RESPIRATORY FAILURE WITH HYPOXIA, ON HOME O2 THERAPY (HCC): ICD-10-CM

## 2025-08-01 DIAGNOSIS — J44.9 STEROID-DEPENDENT COPD (HCC): ICD-10-CM

## 2025-08-01 DIAGNOSIS — J44.1 COPD EXACERBATION (HCC): Primary | ICD-10-CM

## 2025-08-01 DIAGNOSIS — J96.11 CHRONIC RESPIRATORY FAILURE WITH HYPOXIA, ON HOME O2 THERAPY (HCC): ICD-10-CM

## 2025-08-01 DIAGNOSIS — Z92.241 STEROID-DEPENDENT COPD (HCC): ICD-10-CM

## 2025-08-01 DIAGNOSIS — C34.91 PRIMARY ADENOCARCINOMA OF RIGHT LUNG (HCC): ICD-10-CM

## 2025-08-01 PROCEDURE — 1160F RVW MEDS BY RX/DR IN RCRD: CPT | Performed by: NURSE PRACTITIONER

## 2025-08-01 PROCEDURE — 3023F SPIROM DOC REV: CPT | Performed by: NURSE PRACTITIONER

## 2025-08-01 PROCEDURE — 99214 OFFICE O/P EST MOD 30 MIN: CPT | Performed by: NURSE PRACTITIONER

## 2025-08-01 PROCEDURE — 3079F DIAST BP 80-89 MM HG: CPT | Performed by: NURSE PRACTITIONER

## 2025-08-01 PROCEDURE — 1036F TOBACCO NON-USER: CPT | Performed by: NURSE PRACTITIONER

## 2025-08-01 PROCEDURE — G8427 DOCREV CUR MEDS BY ELIG CLIN: HCPCS | Performed by: NURSE PRACTITIONER

## 2025-08-01 PROCEDURE — G8420 CALC BMI NORM PARAMETERS: HCPCS | Performed by: NURSE PRACTITIONER

## 2025-08-01 PROCEDURE — 1123F ACP DISCUSS/DSCN MKR DOCD: CPT | Performed by: NURSE PRACTITIONER

## 2025-08-01 PROCEDURE — 3077F SYST BP >= 140 MM HG: CPT | Performed by: NURSE PRACTITIONER

## 2025-08-01 PROCEDURE — 1159F MED LIST DOCD IN RCRD: CPT | Performed by: NURSE PRACTITIONER

## 2025-08-01 RX ORDER — PREDNISONE 20 MG/1
20 TABLET ORAL DAILY
Qty: 5 TABLET | Refills: 0 | Status: SHIPPED | OUTPATIENT
Start: 2025-08-01 | End: 2025-08-06

## 2025-08-01 NOTE — PROGRESS NOTES
[facility]     Patient: LANE MARCH, 80 y.o.   : 1945    Pt of Dr. Carter     Subjective     Chief Complaint   Patient presents with    Follow-up     3 month copd with no testing      Interval history  He was recently evaluated by Dr. Chuck Correia MD on 2024.  Patient underwent CT scan of chest, CT scan of abdomen,pelvis and MRI scan of the brain as a part of follow-up of his colon cancer.  He was noted to have a New spiculated right upper lobe mass measures 30 x 50 mm.  He was sent to Dr Emma MD clinic for further evaluation for biopsy    Bronchoscopy with right upper lobe transbronchial biopsy and the EBUS from 4R and station 7 completed completed at [facility] by Dr Otto Colunga DO   Following with [facility] radiation/ oncology department for Primary Malignant Neoplasm of Right upper Lobe Of Lung   Staging form: Lung, AJCC V9  - Clinical stage from 2024: Stage IIIB (cT2b, cN2b, cM0) - Signed by Maryellen Toney PA-C on 2025  History of Present Illness  The patient is an 80-year-old male who presents for a 3-month COPD follow-up. He is on home oxygen at 2 L/min with a nasal cannula during sleep and 3 L/min with ambulation during the day. 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 His current COPD medications include DuoNeb three times daily and budesonide twice daily. Formoterol and albuterol were discontinued at the last visit due to improved symptoms with DuoNeb.    COPD Management  He reports that his breathing has been manageable, although he tends to stay indoors during hot and humid weather. He has not experienced any respiratory flare-ups in the past few months that required medical attention. He has not taken any higher doses of steroids recently but believes a short course of higher-dose steroids might be beneficial. He has not been exercising on his bike recently but does so when

## 2025-08-12 DIAGNOSIS — C34.90 PRIMARY ADENOCARCINOMA OF LUNG, UNSPECIFIED LATERALITY (HCC): Primary | ICD-10-CM

## 2025-08-12 RX ORDER — ONDANSETRON 2 MG/ML
8 INJECTION INTRAMUSCULAR; INTRAVENOUS
OUTPATIENT
Start: 2025-08-19

## 2025-08-12 RX ORDER — SODIUM CHLORIDE 0.9 % (FLUSH) 0.9 %
5-40 SYRINGE (ML) INJECTION PRN
OUTPATIENT
Start: 2025-08-19

## 2025-08-12 RX ORDER — ALBUTEROL SULFATE 90 UG/1
4 INHALANT RESPIRATORY (INHALATION) PRN
OUTPATIENT
Start: 2025-08-19

## 2025-08-12 RX ORDER — SODIUM CHLORIDE 9 MG/ML
INJECTION, SOLUTION INTRAVENOUS CONTINUOUS
OUTPATIENT
Start: 2025-08-19

## 2025-08-12 RX ORDER — EPINEPHRINE 1 MG/ML
0.3 INJECTION, SOLUTION, CONCENTRATE INTRAVENOUS PRN
OUTPATIENT
Start: 2025-08-19

## 2025-08-12 RX ORDER — SODIUM CHLORIDE 9 MG/ML
5-250 INJECTION, SOLUTION INTRAVENOUS PRN
OUTPATIENT
Start: 2025-08-19

## 2025-08-12 RX ORDER — HEPARIN SODIUM (PORCINE) LOCK FLUSH IV SOLN 100 UNIT/ML 100 UNIT/ML
500 SOLUTION INTRAVENOUS PRN
OUTPATIENT
Start: 2025-08-19

## 2025-08-12 RX ORDER — MEPERIDINE HYDROCHLORIDE 50 MG/ML
12.5 INJECTION INTRAMUSCULAR; INTRAVENOUS; SUBCUTANEOUS PRN
OUTPATIENT
Start: 2025-08-19

## 2025-08-12 RX ORDER — FAMOTIDINE 10 MG/ML
20 INJECTION, SOLUTION INTRAVENOUS
OUTPATIENT
Start: 2025-08-19

## 2025-08-12 RX ORDER — ACETAMINOPHEN 325 MG/1
650 TABLET ORAL
OUTPATIENT
Start: 2025-08-19

## 2025-08-12 RX ORDER — HYDROCORTISONE SODIUM SUCCINATE 100 MG/2ML
100 INJECTION INTRAMUSCULAR; INTRAVENOUS
OUTPATIENT
Start: 2025-08-19

## 2025-08-12 RX ORDER — DIPHENHYDRAMINE HYDROCHLORIDE 50 MG/ML
50 INJECTION, SOLUTION INTRAMUSCULAR; INTRAVENOUS
OUTPATIENT
Start: 2025-08-19

## 2025-08-19 ENCOUNTER — HOSPITAL ENCOUNTER (OUTPATIENT)
Dept: INFUSION THERAPY | Age: 80
Discharge: HOME | End: 2025-08-19
Payer: MEDICARE

## 2025-08-19 ENCOUNTER — OFFICE VISIT (OUTPATIENT)
Dept: ONCOLOGY | Age: 80
End: 2025-08-19
Payer: MEDICARE

## 2025-08-19 VITALS
HEART RATE: 80 BPM | WEIGHT: 145.8 LBS | RESPIRATION RATE: 20 BRPM | DIASTOLIC BLOOD PRESSURE: 73 MMHG | HEIGHT: 64 IN | TEMPERATURE: 97.6 F | OXYGEN SATURATION: 91 % | BODY MASS INDEX: 24.89 KG/M2 | SYSTOLIC BLOOD PRESSURE: 146 MMHG

## 2025-08-19 VITALS
DIASTOLIC BLOOD PRESSURE: 73 MMHG | WEIGHT: 145.8 LBS | BODY MASS INDEX: 25.03 KG/M2 | TEMPERATURE: 98.3 F | HEART RATE: 82 BPM | OXYGEN SATURATION: 93 % | RESPIRATION RATE: 20 BRPM | SYSTOLIC BLOOD PRESSURE: 130 MMHG

## 2025-08-19 DIAGNOSIS — C34.90 PRIMARY ADENOCARCINOMA OF LUNG, UNSPECIFIED LATERALITY (HCC): Primary | ICD-10-CM

## 2025-08-19 DIAGNOSIS — D64.9 ANEMIA, UNSPECIFIED TYPE: ICD-10-CM

## 2025-08-19 DIAGNOSIS — Z29.89 IMMUNOTHERAPY ENCOUNTER: ICD-10-CM

## 2025-08-19 LAB
ALBUMIN SERPL BCG-MCNC: 3.7 G/DL (ref 3.4–4.9)
ALP SERPL-CCNC: 48 U/L (ref 40–129)
ALT SERPL W/O P-5'-P-CCNC: 16 U/L (ref 10–50)
AST SERPL-CCNC: 19 U/L (ref 10–50)
BASOPHILS ABSOLUTE: 0 THOU/MM3 (ref 0–0.1)
BASOPHILS NFR BLD AUTO: 0 % (ref 0–3)
BILIRUB CONJ SERPL-MCNC: < 0.1 MG/DL (ref 0–0.2)
BILIRUB SERPL-MCNC: 0.3 MG/DL (ref 0.3–1.2)
BUN BLDP-MCNC: 30 MG/DL (ref 8–26)
CHLORIDE BLD-SCNC: 105 MEQ/L (ref 98–109)
CREAT BLD-MCNC: 0.9 MG/DL (ref 0.5–1.2)
EOSINOPHIL NFR BLD AUTO: 2 % (ref 0–4)
EOSINOPHILS ABSOLUTE: 0.1 THOU/MM3 (ref 0–0.4)
ERYTHROCYTE [DISTWIDTH] IN BLOOD BY AUTOMATED COUNT: 14.9 % (ref 11.5–14.5)
GFR SERPL CREATININE-BSD FRML MDRD: 86 ML/MIN/1.73M2
GLUCOSE BLD-MCNC: 103 MG/DL (ref 70–108)
HCT VFR BLD AUTO: 37.1 % (ref 42–52)
HGB BLD-MCNC: 12.1 GM/DL (ref 14–18)
IMMATURE GRANULOCYTES %: 0 %
IMMATURE GRANULOCYTES ABSOLUTE: 0.01 THOU/MM3 (ref 0–0.07)
IONIZED CALCIUM, WHOLE BLOOD: 1.3 MMOL/L (ref 1.12–1.32)
LYMPHOCYTES ABSOLUTE: 0.7 THOU/MM3 (ref 1–4.8)
LYMPHOCYTES NFR BLD AUTO: 11 % (ref 15–47)
MCH RBC QN AUTO: 30.3 PG (ref 26–33)
MCHC RBC AUTO-ENTMCNC: 32.6 GM/DL (ref 32.2–35.5)
MCV RBC AUTO: 93 FL (ref 80–94)
MONOCYTES ABSOLUTE: 0.6 THOU/MM3 (ref 0.4–1.3)
MONOCYTES NFR BLD AUTO: 9 % (ref 0–12)
NEUTROPHILS ABSOLUTE: 5.5 THOU/MM3 (ref 1.8–7.7)
NEUTROPHILS NFR BLD AUTO: 78 % (ref 43–75)
PLATELET # BLD AUTO: 172 THOU/MM3 (ref 130–400)
PMV BLD AUTO: 8.7 FL (ref 9.4–12.4)
POTASSIUM BLD-SCNC: 4.3 MEQ/L (ref 3.5–4.9)
PROT SERPL-MCNC: 6.3 G/DL (ref 6.4–8.3)
RBC # BLD AUTO: 4 MILL/MM3 (ref 4.7–6.1)
SODIUM BLD-SCNC: 142 MEQ/L (ref 138–146)
TOTAL CO2, WHOLE BLOOD: 30 MEQ/L (ref 23–33)
TSH SERPL DL<=0.05 MIU/L-ACNC: 5.71 UIU/ML (ref 0.27–4.2)
WBC # BLD AUTO: 7 THOU/MM3 (ref 4.8–10.8)

## 2025-08-19 PROCEDURE — 1036F TOBACCO NON-USER: CPT | Performed by: NURSE PRACTITIONER

## 2025-08-19 PROCEDURE — 1159F MED LIST DOCD IN RCRD: CPT | Performed by: NURSE PRACTITIONER

## 2025-08-19 PROCEDURE — 2580000003 HC RX 258: Performed by: INTERNAL MEDICINE

## 2025-08-19 PROCEDURE — 96413 CHEMO IV INFUSION 1 HR: CPT

## 2025-08-19 PROCEDURE — G8419 CALC BMI OUT NRM PARAM NOF/U: HCPCS | Performed by: NURSE PRACTITIONER

## 2025-08-19 PROCEDURE — 1123F ACP DISCUSS/DSCN MKR DOCD: CPT | Performed by: NURSE PRACTITIONER

## 2025-08-19 PROCEDURE — 99211 OFF/OP EST MAY X REQ PHY/QHP: CPT

## 2025-08-19 PROCEDURE — 80076 HEPATIC FUNCTION PANEL: CPT

## 2025-08-19 PROCEDURE — 2500000003 HC RX 250 WO HCPCS: Performed by: INTERNAL MEDICINE

## 2025-08-19 PROCEDURE — 80047 BASIC METABLC PNL IONIZED CA: CPT

## 2025-08-19 PROCEDURE — 99214 OFFICE O/P EST MOD 30 MIN: CPT | Performed by: NURSE PRACTITIONER

## 2025-08-19 PROCEDURE — 3077F SYST BP >= 140 MM HG: CPT | Performed by: NURSE PRACTITIONER

## 2025-08-19 PROCEDURE — 3078F DIAST BP <80 MM HG: CPT | Performed by: NURSE PRACTITIONER

## 2025-08-19 PROCEDURE — 85025 COMPLETE CBC W/AUTO DIFF WBC: CPT

## 2025-08-19 PROCEDURE — 1160F RVW MEDS BY RX/DR IN RCRD: CPT | Performed by: NURSE PRACTITIONER

## 2025-08-19 PROCEDURE — 1126F AMNT PAIN NOTED NONE PRSNT: CPT | Performed by: NURSE PRACTITIONER

## 2025-08-19 PROCEDURE — G8427 DOCREV CUR MEDS BY ELIG CLIN: HCPCS | Performed by: NURSE PRACTITIONER

## 2025-08-19 PROCEDURE — 6360000002 HC RX W HCPCS: Performed by: INTERNAL MEDICINE

## 2025-08-19 PROCEDURE — 84443 ASSAY THYROID STIM HORMONE: CPT

## 2025-08-19 RX ORDER — EPINEPHRINE 1 MG/ML
0.3 INJECTION, SOLUTION INTRAMUSCULAR; SUBCUTANEOUS PRN
OUTPATIENT
Start: 2025-08-19

## 2025-08-19 RX ORDER — SODIUM CHLORIDE 0.9 % (FLUSH) 0.9 %
5-40 SYRINGE (ML) INJECTION PRN
Status: DISCONTINUED | OUTPATIENT
Start: 2025-08-19 | End: 2025-08-20 | Stop reason: HOSPADM

## 2025-08-19 RX ORDER — ONDANSETRON 2 MG/ML
8 INJECTION INTRAMUSCULAR; INTRAVENOUS
OUTPATIENT
Start: 2025-08-19

## 2025-08-19 RX ORDER — ACETAMINOPHEN 325 MG/1
650 TABLET ORAL
OUTPATIENT
Start: 2025-08-19

## 2025-08-19 RX ORDER — DIPHENHYDRAMINE HYDROCHLORIDE 50 MG/ML
50 INJECTION, SOLUTION INTRAMUSCULAR; INTRAVENOUS
OUTPATIENT
Start: 2025-08-19

## 2025-08-19 RX ORDER — ALBUTEROL SULFATE 90 UG/1
4 INHALANT RESPIRATORY (INHALATION) PRN
OUTPATIENT
Start: 2025-08-19

## 2025-08-19 RX ORDER — SODIUM CHLORIDE 9 MG/ML
INJECTION, SOLUTION INTRAVENOUS CONTINUOUS
OUTPATIENT
Start: 2025-08-19

## 2025-08-19 RX ORDER — FERROUS SULFATE 325(65) MG
325 TABLET ORAL
Qty: 90 TABLET | Refills: 1 | Status: SHIPPED | OUTPATIENT
Start: 2025-08-19

## 2025-08-19 RX ORDER — SODIUM CHLORIDE 9 MG/ML
25 INJECTION, SOLUTION INTRAVENOUS PRN
OUTPATIENT
Start: 2025-08-19

## 2025-08-19 RX ORDER — HYDROCORTISONE SODIUM SUCCINATE 100 MG/2ML
100 INJECTION INTRAMUSCULAR; INTRAVENOUS
OUTPATIENT
Start: 2025-08-19

## 2025-08-19 RX ORDER — SODIUM CHLORIDE 0.9 % (FLUSH) 0.9 %
5-40 SYRINGE (ML) INJECTION PRN
OUTPATIENT
Start: 2025-08-19

## 2025-08-19 RX ORDER — HEPARIN 100 UNIT/ML
500 SYRINGE INTRAVENOUS PRN
Status: DISCONTINUED | OUTPATIENT
Start: 2025-08-19 | End: 2025-08-20 | Stop reason: HOSPADM

## 2025-08-19 RX ORDER — HEPARIN 100 UNIT/ML
500 SYRINGE INTRAVENOUS PRN
OUTPATIENT
Start: 2025-08-19

## 2025-08-19 RX ORDER — SODIUM CHLORIDE 9 MG/ML
5-250 INJECTION, SOLUTION INTRAVENOUS PRN
Status: DISCONTINUED | OUTPATIENT
Start: 2025-08-19 | End: 2025-08-20 | Stop reason: HOSPADM

## 2025-08-19 RX ADMIN — SODIUM CHLORIDE 25 ML/HR: 0.9 INJECTION, SOLUTION INTRAVENOUS at 10:08

## 2025-08-19 RX ADMIN — HEPARIN 500 UNITS: 100 SYRINGE at 11:24

## 2025-08-19 RX ADMIN — SODIUM CHLORIDE, PRESERVATIVE FREE 10 ML: 5 INJECTION INTRAVENOUS at 11:24

## 2025-08-19 RX ADMIN — DURVALUMAB 1500 MG: 500 INJECTION, SOLUTION INTRAVENOUS at 10:09

## 2025-08-19 RX ADMIN — SODIUM CHLORIDE, PRESERVATIVE FREE 30 ML: 5 INJECTION INTRAVENOUS at 08:55

## 2025-08-25 RX ORDER — PREDNISONE 2.5 MG/1
2.5 TABLET ORAL DAILY
Qty: 90 TABLET | Refills: 3 | Status: SHIPPED | OUTPATIENT
Start: 2025-08-25 | End: 2026-08-25

## (undated) DEVICE — BIOGUARD A/W CLEANING ADAPTER

## (undated) DEVICE — BREAST HERNIA: Brand: MEDLINE INDUSTRIES, INC.

## (undated) DEVICE — BAG,BANDED,W/RUBBERBAND,STERILE,30X36: Brand: MEDLINE

## (undated) DEVICE — SYRINGE MED 10ML LUERLOCK TIP W/O SFTY DISP

## (undated) DEVICE — GLOVE ORANGE PI 8   MSG9080

## (undated) DEVICE — HYPODERMIC SAFETY NEEDLE: Brand: MAGELLAN

## (undated) DEVICE — SUTURE VICRYL + SZ 4-0 L27IN ABSRB WHT FS-2 3/8 CIR REV CUT VCP422H

## (undated) DEVICE — SUTURE VICRYL SZ 3-0 L18IN ABSRB VLT L26MM SH 1/2 CIR J774D

## (undated) DEVICE — LIQUIBAND RAPID ADHESIVE 36/CS 0.8ML: Brand: MEDLINE

## (undated) DEVICE — PENCIL SMK EVAC ALL IN 1 DSGN ENH VISIBILITY IMPROVED AIR

## (undated) DEVICE — GLOVE ORANGE PI 7   MSG9070